# Patient Record
Sex: MALE | Race: WHITE | Employment: OTHER | URBAN - METROPOLITAN AREA
[De-identification: names, ages, dates, MRNs, and addresses within clinical notes are randomized per-mention and may not be internally consistent; named-entity substitution may affect disease eponyms.]

---

## 2017-07-28 ENCOUNTER — HOSPITAL ENCOUNTER (OUTPATIENT)
Dept: OTHER | Age: 77
Discharge: HOME | End: 2017-07-28
Attending: UROLOGY | Admitting: UROLOGY

## 2017-07-28 DIAGNOSIS — N20.0 RENAL CALCULUS, LEFT: ICD-10-CM

## 2018-03-05 PROBLEM — E11.9 DIABETES (HCC): Status: ACTIVE | Noted: 2018-03-05

## 2018-03-05 PROBLEM — R07.9 CHEST PAIN: Status: ACTIVE | Noted: 2018-03-05

## 2018-03-05 PROBLEM — E66.9 OBESITY: Status: ACTIVE | Noted: 2018-03-05

## 2018-03-05 PROBLEM — E78.5 HYPERLIPIDEMIA: Status: ACTIVE | Noted: 2018-03-05

## 2018-03-05 PROBLEM — I10 HYPERTENSION: Status: ACTIVE | Noted: 2018-03-05

## 2018-07-27 ENCOUNTER — HOSPITAL ENCOUNTER (EMERGENCY)
Age: 78
Discharge: HOME | End: 2018-07-27
Payer: MEDICARE

## 2018-07-27 ENCOUNTER — APPOINTMENT (OUTPATIENT)
Dept: GENERAL RADIOLOGY | Age: 78
End: 2018-07-27
Payer: MEDICARE

## 2018-07-27 VITALS
WEIGHT: 217 LBS | RESPIRATION RATE: 16 BRPM | OXYGEN SATURATION: 98 % | HEART RATE: 79 BPM | DIASTOLIC BLOOD PRESSURE: 79 MMHG | HEIGHT: 70 IN | BODY MASS INDEX: 31.07 KG/M2 | SYSTOLIC BLOOD PRESSURE: 136 MMHG | TEMPERATURE: 97.8 F

## 2018-07-27 DIAGNOSIS — M25.552 LEFT HIP PAIN: Primary | ICD-10-CM

## 2018-07-27 DIAGNOSIS — M54.50 ACUTE BILATERAL LOW BACK PAIN WITHOUT SCIATICA: ICD-10-CM

## 2018-07-27 PROCEDURE — 73502 X-RAY EXAM HIP UNI 2-3 VIEWS: CPT

## 2018-07-27 PROCEDURE — 72100 X-RAY EXAM L-S SPINE 2/3 VWS: CPT

## 2018-07-27 PROCEDURE — 6360000002 HC RX W HCPCS: Performed by: PHYSICIAN ASSISTANT

## 2018-07-27 PROCEDURE — 96372 THER/PROPH/DIAG INJ SC/IM: CPT

## 2018-07-27 PROCEDURE — 99283 EMERGENCY DEPT VISIT LOW MDM: CPT

## 2018-07-27 RX ORDER — ORPHENADRINE CITRATE 30 MG/ML
60 INJECTION INTRAMUSCULAR; INTRAVENOUS ONCE
Status: COMPLETED | OUTPATIENT
Start: 2018-07-27 | End: 2018-07-27

## 2018-07-27 RX ORDER — KETOROLAC TROMETHAMINE 30 MG/ML
30 INJECTION, SOLUTION INTRAMUSCULAR; INTRAVENOUS ONCE
Status: COMPLETED | OUTPATIENT
Start: 2018-07-27 | End: 2018-07-27

## 2018-07-27 RX ORDER — CYCLOBENZAPRINE HCL 10 MG
10 TABLET ORAL 3 TIMES DAILY PRN
Qty: 20 TABLET | Refills: 0 | Status: SHIPPED | OUTPATIENT
Start: 2018-07-27 | End: 2018-08-03

## 2018-07-27 RX ORDER — METHYLPREDNISOLONE 4 MG/1
TABLET ORAL
Qty: 1 KIT | Refills: 0 | Status: SHIPPED | OUTPATIENT
Start: 2018-07-27 | End: 2018-08-02

## 2018-07-27 RX ADMIN — ORPHENADRINE CITRATE 60 MG: 30 INJECTION INTRAMUSCULAR; INTRAVENOUS at 17:29

## 2018-07-27 RX ADMIN — KETOROLAC TROMETHAMINE 30 MG: 30 INJECTION, SOLUTION INTRAMUSCULAR at 17:29

## 2018-07-27 ASSESSMENT — PAIN SCALES - GENERAL
PAINLEVEL_OUTOF10: 7
PAINLEVEL_OUTOF10: 7

## 2018-07-27 NOTE — ED PROVIDER NOTES
Seen independently        [facility]  eMERGENCY dEPARTMENT eNCOUnter        Pt Name: Christelle Bateman  MRN: [id number]  [place] 1940  Date of evaluation: 7/27/2018  Provider: Shireen Atkinson PA-C  PCP: Angélica Kenny MD  ED Attending: No att. providers found    [facility]       Chief Complaint   Patient presents with    Hip Pain     pt c/o left hip pain and lower back pain that started tuesday. Pt picked up bottled water and thinks he may have done something to it or it is because he wears his wallet on his left side    Back Pain       HISTORY OF PRESENT ILLNESS   (Location/Symptom, Timing/Onset, Context/Setting, Quality, Duration, Modifying Factors, Severity)  Note limiting factors. Christelle Bateman is a [de-identified] y.o. male presents to the emergency department with left-sided lower back pain as well as left hip pain. The patient denies any known injury, but stated it started after he placed for 35-40 pound water jugs in the car on Tuesday. He describes his pain as a 2 out of 10 dull ache that is worse with changing position and walking. He denies any known injury, fever, chills, nausea, vomiting, chest pain, shortness of breath, abdominal pain, urinary symptoms, saddle paresthesias, bowel or bladder incontinence or retention, recreational drug abuse, history of recent neck or back surgery. The patient states that he does have neuropathy and has noticed no change in his numbness/tingling. Nursing Notes were all reviewed and agreed with or any disagreements were addressed  in the HPI. REVIEW OF SYSTEMS    (2-9 systems for level 4, 10 or more for level 5)     Review of Systems    Positives and Pertinent negatives as per HPI. Except as noted above in the ROS, all other systems were reviewed and negative.        PAST MEDICAL HISTORY     Past Medical History:   Diagnosis Date    Diabetes mellitus ([address]     type 2    Glaucoma     Hyperlipidemia     Hypertension     Kidney stone seen.  No fracture or destructive bony lesion is present. Mild endplate bony spurring is present. Severe narrowing of the L5-S1 interspace is present. Moderate degenerative changes of the mid and lower lumbar facet joints are present. No acute abnormality. Mild lumbar spondylosis. Moderate facet arthropathy. Xr Hip Left (2-3 Views)    Result Date: 7/27/2018  EXAMINATION: 2 XRAY VIEWS OF THE LEFT HIP 7/27/2018 5:14 pm COMPARISON: CT pelvis, 07/17/2017 HISTORY: ORDERING SYSTEM PROVIDED HISTORY: pain, no known injury TECHNOLOGIST PROVIDED HISTORY: Reason for exam:->pain, no known injury Ordering Physician Provided Reason for Exam: hip pain Acuity: Acute Type of Exam: Initial FINDINGS: The alignment of the pelvis is normal.  There is no acute fracture or subluxation. No significant arthropathy is appreciated. Arterial vascular calcifications are noted. No acute injury or significant arthropathy of the left hip is identified. PROCEDURES   Unless otherwise noted below, none     Procedures    CRITICAL CARE TIME   N/A    CONSULTS:  None      EMERGENCY DEPARTMENT COURSE and DIFFERENTIAL DIAGNOSIS/MDM:   Vitals:    Vitals:    07/27/18 1650 07/27/18 1652   BP:  136/79   Pulse: 79 79   Resp:  16   Temp:  97.8 °F (36.6 °C)   SpO2:  98%   Weight:  217 lb (98.4 kg)   Height:  5' 9.5\" (1.765 m)       Patient was given the following medications:  Medications   ketorolac (TORADOL) injection 30 mg (30 mg Intramuscular Given 7/27/18 1729)   orphenadrine (NORFLEX) injection 60 mg (60 mg Intramuscular Given 7/27/18 1729)     This patient has back pain and left hip pain which appears to be musculoskeletal.  The patient was given IM Norflex and Toradol. X-rays of the left hip show no acute injury or significant arthropathy of the left hip. X-ray of the lumbar spine shows no acute abnormality, but does show evidence of mild lumbar spondylosis and moderate facet arthropathy.   The symptoms do not appear to be cauda

## 2018-10-05 ENCOUNTER — HOSPITAL ENCOUNTER (OUTPATIENT)
Dept: GENERAL RADIOLOGY | Age: 78
Discharge: HOME | End: 2018-10-05
Payer: MEDICARE

## 2018-10-05 ENCOUNTER — HOSPITAL ENCOUNTER (OUTPATIENT)
Age: 78
Discharge: HOME | End: 2018-10-05
Payer: MEDICARE

## 2018-10-05 DIAGNOSIS — M25.552 LEFT HIP PAIN: ICD-10-CM

## 2018-10-05 PROCEDURE — 73501 X-RAY EXAM HIP UNI 1 VIEW: CPT

## 2018-12-02 ENCOUNTER — APPOINTMENT (OUTPATIENT)
Dept: GENERAL RADIOLOGY | Age: 78
End: 2018-12-02
Payer: MEDICARE

## 2018-12-02 ENCOUNTER — HOSPITAL ENCOUNTER (EMERGENCY)
Age: 78
Discharge: HOME | End: 2018-12-02
Attending: EMERGENCY MEDICINE
Payer: MEDICARE

## 2018-12-02 VITALS
SYSTOLIC BLOOD PRESSURE: 160 MMHG | BODY MASS INDEX: 32.29 KG/M2 | DIASTOLIC BLOOD PRESSURE: 87 MMHG | TEMPERATURE: 98.6 F | RESPIRATION RATE: 16 BRPM | HEART RATE: 88 BPM | HEIGHT: 69 IN | WEIGHT: 218 LBS | OXYGEN SATURATION: 93 %

## 2018-12-02 DIAGNOSIS — R05.9 COUGH: Primary | ICD-10-CM

## 2018-12-02 LAB
RAPID INFLUENZA  B AGN: NEGATIVE
RAPID INFLUENZA A AGN: NEGATIVE

## 2018-12-02 PROCEDURE — 99284 EMERGENCY DEPT VISIT MOD MDM: CPT

## 2018-12-02 PROCEDURE — 6370000000 HC RX 637 (ALT 250 FOR IP): Performed by: EMERGENCY MEDICINE

## 2018-12-02 PROCEDURE — 87804 INFLUENZA ASSAY W/OPTIC: CPT

## 2018-12-02 PROCEDURE — 71046 X-RAY EXAM CHEST 2 VIEWS: CPT

## 2018-12-02 RX ORDER — ASCORBIC ACID 500 MG
500 TABLET ORAL DAILY
COMMUNITY
End: 2021-07-27

## 2018-12-02 RX ORDER — OMEPRAZOLE 10 MG/1
10 CAPSULE, DELAYED RELEASE ORAL DAILY
COMMUNITY
End: 2021-07-27

## 2018-12-02 RX ORDER — BENZONATATE 200 MG/1
200 CAPSULE ORAL 3 TIMES DAILY PRN
Qty: 30 CAPSULE | Refills: 0 | Status: SHIPPED | OUTPATIENT
Start: 2018-12-02 | End: 2018-12-09

## 2018-12-02 RX ORDER — BROMPHENIRAMINE MALEATE, PSEUDOEPHEDRINE HYDROCHLORIDE, AND DEXTROMETHORPHAN HYDROBROMIDE 2; 30; 10 MG/5ML; MG/5ML; MG/5ML
5 SYRUP ORAL 4 TIMES DAILY PRN
Qty: 150 ML | Refills: 0 | Status: SHIPPED | OUTPATIENT
Start: 2018-12-02 | End: 2021-07-27

## 2018-12-02 RX ORDER — BENZONATATE 100 MG/1
100 CAPSULE ORAL ONCE
Status: COMPLETED | OUTPATIENT
Start: 2018-12-02 | End: 2018-12-02

## 2018-12-02 RX ADMIN — BENZONATATE 100 MG: 100 CAPSULE ORAL at 07:01

## 2018-12-02 NOTE — ED NOTES
Called RT about chest xray results. She is calling them, because she sts it looks like no one has \" picked it up to read\".       Hua Guzman RN  12/02/18 2359

## 2018-12-16 ENCOUNTER — APPOINTMENT (OUTPATIENT)
Dept: CT IMAGING | Age: 78
End: 2018-12-16
Payer: MEDICARE

## 2018-12-16 ENCOUNTER — HOSPITAL ENCOUNTER (EMERGENCY)
Age: 78
Discharge: HOME | End: 2018-12-16
Payer: MEDICARE

## 2018-12-16 VITALS
BODY MASS INDEX: 31.75 KG/M2 | TEMPERATURE: 96.6 F | OXYGEN SATURATION: 95 % | HEART RATE: 91 BPM | SYSTOLIC BLOOD PRESSURE: 138 MMHG | RESPIRATION RATE: 16 BRPM | DIASTOLIC BLOOD PRESSURE: 75 MMHG | WEIGHT: 215 LBS

## 2018-12-16 DIAGNOSIS — N20.0 KIDNEY STONE: Primary | ICD-10-CM

## 2018-12-16 DIAGNOSIS — K86.2 PANCREATIC CYST: ICD-10-CM

## 2018-12-16 DIAGNOSIS — R10.9 FLANK PAIN: ICD-10-CM

## 2018-12-16 LAB
A/G RATIO: 1.2 (ref 1.1–2.2)
ALBUMIN SERPL-MCNC: 3.9 G/DL (ref 3.4–5)
ALP BLD-CCNC: 93 U/L (ref 40–129)
ALT SERPL-CCNC: 14 U/L (ref 10–40)
AMORPHOUS: ABNORMAL /HPF
ANION GAP SERPL CALCULATED.3IONS-SCNC: 10 MMOL/L (ref 3–16)
AST SERPL-CCNC: 18 U/L (ref 15–37)
BASOPHILS ABSOLUTE: 0.1 K/UL (ref 0–0.2)
BASOPHILS RELATIVE PERCENT: 0.5 %
BILIRUB SERPL-MCNC: 0.5 MG/DL (ref 0–1)
BILIRUBIN URINE: NEGATIVE
BLOOD, URINE: ABNORMAL
BUN BLDV-MCNC: 18 MG/DL (ref 7–20)
CALCIUM SERPL-MCNC: 9.5 MG/DL (ref 8.3–10.6)
CHLORIDE BLD-SCNC: 101 MMOL/L (ref 99–110)
CLARITY: CLEAR
CO2: 26 MMOL/L (ref 21–32)
COLOR: YELLOW
CREAT SERPL-MCNC: 0.9 MG/DL (ref 0.8–1.3)
EOSINOPHILS ABSOLUTE: 0.1 K/UL (ref 0–0.6)
EOSINOPHILS RELATIVE PERCENT: 1 %
EPITHELIAL CELLS, UA: ABNORMAL /HPF
GFR AFRICAN AMERICAN: >60
GFR NON-AFRICAN AMERICAN: >60
GLOBULIN: 3.3 G/DL
GLUCOSE BLD-MCNC: 207 MG/DL (ref 70–99)
GLUCOSE URINE: 500 MG/DL
HCT VFR BLD CALC: 50.3 % (ref 40.5–52.5)
HEMOGLOBIN: 17 G/DL (ref 13.5–17.5)
KETONES, URINE: ABNORMAL MG/DL
LEUKOCYTE ESTERASE, URINE: NEGATIVE
LIPASE: 12 U/L (ref 13–60)
LYMPHOCYTES ABSOLUTE: 1.2 K/UL (ref 1–5.1)
LYMPHOCYTES RELATIVE PERCENT: 9.2 %
MCH RBC QN AUTO: 30.4 PG (ref 26–34)
MCHC RBC AUTO-ENTMCNC: 33.8 G/DL (ref 31–36)
MCV RBC AUTO: 89.9 FL (ref 80–100)
MICROSCOPIC EXAMINATION: YES
MONOCYTES ABSOLUTE: 0.6 K/UL (ref 0–1.3)
MONOCYTES RELATIVE PERCENT: 5.1 %
NEUTROPHILS ABSOLUTE: 10.6 K/UL (ref 1.7–7.7)
NEUTROPHILS RELATIVE PERCENT: 84.2 %
NITRITE, URINE: NEGATIVE
PDW BLD-RTO: 13.8 % (ref 12.4–15.4)
PH UA: 6
PLATELET # BLD: 160 K/UL (ref 135–450)
PMV BLD AUTO: 9.1 FL (ref 5–10.5)
POTASSIUM SERPL-SCNC: 3.8 MMOL/L (ref 3.5–5.1)
PROTEIN UA: 30 MG/DL
RBC # BLD: 5.59 M/UL (ref 4.2–5.9)
RBC UA: ABNORMAL /HPF (ref 0–2)
RENAL EPITHELIAL, UA: ABNORMAL /HPF
SODIUM BLD-SCNC: 137 MMOL/L (ref 136–145)
SPECIFIC GRAVITY UA: 1.02
TOTAL PROTEIN: 7.2 G/DL (ref 6.4–8.2)
URINE REFLEX TO CULTURE: ABNORMAL
URINE TYPE: ABNORMAL
UROBILINOGEN, URINE: 0.2 E.U./DL
WBC # BLD: 12.5 K/UL (ref 4–11)
WBC UA: ABNORMAL /HPF (ref 0–5)

## 2018-12-16 PROCEDURE — 6370000000 HC RX 637 (ALT 250 FOR IP): Performed by: PHYSICIAN ASSISTANT

## 2018-12-16 PROCEDURE — 2580000003 HC RX 258: Performed by: PHYSICIAN ASSISTANT

## 2018-12-16 PROCEDURE — 81001 URINALYSIS AUTO W/SCOPE: CPT

## 2018-12-16 PROCEDURE — 74176 CT ABD & PELVIS W/O CONTRAST: CPT

## 2018-12-16 PROCEDURE — 6360000002 HC RX W HCPCS: Performed by: PHYSICIAN ASSISTANT

## 2018-12-16 PROCEDURE — 96374 THER/PROPH/DIAG INJ IV PUSH: CPT

## 2018-12-16 PROCEDURE — 96361 HYDRATE IV INFUSION ADD-ON: CPT

## 2018-12-16 PROCEDURE — 99284 EMERGENCY DEPT VISIT MOD MDM: CPT

## 2018-12-16 PROCEDURE — 85025 COMPLETE CBC W/AUTO DIFF WBC: CPT

## 2018-12-16 PROCEDURE — 83690 ASSAY OF LIPASE: CPT

## 2018-12-16 PROCEDURE — 96375 TX/PRO/DX INJ NEW DRUG ADDON: CPT

## 2018-12-16 PROCEDURE — 80053 COMPREHEN METABOLIC PANEL: CPT

## 2018-12-16 RX ORDER — KETOROLAC TROMETHAMINE 30 MG/ML
15 INJECTION, SOLUTION INTRAMUSCULAR; INTRAVENOUS ONCE
Status: COMPLETED | OUTPATIENT
Start: 2018-12-16 | End: 2018-12-16

## 2018-12-16 RX ORDER — TAMSULOSIN HYDROCHLORIDE 0.4 MG/1
0.4 CAPSULE ORAL ONCE
Status: COMPLETED | OUTPATIENT
Start: 2018-12-16 | End: 2018-12-16

## 2018-12-16 RX ORDER — ONDANSETRON 2 MG/ML
4 INJECTION INTRAMUSCULAR; INTRAVENOUS ONCE
Status: COMPLETED | OUTPATIENT
Start: 2018-12-16 | End: 2018-12-16

## 2018-12-16 RX ORDER — OXYCODONE HYDROCHLORIDE AND ACETAMINOPHEN 5; 325 MG/1; MG/1
1 TABLET ORAL ONCE
Status: COMPLETED | OUTPATIENT
Start: 2018-12-16 | End: 2018-12-16

## 2018-12-16 RX ORDER — ONDANSETRON 4 MG/1
4-8 TABLET, ORALLY DISINTEGRATING ORAL EVERY 12 HOURS PRN
Qty: 12 TABLET | Refills: 0 | Status: SHIPPED | OUTPATIENT
Start: 2018-12-16

## 2018-12-16 RX ORDER — MORPHINE SULFATE 2 MG/ML
4 INJECTION, SOLUTION INTRAMUSCULAR; INTRAVENOUS ONCE
Status: COMPLETED | OUTPATIENT
Start: 2018-12-16 | End: 2018-12-16

## 2018-12-16 RX ORDER — 0.9 % SODIUM CHLORIDE 0.9 %
1000 INTRAVENOUS SOLUTION INTRAVENOUS ONCE
Status: COMPLETED | OUTPATIENT
Start: 2018-12-16 | End: 2018-12-16

## 2018-12-16 RX ORDER — OXYCODONE HYDROCHLORIDE AND ACETAMINOPHEN 5; 325 MG/1; MG/1
1 TABLET ORAL EVERY 6 HOURS PRN
Qty: 12 TABLET | Refills: 0 | Status: SHIPPED | OUTPATIENT
Start: 2018-12-16 | End: 2018-12-19

## 2018-12-16 RX ORDER — KETOROLAC TROMETHAMINE 10 MG/1
10 TABLET, FILM COATED ORAL EVERY 6 HOURS PRN
Qty: 20 TABLET | Refills: 0 | Status: ON HOLD | OUTPATIENT
Start: 2018-12-16 | End: 2020-01-20 | Stop reason: HOSPADM

## 2018-12-16 RX ORDER — ONDANSETRON 4 MG/1
8 TABLET, ORALLY DISINTEGRATING ORAL ONCE
Status: COMPLETED | OUTPATIENT
Start: 2018-12-16 | End: 2018-12-16

## 2018-12-16 RX ORDER — TAMSULOSIN HYDROCHLORIDE 0.4 MG/1
0.4 CAPSULE ORAL DAILY
Qty: 10 CAPSULE | Refills: 0 | Status: ON HOLD | OUTPATIENT
Start: 2018-12-16 | End: 2020-01-20 | Stop reason: HOSPADM

## 2018-12-16 RX ADMIN — MORPHINE SULFATE 4 MG: 2 INJECTION, SOLUTION INTRAMUSCULAR; INTRAVENOUS at 17:04

## 2018-12-16 RX ADMIN — TAMSULOSIN HYDROCHLORIDE 0.4 MG: 0.4 CAPSULE ORAL at 19:08

## 2018-12-16 RX ADMIN — ONDANSETRON 4 MG: 2 INJECTION INTRAMUSCULAR; INTRAVENOUS at 17:01

## 2018-12-16 RX ADMIN — KETOROLAC TROMETHAMINE 15 MG: 30 INJECTION, SOLUTION INTRAMUSCULAR at 17:02

## 2018-12-16 RX ADMIN — SODIUM CHLORIDE 1000 ML: 9 INJECTION, SOLUTION INTRAVENOUS at 17:01

## 2018-12-16 RX ADMIN — OXYCODONE HYDROCHLORIDE AND ACETAMINOPHEN 1 TABLET: 5; 325 TABLET ORAL at 19:12

## 2018-12-16 RX ADMIN — ONDANSETRON 8 MG: 4 TABLET, ORALLY DISINTEGRATING ORAL at 19:12

## 2018-12-16 ASSESSMENT — PAIN SCALES - GENERAL
PAINLEVEL_OUTOF10: 6
PAINLEVEL_OUTOF10: 4
PAINLEVEL_OUTOF10: 6
PAINLEVEL_OUTOF10: 4
PAINLEVEL_OUTOF10: 6

## 2018-12-16 ASSESSMENT — PAIN DESCRIPTION - LOCATION: LOCATION: FLANK

## 2018-12-16 ASSESSMENT — PAIN DESCRIPTION - PAIN TYPE: TYPE: ACUTE PAIN

## 2018-12-17 NOTE — ED PROVIDER NOTES
EYE SURGERY      left eyemed valve, bilateral retinal repair    FOOT SURGERY      right, tendon repair    KIDNEY STONE SURGERY      OTHER SURGICAL HISTORY Left 07/17/2017    CYSTOSCOPY, LEFT URETEROSCOPY, STENT PLACEMENT, URETHERAL dilation, stone manipulation          CURRENTMEDICATIONS       Discharge Medication List as of 12/16/2018  7:08 PM      CONTINUE these medications which have NOT CHANGED    Details   Cyanocobalamin ([employer] VITAMIN B-12 SL) Place under the tongue dailyHistorical Med      vitamin C (ASCORBIC ACID) 500 MG tablet Take 500 mg by mouth dailyHistorical Med      omeprazole (PRILOSEC) 10 MG delayed release capsule Take 10 mg by mouth daily Unsure of dose. Historical Med      brompheniramine-pseudoephedrine-DM (BROMFED DM) 30-2-10 MG/5ML syrup Take 5 mLs by mouth 4 times daily as needed for Congestion or Cough, Disp-150 mL, R-0Print      benazepril (LOTENSIN) 20 MG tablet Take 40 mg by mouth dailyHistorical Med      aspirin 81 MG tablet Take 81 mg by mouth dailyHistorical Med      docusate sodium (COLACE) 100 MG capsule Take 100 mg by mouth 2 times daily as needed for ConstipationHistorical Med      Cholecalciferol (VITAMIN D3) 5000 units TABS Take by mouth dailyHistorical Med      NONFORMULARY Pt takes a probiotic dose [address] [facility] that has about 8 pills in it, Unknown what each pill is. Historical Med      brimonidine (ALPHAGAN) 0.2 % ophthalmic solution Place 1 drop into both eyes 2 times daily Historical Med      b complex vitamins capsule Take 1 capsule by mouth daily      PEARL PARRA REPENS, PO Take by mouth      AMLODIPINE BESYLATE PO Take 10 mg by mouth daily Historical Med      Dorzolamide HCl (TRUSOPT OP) Apply 1 drop to eye 2 times daily Historical Med      Multiple Vitamin (MULTI VITAMIN MENS PO) Take  by mouth. insulin aspart (NOVOLOG) 100 UNIT/ML injection Inject  into the skin 3 times daily (before meals).  Sliding scale based on meal calculation       insulin glargine (LANTUS) 100 UNIT/ML injection Inject 40 Units into the skin 2 times daily. ALLERGIES     Patient has no known allergies. FAMILYHISTORY     History reviewed. No pertinent family history. SOCIAL HISTORY       Social History     Social History    Marital status:      Spouse name: N/A    Number of children: N/A    Years of education: N/A     Social History Main Topics    Smoking status: Never Smoker    Smokeless tobacco: Former User    Alcohol use No    Drug use: No    Sexual activity: Yes     Partners: Female     Other Topics Concern    None     Social History Narrative    None       SCREENINGS             PHYSICAL EXAM    (up to 7 for level 4, 8 or more for level 5)     ED Triage Vitals [12/16/18 1643]   BP Temp Temp Source Pulse Resp SpO2 Height Weight   (!) 179/92 96.6 °F (35.9 °C) Oral 97 20 96 % -- 215 lb (97.5 kg)       Physical Exam   Constitutional: He is oriented to person, place, and time. He appears well-developed and well-nourished. HENT:   Head: Normocephalic and atraumatic. Right Ear: External ear normal.   Left Ear: External ear normal.   Nose: Nose normal.   Eyes: Conjunctivae are normal. Right eye exhibits no discharge. Left eye exhibits no discharge. No scleral icterus. Neck: Normal range of motion. Neck supple. Cardiovascular: Normal rate, regular rhythm, normal heart sounds and intact distal pulses. Exam reveals no gallop and no friction rub. No murmur heard. Pulmonary/Chest: Effort normal and breath sounds normal. No respiratory distress. He has no wheezes. He has no rales. Abdominal: Soft. Normal appearance and bowel sounds are normal. He exhibits no distension and no mass. There is tenderness in the left upper quadrant. There is no rigidity, no rebound, no guarding, no CVA tenderness, no tenderness at McBurney's point and negative Roberts's sign. No hernia. Musculoskeletal: He exhibits no edema.    Neurological: He is alert and oriented to pancreas. Punctate calcifications within the spleen are likely related to prior granulomatous disease. No adrenal lesion. No right-sided hydronephrosis. Large left renal cyst.  Mild left-sided hydronephrosis. Calcifications within the left renal pelvis measure up to 1.2 cm and are suspected to be causing partial obstruction. Stone volume appears increased from the previous exam. GI/Bowel: No bowel obstruction. No acute inflammatory process. Pelvis: No free fluid. No bladder calculus. Peritoneum/Retroperitoneum: Atherosclerotic calcification of the abdominal aorta. No aortic aneurysm. No adenopathy. Bones/Soft Tissues: Bilateral inguinal hernias contain fat. Umbilical hernia contains fat. Lumbar spine degenerative changes. Mild left-sided hydronephrosis. Stone volume within the left renal pelvis appears increased from the prior study and is suspected to be causing at least partial obstruction. Complex cystic pancreatic lesion similar to prior studies.          PROCEDURES   Unless otherwise noted below, none     Procedures    CRITICAL CARE TIME   N/A    CONSULTS:  IP CONSULT TO UROLOGY      EMERGENCY DEPARTMENT COURSE and DIFFERENTIALDIAGNOSIS/MDM:   Vitals:    Vitals:    12/16/18 1643 12/16/18 1726 12/16/18 1845 12/16/18 1902   BP: (!) 179/92 (!) 185/90 138/74 138/75   Pulse: 97 93  91   Resp: 20 18  16   Temp: 96.6 °F (35.9 °C)      TempSrc: Oral      SpO2: 96% 100% 99% 95%   Weight: 215 lb (97.5 kg)          Patient was given thefollowing medications:  Medications   0.9 % sodium chloride bolus (0 mLs Intravenous Stopped 12/16/18 1908)   ondansetron (ZOFRAN) injection 4 mg (4 mg Intravenous Given 12/16/18 1701)   ketorolac (TORADOL) injection 15 mg (15 mg Intravenous Given 12/16/18 1702)   morphine (PF) injection 4 mg (4 mg Intravenous Given 12/16/18 1704)   tamsulosin (FLOMAX) capsule 0.4 mg (0.4 mg Oral Given 12/16/18 1908)   oxyCODONE-acetaminophen (PERCOCET) 5-325 MG per tablet 1 tablet (1 tablet DISPOSITION/PLAN   DISPOSITION Decision To Discharge 12/16/2018 07:04:23 PM      PATIENT REFERREDTO:  [place] [facility]  [facility]  [phone]  Go to   If symptoms worsen    Riya Lowe MD  [place] [id number]  [phone]    Schedule an appointment as soon as possible for a visit         DISCHARGE MEDICATIONS:  Discharge Medication List as of 12/16/2018  7:08 PM      START taking these medications    Details   tamsulosin (FLOMAX) 0.4 MG capsule Take 1 capsule by mouth daily for 10 days, Disp-10 capsule, R-0Print      oxyCODONE-acetaminophen (PERCOCET) 5-325 MG per tablet Take 1 tablet by mouth every 6 hours as needed for Pain for up to 3 days. Intended supply: 3 days. Take lowest dose possible to manage pain., Disp-12 tablet, R-0Print      ketorolac (TORADOL) 10 MG tablet Take 1 tablet by mouth every 6 hours as needed for Pain, Disp-20 tablet, R-0Print      ondansetron (ZOFRAN ODT) 4 MG disintegrating tablet Take 1-2 tablets by mouth every 12 hours as needed for Nausea May Sub regular tablet (non-ODT) if insurance does not cover ODT., Disp-12 tablet, R-0Print             DISCONTINUED MEDICATIONS:  Discharge Medication List as of 12/16/2018  7:08 PM            Attestation: The Prescription Monitoring Report for this patient was reviewed today. (Alfonso Lynn PA-C)  Documentation: Possible medication side effects, risk of tolerance/dependence & alternative treatments discussed.  (Alfonso Lynn PA-C)    (Please note that portions ofthis note were completed with a voice recognition program.  Efforts were made to edit the dictations but occasionally words are mis-transcribed.)    Alexsander Stephenson PA-C (electronically signed)           Alfonso Lynn PA-C  12/16/18 [id number]

## 2018-12-19 ENCOUNTER — APPOINTMENT (OUTPATIENT)
Dept: GENERAL RADIOLOGY | Age: 78
End: 2018-12-19
Payer: MEDICARE

## 2018-12-19 ENCOUNTER — APPOINTMENT (OUTPATIENT)
Dept: CT IMAGING | Age: 78
End: 2018-12-19
Payer: MEDICARE

## 2018-12-19 ENCOUNTER — HOSPITAL ENCOUNTER (EMERGENCY)
Age: 78
Discharge: HOME | End: 2018-12-19
Attending: EMERGENCY MEDICINE
Payer: MEDICARE

## 2018-12-19 VITALS
RESPIRATION RATE: 16 BRPM | HEART RATE: 101 BPM | DIASTOLIC BLOOD PRESSURE: 89 MMHG | OXYGEN SATURATION: 100 % | TEMPERATURE: 97.9 F | WEIGHT: 215 LBS | HEIGHT: 69 IN | BODY MASS INDEX: 31.84 KG/M2 | SYSTOLIC BLOOD PRESSURE: 155 MMHG

## 2018-12-19 DIAGNOSIS — K59.00 CONSTIPATION, UNSPECIFIED CONSTIPATION TYPE: Primary | ICD-10-CM

## 2018-12-19 LAB
A/G RATIO: 1.1 (ref 1.1–2.2)
ALBUMIN SERPL-MCNC: 3.6 G/DL (ref 3.4–5)
ALP BLD-CCNC: 82 U/L (ref 40–129)
ALT SERPL-CCNC: 15 U/L (ref 10–40)
AMORPHOUS: ABNORMAL /HPF
ANION GAP SERPL CALCULATED.3IONS-SCNC: 11 MMOL/L (ref 3–16)
AST SERPL-CCNC: 22 U/L (ref 15–37)
BACTERIA: ABNORMAL /HPF
BASOPHILS ABSOLUTE: 0 K/UL (ref 0–0.2)
BASOPHILS RELATIVE PERCENT: 0.3 %
BILIRUB SERPL-MCNC: 0.4 MG/DL (ref 0–1)
BILIRUBIN URINE: NEGATIVE
BLOOD, URINE: ABNORMAL
BUN BLDV-MCNC: 19 MG/DL (ref 7–20)
CALCIUM SERPL-MCNC: 8.8 MG/DL (ref 8.3–10.6)
CHLORIDE BLD-SCNC: 105 MMOL/L (ref 99–110)
CLARITY: ABNORMAL
CO2: 24 MMOL/L (ref 21–32)
COLOR: YELLOW
CREAT SERPL-MCNC: 0.9 MG/DL (ref 0.8–1.3)
EOSINOPHILS ABSOLUTE: 0 K/UL (ref 0–0.6)
EOSINOPHILS RELATIVE PERCENT: 0.4 %
EPITHELIAL CELLS, UA: ABNORMAL /HPF
GFR AFRICAN AMERICAN: >60
GFR NON-AFRICAN AMERICAN: >60
GLOBULIN: 3.2 G/DL
GLUCOSE BLD-MCNC: 211 MG/DL (ref 70–99)
GLUCOSE URINE: >=1000 MG/DL
HCT VFR BLD CALC: 46.5 % (ref 40.5–52.5)
HEMOGLOBIN: 15.7 G/DL (ref 13.5–17.5)
KETONES, URINE: NEGATIVE MG/DL
LEUKOCYTE ESTERASE, URINE: NEGATIVE
LIPASE: 11 U/L (ref 13–60)
LYMPHOCYTES ABSOLUTE: 0.8 K/UL (ref 1–5.1)
LYMPHOCYTES RELATIVE PERCENT: 8.1 %
MCH RBC QN AUTO: 30.1 PG (ref 26–34)
MCHC RBC AUTO-ENTMCNC: 33.8 G/DL (ref 31–36)
MCV RBC AUTO: 88.9 FL (ref 80–100)
MICROSCOPIC EXAMINATION: YES
MONOCYTES ABSOLUTE: 0.4 K/UL (ref 0–1.3)
MONOCYTES RELATIVE PERCENT: 4.2 %
MUCUS: ABNORMAL /LPF
NEUTROPHILS ABSOLUTE: 8.7 K/UL (ref 1.7–7.7)
NEUTROPHILS RELATIVE PERCENT: 87 %
NITRITE, URINE: NEGATIVE
PDW BLD-RTO: 14.1 % (ref 12.4–15.4)
PH UA: 5.5
PLATELET # BLD: 143 K/UL (ref 135–450)
PMV BLD AUTO: 9.5 FL (ref 5–10.5)
POTASSIUM SERPL-SCNC: 4.5 MMOL/L (ref 3.5–5.1)
PROTEIN UA: NEGATIVE MG/DL
RBC # BLD: 5.23 M/UL (ref 4.2–5.9)
RBC UA: ABNORMAL /HPF (ref 0–2)
SODIUM BLD-SCNC: 140 MMOL/L (ref 136–145)
SPECIFIC GRAVITY UA: 1.01
TOTAL PROTEIN: 6.8 G/DL (ref 6.4–8.2)
URINE TYPE: ABNORMAL
UROBILINOGEN, URINE: 0.2 E.U./DL
WBC # BLD: 10 K/UL (ref 4–11)
WBC UA: ABNORMAL /HPF (ref 0–5)

## 2018-12-19 PROCEDURE — 83690 ASSAY OF LIPASE: CPT

## 2018-12-19 PROCEDURE — 6370000000 HC RX 637 (ALT 250 FOR IP): Performed by: NURSE PRACTITIONER

## 2018-12-19 PROCEDURE — 96360 HYDRATION IV INFUSION INIT: CPT

## 2018-12-19 PROCEDURE — 85025 COMPLETE CBC W/AUTO DIFF WBC: CPT

## 2018-12-19 PROCEDURE — 99284 EMERGENCY DEPT VISIT MOD MDM: CPT

## 2018-12-19 PROCEDURE — 80053 COMPREHEN METABOLIC PANEL: CPT

## 2018-12-19 PROCEDURE — 81001 URINALYSIS AUTO W/SCOPE: CPT

## 2018-12-19 PROCEDURE — 2580000003 HC RX 258: Performed by: NURSE PRACTITIONER

## 2018-12-19 PROCEDURE — 74177 CT ABD & PELVIS W/CONTRAST: CPT

## 2018-12-19 PROCEDURE — 6360000004 HC RX CONTRAST MEDICATION: Performed by: NURSE PRACTITIONER

## 2018-12-19 RX ORDER — POLYETHYLENE GLYCOL 3350 17 G/17G
17 POWDER, FOR SOLUTION ORAL DAILY
Qty: 1 BOTTLE | Refills: 0 | Status: SHIPPED | OUTPATIENT
Start: 2018-12-19 | End: 2018-12-26

## 2018-12-19 RX ORDER — DICYCLOMINE HYDROCHLORIDE 10 MG/1
10 CAPSULE ORAL ONCE
Status: COMPLETED | OUTPATIENT
Start: 2018-12-19 | End: 2018-12-19

## 2018-12-19 RX ORDER — 0.9 % SODIUM CHLORIDE 0.9 %
1000 INTRAVENOUS SOLUTION INTRAVENOUS ONCE
Status: COMPLETED | OUTPATIENT
Start: 2018-12-19 | End: 2018-12-19

## 2018-12-19 RX ADMIN — DICYCLOMINE HYDROCHLORIDE 10 MG: 10 CAPSULE ORAL at 13:48

## 2018-12-19 RX ADMIN — IOPAMIDOL 75 ML: 755 INJECTION, SOLUTION INTRAVENOUS at 13:26

## 2018-12-19 RX ADMIN — SORBITOL: 258.2 SOLUTION ORAL at 14:47

## 2018-12-19 RX ADMIN — SODIUM CHLORIDE 1000 ML: 9 INJECTION, SOLUTION INTRAVENOUS at 12:41

## 2018-12-19 ASSESSMENT — PAIN SCALES - GENERAL: PAINLEVEL_OUTOF10: 6

## 2018-12-19 ASSESSMENT — ENCOUNTER SYMPTOMS
COUGH: 0
ABDOMINAL PAIN: 1
ANAL BLEEDING: 0
BLOOD IN STOOL: 0
VOMITING: 0
DIARRHEA: 0
CONSTIPATION: 1
SHORTNESS OF BREATH: 0
BACK PAIN: 0
NAUSEA: 0

## 2018-12-19 ASSESSMENT — PAIN DESCRIPTION - LOCATION: LOCATION: RECTUM

## 2018-12-19 ASSESSMENT — PAIN SCALES - WONG BAKER: WONGBAKER_NUMERICALRESPONSE: 6

## 2018-12-19 ASSESSMENT — PAIN DESCRIPTION - PAIN TYPE: TYPE: ACUTE PAIN

## 2018-12-19 NOTE — ED PROVIDER NOTES
[address] ED  eMERGENCY dEPARTMENT eNCOUnter        Pt Name: Claudia Tamayo  MRN: [id number]  [place] 1940  Date of evaluation: 12/19/2018  Provider: TRESSA Bautista - JIA  PCP: Dorrie Dandy, MD  ED Attending: No att. providers found    [address] [place] [address]       Chief Complaint   Patient presents with    Rectal Problems     complains of GI problems, 4-5 eposides of loose stools today, last good BM was 1 week ago, (took enema today)       HISTORY OF PRESENT ILLNESS   (Location/Symptom, Timing/Onset, Context/Setting, Quality, Duration, Modifying Factors, Severity)  Note limiting factors. Claudia Tamayo is a [de-identified] y.o. male  tense to the emergency department with complaints of constipation. He reports he has not had a bowel movement since last Thursday. He was seen in this emergency department on Saturday for a kidney stone and was given morphine and oxycodone. He has not needed additional doses of narcotics since he left the emergency department on Saturday. He reports that starting about 4 AM he had some abdominal cramping and rectal pressure like he needed to use the bathroom but was unable to. He did try tapwater enema without much success. He is having no pain in his abdomen and was his abdomen is touched. He has had no abdominal surgeries. Nursing Notes were all reviewed and agreed with or any disagreements were addressed  in the HPI. REVIEW OF SYSTEMS    (2-9 systems for level 4, 10 or more for level 5)     Review of Systems   Constitutional: Negative for chills and fever. HENT: Negative. Respiratory: Negative for cough and shortness of breath. Cardiovascular: Negative for chest pain. Gastrointestinal: Positive for abdominal pain and constipation. Negative for anal bleeding, blood in stool, diarrhea, nausea and vomiting. Genitourinary: Negative for dysuria, flank pain and hematuria. Musculoskeletal: Negative for back pain. Skin: Negative. Abnormal; Notable for the following:     Mucus, UA 1+ (*)     RBC, UA  (*)     Bacteria, UA Rare (*)     Amorphous, UA 1+ (*)     All other components within normal limits    Narrative:     Performed at:  [facility]) [facility]  [address],  [place], [place]   Phone [phone]       All other labs were within normal range or not returned as of this dictation. EKG: All EKG's are interpreted by the Emergency Department Physician who either signs orCo-signs this chart in the absence of a cardiologist.  Please see their note for interpretation of EKG. RADIOLOGY:   Non-plain film images such as CT, Ultrasound and MRI are read by the radiologist. Selestino Coca radiographic images are visualized andpreliminarily interpreted by the  ED Provider with the below findings:    Interpretation per theRadiologist below, if available at the time of this note:    CT ABDOMEN PELVIS W IV CONTRAST Additional Contrast? None   Final Result   1. Fecal impaction with mild presacral edema. 2. Unchanged 9 mm obstructing nephrolithiasis within the left renal pelvis   with resolution of the previously noted mild hydronephrosis. 3. Unchanged 5.3 cm complex cystic pancreatic mass; correlate with endoscopic   ultrasound. No results found.       PROCEDURES   Unless otherwise noted below, none     Procedures    CRITICAL CARE TIME   N/A    CONSULTS:  None      EMERGENCY DEPARTMENT COURSE and DIFFERENTIALDIAGNOSIS/MDM:   Vitals:    Vitals:    12/19/18 1151 12/19/18 1404   BP: (!) 150/79 (!) 155/89   Pulse: 100 101   Resp: 12 16   Temp: 97.9 °F (36.6 °C)    SpO2: 100% 100%   Weight: 215 lb (97.5 kg)    Height: 5' 9\" (1.753 m)        Patient was given thefollowing medications:  Medications   0.9 % sodium chloride bolus (0 mLs Intravenous Stopped 12/19/18 1341)   iopamidol (ISOVUE-370) 76 % injection 75 mL (75 mLs Intravenous Given 12/19/18 1326)   dicyclomine (BENTYL) capsule 10 mg (10 mg Oral Given

## 2018-12-19 NOTE — ED NOTES
Bed: 15  Expected date:   Expected time:   Means of arrival:   Comments:  jesi Shearer  12/19/18 1142

## 2019-01-20 ENCOUNTER — HOSPITAL ENCOUNTER (OUTPATIENT)
Age: 79
Setting detail: SPECIMEN
Discharge: HOME | End: 2019-01-20
Payer: MEDICARE

## 2019-01-20 PROCEDURE — 88300 SURGICAL PATH GROSS: CPT

## 2019-01-20 PROCEDURE — 82365 CALCULUS SPECTROSCOPY: CPT

## 2019-01-27 LAB
CALCULI COMPOSITION: NORMAL
MASS: 78 MG
STONE DESCRIPTION: NORMAL
STONE NUMBER: NORMAL
STONE SIZE: NORMAL MM

## 2019-12-27 ENCOUNTER — APPOINTMENT (OUTPATIENT)
Dept: GENERAL RADIOLOGY | Age: 79
End: 2019-12-27
Payer: MEDICARE

## 2019-12-27 ENCOUNTER — HOSPITAL ENCOUNTER (EMERGENCY)
Age: 79
Discharge: HOME | End: 2019-12-27
Payer: MEDICARE

## 2019-12-27 VITALS
TEMPERATURE: 99 F | SYSTOLIC BLOOD PRESSURE: 150 MMHG | WEIGHT: 214 LBS | BODY MASS INDEX: 31.7 KG/M2 | HEIGHT: 69 IN | DIASTOLIC BLOOD PRESSURE: 81 MMHG | RESPIRATION RATE: 18 BRPM | HEART RATE: 89 BPM | OXYGEN SATURATION: 96 %

## 2019-12-27 DIAGNOSIS — I10 ESSENTIAL HYPERTENSION: Primary | ICD-10-CM

## 2019-12-27 DIAGNOSIS — M25.552 LEFT HIP PAIN: ICD-10-CM

## 2019-12-27 LAB
A/G RATIO: 1.2 (ref 1.1–2.2)
ALBUMIN SERPL-MCNC: 4 G/DL (ref 3.4–5)
ALP BLD-CCNC: 105 U/L (ref 40–129)
ALT SERPL-CCNC: 21 U/L (ref 10–40)
ANION GAP SERPL CALCULATED.3IONS-SCNC: 12 MMOL/L (ref 3–16)
AST SERPL-CCNC: 24 U/L (ref 15–37)
BASOPHILS ABSOLUTE: 0.1 K/UL (ref 0–0.2)
BASOPHILS RELATIVE PERCENT: 1.3 %
BILIRUB SERPL-MCNC: 0.3 MG/DL (ref 0–1)
BUN BLDV-MCNC: 19 MG/DL (ref 7–20)
CALCIUM SERPL-MCNC: 9.3 MG/DL (ref 8.3–10.6)
CHLORIDE BLD-SCNC: 102 MMOL/L (ref 99–110)
CO2: 24 MMOL/L (ref 21–32)
CREAT SERPL-MCNC: 0.9 MG/DL (ref 0.8–1.3)
EOSINOPHILS ABSOLUTE: 0.1 K/UL (ref 0–0.6)
EOSINOPHILS RELATIVE PERCENT: 1.6 %
GFR AFRICAN AMERICAN: >60
GFR NON-AFRICAN AMERICAN: >60
GLOBULIN: 3.4 G/DL
GLUCOSE BLD-MCNC: 277 MG/DL (ref 70–99)
HCT VFR BLD CALC: 46.5 % (ref 40.5–52.5)
HEMOGLOBIN: 15.9 G/DL (ref 13.5–17.5)
LYMPHOCYTES ABSOLUTE: 1.3 K/UL (ref 1–5.1)
LYMPHOCYTES RELATIVE PERCENT: 15.6 %
MCH RBC QN AUTO: 31 PG (ref 26–34)
MCHC RBC AUTO-ENTMCNC: 34.1 G/DL (ref 31–36)
MCV RBC AUTO: 90.7 FL (ref 80–100)
MONOCYTES ABSOLUTE: 0.5 K/UL (ref 0–1.3)
MONOCYTES RELATIVE PERCENT: 6.3 %
NEUTROPHILS ABSOLUTE: 6.4 K/UL (ref 1.7–7.7)
NEUTROPHILS RELATIVE PERCENT: 75.2 %
PDW BLD-RTO: 13.6 % (ref 12.4–15.4)
PLATELET # BLD: 154 K/UL (ref 135–450)
PMV BLD AUTO: 8.9 FL (ref 5–10.5)
POTASSIUM REFLEX MAGNESIUM: 4.3 MMOL/L (ref 3.5–5.1)
RBC # BLD: 5.12 M/UL (ref 4.2–5.9)
SODIUM BLD-SCNC: 138 MMOL/L (ref 136–145)
TOTAL PROTEIN: 7.4 G/DL (ref 6.4–8.2)
TROPONIN: <0.01 NG/ML
WBC # BLD: 8.5 K/UL (ref 4–11)

## 2019-12-27 PROCEDURE — 73501 X-RAY EXAM HIP UNI 1 VIEW: CPT

## 2019-12-27 PROCEDURE — 99283 EMERGENCY DEPT VISIT LOW MDM: CPT

## 2019-12-27 PROCEDURE — 80053 COMPREHEN METABOLIC PANEL: CPT

## 2019-12-27 PROCEDURE — 85025 COMPLETE CBC W/AUTO DIFF WBC: CPT

## 2019-12-27 PROCEDURE — 6370000000 HC RX 637 (ALT 250 FOR IP): Performed by: NURSE PRACTITIONER

## 2019-12-27 PROCEDURE — 93005 ELECTROCARDIOGRAM TRACING: CPT | Performed by: NURSE PRACTITIONER

## 2019-12-27 PROCEDURE — 6360000002 HC RX W HCPCS: Performed by: NURSE PRACTITIONER

## 2019-12-27 PROCEDURE — 71046 X-RAY EXAM CHEST 2 VIEWS: CPT

## 2019-12-27 PROCEDURE — 96361 HYDRATE IV INFUSION ADD-ON: CPT

## 2019-12-27 PROCEDURE — 96374 THER/PROPH/DIAG INJ IV PUSH: CPT

## 2019-12-27 PROCEDURE — 2580000003 HC RX 258: Performed by: NURSE PRACTITIONER

## 2019-12-27 PROCEDURE — 84484 ASSAY OF TROPONIN QUANT: CPT

## 2019-12-27 RX ORDER — 0.9 % SODIUM CHLORIDE 0.9 %
1000 INTRAVENOUS SOLUTION INTRAVENOUS ONCE
Status: COMPLETED | OUTPATIENT
Start: 2019-12-27 | End: 2019-12-27

## 2019-12-27 RX ORDER — BENAZEPRIL HYDROCHLORIDE 20 MG/1
20 TABLET ORAL DAILY
Status: DISCONTINUED | OUTPATIENT
Start: 2019-12-27 | End: 2019-12-28 | Stop reason: HOSPADM

## 2019-12-27 RX ORDER — LIDOCAINE 50 MG/G
1 PATCH TOPICAL DAILY
Qty: 30 PATCH | Refills: 0 | Status: SHIPPED | OUTPATIENT
Start: 2019-12-27 | End: 2021-09-27

## 2019-12-27 RX ORDER — LIDOCAINE 4 G/G
1 PATCH TOPICAL ONCE
Status: DISCONTINUED | OUTPATIENT
Start: 2019-12-27 | End: 2019-12-28 | Stop reason: HOSPADM

## 2019-12-27 RX ORDER — HYDROCHLOROTHIAZIDE 25 MG/1
12.5 TABLET ORAL ONCE
Status: COMPLETED | OUTPATIENT
Start: 2019-12-27 | End: 2019-12-27

## 2019-12-27 RX ORDER — AMLODIPINE BESYLATE 10 MG/1
10 TABLET ORAL DAILY
Qty: 14 TABLET | Refills: 0 | Status: SHIPPED | OUTPATIENT
Start: 2019-12-27 | End: 2021-07-27

## 2019-12-27 RX ORDER — AMLODIPINE BESYLATE 5 MG/1
10 TABLET ORAL ONCE
Status: COMPLETED | OUTPATIENT
Start: 2019-12-27 | End: 2019-12-27

## 2019-12-27 RX ORDER — KETOROLAC TROMETHAMINE 30 MG/ML
15 INJECTION, SOLUTION INTRAMUSCULAR; INTRAVENOUS ONCE
Status: COMPLETED | OUTPATIENT
Start: 2019-12-27 | End: 2019-12-27

## 2019-12-27 RX ADMIN — KETOROLAC TROMETHAMINE 15 MG: 30 INJECTION, SOLUTION INTRAMUSCULAR at 22:25

## 2019-12-27 RX ADMIN — HYDROCHLOROTHIAZIDE 12.5 MG: 25 TABLET ORAL at 20:54

## 2019-12-27 RX ADMIN — AMLODIPINE BESYLATE 10 MG: 5 TABLET ORAL at 20:54

## 2019-12-27 RX ADMIN — BENAZEPRIL HYDROCHLORIDE 20 MG: 20 TABLET, COATED ORAL at 20:54

## 2019-12-27 RX ADMIN — SODIUM CHLORIDE 1000 ML: 9 INJECTION, SOLUTION INTRAVENOUS at 20:54

## 2019-12-27 ASSESSMENT — ENCOUNTER SYMPTOMS
SHORTNESS OF BREATH: 0
BACK PAIN: 0
COUGH: 0
GASTROINTESTINAL NEGATIVE: 1

## 2019-12-27 ASSESSMENT — PAIN SCALES - GENERAL: PAINLEVEL_OUTOF10: 0

## 2019-12-28 LAB
EKG ATRIAL RATE: 89 BPM
EKG DIAGNOSIS: NORMAL
EKG P AXIS: 39 DEGREES
EKG P-R INTERVAL: 164 MS
EKG Q-T INTERVAL: 380 MS
EKG QRS DURATION: 84 MS
EKG QTC CALCULATION (BAZETT): 462 MS
EKG R AXIS: 14 DEGREES
EKG T AXIS: 14 DEGREES
EKG VENTRICULAR RATE: 89 BPM

## 2019-12-28 PROCEDURE — 93010 ELECTROCARDIOGRAM REPORT: CPT | Performed by: INTERNAL MEDICINE

## 2020-01-16 ENCOUNTER — APPOINTMENT (OUTPATIENT)
Dept: CT IMAGING | Age: 80
End: 2020-01-16
Payer: MEDICARE

## 2020-01-16 ENCOUNTER — HOSPITAL ENCOUNTER (EMERGENCY)
Age: 80
Discharge: ANOTHER HOSPITAL | End: 2020-01-16
Attending: EMERGENCY MEDICINE
Payer: MEDICARE

## 2020-01-16 ENCOUNTER — HOSPITAL ENCOUNTER (INPATIENT)
Age: 80
LOS: 3 days | Discharge: HOME | DRG: 066 | End: 2020-01-20
Attending: INTERNAL MEDICINE | Admitting: INTERNAL MEDICINE
Payer: MEDICARE

## 2020-01-16 ENCOUNTER — APPOINTMENT (OUTPATIENT)
Dept: GENERAL RADIOLOGY | Age: 80
End: 2020-01-16
Payer: MEDICARE

## 2020-01-16 VITALS
WEIGHT: 215 LBS | TEMPERATURE: 98.5 F | HEART RATE: 88 BPM | DIASTOLIC BLOOD PRESSURE: 77 MMHG | BODY MASS INDEX: 31.84 KG/M2 | HEIGHT: 69 IN | OXYGEN SATURATION: 98 % | SYSTOLIC BLOOD PRESSURE: 137 MMHG | RESPIRATION RATE: 17 BRPM

## 2020-01-16 PROBLEM — G45.9 TIA (TRANSIENT ISCHEMIC ATTACK): Status: ACTIVE | Noted: 2020-01-16

## 2020-01-16 LAB
A/G RATIO: 1.5 (ref 1.1–2.2)
ALBUMIN SERPL-MCNC: 4.1 G/DL (ref 3.4–5)
ALP BLD-CCNC: 90 U/L (ref 40–129)
ALT SERPL-CCNC: 27 U/L (ref 10–40)
ANION GAP SERPL CALCULATED.3IONS-SCNC: 14 MMOL/L (ref 3–16)
AST SERPL-CCNC: 24 U/L (ref 15–37)
BASE EXCESS VENOUS: -1.1 MMOL/L (ref -3–3)
BASOPHILS ABSOLUTE: 0.1 K/UL (ref 0–0.2)
BASOPHILS RELATIVE PERCENT: 0.8 %
BETA-HYDROXYBUTYRATE: 0.2 MMOL/L (ref 0–0.27)
BILIRUB SERPL-MCNC: 0.4 MG/DL (ref 0–1)
BILIRUBIN URINE: NEGATIVE
BLOOD, URINE: NEGATIVE
BUN BLDV-MCNC: 26 MG/DL (ref 7–20)
CALCIUM SERPL-MCNC: 9.4 MG/DL (ref 8.3–10.6)
CARBOXYHEMOGLOBIN: 3.5 % (ref 0–1.5)
CHLORIDE BLD-SCNC: 99 MMOL/L (ref 99–110)
CHP ED QC CHECK: NORMAL
CLARITY: CLEAR
CO2: 22 MMOL/L (ref 21–32)
COLOR: YELLOW
CREAT SERPL-MCNC: 1 MG/DL (ref 0.8–1.3)
EKG ATRIAL RATE: 98 BPM
EKG DIAGNOSIS: NORMAL
EKG P AXIS: 54 DEGREES
EKG P-R INTERVAL: 178 MS
EKG Q-T INTERVAL: 362 MS
EKG QRS DURATION: 82 MS
EKG QTC CALCULATION (BAZETT): 462 MS
EKG R AXIS: 39 DEGREES
EKG T AXIS: 35 DEGREES
EKG VENTRICULAR RATE: 98 BPM
EOSINOPHILS ABSOLUTE: 0.1 K/UL (ref 0–0.6)
EOSINOPHILS RELATIVE PERCENT: 1.4 %
GFR AFRICAN AMERICAN: >60
GFR NON-AFRICAN AMERICAN: >60
GLOBULIN: 2.7 G/DL
GLUCOSE BLD-MCNC: 351 MG/DL (ref 70–99)
GLUCOSE BLD-MCNC: 359 MG/DL
GLUCOSE BLD-MCNC: 359 MG/DL (ref 70–99)
GLUCOSE URINE: >=1000 MG/DL
HCO3 VENOUS: 22.6 MMOL/L (ref 23–29)
HCT VFR BLD CALC: 46.2 % (ref 40.5–52.5)
HEMOGLOBIN: 15.7 G/DL (ref 13.5–17.5)
INR BLD: 0.95 (ref 0.86–1.14)
KETONES, URINE: NEGATIVE MG/DL
LEUKOCYTE ESTERASE, URINE: NEGATIVE
LYMPHOCYTES ABSOLUTE: 1.6 K/UL (ref 1–5.1)
LYMPHOCYTES RELATIVE PERCENT: 19.8 %
MCH RBC QN AUTO: 30.9 PG (ref 26–34)
MCHC RBC AUTO-ENTMCNC: 34 G/DL (ref 31–36)
MCV RBC AUTO: 91 FL (ref 80–100)
METHEMOGLOBIN VENOUS: 0.3 %
MICROSCOPIC EXAMINATION: ABNORMAL
MONOCYTES ABSOLUTE: 0.4 K/UL (ref 0–1.3)
MONOCYTES RELATIVE PERCENT: 5.1 %
NEUTROPHILS ABSOLUTE: 5.8 K/UL (ref 1.7–7.7)
NEUTROPHILS RELATIVE PERCENT: 72.9 %
NITRITE, URINE: NEGATIVE
O2 CONTENT, VEN: 21 VOL %
O2 SAT, VEN: 97 %
O2 THERAPY: ABNORMAL
PCO2, VEN: 35.2 MMHG (ref 40–50)
PDW BLD-RTO: 13.4 % (ref 12.4–15.4)
PERFORMED ON: ABNORMAL
PH UA: 5 (ref 5–8)
PH VENOUS: 7.43 (ref 7.35–7.45)
PLATELET # BLD: 153 K/UL (ref 135–450)
PMV BLD AUTO: 9.6 FL (ref 5–10.5)
PO2, VEN: 86.4 MMHG (ref 25–40)
POTASSIUM REFLEX MAGNESIUM: 4.8 MMOL/L (ref 3.5–5.1)
PROTEIN UA: NEGATIVE MG/DL
PROTHROMBIN TIME: 11 SEC (ref 10–13.2)
RBC # BLD: 5.07 M/UL (ref 4.2–5.9)
SODIUM BLD-SCNC: 135 MMOL/L (ref 136–145)
SPECIFIC GRAVITY UA: 1.01 (ref 1–1.03)
TCO2 CALC VENOUS: 24 MMOL/L
TOTAL PROTEIN: 6.8 G/DL (ref 6.4–8.2)
TROPONIN: 0.02 NG/ML
URINE REFLEX TO CULTURE: ABNORMAL
URINE TYPE: ABNORMAL
UROBILINOGEN, URINE: 0.2 E.U./DL
WBC # BLD: 8 K/UL (ref 4–11)

## 2020-01-16 PROCEDURE — 71045 X-RAY EXAM CHEST 1 VIEW: CPT

## 2020-01-16 PROCEDURE — 93010 ELECTROCARDIOGRAM REPORT: CPT | Performed by: INTERNAL MEDICINE

## 2020-01-16 PROCEDURE — 82803 BLOOD GASES ANY COMBINATION: CPT

## 2020-01-16 PROCEDURE — 2580000003 HC RX 258: Performed by: EMERGENCY MEDICINE

## 2020-01-16 PROCEDURE — 93005 ELECTROCARDIOGRAM TRACING: CPT | Performed by: EMERGENCY MEDICINE

## 2020-01-16 PROCEDURE — 84484 ASSAY OF TROPONIN QUANT: CPT

## 2020-01-16 PROCEDURE — 6370000000 HC RX 637 (ALT 250 FOR IP): Performed by: INTERNAL MEDICINE

## 2020-01-16 PROCEDURE — 70450 CT HEAD/BRAIN W/O DYE: CPT

## 2020-01-16 PROCEDURE — 85025 COMPLETE CBC W/AUTO DIFF WBC: CPT

## 2020-01-16 PROCEDURE — 85610 PROTHROMBIN TIME: CPT

## 2020-01-16 PROCEDURE — 96361 HYDRATE IV INFUSION ADD-ON: CPT

## 2020-01-16 PROCEDURE — 81003 URINALYSIS AUTO W/O SCOPE: CPT

## 2020-01-16 PROCEDURE — 6370000000 HC RX 637 (ALT 250 FOR IP): Performed by: EMERGENCY MEDICINE

## 2020-01-16 PROCEDURE — G0378 HOSPITAL OBSERVATION PER HR: HCPCS

## 2020-01-16 PROCEDURE — 2580000003 HC RX 258: Performed by: INTERNAL MEDICINE

## 2020-01-16 PROCEDURE — 70498 CT ANGIOGRAPHY NECK: CPT

## 2020-01-16 PROCEDURE — G0379 DIRECT REFER HOSPITAL OBSERV: HCPCS

## 2020-01-16 PROCEDURE — 82010 KETONE BODYS QUAN: CPT

## 2020-01-16 PROCEDURE — 96360 HYDRATION IV INFUSION INIT: CPT

## 2020-01-16 PROCEDURE — 80053 COMPREHEN METABOLIC PANEL: CPT

## 2020-01-16 PROCEDURE — 6370000000 HC RX 637 (ALT 250 FOR IP): Performed by: NURSE PRACTITIONER

## 2020-01-16 PROCEDURE — 6360000004 HC RX CONTRAST MEDICATION: Performed by: EMERGENCY MEDICINE

## 2020-01-16 PROCEDURE — 99285 EMERGENCY DEPT VISIT HI MDM: CPT

## 2020-01-16 RX ORDER — SENNA AND DOCUSATE SODIUM 50; 8.6 MG/1; MG/1
2 TABLET, FILM COATED ORAL DAILY PRN
Status: DISCONTINUED | OUTPATIENT
Start: 2020-01-16 | End: 2020-01-18

## 2020-01-16 RX ORDER — 0.9 % SODIUM CHLORIDE 0.9 %
1000 INTRAVENOUS SOLUTION INTRAVENOUS ONCE
Status: COMPLETED | OUTPATIENT
Start: 2020-01-16 | End: 2020-01-16

## 2020-01-16 RX ORDER — LABETALOL HYDROCHLORIDE 5 MG/ML
10 INJECTION, SOLUTION INTRAVENOUS EVERY 10 MIN PRN
Status: DISCONTINUED | OUTPATIENT
Start: 2020-01-16 | End: 2020-01-20 | Stop reason: HOSPADM

## 2020-01-16 RX ORDER — SODIUM CHLORIDE 0.9 % (FLUSH) 0.9 %
10 SYRINGE (ML) INJECTION EVERY 12 HOURS SCHEDULED
Status: DISCONTINUED | OUTPATIENT
Start: 2020-01-16 | End: 2020-01-20 | Stop reason: HOSPADM

## 2020-01-16 RX ORDER — INSULIN GLARGINE 100 [IU]/ML
40 INJECTION, SOLUTION SUBCUTANEOUS 2 TIMES DAILY
Status: DISCONTINUED | OUTPATIENT
Start: 2020-01-16 | End: 2020-01-20 | Stop reason: HOSPADM

## 2020-01-16 RX ORDER — SODIUM CHLORIDE 0.9 % (FLUSH) 0.9 %
10 SYRINGE (ML) INJECTION PRN
Status: DISCONTINUED | OUTPATIENT
Start: 2020-01-16 | End: 2020-01-20 | Stop reason: HOSPADM

## 2020-01-16 RX ORDER — DEXTROSE MONOHYDRATE 50 MG/ML
100 INJECTION, SOLUTION INTRAVENOUS PRN
Status: DISCONTINUED | OUTPATIENT
Start: 2020-01-16 | End: 2020-01-20 | Stop reason: HOSPADM

## 2020-01-16 RX ORDER — PANTOPRAZOLE SODIUM 40 MG/1
40 TABLET, DELAYED RELEASE ORAL
Status: DISCONTINUED | OUTPATIENT
Start: 2020-01-17 | End: 2020-01-20 | Stop reason: HOSPADM

## 2020-01-16 RX ORDER — ASPIRIN 81 MG/1
81 TABLET, CHEWABLE ORAL DAILY
Status: DISCONTINUED | OUTPATIENT
Start: 2020-01-17 | End: 2020-01-20 | Stop reason: HOSPADM

## 2020-01-16 RX ORDER — ONDANSETRON 2 MG/ML
4 INJECTION INTRAMUSCULAR; INTRAVENOUS EVERY 6 HOURS PRN
Status: DISCONTINUED | OUTPATIENT
Start: 2020-01-16 | End: 2020-01-20 | Stop reason: HOSPADM

## 2020-01-16 RX ORDER — ATORVASTATIN CALCIUM 80 MG/1
80 TABLET, FILM COATED ORAL NIGHTLY
Status: DISCONTINUED | OUTPATIENT
Start: 2020-01-16 | End: 2020-01-20 | Stop reason: HOSPADM

## 2020-01-16 RX ORDER — LORAZEPAM 2 MG/ML
1 INJECTION INTRAMUSCULAR EVERY 6 HOURS PRN
Status: DISCONTINUED | OUTPATIENT
Start: 2020-01-16 | End: 2020-01-20 | Stop reason: HOSPADM

## 2020-01-16 RX ORDER — ASPIRIN 81 MG/1
324 TABLET, CHEWABLE ORAL ONCE
Status: COMPLETED | OUTPATIENT
Start: 2020-01-16 | End: 2020-01-16

## 2020-01-16 RX ORDER — DEXTROSE MONOHYDRATE 25 G/50ML
12.5 INJECTION, SOLUTION INTRAVENOUS PRN
Status: DISCONTINUED | OUTPATIENT
Start: 2020-01-16 | End: 2020-01-20 | Stop reason: HOSPADM

## 2020-01-16 RX ORDER — ACETAMINOPHEN 325 MG/1
650 TABLET ORAL ONCE
Status: COMPLETED | OUTPATIENT
Start: 2020-01-16 | End: 2020-01-16

## 2020-01-16 RX ORDER — NICOTINE POLACRILEX 4 MG
15 LOZENGE BUCCAL PRN
Status: DISCONTINUED | OUTPATIENT
Start: 2020-01-16 | End: 2020-01-20 | Stop reason: HOSPADM

## 2020-01-16 RX ADMIN — IOPAMIDOL 75 ML: 755 INJECTION, SOLUTION INTRAVENOUS at 16:04

## 2020-01-16 RX ADMIN — INSULIN GLARGINE 40 UNITS: 100 INJECTION, SOLUTION SUBCUTANEOUS at 22:34

## 2020-01-16 RX ADMIN — SENNOSIDES AND DOCUSATE SODIUM 2 TABLET: 8.6; 5 TABLET ORAL at 23:30

## 2020-01-16 RX ADMIN — Medication 10 ML: at 23:13

## 2020-01-16 RX ADMIN — ACETAMINOPHEN 650 MG: 325 TABLET ORAL at 16:57

## 2020-01-16 RX ADMIN — ASPIRIN 81 MG 324 MG: 81 TABLET ORAL at 18:13

## 2020-01-16 RX ADMIN — SODIUM CHLORIDE 1000 ML: 9 INJECTION, SOLUTION INTRAVENOUS at 15:42

## 2020-01-16 RX ADMIN — INSULIN LISPRO 1 UNITS: 100 INJECTION, SOLUTION INTRAVENOUS; SUBCUTANEOUS at 22:34

## 2020-01-16 ASSESSMENT — PAIN SCALES - GENERAL
PAINLEVEL_OUTOF10: 0
PAINLEVEL_OUTOF10: 2

## 2020-01-16 NOTE — ED NOTES
[facility] [place]      Benedicto Gong, CAROL  01/16/20 KATYA Pedroza, RN  01/16/20 KATYA Pedroza, RN  01/16/20 [id number]

## 2020-01-16 NOTE — ED PROVIDER NOTES
insecurity:     Worry: Not on file     Inability: Not on file    Transportation needs:     Medical: Not on file     Non-medical: Not on file   Tobacco Use    Smoking status: Never Smoker    Smokeless tobacco: Former User   Substance and Sexual Activity    Alcohol use: No    Drug use: No    Sexual activity: Yes     Partners: Female   Lifestyle    Physical activity:     Days per week: Not on file     Minutes per session: Not on file    Stress: Not on file   Relationships    Social connections:     Talks on phone: Not on file     Gets together: Not on file     Attends Rastafari service: Not on file     Active member of club or organization: Not on file     Attends meetings of clubs or organizations: Not on file     Relationship status: Not on file    Intimate partner violence:     Fear of current or ex partner: Not on file     Emotionally abused: Not on file     Physically abused: Not on file     Forced sexual activity: Not on file   Other Topics Concern    Not on file   Social History Narrative    Not on file     No current facility-administered medications for this encounter. No current outpatient medications on file.      Facility-Administered Medications Ordered in Other Encounters   Medication Dose Route Frequency Provider Last Rate Last Dose    [START ON 1/17/2020] aspirin chewable tablet 81 mg  81 mg Oral Daily Leeanne Rosales MD        insulin glargine (LANTUS) injection vial 40 Units  40 Units Subcutaneous BID Leeanne Rosales MD       Alfaro [START ON 1/17/2020] pantoprazole (PROTONIX) tablet 40 mg  40 mg Oral QAM AC Leeanne Rosales MD        sodium chloride flush 0.9 % injection 10 mL  10 mL Intravenous 2 times per day Leeanne Rosales MD        sodium chloride flush 0.9 % injection 10 mL  10 mL Intravenous PRN Leeanne Rosales MD        magnesium hydroxide (MILK OF MAGNESIA) 400 MG/5ML suspension 30 mL  30 mL Oral Daily PRN Leeanne Roslaes MD        ondansetron (ZOFRAN) injection 4 mg  4 mg Intravenous Q6H PRN MD Freddie Angeles [START ON 1/17/2020] enoxaparin (LOVENOX) injection 40 mg  40 mg Subcutaneous Daily Jose Valdovinos MD        atorvastatin (LIPITOR) tablet 80 mg  80 mg Oral Nightly Jose Valdovinos MD        labetalol (NORMODYNE;TRANDATE) injection 10 mg  10 mg Intravenous Q10 Min PRN Jose Valdovinos MD        glucose (GLUTOSE) 40 % oral gel 15 g  15 g Oral PRN Jose Valdovinos MD        dextrose 50 % IV solution  12.5 g Intravenous PRN Jose Valdovinos MD        glucagon (rDNA) injection 1 mg  1 mg Intramuscular PRN Jose Valdovinos MD        dextrose 5 % solution  100 mL/hr Intravenous PRN MD Freddie Angeles [START ON 1/17/2020] insulin lispro (HUMALOG) injection vial 0-12 Units  0-12 Units Subcutaneous TID WC Jose Valdovinos MD        insulin lispro (HUMALOG) injection vial 0-6 Units  0-6 Units Subcutaneous Nightly Jose Valdovinos MD         No Known Allergies    REVIEW OF SYSTEMS  10 systems reviewed, pertinent positives and negatives per HPI, otherwise noted to be negative. PHYSICAL EXAM  ED Triage Vitals   Enc Vitals Group      BP 01/16/20 1502 (!) 170/79      Pulse 01/16/20 1528 95      Resp 01/16/20 1459 20      Temp 01/16/20 1459 98.5 °F (36.9 °C)      Temp Source 01/16/20 1459 Oral      SpO2 01/16/20 1528 96 %      Weight 01/16/20 1459 215 lb (97.5 kg)      Height 01/16/20 1459 5' 9\" (1.753 m)      Head Circumference --       Peak Flow --       Pain Score --       Pain Loc --       Pain Edu? --       Excl. in [address]? --        General appearance: Awake and alert. Cooperative. No acute distress. HENT: Normocephalic. Atraumatic. Mucous membranes are moist.  Neck: Supple. Eyes: PERRL. EOMI. Heart/Chest: RRR. No murmurs. Lungs: Respirations unlabored. CTAB. Good air exchange. Speaking comfortably in full sentences. Abdomen: No tenderness. Soft. Non-distended. No masses. No organomegaly. No guarding or rebound. Musculoskeletal: No extremity edema. No deformity.   No tenderness in the extremities. All extremities neurovascularly intact. Skin: Warm and dry. No acute rashes. Neurological:   NIH Stroke Scale     Interval: Baseline  Time: 2:55 PM  Person Administering Scale: Roberto Mehta   Administer stroke scale items in the order listed. Record performance in each category after each subscale exam. Do not go back and change scores. Follow directions provided for each exam technique. Scores should reflect what the patient does, not what the clinician thinks the patient can do. The clinician should record answers while administering the exam and work quickly. Except where indicated, the patient should not be coached (i.e., repeated requests to patient to make a special effort). 1a  Level of consciousness: 0=alert; keenly responsive   1b. LOC questions:  0=Performs both tasks correctly   1c. LOC commands: 0=Performs both tasks correctly   2. Best Gaze: 0=normal   3. Visual: 0=No visual loss   4. Facial Palsy: 0=Normal symmetric movement   5a. Motor left arm: 0=No drift, limb holds 90 (or 45) degrees for full 10 seconds   5b. Motor right arm: 0=No drift, limb holds 90 (or 45) degrees for full 10 seconds   6a. motor left le=No drift, limb holds 90 (or 45) degrees for full 10 seconds   6b  Motor right le=No drift, limb holds 90 (or 45) degrees for full 10 seconds   7. Limb Ataxia: 0=Absent   8. Sensory: 0=Normal; no sensory loss   9. Best Language:  0=No aphasia, normal   10. Dysarthria: 0=Normal   11. Extinction and Inattention: 0=No abnormality         Total:  0       Psychiatric: Normal mood and affect. LABS  I have reviewed all labs for this visit.    Results for orders placed or performed during the hospital encounter of 20   CBC Auto Differential   Result Value Ref Range    WBC 8.0 4.0 - 11.0 K/uL    RBC 5.07 4.20 - 5.90 M/uL    Hemoglobin 15.7 13.5 - 17.5 g/dL    Hematocrit 46.2 40.5 - 52.5 %    MCV 91.0 80.0 - 100.0 fL    MCH 30.9 26.0 - 34.0 pg

## 2020-01-17 ENCOUNTER — APPOINTMENT (OUTPATIENT)
Dept: MRI IMAGING | Age: 80
DRG: 066 | End: 2020-01-17
Attending: INTERNAL MEDICINE
Payer: MEDICARE

## 2020-01-17 PROBLEM — I63.9 ACUTE CEREBROVASCULAR ACCIDENT (CVA) (HCC): Status: ACTIVE | Noted: 2020-01-17

## 2020-01-17 LAB
CHOLESTEROL, TOTAL: 172 MG/DL (ref 0–199)
ESTIMATED AVERAGE GLUCOSE: 226 MG/DL
GLUCOSE BLD-MCNC: 143 MG/DL (ref 70–99)
GLUCOSE BLD-MCNC: 157 MG/DL (ref 70–99)
GLUCOSE BLD-MCNC: 160 MG/DL (ref 70–99)
GLUCOSE BLD-MCNC: 183 MG/DL (ref 70–99)
GLUCOSE BLD-MCNC: 191 MG/DL (ref 70–99)
HBA1C MFR BLD: 9.5 %
HCT VFR BLD CALC: 44.1 % (ref 40.5–52.5)
HDLC SERPL-MCNC: 42 MG/DL (ref 40–60)
HEMOGLOBIN: 15 G/DL (ref 13.5–17.5)
LDL CHOLESTEROL CALCULATED: 86 MG/DL
MCH RBC QN AUTO: 30.8 PG (ref 26–34)
MCHC RBC AUTO-ENTMCNC: 34 G/DL (ref 31–36)
MCV RBC AUTO: 90.7 FL (ref 80–100)
PDW BLD-RTO: 13.5 % (ref 12.4–15.4)
PERFORMED ON: ABNORMAL
PLATELET # BLD: 150 K/UL (ref 135–450)
PMV BLD AUTO: 8.8 FL (ref 5–10.5)
RBC # BLD: 4.87 M/UL (ref 4.2–5.9)
TRIGL SERPL-MCNC: 219 MG/DL (ref 0–150)
VLDLC SERPL CALC-MCNC: 44 MG/DL
WBC # BLD: 6.6 K/UL (ref 4–11)

## 2020-01-17 PROCEDURE — 80061 LIPID PANEL: CPT

## 2020-01-17 PROCEDURE — 96372 THER/PROPH/DIAG INJ SC/IM: CPT

## 2020-01-17 PROCEDURE — 97165 OT EVAL LOW COMPLEX 30 MIN: CPT

## 2020-01-17 PROCEDURE — 97162 PT EVAL MOD COMPLEX 30 MIN: CPT

## 2020-01-17 PROCEDURE — 92610 EVALUATE SWALLOWING FUNCTION: CPT

## 2020-01-17 PROCEDURE — 97530 THERAPEUTIC ACTIVITIES: CPT

## 2020-01-17 PROCEDURE — 6370000000 HC RX 637 (ALT 250 FOR IP): Performed by: NURSE PRACTITIONER

## 2020-01-17 PROCEDURE — 6370000000 HC RX 637 (ALT 250 FOR IP): Performed by: INTERNAL MEDICINE

## 2020-01-17 PROCEDURE — 36415 COLL VENOUS BLD VENIPUNCTURE: CPT

## 2020-01-17 PROCEDURE — 99223 1ST HOSP IP/OBS HIGH 75: CPT | Performed by: PSYCHIATRY & NEUROLOGY

## 2020-01-17 PROCEDURE — 70551 MRI BRAIN STEM W/O DYE: CPT

## 2020-01-17 PROCEDURE — 6360000002 HC RX W HCPCS: Performed by: INTERNAL MEDICINE

## 2020-01-17 PROCEDURE — 83036 HEMOGLOBIN GLYCOSYLATED A1C: CPT

## 2020-01-17 PROCEDURE — 92526 ORAL FUNCTION THERAPY: CPT

## 2020-01-17 PROCEDURE — 85027 COMPLETE CBC AUTOMATED: CPT

## 2020-01-17 PROCEDURE — 97116 GAIT TRAINING THERAPY: CPT

## 2020-01-17 PROCEDURE — 1200000000 HC SEMI PRIVATE

## 2020-01-17 PROCEDURE — 2580000003 HC RX 258: Performed by: INTERNAL MEDICINE

## 2020-01-17 RX ORDER — ACETAMINOPHEN 325 MG/1
650 TABLET ORAL EVERY 6 HOURS PRN
Status: DISCONTINUED | OUTPATIENT
Start: 2020-01-17 | End: 2020-01-18

## 2020-01-17 RX ORDER — ROPINIROLE 0.5 MG/1
0.5 TABLET, FILM COATED ORAL ONCE
Status: COMPLETED | OUTPATIENT
Start: 2020-01-17 | End: 2020-01-17

## 2020-01-17 RX ORDER — DIAZEPAM 5 MG/1
5 TABLET ORAL ONCE
Status: COMPLETED | OUTPATIENT
Start: 2020-01-17 | End: 2020-01-17

## 2020-01-17 RX ADMIN — ACETAMINOPHEN 650 MG: 325 TABLET ORAL at 09:52

## 2020-01-17 RX ADMIN — INSULIN LISPRO 2 UNITS: 100 INJECTION, SOLUTION INTRAVENOUS; SUBCUTANEOUS at 16:50

## 2020-01-17 RX ADMIN — INSULIN GLARGINE 40 UNITS: 100 INJECTION, SOLUTION SUBCUTANEOUS at 09:53

## 2020-01-17 RX ADMIN — Medication 10 ML: at 09:54

## 2020-01-17 RX ADMIN — ASPIRIN 81 MG 81 MG: 81 TABLET ORAL at 09:52

## 2020-01-17 RX ADMIN — INSULIN GLARGINE 40 UNITS: 100 INJECTION, SOLUTION SUBCUTANEOUS at 20:24

## 2020-01-17 RX ADMIN — SENNOSIDES AND DOCUSATE SODIUM 2 TABLET: 8.6; 5 TABLET ORAL at 20:18

## 2020-01-17 RX ADMIN — INSULIN LISPRO 2 UNITS: 100 INJECTION, SOLUTION INTRAVENOUS; SUBCUTANEOUS at 09:54

## 2020-01-17 RX ADMIN — Medication 1 LOZENGE: at 13:20

## 2020-01-17 RX ADMIN — ROPINIROLE HYDROCHLORIDE 0.5 MG: 0.5 TABLET, FILM COATED ORAL at 20:18

## 2020-01-17 RX ADMIN — ACETAMINOPHEN 650 MG: 325 TABLET ORAL at 03:08

## 2020-01-17 RX ADMIN — ACETAMINOPHEN 650 MG: 325 TABLET ORAL at 20:18

## 2020-01-17 RX ADMIN — DIAZEPAM 5 MG: 5 TABLET ORAL at 13:19

## 2020-01-17 RX ADMIN — PANTOPRAZOLE SODIUM 40 MG: 40 TABLET, DELAYED RELEASE ORAL at 05:41

## 2020-01-17 RX ADMIN — ENOXAPARIN SODIUM 40 MG: 40 INJECTION SUBCUTANEOUS at 09:53

## 2020-01-17 RX ADMIN — INSULIN LISPRO 2 UNITS: 100 INJECTION, SOLUTION INTRAVENOUS; SUBCUTANEOUS at 12:38

## 2020-01-17 RX ADMIN — ATORVASTATIN CALCIUM 80 MG: 80 TABLET, FILM COATED ORAL at 20:18

## 2020-01-17 ASSESSMENT — PAIN SCALES - WONG BAKER: WONGBAKER_NUMERICALRESPONSE: 4

## 2020-01-17 ASSESSMENT — PAIN DESCRIPTION - PAIN TYPE
TYPE: CHRONIC PAIN
TYPE: CHRONIC PAIN

## 2020-01-17 ASSESSMENT — PAIN SCALES - GENERAL
PAINLEVEL_OUTOF10: 0
PAINLEVEL_OUTOF10: 5
PAINLEVEL_OUTOF10: 0
PAINLEVEL_OUTOF10: 7
PAINLEVEL_OUTOF10: 0
PAINLEVEL_OUTOF10: 4
PAINLEVEL_OUTOF10: 0

## 2020-01-17 ASSESSMENT — PAIN DESCRIPTION - LOCATION
LOCATION: HIP
LOCATION: HIP

## 2020-01-17 ASSESSMENT — PAIN DESCRIPTION - ORIENTATION
ORIENTATION: LEFT
ORIENTATION: LEFT

## 2020-01-17 ASSESSMENT — PAIN - FUNCTIONAL ASSESSMENT: PAIN_FUNCTIONAL_ASSESSMENT: PREVENTS OR INTERFERES SOME ACTIVE ACTIVITIES AND ADLS

## 2020-01-17 NOTE — CARE COORDINATION
CASE MANAGEMENT INITIAL ASSESSMENT      Reviewed chart and met with patient today, re: potential needs at discharge. Triggered by age and dx  Explained Case Management role/services. Family present: no  Primary contact information: Farhat Price [address] date/status: observation 1/16/20   Diagnosis: TIA  Is this a Readmission? no    Insurance: Medicare. [employer] secondary    Precert required for SNF - N        3 night stay required - Y    Living arrangements, Adls, care needs, prior to admission: lives with spouse in 2 story home. Ramped entrance (pt and spouse care for patient's uncle), indep in adls    Transportation: likely private    Durable Medical Equipment at home: walker, w/c, raised toilet, grab bars, lift chair to basement, ramped entrance to home    Services in the home and/or outpatient, prior to admission: none    PT/OT recs: not completed    [address] Notification (HEN): not started, as pt anticipates being discharged home. Barriers to discharge: none identified    Plan/comments: pt intends to discharge home with spouse without needs from case mgmt team. Please consult case mgmt if needs arise.     ECOC on chart for MD willie Garner, CAROL CM

## 2020-01-17 NOTE — FLOWSHEET NOTE
Attempted to visit with Pt for Over 78 Advance Care Planning initiative. Pt asleep. Will attempt to follow up at a later time.     [address] [facility]       01/17/20 1129   Encounter Summary   Services provided to: Patient not available   Continue Visiting   (1/17 80 ACP, Pt asleep)

## 2020-01-17 NOTE — PROGRESS NOTES
Patient admitted to room 363-02 from [facility] ED. Patient oriented to room, call light, bed rails, phone, lights and bathroom. Patient instructed about the schedule of the day including: vital sign frequency, lab draws, possible tests, frequency of MD and staff rounds, including RN/MD rounding together at bedside, daily weights, and I &O's. Patient instructed about prescribed diet, how to use 8MENU, and television. elemetry box  in place, patient aware of placement and reason. Bed locked, in lowest position, side rails up 2/4, call light within reach. Will continue to monitor.

## 2020-01-17 NOTE — ED NOTES
Called to give report to [facility], they needed to find out who was getting this pt and will call me back to get report.      Bryanna Jacob RN  01/16/20 3063

## 2020-01-17 NOTE — H&P
Hospital Medicine History & Physical      PCP: Eston Libman, MD    Date of Admission: 1/16/2020    Date of Service: Pt seen/examined on 01/16/20 and placed in Observation. Chief Complaint:  Slurred speech       History Of Present Illness:  Jennifer Blair is 78 y.o. male who presented with complaint of slurred speech. Symptom onset was acute for a time period of 1 day. The severity is described as moderate. The course of his symptoms over time is constant. The symptoms improved with none and worsened with none. The patient's symptom is associated with generalized fatigue. Jennifer Blair is 78 y.o. male with history of DM II and HLP  He presents to the ER with complaint of slurred speech   It started the day before and it continued  He says \"I don't have the freedom to talk\" or it feels the words are not coming out properly  His wife says his speech was somewhat garbled. At the time of interview, his speech appears normal to me although he hesitates with some words. Again, he thinks his speech is not back to his baseline. He denies any other focal neurological deficit. Past Medical History:          Diagnosis Date    Diabetes mellitus ([facility] [address]     type 2    Glaucoma     Hyperlipidemia     Hypertension     Kidney stone     Neuropathy     Osteoarthrosis, hip 3/21/2016    Prepatellar bursitis 3/21/2016       Past Surgical History:          Procedure Laterality Date    COLONOSCOPY  2008    CYSTOSCOPY  2/2/12    with stent placement    EYE SURGERY      left eyemed valve, bilateral retinal repair    FOOT SURGERY      right, tendon repair    KIDNEY STONE SURGERY      OTHER SURGICAL HISTORY Left 07/17/2017    CYSTOSCOPY, LEFT URETEROSCOPY, STENT PLACEMENT, URETHERAL dilation, stone manipulation        Medications Prior to Admission:      Prior to Admission medications    Medication Sig Start Date End Date Taking?  Authorizing Provider   amLODIPine (NORVASC) 10 MG tablet Take 1 tablet by Historical Provider, MD   Dorzolamide HCl (TRUSOPT OP) Apply 1 drop to eye 2 times daily     Historical Provider, MD   Multiple Vitamin (MULTI VITAMIN MENS PO) Take  by mouth. Historical Provider, MD   insulin aspart (NOVOLOG) 100 UNIT/ML injection Inject  into the skin 3 times daily (before meals). Sliding scale based on meal calculation     Historical Provider, MD   insulin glargine (LANTUS) 100 UNIT/ML injection Inject 40 Units into the skin 2 times daily. Historical Provider, MD       Allergies:  Patient has no known allergies. Social History:      The patient currently lives at home with wife and son. Retired in 2003, previously worked at [employer] and . TOBACCO:   reports that he has never smoked. He has quit using smokeless tobacco.  ETOH:   reports no history of alcohol use. E-Cigarettes Vaping or Juuling     Questions Responses    E-Cigarette Use Never User    Start Date     Does device contain nicotine? Quit Date     E-Cigarette Type             Family History:      Reviewed in detail and negative for DM, CAD, Cancer, CVA. REVIEW OF SYSTEMS:   Pertinent positives as noted in the HPI. All other systems reviewed and negative. PHYSICAL EXAM PERFORMED:    BP (!) 156/84   Pulse 91   Temp 98 °F (36.7 °C) (Oral)   Resp 16   SpO2 94%     General appearance:  No apparent distress, appears stated age and cooperative. HEENT:  Normal cephalic, atraumatic without obvious deformity. Pupils equal, round, and reactive to light. Extra ocular muscles intact. Conjunctivae/corneas clear. Neck: Supple, with full range of motion. No jugular venous distention. Trachea midline. Respiratory:  Normal respiratory effort. Clear to auscultation, bilaterally without Rales/Wheezes/Rhonchi. Cardiovascular:  Regular rate and rhythm with normal S1/S2 without murmurs, rubs or gallops.   Abdomen: Soft, non-tender, non-distended with normal bowel sounds. Musculoskeletal:  No clubbing, cyanosis or edema bilaterally. Full range of motion without deformity. Skin: Skin color, texture, turgor normal.  No rashes or lesions. Neurologic:  Neurovascularly intact without any focal sensory/motor deficits. Cranial nerves: II-XII intact, grossly non-focal.  Psychiatric:  Alert and oriented, thought content appropriate, normal insight  Capillary Refill: Brisk,< 3 seconds   Peripheral Pulses: +2 palpable, equal bilaterally       Labs:     Recent Labs     01/16/20  1510   WBC 8.0   HGB 15.7   HCT 46.2        Recent Labs     01/16/20  1510   *   K 4.8   CL 99   CO2 22   BUN 26*   CREATININE 1.0   CALCIUM 9.4     Recent Labs     01/16/20  1510   AST 24   ALT 27   BILITOT 0.4   ALKPHOS 90     Recent Labs     01/16/20  1510   INR 0.95     Recent Labs     01/16/20  1510   TROPONINI 0.02*       Urinalysis:      Lab Results   Component Value Date    NITRU Negative 01/16/2020    WBCUA 3-5 12/19/2018    BACTERIA Rare 12/19/2018    RBCUA  12/19/2018    BLOODU Negative 01/16/2020    SPECGRAV 1.015 01/16/2020    GLUCOSEU >=1000 01/16/2020    GLUCOSEU >=1000 02/02/2012       Radiology:       MRI brain without contrast    (Results Pending)       ASSESSMENT:    Principal Problem:    TIA (transient ischemic attack)  Active Problems:    Hypertension    Hyperlipidemia    Diabetes ([facility] [address]  Resolved Problems:    * No resolved hospital problems. *        PLAN:    1. TIA    Admit to telemetry unit   Started on ASA and statin   Lipid panel in am   NPO until dysphagia evaluation    SLP / OT / PT consulted    VTE prophylaxis ordered - Lovenox    MRI brain without contrast in am     2. DM II - check HgBA1C. . Continue lantus 40 un bid and add SSI     3. HTN - he is not on home BP med. BP is elevated but is appropriate in context of suspected TIA/CVA. Monitor for now.      4. GERD - on Protonix       DVT Prophylaxis: Lovenox   Diet: Diet NPO Effective Now  Code Status: Full Code    PT/OT Eval Status: ordered     Dispo - Admit to telemetry unit        Shay Trinidad MD    Thank you Edwige Em MD for the opportunity to be involved in this patient's care. If you have any questions or concerns please feel free to contact me at [phone].

## 2020-01-17 NOTE — PROGRESS NOTES
Physical Therapy    Facility/Department: [facility] - MED SURG  Initial Assessment    NAME: Teresa Singh  : 1940  MRN: [id number]    Date of Service: 2020    Discharge Recommendations:  24 hour supervision or assist, Home with Home health PT   PT Equipment Recommendations  Equipment Needed: No    Assessment   Body structures, Functions, Activity limitations: Decreased functional mobility ; Decreased safe awareness;Decreased balance;Decreased ADL status; Decreased endurance; Increased pain;Decreased strength;Decreased sensation;Decreased posture  Assessment: Pt evaluated this PM for gait training. Pt cleared per RN, pt agreeable to therapy. Pt admitted for slurred speech with PMH neuropathy kim feet and DM2. Pt demonstrated 1 LOB upon initiation of walking on 1st bout of ambulation, requiring up to mod A to assist to correct. Pt demonstrates unsteadiness of gait, reaching for items such as railing and tables to provide support during a rest break and ambulation. Pt's L hip pain limiting ambulation distance. Faciliated endurance training with pt ambulating 1x 20 ft and 1x 40 ft without AD and CGA. PT will continue to follow throughout LOS. Recommending d/c to home with 24 hr supervision and home PT due to balance and endurance deficits. Treatment Diagnosis: decreased balance and endurance  Specific instructions for Next Treatment: progress ambulation, trial AD  Prognosis: Good  Decision Making: Medium Complexity  PT Education: Goals;Precautions;Orientation; Functional Mobility Training;PT Role;Transfer Training;Family Education;Plan of Care;Gait Training;General Safety  Patient Education: pt verbalized understanding  Barriers to Learning: none  REQUIRES PT FOLLOW UP: Yes  Activity Tolerance  Activity Tolerance: Patient Tolerated treatment well;Patient limited by fatigue;Patient limited by pain       Patient Diagnosis(es): There were no encounter diagnoses.      has a past medical history of Diabetes mellitus Toilet: Handicap height  Bathroom Equipment: Grab bars in shower  Home Equipment: Cane, [employer], Rolling walker  ADL Assistance: Independent  Homemaking Responsibilities: Yes  Meal Prep Responsibility: Secondary  Cleaning Responsibility: Secondary  Bill Paying/Finance Responsibility: Primary  Shopping Responsibility: Secondary  Ambulation Assistance: Independent(without AD)  Transfer Assistance: Independent  Active : No  Patient's  Info: reports has not driven in a couple of years due to decreased vision  Mode of Transportation: Lujuliette Garcia  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[address], teacher  Leisure & Hobbies: camping  Additional Comments: pt's wife able to provide 24 hr  Cognition   Cognition  Overall Cognitive Status: Exceptions  Memory: Decreased short term memory    Objective     Observation/Palpation  Posture: Fair    AROM RLE (degrees)  RLE AROM: WFL(did not formally assess)  AROM LLE (degrees)  LLE AROM : WFL(did not formally assess)  Strength RLE  Strength RLE: WFL(did not formally assess)  Strength LLE  Strength LLE: WFL(did not formally assess)     Sensation  Overall Sensation Status: Impaired(chronic numbness in BLE d/t neuropathy)  Bed mobility  Supine to Sit: Supervision(HOB elevated)  Sit to Supine: Unable to assess(seated at EOB with RN at end of session)  Transfers  Sit to Stand: Contact guard assistance  Stand to sit: Contact guard assistance  Ambulation  Ambulation?: Yes  Ambulation 1  Surface: level tile  Device: No Device  Assistance: Contact guard assistance; Moderate assistance  Quality of Gait: Pt ambulates with increased trunk flexion, shuffling gait with minimal kim foot clearance, pt reaching for walls/railing/tables for steadying. Pt required up to mod A initially upon ambulation due to LOB toward the R. Pt was able to steady himself with assistance and ambulate. Gait Deviations: Slow Kaila; Increased MARSHAL; Decreased step length;Shuffles;Decreased step height  Distance: 1x 20 ft, 1x 40 ft   Comments: Pt demonstrates one LOB with steadying with mod A. Stairs/Curb  Stairs?: No              Plan   Plan  Times per week: 3-5x/week  Times per day: Daily  Plan weeks: 1/24/20  Specific instructions for Next Treatment: progress ambulation, trial AD  Current Treatment Recommendations: Transfer Training, Strengthening, Endurance Training, Patient/Caregiver Education & Training, Balance Training, Gait Training, Home Exercise Program, Functional Mobility Training, Safety Education & Training  Safety Devices  Type of devices: Gait belt, Patient at risk for falls, Nurse notified, Call light within reach, Left in bed(pt with RN at end of session)  Restraints  Initially in place: No      AM-PAC Score     AM-PAC Inpatient Mobility without Stair Climbing Raw Score : 15 (01/17/20 1544)  AM-PAC Inpatient without Stair Climbing T-Scale Score : 43.03 (01/17/20 1544)  Mobility Inpatient CMS 0-100% Score: 47.43 (01/17/20 1544)  Mobility Inpatient without Stair CMS G-Code Modifier : CK (01/17/20 1544)       Goals  Short term goals  Time Frame for Short term goals: 1/22/20  Short term goal 1: Pt will complete bed mobility independently. Short term goal 2: Pt will complete sit<>stand independently. Short term goal 3: Pt will ambulate 50 ft with LRAD and supervision.   Short term goal 4: Pt will tolerate 12-15 reps of LE exercises for strengthening by 1/20/20  Patient Goals   Patient goals : \"get back to being active\"       Therapy Time   Individual Concurrent Group Co-treatment   Time In 1455         Time Out 1520         Minutes 25         Timed Code Treatment Minutes: 250 Soraya Rock PT

## 2020-01-17 NOTE — PROGRESS NOTES
Occupational Therapy   Occupational Therapy Initial/discharge Assessment  1 x only;    Date: 2020   Patient Name: Tere Sawyer  MRN: [id number]     : 1940    Date of Service: 2020    Discharge Recommendations:  Home with assist PRN       Assessment      OT Education: OT Role  REQUIRES OT FOLLOW UP: No  Activity Tolerance  Activity Tolerance: Patient Tolerated treatment well  Safety Devices  Safety Devices in place: Yes  Type of devices: Call light within reach           Patient Diagnosis(es): There were no encounter diagnoses. has a past medical history of Diabetes mellitus ([facility] [address] Glaucoma, Hyperlipidemia, Hypertension, Kidney stone, Neuropathy, Osteoarthrosis, hip, and Prepatellar bursitis. has a past surgical history that includes eye surgery; Kidney stone surgery; Foot surgery; Colonoscopy (); Cystoscopy (12); and other surgical history (Left, 2017). Restrictions  Restrictions/Precautions  Restrictions/Precautions: General Precautions  Position Activity Restriction  Other position/activity restrictions: Medium fall risk per nursing assessment; Up as tolerated, telemetry    Subjective   General  Chart Reviewed: Yes  Patient assessed for rehabilitation services?: Yes  Family / Caregiver Present: No  Referring Practitioner: Dr. Alyson Gonzalez  Diagnosis: TIA, slurred speech  Subjective  Subjective: \"I get the shakes when I am cold & haven't eaten\"  General Comment  Comments: RN cleared pt for OT eval; pt sitting EOB, agreeable to therapy  Patient Currently in Pain: Yes  Pain Assessment  Pain Assessment: Faces  Pain Level: 4  Pain Type: Chronic pain  Pain Location: Hip  Pain Orientation: Left  Functional Pain Assessment: Prevents or interferes some active activities and ADLs  Non-Pharmaceutical Pain Intervention(s): Ambulation/Increased Activity;Repositioned; Therapeutic presence  Pre Treatment Pain Screening  Intervention List: Patient able to continue with

## 2020-01-17 NOTE — CONSULTS
In patient Neurology consult        [facility]      MD Torres [place]  1940    Date of Service: 1/17/2020    Referring Physician: Loy Trent MD      Reason for the consult and CC: Acute speech impairment and possible new stroke. HPI:   The patient is a 78y.o.  years old male with history of diabetes, hypertension hyperlipidemia who was admitted to the hospital yesterday with acute speech impairment and dysarthria. Symptoms started the day before admission. He describes sudden onset of speech impairment and lack of fluency. He was having some difficulties with word findings. Degree was severe and duration was persistent. Other associated symptom including feeling unsteady and dizzy but no headache or chest pain or swallowing issues. No falling or passing out. No other relieving or aggravating factors. He was concerned with possible stroke and he came actually to the ER. Initial CT of the head showed no acute stroke. CTA showed no large vessel occlusion. He was admitted. Today he denies any new symptoms but he continues to have dysarthria and speech impairment. He was taking aspirin at home. Other review of system was unremarkable. No family history on file.   Past Surgical History:   Procedure Laterality Date    COLONOSCOPY  2008    CYSTOSCOPY  2/2/12    with stent placement    EYE SURGERY      left eyemed valve, bilateral retinal repair    FOOT SURGERY      right, tendon repair    KIDNEY STONE SURGERY      OTHER SURGICAL HISTORY Left 07/17/2017    CYSTOSCOPY, LEFT URETEROSCOPY, STENT PLACEMENT, URETHERAL dilation, stone manipulation         Past Medical History:   Diagnosis Date    Diabetes mellitus ([facility] [address]     type 2    Glaucoma     Hyperlipidemia     Hypertension     Kidney stone     Neuropathy     Osteoarthrosis, hip 3/21/2016    Prepatellar bursitis 3/21/2016     Social History     Tobacco Use    Smoking status: Never Smoker   

## 2020-01-17 NOTE — PROGRESS NOTES
The patient will demonstrate an understanding of a low carb diet, by selecting low carb items,  with the goal of completion at discharge.

## 2020-01-17 NOTE — PROGRESS NOTES
Speech Language Pathology  Facility/Department: [facility] - MED SURG   CLINICAL BEDSIDE SWALLOW EVALUATION    NAME: Lorraine Faria  : 1940  MRN: [id number]       Recommend regular solids and thin liquids, meds whole with thin liquids. Recommend small bites/sips, slow rate of feeding, upright position for po intake and 30 min after, and oral care. ADMISSION DATE: 2020  ADMITTING DIAGNOSIS: has Renal colic on left side; Prepatellar bursitis; Osteoarthrosis, hip; Obesity; Hypertension; Hyperlipidemia; Diabetes ([address] Chest pain; and TIA (transient ischemic attack) on their problem list.  ONSET DATE: admit 2020    Past Medical History:       Past Medical History             Diagnosis Date    Diabetes mellitus ([address]       type 2    Glaucoma      Hyperlipidemia      Hypertension      Kidney stone      Neuropathy      Osteoarthrosis, hip 3/21/2016    Prepatellar bursitis                 Recent Chest Xray/CT of Chest: ( 2020)  No acute cardiopulmonary disease. Date of Eval: 2020  Evaluating Therapist: Flor Mendez    Current Diet level: Regular/Thin Liquids     Primary Complaint: \"I just feel like everything I do takes more effort. \" (including speech and swallowing). Pt reports articulation is not precise, more sluggish. Pain:  Pain Assessment  Pain Assessment: Faces  Pain Level: 4  Pain Type: Chronic pain  Pain Location: Hip  Pain Orientation: Left  Functional Pain Assessment: Prevents or interferes some active activities and ADLs  Non-Pharmaceutical Pain Intervention(s): Ambulation/Increased Activity, Repositioned, Therapeutic presence    Reason for Referral  Lorraine Faria was referred for a bedside swallow evaluation to assess the efficiency of his swallow function, identify signs and symptoms of aspiration and make recommendations regarding safe dietary consistencies, effective compensatory strategies, and safe eating environment.     Impression    Per MD note, \"Maya is 78 y.o. male who presented with complaint of slurred speech. Symptom onset was acute for a time period of 1 day. The severity is described as moderate. The course of his symptoms over time is constant. The symptoms improved with none and worsened with none. The patient's symptom is associated with generalized fatigue. \"    Pt admit  for TIA and is currently awaiting MRI brain. CT Head (01/16/2020) indicates:  Mild scattered atherosclerotic changes.  No evidence of hemodynamic stenosis   or occlusion involving intracranial or cervical arterial circulation. Pt seen sitting on side of bed. Spouse present. No c/o pain. Pt reports his tongue feels thick and speech and swallowing are more effortful than usual.  Pt states he feels as if his articulation is imprecise as he fatigues and if he does not use intentional effort. Spouse states she feels overall speech and intelligibility are at baseline. Oral motor exam reveals adequate lip ROM and symmetry at rest and with retraction. On tongue protrusion, the tongue deviates slightly to the right. No other remarkable observations noted. Volitional cough is strong. Cocal quality is strong and clear with appropriate intensity and prosodic features in conversational speech. Trials of thin liquids by tsp, cup, and straw yield timely oral prep and transit, no significant oral residue, and no clinical signs of aspiration are observed. Pt completes 3 ounces water challenge without stopping and without coughing one minute after completion. Pt reports needing increased effort and focus to slow and intentionally uses slow rate of eating. He also states he feels like his swallow is less coordinated as he fatigues. Assessment: Oral swallow appears WFL. No overt clinical signs of aspiration are observed. Pt completes 3 ounce water challenge without stopping and without coughing suggesting that silent aspiration is highly unlikely.  Speech is informally assessed during conversation with appropriate comprehension, no word-finding deficits noted, occasional hesitations and repetitions, and normal intelligibility. However, pt also c/o speech requiring effort for articulatory precision which declines as he fatigues. Recommend regular solids and thin liquids, meds whole with thin liquids. Recommend small bites/sips, slow rate of feeding, upright position for po intake and 30 min after, and oral care. Given pt's main complaint of increased effort and fatigue over time, recommend ST assess diet tolerance over time to ensure swallow safety. Recommend speech/language eval to determine needs. Await MRI brain to identify further needs. Dysphagia Impression : Oral swallow appears WFL. No clinical signs of aspiration observed. However, pt reports he feels as if he requires more effort, focus, and intentionality when he swallows as his tongue feels thick. Pt also reports decline and increased effort required with fatigue. Dysphagia Outcome Severity Scale: Level 6: Within functional limits/Modified independence     Treatment Plan  Requires SLP Intervention: Yes  Duration/Frequency of Treatment: 1-2x over length of stay to assess endurance with swallow and reported decline with fatigue  D/C Recommendations: To be determined       Recommended Diet and Intervention  Diet Solids Recommendation: Regular  Liquid Consistency Recommendation:  Thin  Recommended Form of Meds: Whole with water  Recommendations: Dysphagia treatment  Therapeutic Interventions: Diet tolerance monitoring;Patient/Family education    Compensatory Swallowing Strategies  Compensatory Swallowing Strategies: Upright as possible for all oral intake;Remain upright for 30-45 minutes after meals;Small bites/sips;Eat/Feed slowly    Treatment/Goals  Short-term Goals  Timeframe for Short-term Goals: 5 days by 01/22/2020  Goal 1: Pt will tolerate recommended diet with no clinical signs of aspiration observed in 5/5

## 2020-01-17 NOTE — PROGRESS NOTES
Hospitalist Progress Note      PCP: Neil Toro MD    Date of Admission: 1/16/2020    Chief Complaint: Slurred speech    Hospital Course: Bill Bergman is 78 y.o. male who presented with complaint of slurred speech. Subjective: slurred speech persists. Wife at bedside. Very anxious about having MRI - asking to be \"knocked out. \"       Medications:  Reviewed    Infusion Medications    dextrose       Scheduled Medications    aspirin  81 mg Oral Daily    insulin glargine  40 Units Subcutaneous BID    pantoprazole  40 mg Oral QAM AC    sodium chloride flush  10 mL Intravenous 2 times per day    enoxaparin  40 mg Subcutaneous Daily    atorvastatin  80 mg Oral Nightly    insulin lispro  0-12 Units Subcutaneous TID WC    insulin lispro  0-6 Units Subcutaneous Nightly     PRN Meds: acetaminophen, benzocaine-menthol, sodium chloride flush, magnesium hydroxide, ondansetron, labetalol, glucose, dextrose, glucagon (rDNA), dextrose, LORazepam, sennosides-docusate sodium      Intake/Output Summary (Last 24 hours) at 1/17/2020 1808  Last data filed at 1/17/2020 0330  Gross per 24 hour   Intake 470 ml   Output 700 ml   Net -230 ml       Physical Exam Performed:    BP (!) 145/84   Pulse 78   Temp 97.9 °F (36.6 °C) (Oral)   Resp 18   Ht 5' 9\" (1.753 m)   Wt 215 lb 11.2 oz (97.8 kg)   SpO2 97%   BMI 31.85 kg/m²     General appearance: No apparent distress, appears stated age and cooperative. HEENT: Pupils equal, round, and reactive to light. Conjunctivae/corneas clear. Neck: Supple, with full range of motion. No jugular venous distention. Trachea midline. Respiratory:  Normal respiratory effort. Clear to auscultation, bilaterally without Rales/Wheezes/Rhonchi. Cardiovascular: Regular rate and rhythm with normal S1/S2 without murmurs, rubs or gallops. Abdomen: Soft, non-tender, non-distended with normal bowel sounds. Musculoskeletal: No clubbing, cyanosis or edema bilaterally.   Full range of motion without deformity. Skin: Skin color, texture, turgor normal.  No rashes or lesions. Neurologic:  Neurovascularly intact without any focal sensory/motor deficits. Cranial nerves: II-XII intact, grossly non-focal.  Mild dysarthria noted. Psychiatric: Alert and oriented, thought content appropriate, normal insight  Capillary Refill: Brisk,< 3 seconds   Peripheral Pulses: +2 palpable, equal bilaterally       Labs:   Recent Labs     01/16/20  1510 01/17/20  0717   WBC 8.0 6.6   HGB 15.7 15.0   HCT 46.2 44.1    150     Recent Labs     01/16/20  1510   *   K 4.8   CL 99   CO2 22   BUN 26*   CREATININE 1.0   CALCIUM 9.4     Recent Labs     01/16/20  1510   AST 24   ALT 27   BILITOT 0.4   ALKPHOS 90     Recent Labs     01/16/20  1510   INR 0.95     Recent Labs     01/16/20  1510   TROPONINI 0.02*       Urinalysis:      Lab Results   Component Value Date    NITRU Negative 01/16/2020    WBCUA 3-5 12/19/2018    BACTERIA Rare 12/19/2018    RBCUA  12/19/2018    BLOODU Negative 01/16/2020    SPECGRAV 1.015 01/16/2020    GLUCOSEU >=1000 01/16/2020    GLUCOSEU >=1000 02/02/2012       Radiology:  MRI brain without contrast   Preliminary Result   1. Acute/early subacute lacunar infarction in the left frontal lobe posterior   corona radiata. No associated hemorrhage. 2. Parenchymal volume loss and sequela of mild chronic microvascular ischemic   changes and old lacunar infarctions, as described above. The findings were sent to the Radiology Results [address] at 3:17   pm on 1/17/2020to be communicated to a licensed caregiver. Assessment/Plan:    Active Hospital Problems    Diagnosis    Arterial ischemic stroke, ICA, left, acute ([facility] [address] [I63.232]    Dyslipidemia [E78.5]    TIA (transient ischemic attack) [G45.9]    DM (diabetes mellitus), type 2, uncontrolled with complications (HCC) [G11.9, E11.65]    Hyperlipidemia [E78.5]    HTN (hypertension), benign [I10]     1.  TIA vs CVA   CTA head and neck with mild atherosclerotic changes. CTH negative. Started on ASA and statin              LDL 86              SLP / OT / PT consulted               MRI brain pending. PRN Valium for MRI.     2. DM II - check HgBA1C. . Continue lantus 40 un bid and add SSI      3. HTN - he is not on home BP med. BP is elevated but is appropriate in context of suspected TIA/CVA. Monitor for now.      4.  GERD - on Protonix     DVT Prophylaxis: Lovenox  Diet: DIET CARB CONTROL;  Code Status: Full Code    PT/OT Eval Status: ordered    Dispo - likely 1-2 days    TRESSA Rueda - CNP

## 2020-01-17 NOTE — PLAN OF CARE
Problem: Nutrition  Intervention: Swallowing evaluation  Note:   Education completed re: results and recommendations, role of ST, and POC. Problem: Nutrition  Intervention: Aspiration precautions  Note:   Education completed re: signs of aspiration, safe swallow strategies, and recommendations.

## 2020-01-18 LAB
GLUCOSE BLD-MCNC: 195 MG/DL (ref 70–99)
GLUCOSE BLD-MCNC: 202 MG/DL (ref 70–99)
GLUCOSE BLD-MCNC: 220 MG/DL (ref 70–99)
GLUCOSE BLD-MCNC: 84 MG/DL (ref 70–99)
PERFORMED ON: ABNORMAL
PERFORMED ON: NORMAL

## 2020-01-18 PROCEDURE — 1200000000 HC SEMI PRIVATE

## 2020-01-18 PROCEDURE — 6360000002 HC RX W HCPCS: Performed by: INTERNAL MEDICINE

## 2020-01-18 PROCEDURE — 99232 SBSQ HOSP IP/OBS MODERATE 35: CPT | Performed by: PSYCHIATRY & NEUROLOGY

## 2020-01-18 PROCEDURE — 2580000003 HC RX 258: Performed by: INTERNAL MEDICINE

## 2020-01-18 PROCEDURE — 92526 ORAL FUNCTION THERAPY: CPT

## 2020-01-18 PROCEDURE — 6370000000 HC RX 637 (ALT 250 FOR IP): Performed by: NURSE PRACTITIONER

## 2020-01-18 PROCEDURE — 6370000000 HC RX 637 (ALT 250 FOR IP): Performed by: INTERNAL MEDICINE

## 2020-01-18 RX ORDER — ROPINIROLE 0.5 MG/1
0.5 TABLET, FILM COATED ORAL NIGHTLY
Status: DISCONTINUED | OUTPATIENT
Start: 2020-01-18 | End: 2020-01-20 | Stop reason: HOSPADM

## 2020-01-18 RX ORDER — ACETAMINOPHEN 325 MG/1
TABLET ORAL
Status: DISPENSED
Start: 2020-01-18 | End: 2020-01-18

## 2020-01-18 RX ORDER — ACETAMINOPHEN 325 MG/1
650 TABLET ORAL EVERY 4 HOURS PRN
Status: DISCONTINUED | OUTPATIENT
Start: 2020-01-18 | End: 2020-01-20 | Stop reason: HOSPADM

## 2020-01-18 RX ORDER — SODIUM PHOSPHATE,MONO-DIBASIC 19G-7G/118
1 ENEMA (ML) RECTAL ONCE
Status: DISCONTINUED | OUTPATIENT
Start: 2020-01-18 | End: 2020-01-20 | Stop reason: HOSPADM

## 2020-01-18 RX ORDER — SENNA AND DOCUSATE SODIUM 50; 8.6 MG/1; MG/1
2 TABLET, FILM COATED ORAL 2 TIMES DAILY
Status: DISCONTINUED | OUTPATIENT
Start: 2020-01-18 | End: 2020-01-20 | Stop reason: HOSPADM

## 2020-01-18 RX ORDER — BISACODYL 10 MG
10 SUPPOSITORY, RECTAL RECTAL ONCE
Status: COMPLETED | OUTPATIENT
Start: 2020-01-18 | End: 2020-01-18

## 2020-01-18 RX ADMIN — ATORVASTATIN CALCIUM 80 MG: 80 TABLET, FILM COATED ORAL at 21:06

## 2020-01-18 RX ADMIN — ACETAMINOPHEN 650 MG: 325 TABLET ORAL at 21:05

## 2020-01-18 RX ADMIN — INSULIN LISPRO 4 UNITS: 100 INJECTION, SOLUTION INTRAVENOUS; SUBCUTANEOUS at 12:56

## 2020-01-18 RX ADMIN — SENNOSIDES AND DOCUSATE SODIUM 2 TABLET: 8.6; 5 TABLET ORAL at 10:04

## 2020-01-18 RX ADMIN — ACETAMINOPHEN 650 MG: 325 TABLET ORAL at 05:14

## 2020-01-18 RX ADMIN — ROPINIROLE HYDROCHLORIDE 0.5 MG: 0.5 TABLET, FILM COATED ORAL at 21:06

## 2020-01-18 RX ADMIN — INSULIN LISPRO 4 UNITS: 100 INJECTION, SOLUTION INTRAVENOUS; SUBCUTANEOUS at 16:36

## 2020-01-18 RX ADMIN — ACETAMINOPHEN 650 MG: 325 TABLET ORAL at 10:05

## 2020-01-18 RX ADMIN — INSULIN GLARGINE 40 UNITS: 100 INJECTION, SOLUTION SUBCUTANEOUS at 10:06

## 2020-01-18 RX ADMIN — ASPIRIN 81 MG 81 MG: 81 TABLET ORAL at 10:04

## 2020-01-18 RX ADMIN — INSULIN GLARGINE 40 UNITS: 100 INJECTION, SOLUTION SUBCUTANEOUS at 21:06

## 2020-01-18 RX ADMIN — Medication 10 ML: at 10:06

## 2020-01-18 RX ADMIN — ENOXAPARIN SODIUM 40 MG: 40 INJECTION SUBCUTANEOUS at 10:05

## 2020-01-18 RX ADMIN — BISACODYL 10 MG: 10 SUPPOSITORY RECTAL at 12:55

## 2020-01-18 RX ADMIN — SENNOSIDES AND DOCUSATE SODIUM 2 TABLET: 8.6; 5 TABLET ORAL at 21:10

## 2020-01-18 RX ADMIN — PANTOPRAZOLE SODIUM 40 MG: 40 TABLET, DELAYED RELEASE ORAL at 05:08

## 2020-01-18 ASSESSMENT — PAIN SCALES - GENERAL
PAINLEVEL_OUTOF10: 7
PAINLEVEL_OUTOF10: 0
PAINLEVEL_OUTOF10: 7
PAINLEVEL_OUTOF10: 7
PAINLEVEL_OUTOF10: 0
PAINLEVEL_OUTOF10: 0
PAINLEVEL_OUTOF10: 6
PAINLEVEL_OUTOF10: 0

## 2020-01-18 NOTE — PROGRESS NOTES
clubbing, cyanosis or edema bilaterally. Full range of motion without deformity. Skin: Skin color, texture, turgor normal.  No rashes or lesions. Neurologic:  Neurovascularly intact without any focal sensory/motor deficits. Cranial nerves: II-XII intact, grossly non-focal.  Mild dysarthria noted. Psychiatric: Alert and oriented, thought content appropriate, normal insight  Capillary Refill: Brisk,< 3 seconds   Peripheral Pulses: +2 palpable, equal bilaterally       Labs:   Recent Labs     01/16/20  1510 01/17/20  0717   WBC 8.0 6.6   HGB 15.7 15.0   HCT 46.2 44.1    150     Recent Labs     01/16/20  1510   *   K 4.8   CL 99   CO2 22   BUN 26*   CREATININE 1.0   CALCIUM 9.4     Recent Labs     01/16/20  1510   AST 24   ALT 27   BILITOT 0.4   ALKPHOS 90     Recent Labs     01/16/20  1510   INR 0.95     Recent Labs     01/16/20  1510   TROPONINI 0.02*       Urinalysis:      Lab Results   Component Value Date    NITRU Negative 01/16/2020    WBCUA 3-5 12/19/2018    BACTERIA Rare 12/19/2018    RBCUA  12/19/2018    BLOODU Negative 01/16/2020    SPECGRAV 1.015 01/16/2020    GLUCOSEU >=1000 01/16/2020    GLUCOSEU >=1000 02/02/2012       Radiology:  MRI brain without contrast   Final Result   1. Acute/early subacute lacunar infarction in the left frontal lobe posterior   corona radiata. No associated hemorrhage. 2. Parenchymal volume loss and sequela of mild chronic microvascular ischemic   changes and old lacunar infarctions, as described above. The findings were sent to the Radiology Results [address] at 3:17   pm on 1/17/2020to be communicated to a licensed caregiver.                  Assessment/Plan:    Active Hospital Problems    Diagnosis    Acute cerebrovascular accident (CVA) ([facility] [address] [I63.9]    Arterial ischemic stroke, ICA, left, acute ([facility] [address] [I63.232]    Dyslipidemia [E78.5]    TIA (transient ischemic attack) [G45.9]    DM (diabetes mellitus), type 2, uncontrolled with

## 2020-01-18 NOTE — PROGRESS NOTES
Speech Language Pathology  Facility/Department: [facility] B3 - MED SURG  Dysphagia Daily Treatment Note    NAME: Ventura Jean  : 1940  MRN: [id number]    Patient Diagnosis(es):   Patient Active Problem List    Diagnosis Date Noted    Acute cerebrovascular accident (CVA) ([facility] [address] 2020    Arterial ischemic stroke, ICA, left, acute ([facility] [address]     Dyslipidemia     TIA (transient ischemic attack) 2020    Obesity 2018    HTN (hypertension), benign 2018    Hyperlipidemia 2018    DM (diabetes mellitus), type 2, uncontrolled with complications ([facility] [address]     Chest pain 2018    Prepatellar bursitis 2016    Osteoarthrosis, hip     Renal colic on left side      Allergies: No Known Allergies     Subjective: 78year old male admitted on 20 with left CVA. Pt reports that he has mild dysphagia at baseline, which he attributes to esophageal issues. Pt reports that his speech has gotten \"much better\" today. Pain: denies    Current Diet: DIET CARB CONTROL; Diet Tolerance:  Patient tolerating current diet level without signs/symptoms of penetration / aspiration. P.O. Trials: Thin   x Cup sip x 15   Solid   x X 4     Dysphagia Treatment and Impressions:  RN okays SLP entry into pt's room. Pt sitting up on edge of bed. Cough x 1 following pt taking several pills at once with sip of water. Pt states: \"I took too many at one time. \"   Oral phase of swallow grossly appears WNL. No oral phase deficits noted during evaluation. Pharyngeal phase of swallow appears grossly WNL. No pharyngeal deficits noted during evaluation. Laryngeal elevation appears WFL based upon palpation of anterior neck during swallow. Vocal quality dry before and after po trials. RR 16/min prior to and following po trials.       Dysphagia Goals:  Timeframe for Long-term Goals: 7 days by 2020  Goal 1: Pt will demonstrate functional swallow of preferred diet items using safe swallow strategies independently in 5/5 opportunities. Today, 01/18: Progressing. Ongoing. Short-term Goals  Timeframe for Short-term Goals: 5 days by 01/22/2020  Goal 1: Pt will tolerate recommended diet with no clinical signs of aspiration observed in 5/5 trials. Today, 01/18: Progressing. Ongoing. Goal 2: Pt will demonstrate safe swallow strategies independently in 5/5 opportunities. Today, 01/18: Progressing. Ongoing. Recommendations:  Solid Consistency: REGULAR  Liquid Consistency: THIN  Medication: one at a time, with sips of water    Patient/Family/Caregiver Education: reviewed aspiration precautions    Compensatory Strategies:   · HOB 90* and 30\" after meals;   · small bites/sips;   · alternate solids/liquids every 3-5 bites;   · oral care after every meal    Plan:    Continued Dysphagia treatment with goals per plan of care. Discharge Recommendations: to be determined    If pt discharges from hospital prior to Speech/Swallowing discharge, this note serves as tx and discharge summary. Total Treatment Time / Charges     Time in Time out Total Time / units   Cognitive Tx         Speech Tx      Dysphagia Tx  1005 1020  15 min / 1 unit     Signature:    Zulay Fisher, [address] [place] [id number]  Speech-Language Pathologist  (desk #): [phone]

## 2020-01-18 NOTE — PROGRESS NOTES
(ZOFRAN) injection 4 mg  4 mg Intravenous Q6H PRN Vita Pérez MD        enoxaparin (LOVENOX) injection 40 mg  40 mg Subcutaneous Daily Vita Pérez MD   40 mg at 01/18/20 1005    atorvastatin (LIPITOR) tablet 80 mg  80 mg Oral Nightly Vita Pérez MD   80 mg at 01/17/20 2018    labetalol (NORMODYNE;TRANDATE) injection 10 mg  10 mg Intravenous Q10 Min PRN Vita Pérez MD        glucose (GLUTOSE) 40 % oral gel 15 g  15 g Oral PRN Vita Pérez MD        dextrose 50 % IV solution  12.5 g Intravenous PRN Vita Pérez MD        glucagon (rDNA) injection 1 mg  1 mg Intramuscular PRN Vita Pérez MD        dextrose 5 % solution  100 mL/hr Intravenous PRN Vita Pérez MD        insulin lispro (HUMALOG) injection vial 0-12 Units  0-12 Units Subcutaneous TID WC Vita Pérez MD   2 Units at 01/17/20 1650    insulin lispro (HUMALOG) injection vial 0-6 Units  0-6 Units Subcutaneous Nightly Vita Pérez MD   1 Units at 01/16/20 2234    LORazepam (ATIVAN) injection 1 mg  1 mg Intravenous Q6H PRN Vita Pérez MD        sennosides-docusate sodium (SENOKOT-S) 8.6-50 MG tablet 2 tablet  2 tablet Oral Daily PRN Mackenzie Torres, APRN - NP   2 tablet at 01/18/20 1004     No Known Allergies   reports that he has never smoked. He has quit using smokeless tobacco. He reports that he does not drink alcohol or use drugs. Objective:  Exam:   Constitutional:   Vitals:    01/17/20 2018 01/18/20 0000 01/18/20 0500 01/18/20 0745   BP: (!) 144/82 (!) 102/41 (!) 161/90 120/69   Pulse: 79 69 77 69   Resp: 16 16 16 16   Temp: 97.5 °F (36.4 °C) 98.2 °F (36.8 °C) 97.6 °F (36.4 °C) 97.6 °F (36.4 °C)   TempSrc: Oral Oral Oral Oral   SpO2: 97% 99% 97% 100%   Weight:       Height:         General appearance:  Normal development and appear in no acute distress. Eye: No icterus. Neck: supple  Cardiovascular:  No lower leg edema with good pulsation. Mental Status:   Oriented to person, place, problem, and time.

## 2020-01-19 ENCOUNTER — APPOINTMENT (OUTPATIENT)
Dept: GENERAL RADIOLOGY | Age: 80
DRG: 066 | End: 2020-01-19
Attending: INTERNAL MEDICINE
Payer: MEDICARE

## 2020-01-19 LAB
GLUCOSE BLD-MCNC: 125 MG/DL (ref 70–99)
GLUCOSE BLD-MCNC: 164 MG/DL (ref 70–99)
GLUCOSE BLD-MCNC: 174 MG/DL (ref 70–99)
GLUCOSE BLD-MCNC: 201 MG/DL (ref 70–99)
PERFORMED ON: ABNORMAL

## 2020-01-19 PROCEDURE — 6370000000 HC RX 637 (ALT 250 FOR IP): Performed by: INTERNAL MEDICINE

## 2020-01-19 PROCEDURE — 6360000002 HC RX W HCPCS: Performed by: INTERNAL MEDICINE

## 2020-01-19 PROCEDURE — 74022 RADEX COMPL AQT ABD SERIES: CPT

## 2020-01-19 PROCEDURE — 2580000003 HC RX 258: Performed by: INTERNAL MEDICINE

## 2020-01-19 PROCEDURE — 6370000000 HC RX 637 (ALT 250 FOR IP): Performed by: NURSE PRACTITIONER

## 2020-01-19 PROCEDURE — 1200000000 HC SEMI PRIVATE

## 2020-01-19 RX ORDER — BISACODYL 10 MG
10 SUPPOSITORY, RECTAL RECTAL DAILY PRN
Status: DISCONTINUED | OUTPATIENT
Start: 2020-01-19 | End: 2020-01-20 | Stop reason: HOSPADM

## 2020-01-19 RX ORDER — GABAPENTIN 100 MG/1
100 CAPSULE ORAL ONCE
Status: COMPLETED | OUTPATIENT
Start: 2020-01-19 | End: 2020-01-19

## 2020-01-19 RX ORDER — LACTULOSE 10 G/15ML
20 SOLUTION ORAL 3 TIMES DAILY
Status: DISCONTINUED | OUTPATIENT
Start: 2020-01-19 | End: 2020-01-20 | Stop reason: HOSPADM

## 2020-01-19 RX ADMIN — Medication 10 ML: at 09:35

## 2020-01-19 RX ADMIN — SENNOSIDES AND DOCUSATE SODIUM 2 TABLET: 8.6; 5 TABLET ORAL at 21:10

## 2020-01-19 RX ADMIN — ACETAMINOPHEN 650 MG: 325 TABLET ORAL at 12:12

## 2020-01-19 RX ADMIN — ASPIRIN 81 MG 81 MG: 81 TABLET ORAL at 09:34

## 2020-01-19 RX ADMIN — ROPINIROLE HYDROCHLORIDE 0.5 MG: 0.5 TABLET, FILM COATED ORAL at 21:10

## 2020-01-19 RX ADMIN — INSULIN LISPRO 2 UNITS: 100 INJECTION, SOLUTION INTRAVENOUS; SUBCUTANEOUS at 12:09

## 2020-01-19 RX ADMIN — LACTULOSE 20 G: 20 SOLUTION ORAL at 21:07

## 2020-01-19 RX ADMIN — INSULIN GLARGINE 40 UNITS: 100 INJECTION, SOLUTION SUBCUTANEOUS at 23:31

## 2020-01-19 RX ADMIN — GABAPENTIN 100 MG: 100 CAPSULE ORAL at 00:20

## 2020-01-19 RX ADMIN — Medication 10 ML: at 21:10

## 2020-01-19 RX ADMIN — LACTULOSE 20 G: 20 SOLUTION ORAL at 15:02

## 2020-01-19 RX ADMIN — INSULIN GLARGINE 40 UNITS: 100 INJECTION, SOLUTION SUBCUTANEOUS at 09:35

## 2020-01-19 RX ADMIN — ENOXAPARIN SODIUM 40 MG: 40 INJECTION SUBCUTANEOUS at 09:34

## 2020-01-19 RX ADMIN — INSULIN LISPRO 2 UNITS: 100 INJECTION, SOLUTION INTRAVENOUS; SUBCUTANEOUS at 21:10

## 2020-01-19 RX ADMIN — PANTOPRAZOLE SODIUM 40 MG: 40 TABLET, DELAYED RELEASE ORAL at 05:42

## 2020-01-19 RX ADMIN — ACETAMINOPHEN 650 MG: 325 TABLET ORAL at 21:27

## 2020-01-19 RX ADMIN — SENNOSIDES AND DOCUSATE SODIUM 2 TABLET: 8.6; 5 TABLET ORAL at 09:35

## 2020-01-19 RX ADMIN — ATORVASTATIN CALCIUM 80 MG: 80 TABLET, FILM COATED ORAL at 21:10

## 2020-01-19 RX ADMIN — INSULIN LISPRO 2 UNITS: 100 INJECTION, SOLUTION INTRAVENOUS; SUBCUTANEOUS at 18:38

## 2020-01-19 ASSESSMENT — PAIN DESCRIPTION - PAIN TYPE
TYPE: CHRONIC PAIN
TYPE: CHRONIC PAIN

## 2020-01-19 ASSESSMENT — PAIN SCALES - GENERAL
PAINLEVEL_OUTOF10: 0
PAINLEVEL_OUTOF10: 0
PAINLEVEL_OUTOF10: 4
PAINLEVEL_OUTOF10: 0
PAINLEVEL_OUTOF10: 4
PAINLEVEL_OUTOF10: 0
PAINLEVEL_OUTOF10: 3

## 2020-01-19 ASSESSMENT — PAIN DESCRIPTION - LOCATION
LOCATION: BACK
LOCATION: BACK

## 2020-01-19 NOTE — PROGRESS NOTES
with normal bowel sounds. Musculoskeletal: No clubbing, cyanosis or edema bilaterally. Full range of motion without deformity. Skin: Skin color, texture, turgor normal.  No rashes or lesions. Neurologic:  Neurovascularly intact without any focal sensory/motor deficits. Cranial nerves: II-XII intact, grossly non-focal.  Mild dysarthria noted. Psychiatric: Alert and oriented, thought content appropriate, normal insight  Capillary Refill: Brisk,< 3 seconds   Peripheral Pulses: +2 palpable, equal bilaterally       Labs:   Recent Labs     01/17/20  0717   WBC 6.6   HGB 15.0   HCT 44.1        No results for input(s): NA, K, CL, CO2, BUN, CREATININE, CALCIUM, PHOS in the last 72 hours. Invalid input(s): MAGNES  No results for input(s): AST, ALT, BILIDIR, BILITOT, ALKPHOS in the last 72 hours. No results for input(s): INR in the last 72 hours. No results for input(s): [facility] in the last 72 hours. Urinalysis:      Lab Results   Component Value Date    NITRU Negative 01/16/2020    WBCUA 3-5 12/19/2018    BACTERIA Rare 12/19/2018    RBCUA  12/19/2018    BLOODU Negative 01/16/2020    SPECGRAV 1.015 01/16/2020    GLUCOSEU >=1000 01/16/2020    GLUCOSEU >=1000 02/02/2012       Radiology:  XR Acute Abd Series Chest 1 VW   Final Result   No radiographic evidence of acute cardiopulmonary disease. Moderate volume fecal debris may reflect constipation. Otherwise unremarkable upright and supine AP abdomen. MRI brain without contrast   Final Result   1. Acute/early subacute lacunar infarction in the left frontal lobe posterior   corona radiata. No associated hemorrhage. 2. Parenchymal volume loss and sequela of mild chronic microvascular ischemic   changes and old lacunar infarctions, as described above. The findings were sent to the Radiology Results [address] at 3:17   pm on 1/17/2020to be communicated to a licensed caregiver.

## 2020-01-20 VITALS
SYSTOLIC BLOOD PRESSURE: 147 MMHG | HEIGHT: 69 IN | HEART RATE: 83 BPM | TEMPERATURE: 97.7 F | DIASTOLIC BLOOD PRESSURE: 85 MMHG | WEIGHT: 172 LBS | OXYGEN SATURATION: 97 % | RESPIRATION RATE: 16 BRPM | BODY MASS INDEX: 25.48 KG/M2

## 2020-01-20 LAB
GLUCOSE BLD-MCNC: 124 MG/DL (ref 70–99)
GLUCOSE BLD-MCNC: 150 MG/DL (ref 70–99)
LV EF: 58 %
LVEF MODALITY: NORMAL
PERFORMED ON: ABNORMAL
PERFORMED ON: ABNORMAL

## 2020-01-20 PROCEDURE — 2580000003 HC RX 258: Performed by: INTERNAL MEDICINE

## 2020-01-20 PROCEDURE — 93306 TTE W/DOPPLER COMPLETE: CPT

## 2020-01-20 PROCEDURE — 6370000000 HC RX 637 (ALT 250 FOR IP): Performed by: NURSE PRACTITIONER

## 2020-01-20 PROCEDURE — 6370000000 HC RX 637 (ALT 250 FOR IP): Performed by: INTERNAL MEDICINE

## 2020-01-20 PROCEDURE — 6360000002 HC RX W HCPCS: Performed by: INTERNAL MEDICINE

## 2020-01-20 RX ORDER — ATORVASTATIN CALCIUM 80 MG/1
80 TABLET, FILM COATED ORAL NIGHTLY
Qty: 30 TABLET | Refills: 3 | Status: ON HOLD | OUTPATIENT
Start: 2020-01-20 | End: 2022-02-08 | Stop reason: SDUPTHER

## 2020-01-20 RX ADMIN — ASPIRIN 81 MG 81 MG: 81 TABLET ORAL at 08:12

## 2020-01-20 RX ADMIN — ACETAMINOPHEN 650 MG: 325 TABLET ORAL at 08:18

## 2020-01-20 RX ADMIN — INSULIN GLARGINE 40 UNITS: 100 INJECTION, SOLUTION SUBCUTANEOUS at 08:12

## 2020-01-20 RX ADMIN — Medication 10 ML: at 08:11

## 2020-01-20 RX ADMIN — PANTOPRAZOLE SODIUM 40 MG: 40 TABLET, DELAYED RELEASE ORAL at 05:10

## 2020-01-20 RX ADMIN — ENOXAPARIN SODIUM 40 MG: 40 INJECTION SUBCUTANEOUS at 08:10

## 2020-01-20 ASSESSMENT — PAIN SCALES - GENERAL
PAINLEVEL_OUTOF10: 6
PAINLEVEL_OUTOF10: 0
PAINLEVEL_OUTOF10: 0

## 2020-01-20 NOTE — CARE COORDINATION
CM reviewed chart for length of stay. Per provider note yesterday, pt likely to discharge home today after echocardiogram. Pt lives with spouse and was independent prior to admission. No needs identified by CM. Please consult CM team if needs arise.      Fernando Davis RN CM

## 2020-01-20 NOTE — DISCHARGE INSTR - COC
mellitus), type 2, uncontrolled with complications (HCC) W34.0, E11.65    Chest pain R07.9    TIA (transient ischemic attack) G45.9    Arterial ischemic stroke, ICA, left, acute (HCC) R77.209    Dyslipidemia E78.5    Acute cerebrovascular accident (CVA) ([facility] [address] I63.9       Isolation/Infection:   Isolation          No Isolation        Patient Infection Status     None to display          Nurse Assessment:  Last Vital Signs: /86   Pulse 78   Temp 97.5 °F (36.4 °C) (Oral)   Resp 16   Ht 5' 9\" (1.753 m)   Wt 172 lb (78 kg)   SpO2 96%   BMI 25.40 kg/m²     Last documented pain score (0-10 scale): Pain Level: 6  Last Weight:   Wt Readings from Last 1 Encounters:   01/19/20 172 lb (78 kg)     Mental Status:  {IP PT MENTAL STATUS:20030}    IV Access:  { BEA IV ACCESS:811487707}    Nursing Mobility/ADLs:  Walking   {[facility] DME VRHJ:562573472}  Transfer  {P DME CIFY:004538385}  Bathing  {[facility] DME RLUN:570199293}  Dressing  {P DME ZTBL:358184682}  Toileting  {[facility] DME OTOI:866159328}  Feeding  {[facility] DME JYFW:859245450}  Med Admin  {[facility] DME QOLX:917381855}  Med Delivery   { BEA MED Delivery:758162995}    Wound Care Documentation and Therapy:        Elimination:  Continence:   · Bowel: {YES / DS:40016}  · Bladder: {YES / JN:51610}  Urinary Catheter: {Urinary Catheter:534788924}   Colostomy/Ileostomy/Ileal Conduit: {YES / QF:20073}       Date of Last BM: ***    Intake/Output Summary (Last 24 hours) at 1/20/2020 1146  Last data filed at 1/20/2020 0500  Gross per 24 hour   Intake 730 ml   Output --   Net 730 ml     I/O last 3 completed shifts:   In: 5589 [P.O.:1080; I.V.:10]  Out: 1 [Urine:1]    Safety Concerns:     [address] BEA Safety Concerns:147408398}    Impairments/Disabilities:      [address] FIGU[address]EDO Impairments/Disabilities:547077954}    Nutrition Therapy:  Current Nutrition Therapy:   [address] Diet List:172913901}    Routes of Feeding: {CHP DME Other Feedings:048946822}  Liquids: {Slp liquid thickness:43674}  Daily Fluid Restriction: {CHP DME Yes amt example:756725862}  Last Modified Barium Swallow with Video (Video Swallowing Test): {Done Not Done YNNS:483162154}    Treatments at the Time of Hospital Discharge:   Respiratory Treatments: ***  Oxygen Therapy:  {Therapy; copd oxygen:26710}  Ventilator:    {MH CC Vent HZLW:084230422}    Rehab Therapies: {THERAPEUTIC INTERVENTION:5184882984}  Weight Bearing Status/Restrictions: { CC Weight Bearin}  Other Medical Equipment (for information only, NOT a DME order):  {EQUIPMENT:542345718}  Other Treatments: ***    Patient's personal belongings (please select all that are sent with patient):  {P DME Belongings:689623749}    RN SIGNATURE:  {Esignature:861436957}    CASE MANAGEMENT/SOCIAL WORK SECTION    Inpatient Status Date: ***    Readmission Risk Assessment Score:  Readmission Risk              Risk of Unplanned Readmission:        10           Discharging to Facility/ Agency   · Name:   · Address:  · Phone:  · Fax:    Dialysis Facility (if applicable)   · Name:  · Address:  · Dialysis Schedule:  · Phone:  · Fax:    / signature: {Esignature:528804310}    PHYSICIAN SECTION    Prognosis: Good    Condition at Discharge: Stable    Recommended Labs or Other Treatments After Discharge: Home PT/OT    Physician Certification: I certify the above information and transfer of Joann Gann  is necessary for the continuing treatment of the diagnosis listed and that he requires Home Care for less than 30 days.      Update Admission H&P: No change in H&P    PHYSICIAN SIGNATURE:  Electronically signed by TRESSA Olivier CNP on 20 at 11:47 AM

## 2020-01-20 NOTE — PROGRESS NOTES
Discharge instructions given, pt and spouse verbalized understanding. Pt aware 1 RX in OP pharmacy. IV removed no complications. Tele box removed returned. Lock box emptied. Pt belongings gathered. Awaiting ride from family.

## 2020-01-25 NOTE — DISCHARGE SUMMARY
Hospital Medicine Discharge Summary    Patient ID: She Caldwell      Patient's PCP: Shaka Castorena MD    Admit Date: 1/16/2020     Discharge Date: 1/20/2020      Admitting Physician: Matteo Bertrand MD     Discharge Physician: TRESSA Zhang - CNP     Discharge Diagnoses: Active Hospital Problems    Diagnosis    Acute cerebrovascular accident (CVA) ([facility] [address] [I63.9]    Arterial ischemic stroke, ICA, left, acute ([facility] [address] [I63.232]    Dyslipidemia [E78.5]    TIA (transient ischemic attack) [G45.9]    DM (diabetes mellitus), type 2, uncontrolled with complications ([facility] [address] [X69.7, E11.65]    Hyperlipidemia [E78.5]    HTN (hypertension), benign [I10]       The patient was seen and examined on day of discharge and this discharge summary is in conjunction with any daily progress note from day of discharge. Hospital Course:     Estella Lopez 78 y. o. male who presented with complaint of slurred speech. 1. Acute CVA              CTA head and neck with mild atherosclerotic changes. CTH negative.              Started on ASA and statin              LDL 80              SLP / OT / PT consulted               NKR brain completed and revealed acute/early subacute      lacunar infarction in the left frontal lobe posterior corona  Radiata. Echo completed - EF 55-60% with grade I DD.       2. DM II - check HgBA1C. . Continue lantus 40 un bid and add SSI      3. HTN - he is not on home BP med. BP is elevated but is appropriate in context of suspected TIA/CVA. Monitor for now.      4. GERD - on Protonix      5. Constipation, acute on chronic - worsened by immobility. - attempted suppository, senna without success. Resolved with  Lactulose.         Physical Exam Performed:     BP (!) 147/85   Pulse 83   Temp 97.7 °F (36.5 °C) (Oral)   Resp 16   Ht 5' 9\" (1.753 m)   Wt 172 lb (78 kg)   SpO2 97%   BMI 25.40 kg/m²       General appearance:  No apparent distress, appears stated Take by mouth dailyHistorical Med      NONFORMULARY Pt takes a probiotic dose [address] [facility] that has about 8 pills in it, Unknown what each pill is. Historical Med      brimonidine (ALPHAGAN) 0.2 % ophthalmic solution Place 1 drop into both eyes 2 times daily Historical Med      b complex vitamins capsule Take 1 capsule by mouth daily      PEARL PARRA, PO Take by mouth      Dorzolamide HCl (TRUSOPT OP) Apply 1 drop to eye 2 times daily Historical Med      Multiple Vitamin (MULTI VITAMIN MENS PO) Take  by mouth. insulin aspart (NOVOLOG) 100 UNIT/ML injection Inject  into the skin 3 times daily (before meals). Sliding scale based on meal calculation       insulin glargine (LANTUS) 100 UNIT/ML injection Inject 40 Units into the skin 2 times daily. Time Spent on discharge is more than 30 minutes in the examination, evaluation, counseling and review of medications and discharge plan. Signed:    TRESSA Zhang - CNP   1/25/2020      Thank you Shaka Castorena MD for the opportunity to be involved in this patient's care. If you have any questions or concerns please feel free to contact me at [phone].

## 2020-11-03 PROBLEM — E78.5 HYPERLIPIDEMIA: Status: RESOLVED | Noted: 2018-03-05 | Resolved: 2020-11-03

## 2021-03-20 ENCOUNTER — HOSPITAL ENCOUNTER (EMERGENCY)
Age: 81
Discharge: HOME | End: 2021-03-21
Attending: EMERGENCY MEDICINE
Payer: MEDICARE

## 2021-03-20 ENCOUNTER — APPOINTMENT (OUTPATIENT)
Dept: GENERAL RADIOLOGY | Age: 81
End: 2021-03-20
Payer: MEDICARE

## 2021-03-20 DIAGNOSIS — R73.9 HYPERGLYCEMIA: ICD-10-CM

## 2021-03-20 DIAGNOSIS — R29.810 FACIAL DROOP: ICD-10-CM

## 2021-03-20 DIAGNOSIS — G45.9 TRANSIENT ISCHEMIC ATTACK: Primary | ICD-10-CM

## 2021-03-20 DIAGNOSIS — I70.90 ATHEROSCLEROSIS: ICD-10-CM

## 2021-03-20 LAB
A/G RATIO: 1.5 (ref 1.1–2.2)
ALBUMIN SERPL-MCNC: 4 G/DL (ref 3.4–5)
ALP BLD-CCNC: 111 U/L (ref 40–129)
ALT SERPL-CCNC: 19 U/L (ref 10–40)
ANION GAP SERPL CALCULATED.3IONS-SCNC: 10 MMOL/L (ref 3–16)
AST SERPL-CCNC: 21 U/L (ref 15–37)
BASOPHILS ABSOLUTE: 0 K/UL (ref 0–0.2)
BASOPHILS RELATIVE PERCENT: 0.5 %
BILIRUB SERPL-MCNC: 0.6 MG/DL (ref 0–1)
BUN BLDV-MCNC: 27 MG/DL (ref 7–20)
CALCIUM SERPL-MCNC: 9.2 MG/DL (ref 8.3–10.6)
CHLORIDE BLD-SCNC: 105 MMOL/L (ref 99–110)
CO2: 26 MMOL/L (ref 21–32)
CREAT SERPL-MCNC: 1 MG/DL (ref 0.8–1.3)
EOSINOPHILS ABSOLUTE: 0.3 K/UL (ref 0–0.6)
EOSINOPHILS RELATIVE PERCENT: 3.5 %
GFR AFRICAN AMERICAN: >60
GFR NON-AFRICAN AMERICAN: >60
GLOBULIN: 2.7 G/DL
GLUCOSE BLD-MCNC: 207 MG/DL (ref 70–99)
HCT VFR BLD CALC: 43 % (ref 40.5–52.5)
HEMOGLOBIN: 14.5 G/DL (ref 13.5–17.5)
INR BLD: 0.95 (ref 0.86–1.14)
LYMPHOCYTES ABSOLUTE: 2 K/UL (ref 1–5.1)
LYMPHOCYTES RELATIVE PERCENT: 27.6 %
MCH RBC QN AUTO: 30.1 PG (ref 26–34)
MCHC RBC AUTO-ENTMCNC: 33.8 G/DL (ref 31–36)
MCV RBC AUTO: 89.2 FL (ref 80–100)
MONOCYTES ABSOLUTE: 0.6 K/UL (ref 0–1.3)
MONOCYTES RELATIVE PERCENT: 8.7 %
NEUTROPHILS ABSOLUTE: 4.3 K/UL (ref 1.7–7.7)
NEUTROPHILS RELATIVE PERCENT: 59.7 %
PDW BLD-RTO: 14.4 % (ref 12.4–15.4)
PLATELET # BLD: 142 K/UL (ref 135–450)
PMV BLD AUTO: 9.3 FL (ref 5–10.5)
POTASSIUM REFLEX MAGNESIUM: 4.2 MMOL/L (ref 3.5–5.1)
PROTHROMBIN TIME: 11 SEC (ref 10–13.2)
RBC # BLD: 4.82 M/UL (ref 4.2–5.9)
SODIUM BLD-SCNC: 141 MMOL/L (ref 136–145)
TOTAL PROTEIN: 6.7 G/DL (ref 6.4–8.2)
TROPONIN: 0.01 NG/ML
WBC # BLD: 7.2 K/UL (ref 4–11)

## 2021-03-20 PROCEDURE — 71045 X-RAY EXAM CHEST 1 VIEW: CPT

## 2021-03-20 PROCEDURE — 85610 PROTHROMBIN TIME: CPT

## 2021-03-20 PROCEDURE — 93005 ELECTROCARDIOGRAM TRACING: CPT | Performed by: EMERGENCY MEDICINE

## 2021-03-20 PROCEDURE — 99284 EMERGENCY DEPT VISIT MOD MDM: CPT

## 2021-03-20 PROCEDURE — 84484 ASSAY OF TROPONIN QUANT: CPT

## 2021-03-20 PROCEDURE — 80053 COMPREHEN METABOLIC PANEL: CPT

## 2021-03-20 PROCEDURE — 85025 COMPLETE CBC W/AUTO DIFF WBC: CPT

## 2021-03-21 ENCOUNTER — APPOINTMENT (OUTPATIENT)
Dept: CT IMAGING | Age: 81
End: 2021-03-21
Payer: MEDICARE

## 2021-03-21 VITALS
RESPIRATION RATE: 16 BRPM | BODY MASS INDEX: 30.66 KG/M2 | WEIGHT: 207 LBS | HEIGHT: 69 IN | TEMPERATURE: 98.1 F | DIASTOLIC BLOOD PRESSURE: 85 MMHG | HEART RATE: 67 BPM | SYSTOLIC BLOOD PRESSURE: 148 MMHG | OXYGEN SATURATION: 94 %

## 2021-03-21 LAB
EKG ATRIAL RATE: 77 BPM
EKG DIAGNOSIS: NORMAL
EKG P AXIS: 37 DEGREES
EKG P-R INTERVAL: 190 MS
EKG Q-T INTERVAL: 410 MS
EKG QRS DURATION: 82 MS
EKG QTC CALCULATION (BAZETT): 463 MS
EKG R AXIS: 18 DEGREES
EKG T AXIS: 18 DEGREES
EKG VENTRICULAR RATE: 77 BPM

## 2021-03-21 PROCEDURE — 70498 CT ANGIOGRAPHY NECK: CPT

## 2021-03-21 PROCEDURE — 6360000004 HC RX CONTRAST MEDICATION: Performed by: EMERGENCY MEDICINE

## 2021-03-21 PROCEDURE — 70450 CT HEAD/BRAIN W/O DYE: CPT

## 2021-03-21 PROCEDURE — 93010 ELECTROCARDIOGRAM REPORT: CPT | Performed by: INTERNAL MEDICINE

## 2021-03-21 RX ADMIN — IOPAMIDOL 75 ML: 755 INJECTION, SOLUTION INTRAVENOUS at 01:20

## 2021-03-21 ASSESSMENT — ENCOUNTER SYMPTOMS
TROUBLE SWALLOWING: 0
DIARRHEA: 0
PHOTOPHOBIA: 0
BACK PAIN: 0
VOMITING: 0
CHEST TIGHTNESS: 0
SORE THROAT: 0
EYE PAIN: 0
FACIAL SWELLING: 0
ABDOMINAL PAIN: 0
COLOR CHANGE: 0
EYE REDNESS: 0
SHORTNESS OF BREATH: 0
COUGH: 0

## 2021-03-21 NOTE — ED NOTES

## 2021-03-21 NOTE — ED NOTES
All discharge paperwork and follow-up instructions reviewed with pt. Pt verbalized understanding. Pt ambulatory upon discharge in stable condition to private vehicle with Wife and Family.        Reymundo Camacho RN  03/21/21 8602

## 2021-03-21 NOTE — ED PROVIDER NOTES
[facility]  ED  EMERGENCY DEPARTMENT ENCOUNTER        Pt Name: Anthony Ramírez  MRN: [id number]  [place] 1940  Date of evaluation: 3/20/2021  Provider: Thania Madrid MD  PCP: Serjio Loya MD      [address] [place] [address]       Chief Complaint   Patient presents with    Transient Ischemic Attack     Patient comes into ED via [facility] from home with c/o right sided facial drooping that had resolved before EMS got there. Patient has hx of TIA. HISTORY OFPRESENT ILLNESS   (Location/Symptom, Timing/Onset, Context/Setting, Quality, Duration, Modifying Factors,Severity)  Note limiting factors. Anthony Ramírez is a 78 y.o. male presenting today due to concern for developing left-sided facial droop (not right side as chief complaint stated) today around 4 PM associated with some trouble with speech. He states last year he had a stroke in January 2020 and had similar symptoms and was worried he may be having another stroke. By the time he got to the emergency department by EMS, his droop had resolved and he was feeling back to his normal self. He states that he always has a little bit of numbness to the left side of the face since the prior stroke but no current change from his baseline. He denies any chest pain or shortness of breath. No falls or trauma. He is on aspirin but denies any other blood thinners. No visual changes. No fevers or chills. No trouble swallowing. No double vision. No other concerns at this time but he does wanted to be safe based on his prior history of strokes. REVIEW OF SYSTEMS    (2-9 systems for level 4, 10 or more for level 5)     Review of Systems   Constitutional: Negative for chills, diaphoresis, fatigue and fever. HENT: Negative for congestion, ear pain, facial swelling, sore throat and trouble swallowing. Eyes: Negative for photophobia, pain, redness and visual disturbance.    Respiratory: Negative for cough, chest tightness and shortness of breath. Cardiovascular: Negative for chest pain. Gastrointestinal: Negative for abdominal pain, diarrhea and vomiting. Genitourinary: Negative for dysuria and flank pain. Musculoskeletal: Negative for back pain, gait problem, neck pain and neck stiffness. Skin: Negative for color change. Neurological: Positive for facial asymmetry (left side, resolved), speech difficulty (resolved) and numbness (left side of face is chronic, no new changes). Negative for dizziness, weakness, light-headedness and headaches. Psychiatric/Behavioral: Negative for confusion. Positives and Pertinent negatives as per HPI.       PASTMEDICAL HISTORY     Past Medical History:   Diagnosis Date    Diabetes mellitus ([facility] [address]     type 2    Glaucoma     Hyperlipidemia     Hypertension     Kidney stone     Neuropathy     Osteoarthrosis, hip 3/21/2016    Prepatellar bursitis 3/21/2016         SURGICAL HISTORY       Past Surgical History:   Procedure Laterality Date    COLONOSCOPY  2008    CYSTOSCOPY  2/2/12    with stent placement    EYE SURGERY      left eyemed valve, bilateral retinal repair    FOOT SURGERY      right, tendon repair    KIDNEY STONE SURGERY      OTHER SURGICAL HISTORY Left 07/17/2017    CYSTOSCOPY, LEFT URETEROSCOPY, STENT PLACEMENT, URETHERAL dilation, stone manipulation          CURRENT MEDICATIONS       Discharge Medication List as of 3/21/2021  4:33 AM      CONTINUE these medications which have NOT CHANGED    Details   atorvastatin (LIPITOR) 80 MG tablet Take 1 tablet by mouth nightly, Disp-30 tablet, R-3Normal      amLODIPine (NORVASC) 10 MG tablet Take 1 tablet by mouth daily, Disp-14 tablet, R-0Print      lidocaine (LIDODERM) 5 % Place 1 patch onto the skin daily 12 hours on, 12 hours off., Disp-30 patch, R-0Print      ondansetron (ZOFRAN ODT) 4 MG disintegrating tablet Take 1-2 tablets by mouth every 12 hours as needed for Nausea May Sub regular tablet (non-ODT) if insurance does not cover ODT., Disp-12 tablet, R-0Print      Cyanocobalamin ([employer] VITAMIN B-12 SL) Place under the tongue dailyHistorical Med      vitamin C (ASCORBIC ACID) 500 MG tablet Take 500 mg by mouth dailyHistorical Med      omeprazole (PRILOSEC) 10 MG delayed release capsule Take 10 mg by mouth daily Unsure of dose. Historical Med      brompheniramine-pseudoephedrine-DM (BROMFED DM) 30-2-10 MG/5ML syrup Take 5 mLs by mouth 4 times daily as needed for Congestion or Cough, Disp-150 mL, R-0Print      benazepril (LOTENSIN) 20 MG tablet Take 20 mg by mouth daily Historical Med      aspirin 81 MG tablet Take 81 mg by mouth dailyHistorical Med      docusate sodium (COLACE) 100 MG capsule Take 100 mg by mouth 2 times daily as needed for ConstipationHistorical Med      Cholecalciferol (VITAMIN D3) 5000 units TABS Take by mouth dailyHistorical Med      NONFORMULARY Pt takes a probiotic dose [address] [facility] that has about 8 pills in it, Unknown what each pill is. Historical Med      brimonidine (ALPHAGAN) 0.2 % ophthalmic solution Place 1 drop into both eyes 2 times daily Historical Med      b complex vitamins capsule Take 1 capsule by mouth daily      SAW PALMETTO, SERENOA REPENS, PO Take by mouth      Dorzolamide HCl (TRUSOPT OP) Apply 1 drop to eye 2 times daily Historical Med      Multiple Vitamin (MULTI VITAMIN MENS PO) Take  by mouth. insulin aspart (NOVOLOG) 100 UNIT/ML injection Inject  into the skin 3 times daily (before meals). Sliding scale based on meal calculation       insulin glargine (LANTUS) 100 UNIT/ML injection Inject 40 Units into the skin 2 times daily. ALLERGIES     Patient has no known allergies. FAMILY HISTORY     History reviewed. No pertinent family history.        SOCIAL HISTORY       Social History     Socioeconomic History    Marital status:      Spouse name: None    Number of children: None    Years of education: None    Highest education level: None   Occupational History    None   Social Needs    Financial resource strain: None    Food insecurity     Worry: None     Inability: None    Transportation needs     Medical: None     Non-medical: None   Tobacco Use    Smoking status: Never Smoker    Smokeless tobacco: Former User   Substance and Sexual Activity    Alcohol use: No    Drug use: No    Sexual activity: Yes     Partners: Female   Lifestyle    Physical activity     Days per week: None     Minutes per session: None    Stress: None   Relationships    Social connections     Talks on phone: None     Gets together: None     Attends Buddhism service: None     Active member of club or organization: None     Attends meetings of clubs or organizations: None     Relationship status: None    Intimate partner violence     Fear of current or ex partner: None     Emotionally abused: None     Physically abused: None     Forced sexual activity: None   Other Topics Concern    None   Social History Narrative    None       SCREENINGS   NIH Stroke Scale  Interval: Baseline  Level of Consciousness (1a. ): Alert  LOC Questions (1b. ): Answers both correctly  LOC Commands (1c. ): Performs both tasks correctly  Best Gaze (2. ): Normal  Visual (3. ): No visual loss  Facial Palsy (4. ): Normal symmetrical movement  Motor Arm, Left (5a. ): No drift  Motor Arm, Right (5b. ): No drift  Motor Leg, Left (6a. ): No drift  Motor Leg, Right (6b. ): No drift  Limb Ataxia (7. ): Absent  Sensory (8. ): Normal  Best Language (9. ): No aphasia  Dysarthria (10. ): Normal  Extinction and Inattention (11): No abnormality  Total: 0            PHYSICAL EXAM    (up to 7 for level 4, 8 or more for level 5)     ED Triage Vitals   BP Temp Temp src Pulse Resp SpO2 Height Weight   -- -- -- -- -- -- -- --       Physical Exam  Vitals signs and nursing note reviewed. Constitutional:       General: He is awake. He is not in acute distress. Appearance: Normal appearance. He is well-developed and well-groomed.  He is obese. He is not ill-appearing, toxic-appearing or diaphoretic. HENT:      Head: Normocephalic and atraumatic. Right Ear: External ear normal.      Left Ear: External ear normal.      Nose: Nose normal.      Mouth/Throat:      Mouth: Mucous membranes are moist.      Pharynx: No oropharyngeal exudate or posterior oropharyngeal erythema. Eyes:      General: No visual field deficit or scleral icterus. Right eye: No discharge. Left eye: No discharge. Extraocular Movements: Extraocular movements intact. Conjunctiva/sclera: Conjunctivae normal.      Pupils: Pupils are equal, round, and reactive to light. Neck:      Musculoskeletal: Full passive range of motion without pain, normal range of motion and neck supple. Normal range of motion. No edema, erythema, neck rigidity, crepitus, injury, pain with movement, torticollis, spinous process tenderness or muscular tenderness. Vascular: No JVD. Trachea: Trachea and phonation normal. No tracheal deviation. Cardiovascular:      Rate and Rhythm: Normal rate and regular rhythm. Pulses: Normal pulses. Heart sounds: No friction rub. No gallop. Pulmonary:      Effort: Pulmonary effort is normal. No accessory muscle usage or respiratory distress. Breath sounds: Normal breath sounds. No stridor. No decreased breath sounds, wheezing, rhonchi or rales. Chest:      Chest wall: No tenderness. Abdominal:      General: Abdomen is flat. Bowel sounds are normal. There is no distension. Palpations: Abdomen is soft. Tenderness: There is no abdominal tenderness. There is no guarding or rebound. Musculoskeletal: Normal range of motion. General: No swelling, tenderness, deformity or signs of injury. Right lower leg: No edema. Left lower leg: No edema. Skin:     General: Skin is warm and dry. Coloration: Skin is not jaundiced or pale. Findings: No bruising, erythema, lesion or rash. All other labs were within normal range or not returned asof this dictation. EKG: All EKG's are interpreted by the Emergency Department Physician who either signs or Co-signs this chart in the absence of a cardiologist.    The Ekg interpreted by me shows  normal sinus rhythm with a rate of 77  Axis is   Normal  QTc is  within an acceptable range  Intervals and Durations are unremarkable. ST Segments: no acute change and nonspecific changes  No significant change from prior EKG dated - 1/16/20  No STEMI           RADIOLOGY:   Non-plain film images such as CT, Ultrasound and MRI are read by the radiologist. Delwin Morelle images are visualized and preliminarily interpreted by the  ED Provider with the belowfindings:        Interpretation per the Radiologist below, if available at the time of this note:    CT HEAD WO CONTRAST   Final Result   1. No acute intracranial abnormality. 2. No significant arterial stenosis in the neck. 3. 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 CTA HEAD NECK W CONTRAST   Final Result   1. No acute intracranial abnormality. 2. No significant arterial stenosis in the neck. 3. 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 XR CHEST PORTABLE   Final Result   No radiographic evidence of acute cardiopulmonary disease.                PROCEDURES   Unless otherwise noted below, none     Procedures    CRITICAL CARE TIME   N/A    CONSULTS:  None    EMERGENCY DEPARTMENT COURSE and DIFFERENTIAL DIAGNOSIS/MDM:   Vitals:    Vitals:    03/21/21 0015 03/21/21 0144 03/21/21 0253 03/21/21 0404   BP: 132/71 (!) 154/83 122/72 (!) 148/85   Pulse: 73 71 66 67   Resp: 16 18 18 16   Temp:       TempSrc:       SpO2: 94% 96% 96% 94%   Weight:       Height:           Patient was given the following medications:  Medications   iopamidol (ISOVUE-370) 76 % injection 75 mL (75 mLs Intravenous Given 3/21/21 0120)     Patient was evaluated due to having left 4:33 AM          DISCONTINUED MEDICATIONS:  Discharge Medication List as of 3/21/2021  4:33 AM                 (Please note that portions of this note were completed with a voicerecognition program.  Efforts were made to edit the dictations but occasionally words are mis-transcribed.)    Donato Garber MD (electronically signed)            Donato Garber MD  03/21/21 2057

## 2021-07-27 ENCOUNTER — HOSPITAL ENCOUNTER (EMERGENCY)
Age: 81
Discharge: HOME | End: 2021-07-27
Attending: STUDENT IN AN ORGANIZED HEALTH CARE EDUCATION/TRAINING PROGRAM
Payer: MEDICARE

## 2021-07-27 VITALS
RESPIRATION RATE: 16 BRPM | WEIGHT: 215 LBS | SYSTOLIC BLOOD PRESSURE: 151 MMHG | HEIGHT: 69 IN | HEART RATE: 75 BPM | OXYGEN SATURATION: 96 % | TEMPERATURE: 97.9 F | DIASTOLIC BLOOD PRESSURE: 83 MMHG | BODY MASS INDEX: 31.84 KG/M2

## 2021-07-27 DIAGNOSIS — F41.0 PANIC ATTACK AS REACTION TO STRESS: Primary | ICD-10-CM

## 2021-07-27 DIAGNOSIS — F43.0 PANIC ATTACK AS REACTION TO STRESS: Primary | ICD-10-CM

## 2021-07-27 LAB
A/G RATIO: 1.3 (ref 1.1–2.2)
ALBUMIN SERPL-MCNC: 3.8 G/DL (ref 3.4–5)
ALP BLD-CCNC: 130 U/L (ref 40–129)
ALT SERPL-CCNC: 31 U/L (ref 10–40)
ANION GAP SERPL CALCULATED.3IONS-SCNC: 9 MMOL/L (ref 3–16)
AST SERPL-CCNC: 31 U/L (ref 15–37)
BASOPHILS ABSOLUTE: 0.1 K/UL (ref 0–0.2)
BASOPHILS RELATIVE PERCENT: 1.1 %
BILIRUB SERPL-MCNC: 0.4 MG/DL (ref 0–1)
BILIRUBIN URINE: NEGATIVE
BLOOD, URINE: NEGATIVE
BUN BLDV-MCNC: 21 MG/DL (ref 7–20)
CALCIUM SERPL-MCNC: 9 MG/DL (ref 8.3–10.6)
CHLORIDE BLD-SCNC: 101 MMOL/L (ref 99–110)
CLARITY: CLEAR
CO2: 23 MMOL/L (ref 21–32)
COLOR: YELLOW
CREAT SERPL-MCNC: 0.8 MG/DL (ref 0.8–1.3)
EOSINOPHILS ABSOLUTE: 0.3 K/UL (ref 0–0.6)
EOSINOPHILS RELATIVE PERCENT: 3.5 %
GFR AFRICAN AMERICAN: >60
GFR NON-AFRICAN AMERICAN: >60
GLOBULIN: 3 G/DL
GLUCOSE BLD-MCNC: 276 MG/DL (ref 70–99)
GLUCOSE URINE: >=1000 MG/DL
HCT VFR BLD CALC: 41.8 % (ref 40.5–52.5)
HEMOGLOBIN: 14.5 G/DL (ref 13.5–17.5)
INFLUENZA A: NOT DETECTED
INFLUENZA B: NOT DETECTED
KETONES, URINE: NEGATIVE MG/DL
LEUKOCYTE ESTERASE, URINE: NEGATIVE
LYMPHOCYTES ABSOLUTE: 1.3 K/UL (ref 1–5.1)
LYMPHOCYTES RELATIVE PERCENT: 16.9 %
MCH RBC QN AUTO: 31.6 PG (ref 26–34)
MCHC RBC AUTO-ENTMCNC: 34.6 G/DL (ref 31–36)
MCV RBC AUTO: 91.2 FL (ref 80–100)
MICROSCOPIC EXAMINATION: ABNORMAL
MONOCYTES ABSOLUTE: 0.6 K/UL (ref 0–1.3)
MONOCYTES RELATIVE PERCENT: 7.7 %
NEUTROPHILS ABSOLUTE: 5.3 K/UL (ref 1.7–7.7)
NEUTROPHILS RELATIVE PERCENT: 70.8 %
NITRITE, URINE: NEGATIVE
PDW BLD-RTO: 14 % (ref 12.4–15.4)
PH UA: 5.5 (ref 5–8)
PLATELET # BLD: 127 K/UL (ref 135–450)
PMV BLD AUTO: 9.5 FL (ref 5–10.5)
POTASSIUM REFLEX MAGNESIUM: 4.1 MMOL/L (ref 3.5–5.1)
PROTEIN UA: NEGATIVE MG/DL
RBC # BLD: 4.59 M/UL (ref 4.2–5.9)
SARS-COV-2 RNA, RT PCR: NOT DETECTED
SODIUM BLD-SCNC: 133 MMOL/L (ref 136–145)
SPECIFIC GRAVITY UA: 1.02 (ref 1–1.03)
TOTAL PROTEIN: 6.8 G/DL (ref 6.4–8.2)
URINE TYPE: ABNORMAL
UROBILINOGEN, URINE: 0.2 E.U./DL
WBC # BLD: 7.4 K/UL (ref 4–11)

## 2021-07-27 PROCEDURE — 81003 URINALYSIS AUTO W/O SCOPE: CPT

## 2021-07-27 PROCEDURE — 80053 COMPREHEN METABOLIC PANEL: CPT

## 2021-07-27 PROCEDURE — 87636 SARSCOV2 & INF A&B AMP PRB: CPT

## 2021-07-27 PROCEDURE — 99285 EMERGENCY DEPT VISIT HI MDM: CPT

## 2021-07-27 PROCEDURE — 6370000000 HC RX 637 (ALT 250 FOR IP): Performed by: STUDENT IN AN ORGANIZED HEALTH CARE EDUCATION/TRAINING PROGRAM

## 2021-07-27 PROCEDURE — 85025 COMPLETE CBC W/AUTO DIFF WBC: CPT

## 2021-07-27 RX ORDER — HYDROCHLOROTHIAZIDE 25 MG/1
12.5 TABLET ORAL DAILY
Status: ON HOLD | COMMUNITY
End: 2021-08-20 | Stop reason: HOSPADM

## 2021-07-27 RX ORDER — IRBESARTAN 300 MG/1
300 TABLET ORAL DAILY
COMMUNITY
End: 2021-09-27

## 2021-07-27 RX ORDER — POLYETHYLENE GLYCOL 3350 17 G/17G
17 POWDER, FOR SOLUTION ORAL DAILY PRN
COMMUNITY
End: 2021-09-27

## 2021-07-27 RX ORDER — METHOCARBAMOL 500 MG/1
1500 TABLET, FILM COATED ORAL ONCE
Status: COMPLETED | OUTPATIENT
Start: 2021-07-27 | End: 2021-07-27

## 2021-07-27 RX ORDER — PREGABALIN 50 MG/1
50 CAPSULE ORAL 2 TIMES DAILY
COMMUNITY

## 2021-07-27 RX ORDER — LANSOPRAZOLE 30 MG/1
30 CAPSULE, DELAYED RELEASE ORAL DAILY
COMMUNITY

## 2021-07-27 RX ADMIN — METHOCARBAMOL TABLETS 1500 MG: 500 TABLET, COATED ORAL at 06:22

## 2021-07-27 NOTE — ED PROVIDER NOTES
[address] ED      CHIEF COMPLAINT  Other (pt states just feels ill. denies pain or any specific area that is causing him to feel ill. deneis nausea and vomiting, chest pain or sob)     HISTORY OF PRESENT ILLNESS  Delicia Suarez is a 78 y.o. male  who presents to the ED complaining of episode of feeling ill prior to arrival which is since resolved. 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 He states that he this evening had an episode of shakiness that occurred as his uncle was being loaded into the ambulance that lasted a few minutes and since resolved. He denies any pain, recent illnesses, fevers or chills, nausea vomiting, chest pain or shortness of breath, or other complaints or concerns. Denies dysuria or hematuria. He states that he is currently back to baseline and has no current complaints and believes that he may have just felt \"severe empathy\" for his uncle. No other complaints, modifying factors or associated symptoms. I have reviewed the following from the nursing documentation. Past Medical History:   Diagnosis Date    Diabetes mellitus ([facility] [address]     type 2    Glaucoma     Hyperlipidemia     Hypertension     Kidney stone     Neuropathy     Osteoarthrosis, hip 3/21/2016    Prepatellar bursitis 3/21/2016     Past Surgical History:   Procedure Laterality Date    COLON SURGERY      COLONOSCOPY  2008    CYSTOSCOPY  2/2/12    with stent placement    EYE SURGERY      left eyemed valve, bilateral retinal repair    FOOT SURGERY      right, tendon repair    KIDNEY STONE SURGERY      OTHER SURGICAL HISTORY Left 07/17/2017    CYSTOSCOPY, LEFT URETEROSCOPY, STENT PLACEMENT, URETHERAL dilation, stone manipulation      History reviewed. No pertinent family history.   Social History     Socioeconomic History    Marital status:      Spouse name: Not on file    Number of children: Not on file    Years of education: Not on file    Highest education level: Not on file   Occupational History    Not on file   Tobacco Use    Smoking status: Never Smoker    Smokeless tobacco: Former User   Vaping Use    Vaping Use: Never used   Substance and Sexual Activity    Alcohol use: No    Drug use: No    Sexual activity: Yes     Partners: Female   Other Topics Concern    Not on file   Social History Narrative    Not on file     Social Determinants of Health     Financial Resource Strain:     Difficulty of Paying Living Expenses:    Food Insecurity:     Worried About [address] [employer] in the Last Year:     [address] [employer] in the Last Year:    Transportation Needs:     Lack of Transportation (Medical):  Lack of Transportation (Non-Medical):    Physical Activity:     Days of Exercise per Week:     Minutes of Exercise per Session:    Stress:     Feeling of Stress :    Social Connections:     Frequency of Communication with Friends and Family:     Frequency of Social Gatherings with Friends and Family:     Attends Hinduism Services:     Active Member of Clubs or Organizations:     Attends Club or Organization Meetings:     Marital Status:    Intimate Partner Violence:     Fear of Current or Ex-Partner:     Emotionally Abused:     Physically Abused:     Sexually Abused:      No current facility-administered medications for this encounter. Current Outpatient Medications   Medication Sig Dispense Refill    hydroCHLOROthiazide (HYDRODIURIL) 25 MG tablet Take 12.5 mg by mouth daily      irbesartan (AVAPRO) 300 MG tablet Take 300 mg by mouth daily      lansoprazole (PREVACID) 30 MG delayed release capsule Take 30 mg by mouth daily      polyethylene glycol (GLYCOLAX) 17 g packet Take 17 g by mouth daily as needed for Constipation      pregabalin (LYRICA) 50 MG capsule Take 50 mg by mouth 2 times daily.       atorvastatin (LIPITOR) 80 MG organomegaly. No guarding or rebound. MUSCULOSKELETAL: No extremity edema. Compartments soft. No deformity. No tenderness in the extremities. All extremities neurovascularly intact. SKIN: Warm and dry. No acute rashes. NEUROLOGICAL: Alert and oriented. CN's 2-12 intact. No gross facial drooping. Strength 5/5, sensation intact. PSYCHIATRIC: Normal mood and affect. LABS  I have reviewed all labs for this visit.    Results for orders placed or performed during the hospital encounter of 07/27/21   COVID-19 & Influenza Combo    Specimen: Nasopharyngeal Swab   Result Value Ref Range    SARS-CoV-2 RNA, RT PCR NOT DETECTED NOT DETECTED    INFLUENZA A NOT DETECTED NOT DETECTED    INFLUENZA B NOT DETECTED NOT DETECTED   CBC Auto Differential   Result Value Ref Range    WBC 7.4 4.0 - 11.0 K/uL    RBC 4.59 4.20 - 5.90 M/uL    Hemoglobin 14.5 13.5 - 17.5 g/dL    Hematocrit 41.8 40.5 - 52.5 %    MCV 91.2 80.0 - 100.0 fL    MCH 31.6 26.0 - 34.0 pg    MCHC 34.6 31.0 - 36.0 g/dL    RDW 14.0 12.4 - 15.4 %    Platelets 363 (L) 193 - 450 K/uL    MPV 9.5 5.0 - 10.5 fL    Neutrophils % 70.8 %    Lymphocytes % 16.9 %    Monocytes % 7.7 %    Eosinophils % 3.5 %    Basophils % 1.1 %    Neutrophils Absolute 5.3 1.7 - 7.7 K/uL    Lymphocytes Absolute 1.3 1.0 - 5.1 K/uL    Monocytes Absolute 0.6 0.0 - 1.3 K/uL    Eosinophils Absolute 0.3 0.0 - 0.6 K/uL    Basophils Absolute 0.1 0.0 - 0.2 K/uL   Comprehensive Metabolic Panel w/ Reflex to MG   Result Value Ref Range    Sodium 133 (L) 136 - 145 mmol/L    Potassium reflex Magnesium 4.1 3.5 - 5.1 mmol/L    Chloride 101 99 - 110 mmol/L    CO2 23 21 - 32 mmol/L    Anion Gap 9 3 - 16    Glucose 276 (H) 70 - 99 mg/dL    BUN 21 (H) 7 - 20 mg/dL    CREATININE 0.8 0.8 - 1.3 mg/dL    GFR Non-African American >60 >60    GFR African American >60 >60    Calcium 9.0 8.3 - 10.6 mg/dL    Total Protein 6.8 6.4 - 8.2 g/dL    Albumin 3.8 3.4 - 5.0 g/dL    Albumin/Globulin Ratio 1.3 1.1 - 2.2    Total Bilirubin 0.4 0.0 - 1.0 mg/dL    Alkaline Phosphatase 130 (H) 40 - 129 U/L    ALT 31 10 - 40 U/L    AST 31 15 - 37 U/L    Globulin 3.0 g/dL   Urinalysis, reflex to microscopic   Result Value Ref Range    Color, UA Yellow Straw/Yellow    Clarity, UA Clear Clear    Glucose, Ur >=1000 (A) Negative mg/dL    Bilirubin Urine Negative Negative    Ketones, Urine Negative Negative mg/dL    Specific Gravity, UA 1.020 1.005 - 1.030    Blood, Urine Negative Negative    pH, UA 5.5 5.0 - 8.0    Protein, UA Negative Negative mg/dL    Urobilinogen, Urine 0.2 <2.0 E.U./dL    Nitrite, Urine Negative Negative    Leukocyte Esterase, Urine Negative Negative    Microscopic Examination Not Indicated     Urine Type NotGiven        RADIOLOGY  No orders to display     ED COURSE/MDM  Patient seen and evaluated. Old records reviewed. Labs and imaging reviewed and results discussed with patient. Is a 68-year-old male, presenting with concerns for \"feeling shaky\" while his uncle is being brought to the hospital.  Please for detailed above for upon arrival in the ED, vitals reassuring. Patient is resting comfortably is in no acute distress. He denies any current complaints. Denies any pain at any point. Denies any dysuria hematuria. Denies abdominal pain, nausea vomiting, or other complaints or concerns. COVID flu swabs are negative. Labs without acute concerning electrolyte abnormalities. Patient made asymptomatic urine department, however he did develop worsening of his chronic restless leg syndrome and requested medication and was given robaxin. Patient was reassessed and continued to feel well. He is comfortable agreement with plan of care for discharge home. Given return precautions for the ED contacts follow-up with his PCP if symptoms recur. During the patient's ED course, the patient was given:  Medications   methocarbamol (ROBAXIN) tablet 1,500 mg (1,500 mg Oral Given 7/27/21 0622)        CLINICAL IMPRESSION  1.  Panic attack as reaction to stress        Blood pressure (!) 151/83, pulse 75, temperature 97.9 °F (36.6 °C), temperature source Oral, resp. rate 16, height 5' 9\" (1.753 m), weight 215 lb (97.5 kg), SpO2 96 %. Mateo Rodriguez was discharged to home in good condition. Patient was given scripts for the following medications. I counseled patient how to take these medications. Discharge Medication List as of 7/27/2021  6:31 AM          Follow-up with:  Poornima Medina, [address] [facility]  [phone]    Schedule an appointment as soon as possible for a visit       [facility] PHYSICAL [facility] ED  [address] [facility]  [phone]  Go to   If symptoms worsen      DISCLAIMER: This chart was created using Dragon dictation software. Efforts were made by me to ensure accuracy, however some errors may be present due to limitations of this technology and occasionally words are not transcribed correctly.        Letty Reyes MD  07/27/21 7676

## 2021-07-27 NOTE — ED NOTES
Pt to lobby ambulatory with steady gait, to await family.      Johanny Blair, [address] [facility]  07/27/21 5212

## 2021-08-04 ENCOUNTER — APPOINTMENT (OUTPATIENT)
Dept: GENERAL RADIOLOGY | Age: 81
End: 2021-08-04
Payer: MEDICARE

## 2021-08-04 ENCOUNTER — HOSPITAL ENCOUNTER (EMERGENCY)
Age: 81
Discharge: HOME | End: 2021-08-04
Attending: EMERGENCY MEDICINE
Payer: MEDICARE

## 2021-08-04 VITALS
WEIGHT: 215 LBS | TEMPERATURE: 98.4 F | BODY MASS INDEX: 31.84 KG/M2 | HEIGHT: 69 IN | RESPIRATION RATE: 20 BRPM | OXYGEN SATURATION: 98 % | HEART RATE: 91 BPM | DIASTOLIC BLOOD PRESSURE: 77 MMHG | SYSTOLIC BLOOD PRESSURE: 140 MMHG

## 2021-08-04 DIAGNOSIS — U07.1 COVID-19: Primary | ICD-10-CM

## 2021-08-04 LAB
A/G RATIO: 1.4 (ref 1.1–2.2)
ALBUMIN SERPL-MCNC: 4.2 G/DL (ref 3.4–5)
ALP BLD-CCNC: 133 U/L (ref 40–129)
ALT SERPL-CCNC: 38 U/L (ref 10–40)
ANION GAP SERPL CALCULATED.3IONS-SCNC: 12 MMOL/L (ref 3–16)
APTT: 29 SEC (ref 26.2–38.6)
AST SERPL-CCNC: 36 U/L (ref 15–37)
BASOPHILS ABSOLUTE: 0 K/UL (ref 0–0.2)
BASOPHILS RELATIVE PERCENT: 0.6 %
BILIRUB SERPL-MCNC: 0.7 MG/DL (ref 0–1)
BUN BLDV-MCNC: 22 MG/DL (ref 7–20)
CALCIUM SERPL-MCNC: 9.5 MG/DL (ref 8.3–10.6)
CHLORIDE BLD-SCNC: 106 MMOL/L (ref 99–110)
CO2: 25 MMOL/L (ref 21–32)
CREAT SERPL-MCNC: 1.1 MG/DL (ref 0.8–1.3)
D DIMER: <200 NG/ML DDU (ref 0–229)
EKG ATRIAL RATE: 95 BPM
EKG DIAGNOSIS: NORMAL
EKG P AXIS: 47 DEGREES
EKG P-R INTERVAL: 170 MS
EKG Q-T INTERVAL: 366 MS
EKG QRS DURATION: 82 MS
EKG QTC CALCULATION (BAZETT): 459 MS
EKG R AXIS: 36 DEGREES
EKG T AXIS: 43 DEGREES
EKG VENTRICULAR RATE: 95 BPM
EOSINOPHILS ABSOLUTE: 0.1 K/UL (ref 0–0.6)
EOSINOPHILS RELATIVE PERCENT: 1.4 %
FIBRINOGEN: 405 MG/DL (ref 200–397)
GFR AFRICAN AMERICAN: >60
GFR NON-AFRICAN AMERICAN: >60
GLOBULIN: 3.1 G/DL
GLUCOSE BLD-MCNC: 254 MG/DL (ref 70–99)
HCT VFR BLD CALC: 42.7 % (ref 40.5–52.5)
HEMOGLOBIN: 14.5 G/DL (ref 13.5–17.5)
INFLUENZA A: NOT DETECTED
INFLUENZA B: NOT DETECTED
INR BLD: 0.9 (ref 0.88–1.12)
LACTATE DEHYDROGENASE: 228 U/L (ref 100–190)
LACTIC ACID, SEPSIS: 2.3 MMOL/L (ref 0.4–1.9)
LACTIC ACID, SEPSIS: 2.3 MMOL/L (ref 0.4–1.9)
LACTIC ACID: 2.7 MMOL/L (ref 0.4–2)
LYMPHOCYTES ABSOLUTE: 0.4 K/UL (ref 1–5.1)
LYMPHOCYTES RELATIVE PERCENT: 5.2 %
MCH RBC QN AUTO: 30.9 PG (ref 26–34)
MCHC RBC AUTO-ENTMCNC: 34 G/DL (ref 31–36)
MCV RBC AUTO: 90.8 FL (ref 80–100)
MONOCYTES ABSOLUTE: 0.6 K/UL (ref 0–1.3)
MONOCYTES RELATIVE PERCENT: 7.9 %
NEUTROPHILS ABSOLUTE: 6.3 K/UL (ref 1.7–7.7)
NEUTROPHILS RELATIVE PERCENT: 84.9 %
PDW BLD-RTO: 13.9 % (ref 12.4–15.4)
PLATELET # BLD: 135 K/UL (ref 135–450)
PMV BLD AUTO: 8.9 FL (ref 5–10.5)
POTASSIUM REFLEX MAGNESIUM: 4.1 MMOL/L (ref 3.5–5.1)
PRO-BNP: 147 PG/ML (ref 0–449)
PROCALCITONIN: 0.13 NG/ML (ref 0–0.15)
PROTHROMBIN TIME: 10.1 SEC (ref 9.9–12.7)
RBC # BLD: 4.7 M/UL (ref 4.2–5.9)
SARS-COV-2 RNA, RT PCR: DETECTED
SODIUM BLD-SCNC: 143 MMOL/L (ref 136–145)
TOTAL PROTEIN: 7.3 G/DL (ref 6.4–8.2)
TROPONIN: 0.01 NG/ML
TROPONIN: 0.02 NG/ML
WBC # BLD: 7.4 K/UL (ref 4–11)

## 2021-08-04 PROCEDURE — 71045 X-RAY EXAM CHEST 1 VIEW: CPT

## 2021-08-04 PROCEDURE — 99284 EMERGENCY DEPT VISIT MOD MDM: CPT

## 2021-08-04 PROCEDURE — 87636 SARSCOV2 & INF A&B AMP PRB: CPT

## 2021-08-04 PROCEDURE — 80053 COMPREHEN METABOLIC PANEL: CPT

## 2021-08-04 PROCEDURE — 87040 BLOOD CULTURE FOR BACTERIA: CPT

## 2021-08-04 PROCEDURE — 6370000000 HC RX 637 (ALT 250 FOR IP): Performed by: EMERGENCY MEDICINE

## 2021-08-04 PROCEDURE — 85379 FIBRIN DEGRADATION QUANT: CPT

## 2021-08-04 PROCEDURE — 85610 PROTHROMBIN TIME: CPT

## 2021-08-04 PROCEDURE — 85025 COMPLETE CBC W/AUTO DIFF WBC: CPT

## 2021-08-04 PROCEDURE — 2580000003 HC RX 258: Performed by: STUDENT IN AN ORGANIZED HEALTH CARE EDUCATION/TRAINING PROGRAM

## 2021-08-04 PROCEDURE — 83615 LACTATE (LD) (LDH) ENZYME: CPT

## 2021-08-04 PROCEDURE — 85384 FIBRINOGEN ACTIVITY: CPT

## 2021-08-04 PROCEDURE — 83880 ASSAY OF NATRIURETIC PEPTIDE: CPT

## 2021-08-04 PROCEDURE — 84484 ASSAY OF TROPONIN QUANT: CPT

## 2021-08-04 PROCEDURE — 84145 PROCALCITONIN (PCT): CPT

## 2021-08-04 PROCEDURE — 85730 THROMBOPLASTIN TIME PARTIAL: CPT

## 2021-08-04 PROCEDURE — 93010 ELECTROCARDIOGRAM REPORT: CPT | Performed by: INTERNAL MEDICINE

## 2021-08-04 PROCEDURE — 83605 ASSAY OF LACTIC ACID: CPT

## 2021-08-04 PROCEDURE — 93005 ELECTROCARDIOGRAM TRACING: CPT | Performed by: EMERGENCY MEDICINE

## 2021-08-04 RX ORDER — BENZONATATE 100 MG/1
100 CAPSULE ORAL 3 TIMES DAILY PRN
Qty: 21 CAPSULE | Refills: 0 | Status: SHIPPED | OUTPATIENT
Start: 2021-08-04 | End: 2021-08-11

## 2021-08-04 RX ORDER — 0.9 % SODIUM CHLORIDE 0.9 %
500 INTRAVENOUS SOLUTION INTRAVENOUS ONCE
Status: COMPLETED | OUTPATIENT
Start: 2021-08-04 | End: 2021-08-04

## 2021-08-04 RX ORDER — ACETAMINOPHEN 650 MG/1
650 SUPPOSITORY RECTAL EVERY 6 HOURS PRN
Status: DISCONTINUED | OUTPATIENT
Start: 2021-08-04 | End: 2021-08-04 | Stop reason: HOSPADM

## 2021-08-04 RX ORDER — BENZONATATE 100 MG/1
100 CAPSULE ORAL ONCE
Status: COMPLETED | OUTPATIENT
Start: 2021-08-04 | End: 2021-08-04

## 2021-08-04 RX ORDER — ACETAMINOPHEN 325 MG/1
650 TABLET ORAL EVERY 6 HOURS PRN
Status: DISCONTINUED | OUTPATIENT
Start: 2021-08-04 | End: 2021-08-04 | Stop reason: HOSPADM

## 2021-08-04 RX ADMIN — BENZONATATE 100 MG: 100 CAPSULE ORAL at 06:48

## 2021-08-04 RX ADMIN — SODIUM CHLORIDE 500 ML: 9 INJECTION, SOLUTION INTRAVENOUS at 12:15

## 2021-08-04 NOTE — ED PROVIDER NOTES
HPI.  Except as noted above in the ROS, all other systems were reviewed and negative. PAST MEDICAL HISTORY     Past Medical History:   Diagnosis Date    COVID-19 08/04/2021    Diabetes mellitus ([address]     type 2    Glaucoma     Hyperlipidemia     Hypertension     Kidney stone     Neuropathy     Osteoarthrosis, hip 03/21/2016    Prepatellar bursitis 03/21/2016         SURGICAL HISTORY     Past Surgical History:   Procedure Laterality Date    COLON SURGERY      COLONOSCOPY  2008    CYSTOSCOPY  2/2/12    with stent placement    EYE SURGERY      left eyemed valve, bilateral retinal repair    FOOT SURGERY      right, tendon repair    KIDNEY STONE SURGERY      OTHER SURGICAL HISTORY Left 07/17/2017    CYSTOSCOPY, LEFT URETEROSCOPY, STENT PLACEMENT, URETHERAL dilation, stone manipulation          CURRENTMEDICATIONS       Discharge Medication List as of 8/4/2021  4:24 PM      CONTINUE these medications which have NOT CHANGED    Details   hydroCHLOROthiazide (HYDRODIURIL) 25 MG tablet Take 12.5 mg by mouth dailyHistorical Med      irbesartan (AVAPRO) 300 MG tablet Take 300 mg by mouth dailyHistorical Med      lansoprazole (PREVACID) 30 MG delayed release capsule Take 30 mg by mouth dailyHistorical Med      polyethylene glycol (GLYCOLAX) 17 g packet Take 17 g by mouth daily as needed for ConstipationHistorical Med      pregabalin (LYRICA) 50 MG capsule Take 50 mg by mouth 2 times daily. Historical Med      atorvastatin (LIPITOR) 80 MG tablet Take 1 tablet by mouth nightly, Disp-30 tablet, R-3Normal      lidocaine (LIDODERM) 5 % Place 1 patch onto the skin daily 12 hours on, 12 hours off., Disp-30 patch, R-0Print      ondansetron (ZOFRAN ODT) 4 MG disintegrating tablet Take 1-2 tablets by mouth every 12 hours as needed for Nausea May Sub regular tablet (non-ODT) if insurance does not cover ODT., Disp-12 tablet, R-0Print      Cyanocobalamin ([employer] VITAMIN B-12 SL) Place under the tongue dailyHistorical Med aspirin 81 MG tablet Take 162 mg by mouth daily Historical Med      docusate sodium (COLACE) 100 MG capsule Take 100 mg by mouth 2 times daily as needed for ConstipationHistorical Med      Cholecalciferol (VITAMIN D3) 5000 units TABS Take by mouth dailyHistorical Med      NONFORMULARY Pt takes a probiotic dose [id number] [facility] that has about 8 pills in it, Unknown what each pill is. Historical Med      brimonidine (ALPHAGAN) 0.2 % ophthalmic solution Place 1 drop into both eyes 2 times daily Historical Med      b complex vitamins capsule Take 1 capsule by mouth daily      Dorzolamide HCl (TRUSOPT OP) Apply 1 drop to eye 2 times daily Historical Med      Multiple Vitamin (MULTI VITAMIN MENS PO) Take  by mouth. insulin aspart (NOVOLOG) 100 UNIT/ML injection Inject  into the skin 3 times daily (before meals). Sliding scale based on meal calculation       insulin glargine (LANTUS) 100 UNIT/ML injection Inject 40 Units into the skin 2 times daily. ALLERGIES     Patient has no known allergies. FAMILYHISTORY     History reviewed. No pertinent family history. SOCIAL HISTORY       Social History     Socioeconomic History    Marital status:      Spouse name: None    Number of children: None    Years of education: None    Highest education level: None   Occupational History    None   Tobacco Use    Smoking status: Never Smoker    Smokeless tobacco: Former User   Vaping Use    Vaping Use: Never used   Substance and Sexual Activity    Alcohol use: No    Drug use: No    Sexual activity: Yes     Partners: Female   Other Topics Concern    None   Social History Narrative    None     Social Determinants of Health     Financial Resource Strain:     Difficulty of Paying Living Expenses:    Food Insecurity:     Worried About Running Out of Food in the Last Year:     Ran Out of Food in the Last Year:    Transportation Needs:     Lack of Transportation (Medical):      Lack of Transportation (Non-Medical):    Physical Activity:     Days of Exercise per Week:     Minutes of Exercise per Session:    Stress:     Feeling of Stress :    Social Connections:     Frequency of Communication with Friends and Family:     Frequency of Social Gatherings with Friends and Family:     Attends [employer] Services:     Active Member of Clubs or Organizations:     Attends Club or Organization Meetings:     Marital Status:    Intimate Partner Violence:     Fear of Current or Ex-Partner:     Emotionally Abused:     Physically Abused:     Sexually Abused:        SCREENINGS             PHYSICAL EXAM    (up to 7 for level 4, 8 or more for level 5)     ED Triage Vitals [08/04/21 0538]   BP Temp Temp Source Pulse Resp SpO2 Height Weight   (!) 149/68 99.1 °F (37.3 °C) Oral 99 16 98 % 5' 9\" (1.753 m) 215 lb (97.5 kg)       Physical Exam  Constitutional:       General: He is not in acute distress. Appearance: Normal appearance. He is well-developed. HENT:      Head: Normocephalic and atraumatic. Right Ear: External ear normal.      Left Ear: External ear normal.   Eyes:      Conjunctiva/sclera: Conjunctivae normal.   Cardiovascular:      Rate and Rhythm: Normal rate and regular rhythm. Heart sounds: Normal heart sounds. No murmur heard. No friction rub. No gallop. Pulmonary:      Effort: Pulmonary effort is normal. No respiratory distress. Breath sounds: Wheezing present. Abdominal:      General: Bowel sounds are normal. There is no distension. Palpations: Abdomen is soft. Tenderness: There is no abdominal tenderness. There is no guarding or rebound. Musculoskeletal:         General: No tenderness. Normal range of motion. Cervical back: Normal range of motion and neck supple. Lymphadenopathy:      Cervical: No cervical adenopathy. Skin:     General: Skin is warm and dry. Capillary Refill: Capillary refill takes less than 2 seconds.       Findings: No rash.   Neurological:      Mental Status: He is alert and oriented to person, place, and time. Motor: No weakness. Psychiatric:         Mood and Affect: Mood normal.         DIAGNOSTIC RESULTS   LABS:    Results for orders placed or performed during the hospital encounter of 08/04/21   COVID-19 & Influenza Combo    Specimen: Nasopharyngeal Swab; Nasal   Result Value Ref Range    SARS-CoV-2 RNA, RT PCR DETECTED (A) NOT DETECTED    INFLUENZA A NOT DETECTED NOT DETECTED    INFLUENZA B NOT DETECTED NOT DETECTED   Culture, Blood 1    Specimen: Blood; Arm   Result Value Ref Range    Blood Culture, Routine No Growth after 4 days of incubation. Culture, Blood 2    Specimen: Blood   Result Value Ref Range    Culture, Blood 2 No Growth after 4 days of incubation.     CBC Auto Differential   Result Value Ref Range    WBC 7.4 4.0 - 11.0 K/uL    RBC 4.70 4.20 - 5.90 M/uL    Hemoglobin 14.5 13.5 - 17.5 g/dL    Hematocrit 42.7 40.5 - 52.5 %    MCV 90.8 80.0 - 100.0 fL    MCH 30.9 26.0 - 34.0 pg    MCHC 34.0 31.0 - 36.0 g/dL    RDW 13.9 12.4 - 15.4 %    Platelets 565 618 - 852 K/uL    MPV 8.9 5.0 - 10.5 fL    Neutrophils % 84.9 %    Lymphocytes % 5.2 %    Monocytes % 7.9 %    Eosinophils % 1.4 %    Basophils % 0.6 %    Neutrophils Absolute 6.3 1.7 - 7.7 K/uL    Lymphocytes Absolute 0.4 (L) 1.0 - 5.1 K/uL    Monocytes Absolute 0.6 0.0 - 1.3 K/uL    Eosinophils Absolute 0.1 0.0 - 0.6 K/uL    Basophils Absolute 0.0 0.0 - 0.2 K/uL   Comprehensive Metabolic Panel w/ Reflex to MG   Result Value Ref Range    Sodium 143 136 - 145 mmol/L    Potassium reflex Magnesium 4.1 3.5 - 5.1 mmol/L    Chloride 106 99 - 110 mmol/L    CO2 25 21 - 32 mmol/L    Anion Gap 12 3 - 16    Glucose 254 (H) 70 - 99 mg/dL    BUN 22 (H) 7 - 20 mg/dL    CREATININE 1.1 0.8 - 1.3 mg/dL    GFR Non-African American >60 >60    GFR African American >60 >60    Calcium 9.5 8.3 - 10.6 mg/dL    Total Protein 7.3 6.4 - 8.2 g/dL    Albumin 4.2 3.4 - 5.0 g/dL Albumin/Globulin Ratio 1.4 1.1 - 2.2    Total Bilirubin 0.7 0.0 - 1.0 mg/dL    Alkaline Phosphatase 133 (H) 40 - 129 U/L    ALT 38 10 - 40 U/L    AST 36 15 - 37 U/L    Globulin 3.1 g/dL   Protime-INR   Result Value Ref Range    Protime 10.1 9.9 - 12.7 sec    INR 0.90 0.88 - 1.12   APTT   Result Value Ref Range    aPTT 29.0 26.2 - 38.6 sec   Lactate Dehydrogenase   Result Value Ref Range     (H) 100 - 190 U/L   D-Dimer, Quantitative   Result Value Ref Range    D-Dimer, Quant <200 0 - 229 ng/mL DDU   Troponin   Result Value Ref Range    Troponin 0.02 (H) <0.01 ng/mL   Fibrinogen   Result Value Ref Range    Fibrinogen 405 (H) 200 - 397 mg/dL   Procalcitonin   Result Value Ref Range    Procalcitonin 0.13 0.00 - 0.15 ng/mL   Brain Natriuretic Peptide   Result Value Ref Range    Pro- 0 - 449 pg/mL   Lactate, Sepsis   Result Value Ref Range    Lactic Acid, Sepsis 2.3 (H) 0.4 - 1.9 mmol/L   Lactate, Sepsis   Result Value Ref Range    Lactic Acid, Sepsis 2.3 (H) 0.4 - 1.9 mmol/L   Troponin   Result Value Ref Range    Troponin 0.01 <0.01 ng/mL   Lactic acid, plasma   Result Value Ref Range    Lactic Acid 2.7 (H) 0.4 - 2.0 mmol/L   EKG 12 lead   Result Value Ref Range    Ventricular Rate 95 BPM    Atrial Rate 95 BPM    P-R Interval 170 ms    QRS Duration 82 ms    Q-T Interval 366 ms    QTc Calculation (Bazett) 459 ms    P Axis 47 degrees    R Axis 36 degrees    T Axis 43 degrees    Diagnosis       Normal sinus rhythmNormal ECGNo previous ECGs availableConfirmed by ROBERT LOPEZ, GEORGIE ([id number]) on 8/4/2021 3:03:01 PM       All other labs were within normal range ornot returned as of this dictation. EKG: All EKG's are interpreted by the Emergency Department Physician who either signs or Co-signs this chart in the absence of a cardiologist.  Please see their note for interpretation of EKG.     EKG Interpretation    Interpreted by emergency department physician    Rhythm: normal sinus   Rate: normal  Axis: normal  Ectopy: none  Conduction: normal  ST Segments: normal  T Waves: normal  Q Waves: none    Clinical Impression: normal sinus rhythm      RADIOLOGY:   Non-plain film images such as CT, Ultrasound and MRI are read by the radiologist.  Plain radiographic images are visualized and preliminarily interpreted by the ED Provider with the belowfindings:    Interpretation per the Radiologist below, if available at the time of this note:    XR CHEST PORTABLE   Final Result   Negative portable chest.               PROCEDURES   Unless otherwise noted below, none     Procedures    CRITICAL CARE TIME   N/A    CONSULTS:  None      EMERGENCY DEPARTMENT COURSE and DIFFERENTIAL DIAGNOSIS/MDM:   Vitals:    Vitals:    08/04/21 1131 08/04/21 1302 08/04/21 1431 08/04/21 1623   BP: (!) 163/81 (!) 166/78 (!) 176/81 (!) 140/77   Pulse:    91   Resp:    20   Temp:    98.4 °F (36.9 °C)   TempSrc:    Oral   SpO2: 97%   98%   Weight:       Height:           Patient was given the following medications:  Medications   benzonatate (TESSALON) capsule 100 mg (100 mg Oral Given 8/4/21 0648)   0.9 % sodium chloride bolus (0 mLs Intravenous Stopped 8/4/21 1443)     Patient is an 78year old male with known COVID exposure presenting with COVID symptoms. COVID workup obtained. At the time of signout, the patient's workup is still pending. I expect that he should be able to be discharged as he is not in any respiratory distress and is not requiring supplemental oxygen. 15:40p.m. I have signed out [facility] Emergency Department care to Dr. China Zimmerman. We discussed the history, physical exam, completed/pending test results (if obtained) and current treatment plan. Please refer to his/her chart for the patients further details, remaining Emergency Department course, final disposition and diagnosis.       (Please note that portions of this note were completed with a voice recognition program.  Efforts were made to edit the dictations but occasionally words are mis-transcribed.)    Bina Stewart MD(electronically signed)              Bina Stewart MD  08/08/21 [address], [place]  08/21/21 9442

## 2021-08-04 NOTE — ED NOTES
Ambulated pt in room with pulse ox, pt's o2 sat 97% while ambulating.      Katie Rodgers RN  08/04/21 6968

## 2021-08-04 NOTE — ED NOTES
Bed: 04  Expected date:   Expected time:   Means of arrival:   Comments:  Maurizio Osuna [facility]  08/04/21 1742

## 2021-08-04 NOTE — ED PROVIDER NOTES
3:42 PM: I discussed the history, physical examination, laboratory and imaging studies, and treatment plan with Dr. Yevgeniy Miller. Leanne Carter was signed out to me in stable condition. Please see Dr. Gari Goltz documentation for details of their history, physical, and laboratory studies. Upon re-examination, a summary of Abiola Day history, physical examination, and studies are as follows: Patient is an 78-year-old male, presenting with concerns for cough that worsened overnight, with Covid exposure at home. At time of signout, pending studies included laboratory results, reevaluation, repeat troponin.     Labs Reviewed   COVID-19 & INFLUENZA COMBO - Abnormal; Notable for the following components:       Result Value    SARS-CoV-2 RNA, RT PCR DETECTED (*)     All other components within normal limits    Narrative:     Yvette WALLIS tel. [phone],  Microbiology results called to and read back by Yosvany Felton RN, 08/04/2021  07:39, by Liyah Leigh  Performed at:  [facility],  [employer], [facility]   Phone [phone]   CBC WITH AUTO DIFFERENTIAL - Abnormal; Notable for the following components:    Lymphocytes Absolute 0.4 (*)     All other components within normal limits    Narrative:     Performed at:  [facility],  [place], [facility]   Phone [phone]   COMPREHENSIVE METABOLIC PANEL W/ REFLEX TO MG FOR LOW K - Abnormal; Notable for the following components:    Glucose 254 (*)     BUN 22 (*)     Alkaline Phosphatase 133 (*)     All other components within normal limits    Narrative:     Performed at:  [facility] - [facility],  [place], [facility]   Phone [phone]   LACTATE DEHYDROGENASE - Abnormal; Notable for the following components:     (*)     All other components within normal limits    Narrative:     Performed at:  [facility]  [facility],  [place], [facility]   Phone [phone]   TROPONIN - Abnormal; Notable for the following components:    Troponin 0.02 (*)     All other components within normal limits    Narrative:     Performed at:  [facility] [address],  [place], [facility]   Phone [phone]   FIBRINOGEN - Abnormal; Notable for the following components:    Fibrinogen 405 (*)     All other components within normal limits    Narrative:     Performed at:  [place] [address],  [place], [facility]   Phone [phone]   LACTATE, SEPSIS - Abnormal; Notable for the following components:    Lactic Acid, Sepsis 2.3 (*)     All other components within normal limits    Narrative:     Performed at:  [place] [address],  [place], [facility]   Phone [phone]   LACTATE, SEPSIS - Abnormal; Notable for the following components:    Lactic Acid, Sepsis 2.3 (*)     All other components within normal limits    Narrative:     Performed at:  [place] [address],  [place], [facility]   Phone [phone]   LACTIC ACID, PLASMA - Abnormal; Notable for the following components:    Lactic Acid 2.7 (*)     All other components within normal limits    Narrative:     Performed at:  [place] [address],  [place], [facility]   Phone [phone]   CULTURE, BLOOD 1   CULTURE, BLOOD 2   PROTIME-INR    Narrative:     Performed at:  [place] [address],  [place], [facility]   Phone [phone]   APTT    Narrative:     Performed at:  [place] [address],  [place], [facility]   Phone [phone]   D-DIMER, QUANTITATIVE    Narrative:     Performed at:  [facility]) [place] [address],  [place], [place] [address]   Phone [phone]   PROCALCITONIN    Narrative:     Performed at:  [facility],  [place], [facility]   Phone [phone]   BRAIN NATRIURETIC PEPTIDE    Narrative:     Performed at:  [facility],  [place], [facility]   Phone [phone]   TROPONIN    Narrative:     Performed at:  [facility],  [place], [facility]   Phone [phone]     XR CHEST PORTABLE   Final Result   Negative portable chest.           The Ekg interpreted by me shows  normal sinus rhythm with a rate of 95  Axis is   Normal  QTc is  within an acceptable range  Intervals and Durations are unremarkable. ST Segments: nonspecific changes  No significant change from prior EKG dated 3/20/2021    Patient is an 49-year-old male, presenting with concerns for dry cough and shortness of breath. Full HPI as detailed above. Upon arrival in the ED, vitals reassuring. Patient is resting comfortably is in no acute distress. Not hypoxic at any point throughout the course of his stay and was ambulated in the department with no decrease in his oxygen saturation. Chest x-ray negative. Patient did test positive for Covid. He does have a lactic acidosis however no other concerns for sepsis given his vital signs, has no leukocytosis at this time. He was treated with some IV fluids here in the department. Initial troponin was found to be 0.02, does have history of chronic troponin leak, 3-hour repeat was decreased to 0.01 and he has no chest pain. proBNP is normal at 147. KG without evidence of acute ischemic changes. Patient was reassessed and continued to feel well. He will be discharged home, sent with a prescription for Tessalon Perles for cough.   He will also be sent with a pulse oximeter for home use, advised to return to the ED for worsening symptoms or oxygen saturation less than 90%. Patient is comfortable in agreement with plan. He will be discharged. Given return cautions for the ED and advised follow-up with PCP as needed.     1. 109 Sunita Recinos MD  08/04/21 0083

## 2021-08-05 ENCOUNTER — CARE COORDINATION (OUTPATIENT)
Dept: CARE COORDINATION | Age: 81
End: 2021-08-05

## 2021-08-05 NOTE — CARE COORDINATION
Patient contacted regarding COVID-19 risk, exposure, pulse oximeter ordered at discharge and monoclonal antibody infusion follow up. Discussed COVID-19 related testing which was available at this time. Test results were positive. Patient informed of results, if available? Yes. Ambulatory Care Manager contacted the patient by telephone to perform post discharge assessment. Call within 2 business days of discharge: Yes. Verified name and  with patient as identifiers. Provided introduction to self, and explanation of the CTN/ACM role, and reason for call due to risk factors for infection and/or exposure to COVID-19. Symptoms reviewed with patient who verbalized the following symptoms: fatigue, pain or aching joints, cough, shortness of breath, no new symptoms and no worsening symptoms. Due to no new or worsening symptoms encounter was not routed to provider for escalation. Discussed follow-up appointments. If no appointment was previously scheduled, appointment scheduling offered: patient declined assistance and plans to contact Dr. Juan R Estrada on his own . [facility] follow up appointment(s): No future appointments. Non-[facility] follow up appointment(s): na  Non-face-to-face services provided:  Reviewed and followed up on pending diagnostic tests and treatments-covid results  Education of patient/family/caregiver/guardian to support self-management-educated on s/s of covid and aftercare, when to return to ER, and quarantine. Assessment and support for treatment adherence and medication management-tessalon perles. reviewed ER discharge instructions     Advance Care Planning:   Does patient have an Advance Directive:  patient declined education. Educated patient about risk for severe COVID-19 due to risk factors according to CDC guidelines. ACM reviewed discharge instructions, medical action plan and red flag symptoms with the patient who verbalized understanding. Discussed COVID vaccination status: Yes. Education provided on COVID-19 vaccination as appropriate. Discussed exposure protocols and quarantine with CDC Guidelines. Patient was given an opportunity to verbalize any questions and concerns and agrees to contact ACM or health care provider for questions related to their healthcare. Reviewed and educated patient on any new and changed medications related to discharge diagnosis     Was patient discharged with a pulse oximeter? No, but has access to one. Discussed and confirmed pulse oximeter discharge instructions and when to notify provider or seek emergency care. Normal blood oxygen level is between 93% to 100%. For patients like you with COVID-19, your oxygen level sometimes drops below 93%.  Please use the Pulse-Oximeter at home to check your blood oxygen level twice per day or anytime you have worsening shortness of breath.                -Place the Pulse-Oximeter on your index finger (remove any nail polish if present). -Direct the red light downwards towards your finger, on top of your fingernail.  -Hold your finger still while the machine reads your blood oxygen level.     If you notice that your blood oxygen level stays below 88% while being active OR stays below 90% while sitting or standing, PLEASE RETURN IMMEDIATELY TO THE EMERGENCY DEPARTMENT.  If you normally require home Oxygen (even before you got COVID-19) and you notice that you need more than 4 liters of Oxygen to keep your oxygen level above 88%, PLEASE RETURN IMMEDIATELY TO THE EMERGENCY DEPARTMENT.  If your shortness of breath is severe or getting worse, no matter what your oxygen level states, PLEASE RETURN IMMEDIATELY TO THE EMERGENCY DEPARTMENT.  If you have chest pain, fainting episodes, heart palpitations, or any other serious medical issue, PLEASE RETURN IMMEDIATELY TO THE EMERGENCY DEPARTMENT. [facility] provided contact information.  Plan for follow-up call in 1-2 days based on severity of symptoms and risk factors.

## 2021-08-06 ENCOUNTER — CARE COORDINATION (OUTPATIENT)
Dept: CARE COORDINATION | Age: 81
End: 2021-08-06

## 2021-08-08 LAB
BLOOD CULTURE, ROUTINE: NORMAL
CULTURE, BLOOD 2: NORMAL

## 2021-08-08 ASSESSMENT — ENCOUNTER SYMPTOMS
EYE DISCHARGE: 0
COUGH: 0
VOMITING: 0
DIARRHEA: 0
ABDOMINAL PAIN: 0
SORE THROAT: 0
NAUSEA: 0
BACK PAIN: 0

## 2021-08-09 ENCOUNTER — CARE COORDINATION (OUTPATIENT)
Dept: CARE COORDINATION | Age: 81
End: 2021-08-09

## 2021-08-09 NOTE — CARE COORDINATION
Patient contacted regarding COVID-19 risk, exposure, pulse oximeter ordered at discharge and monoclonal antibody infusion follow up. Discussed COVID-19 related testing which was available at this time. Test results were positive. Patient informed of results, if available? Yes    Ambulatory Care Manager contacted the patient by telephone to perform follow-up assessment. Verified name and  with patient as identifiers. Patient has following risk factors of: htn. Symptoms reviewed with patient who verbalized the following symptoms: fever, fatigue, cough and shortness of breath. Due to no new or worsening symptoms encounter advised he could contact his physician for addiitonal supportive meds if he feels he needs. them  routed to provider for escalation. Educated patient about risk for severe COVID-19 due to risk factors according to CDC guidelines. ACM reviewed discharge instructions, medical action plan and red flag symptoms with the patient who verbalized understanding. Discussed COVID vaccination status: Yes. Education provided on COVID-19 vaccination as appropriate. Discussed exposure protocols and quarantine with CDC Guidelines. Patient was given an opportunity to verbalize any questions and concerns and agrees to contact ACM or health care provider for questions related to their healthcare. Was patient discharged with a pulse oximeter? No but does have access to his wife's. Discussed and confirmed pulse oximeter discharge instructions and when to notify provider or seek emergency care. ACM provided contact information. Plan for follow-up call in 1-2 days based on severity of symptoms and risk factors. Still having cough and feels he could use an expectorant. He plans to contact his provider. Does not feel the tessalon perles are effective. Using his wife's inhaler at times.

## 2021-08-12 ENCOUNTER — HOSPITAL ENCOUNTER (INPATIENT)
Age: 81
LOS: 7 days | Discharge: HOME | DRG: 177 | End: 2021-08-20
Attending: EMERGENCY MEDICINE | Admitting: INTERNAL MEDICINE
Payer: MEDICARE

## 2021-08-12 ENCOUNTER — CARE COORDINATION (OUTPATIENT)
Dept: CARE COORDINATION | Age: 81
End: 2021-08-12

## 2021-08-12 DIAGNOSIS — U07.1 COVID-19 VIRUS INFECTION: Primary | ICD-10-CM

## 2021-08-12 DIAGNOSIS — J96.01 ACUTE RESPIRATORY FAILURE WITH HYPOXIA (HCC): ICD-10-CM

## 2021-08-12 PROCEDURE — 99285 EMERGENCY DEPT VISIT HI MDM: CPT

## 2021-08-12 NOTE — CARE COORDINATION
Additional ER follow up call completed. The patient is still having issues with a dry, harsh cough that is causing him not to be able to talk easily or rest. I have placed a call to his PCP-Dr. Martha Arevalo and left a message regarding this to see if additional supportive meds are indicated and for follow up needs. . Patient was informed of his upcoming regular appt on  at 130. Patient contacted regarding COVID-19 risk, exposure, pulse oximeter ordered at discharge and monoclonal antibody infusion follow up. Discussed COVID-19 related testing which was available at this time. Test results were positive. Patient informed of results, if available? Yes    Ambulatory Care Manager contacted the patient by telephone to perform follow-up assessment. Verified name and  with patient as identifiers. Patient has following risk factors of: diabetes and htn. Symptoms reviewed with patient who verbalized the following symptoms: cough and sore skin all over, achy like the flu. Due to contacted PCP due to cough encounter was routed to provider for escalation. Educated patient about risk for severe COVID-19 due to risk factors according to CDC guidelines. ACM reviewed discharge instructions, medical action plan and red flag symptoms with the patient who verbalized understanding. Discussed COVID vaccination status: Yes. Education provided on COVID-19 vaccination as appropriate. Discussed exposure protocols and quarantine with CDC Guidelines. Patient was given an opportunity to verbalize any questions and concerns and agrees to contact ACM or health care provider for questions related to their healthcare. Was patient discharged with a pulse oximeter? Yes Discussed and confirmed pulse oximeter discharge instructions and when to notify provider or seek emergency care. ACM provided contact information. Plan for follow-up call in 1-2 days based on severity of symptoms and risk factors.

## 2021-08-13 ENCOUNTER — APPOINTMENT (OUTPATIENT)
Dept: GENERAL RADIOLOGY | Age: 81
DRG: 177 | End: 2021-08-13
Payer: MEDICARE

## 2021-08-13 PROBLEM — J12.82 PNEUMONIA DUE TO COVID-19 VIRUS: Status: ACTIVE | Noted: 2021-08-13

## 2021-08-13 PROBLEM — U07.1 PNEUMONIA DUE TO COVID-19 VIRUS: Status: ACTIVE | Noted: 2021-08-13

## 2021-08-13 LAB
A/G RATIO: 0.9 (ref 1.1–2.2)
ALBUMIN SERPL-MCNC: 3.5 G/DL (ref 3.4–5)
ALP BLD-CCNC: 90 U/L (ref 40–129)
ALT SERPL-CCNC: 24 U/L (ref 10–40)
ANION GAP SERPL CALCULATED.3IONS-SCNC: 14 MMOL/L (ref 3–16)
AST SERPL-CCNC: 34 U/L (ref 15–37)
BASE EXCESS VENOUS: 1.8 MMOL/L (ref -3–3)
BASOPHILS ABSOLUTE: 0 K/UL (ref 0–0.2)
BASOPHILS RELATIVE PERCENT: 0.3 %
BILIRUB SERPL-MCNC: 0.6 MG/DL (ref 0–1)
BUN BLDV-MCNC: 20 MG/DL (ref 7–20)
C-REACTIVE PROTEIN: 145.7 MG/L (ref 0–5.1)
CALCIUM SERPL-MCNC: 8.3 MG/DL (ref 8.3–10.6)
CARBOXYHEMOGLOBIN: 1.7 % (ref 0–1.5)
CHLORIDE BLD-SCNC: 97 MMOL/L (ref 99–110)
CO2: 24 MMOL/L (ref 21–32)
CREAT SERPL-MCNC: 1.3 MG/DL (ref 0.8–1.3)
D DIMER: 390 NG/ML DDU (ref 0–229)
EKG ATRIAL RATE: 85 BPM
EKG DIAGNOSIS: NORMAL
EKG P AXIS: 18 DEGREES
EKG P-R INTERVAL: 140 MS
EKG Q-T INTERVAL: 388 MS
EKG QRS DURATION: 82 MS
EKG QTC CALCULATION (BAZETT): 461 MS
EKG R AXIS: 8 DEGREES
EKG T AXIS: 26 DEGREES
EKG VENTRICULAR RATE: 85 BPM
EOSINOPHILS ABSOLUTE: 0 K/UL (ref 0–0.6)
EOSINOPHILS RELATIVE PERCENT: 0 %
GFR AFRICAN AMERICAN: >60
GFR NON-AFRICAN AMERICAN: 53
GLOBULIN: 3.7 G/DL
GLUCOSE BLD-MCNC: 242 MG/DL (ref 70–99)
GLUCOSE BLD-MCNC: 279 MG/DL (ref 70–99)
GLUCOSE BLD-MCNC: 320 MG/DL (ref 70–99)
GLUCOSE BLD-MCNC: 331 MG/DL (ref 70–99)
GLUCOSE BLD-MCNC: 388 MG/DL (ref 70–99)
GLUCOSE BLD-MCNC: 447 MG/DL (ref 70–99)
HCO3 VENOUS: 26.9 MMOL/L (ref 23–29)
HCT VFR BLD CALC: 38.7 % (ref 40.5–52.5)
HEMOGLOBIN: 13.1 G/DL (ref 13.5–17.5)
LYMPHOCYTES ABSOLUTE: 0.4 K/UL (ref 1–5.1)
LYMPHOCYTES RELATIVE PERCENT: 9.7 %
MCH RBC QN AUTO: 30.8 PG (ref 26–34)
MCHC RBC AUTO-ENTMCNC: 34 G/DL (ref 31–36)
MCV RBC AUTO: 90.6 FL (ref 80–100)
METHEMOGLOBIN VENOUS: 0.3 %
MONOCYTES ABSOLUTE: 0.5 K/UL (ref 0–1.3)
MONOCYTES RELATIVE PERCENT: 10.1 %
NEUTROPHILS ABSOLUTE: 3.6 K/UL (ref 1.7–7.7)
NEUTROPHILS RELATIVE PERCENT: 79.9 %
O2 SAT, VEN: 56 %
O2 THERAPY: ABNORMAL
PCO2, VEN: 43.8 MMHG (ref 40–50)
PDW BLD-RTO: 14.1 % (ref 12.4–15.4)
PERFORMED ON: ABNORMAL
PH VENOUS: 7.41 (ref 7.35–7.45)
PLATELET # BLD: 120 K/UL (ref 135–450)
PMV BLD AUTO: 8.9 FL (ref 5–10.5)
PO2, VEN: 29.2 MMHG (ref 25–40)
POTASSIUM REFLEX MAGNESIUM: 4.2 MMOL/L (ref 3.5–5.1)
PRO-BNP: 557 PG/ML (ref 0–449)
PROCALCITONIN: 0.24 NG/ML (ref 0–0.15)
RBC # BLD: 4.27 M/UL (ref 4.2–5.9)
SODIUM BLD-SCNC: 135 MMOL/L (ref 136–145)
TCO2 CALC VENOUS: 28 MMOL/L
TOTAL PROTEIN: 7.2 G/DL (ref 6.4–8.2)
TROPONIN: 0.03 NG/ML
TROPONIN: <0.01 NG/ML
WBC # BLD: 4.5 K/UL (ref 4–11)

## 2021-08-13 PROCEDURE — 6370000000 HC RX 637 (ALT 250 FOR IP): Performed by: INTERNAL MEDICINE

## 2021-08-13 PROCEDURE — 6360000002 HC RX W HCPCS: Performed by: PHYSICIAN ASSISTANT

## 2021-08-13 PROCEDURE — 80053 COMPREHEN METABOLIC PANEL: CPT

## 2021-08-13 PROCEDURE — 2580000003 HC RX 258: Performed by: INTERNAL MEDICINE

## 2021-08-13 PROCEDURE — 2700000000 HC OXYGEN THERAPY PER DAY

## 2021-08-13 PROCEDURE — 82803 BLOOD GASES ANY COMBINATION: CPT

## 2021-08-13 PROCEDURE — 84145 PROCALCITONIN (PCT): CPT

## 2021-08-13 PROCEDURE — 2580000003 HC RX 258: Performed by: PHYSICIAN ASSISTANT

## 2021-08-13 PROCEDURE — 99223 1ST HOSP IP/OBS HIGH 75: CPT | Performed by: INTERNAL MEDICINE

## 2021-08-13 PROCEDURE — 84484 ASSAY OF TROPONIN QUANT: CPT

## 2021-08-13 PROCEDURE — 1200000000 HC SEMI PRIVATE

## 2021-08-13 PROCEDURE — 2500000003 HC RX 250 WO HCPCS: Performed by: INTERNAL MEDICINE

## 2021-08-13 PROCEDURE — 6360000002 HC RX W HCPCS: Performed by: INTERNAL MEDICINE

## 2021-08-13 PROCEDURE — 85379 FIBRIN DEGRADATION QUANT: CPT

## 2021-08-13 PROCEDURE — 93010 ELECTROCARDIOGRAM REPORT: CPT | Performed by: INTERNAL MEDICINE

## 2021-08-13 PROCEDURE — 85025 COMPLETE CBC W/AUTO DIFF WBC: CPT

## 2021-08-13 PROCEDURE — XW033E5 INTRODUCTION OF REMDESIVIR ANTI-INFECTIVE INTO PERIPHERAL VEIN, PERCUTANEOUS APPROACH, NEW TECHNOLOGY GROUP 5: ICD-10-PCS | Performed by: INTERNAL MEDICINE

## 2021-08-13 PROCEDURE — 83880 ASSAY OF NATRIURETIC PEPTIDE: CPT

## 2021-08-13 PROCEDURE — 93005 ELECTROCARDIOGRAM TRACING: CPT | Performed by: EMERGENCY MEDICINE

## 2021-08-13 PROCEDURE — 71045 X-RAY EXAM CHEST 1 VIEW: CPT

## 2021-08-13 PROCEDURE — 94761 N-INVAS EAR/PLS OXIMETRY MLT: CPT

## 2021-08-13 PROCEDURE — 86140 C-REACTIVE PROTEIN: CPT

## 2021-08-13 RX ORDER — DEXTROSE MONOHYDRATE 50 MG/ML
100 INJECTION, SOLUTION INTRAVENOUS PRN
Status: DISCONTINUED | OUTPATIENT
Start: 2021-08-13 | End: 2021-08-20 | Stop reason: HOSPADM

## 2021-08-13 RX ORDER — SODIUM CHLORIDE 0.9 % (FLUSH) 0.9 %
5-40 SYRINGE (ML) INJECTION PRN
Status: DISCONTINUED | OUTPATIENT
Start: 2021-08-13 | End: 2021-08-20 | Stop reason: HOSPADM

## 2021-08-13 RX ORDER — GUAIFENESIN/DEXTROMETHORPHAN 100-10MG/5
5 SYRUP ORAL EVERY 4 HOURS PRN
Status: DISCONTINUED | OUTPATIENT
Start: 2021-08-13 | End: 2021-08-20 | Stop reason: HOSPADM

## 2021-08-13 RX ORDER — FAMOTIDINE 20 MG/1
20 TABLET, FILM COATED ORAL 2 TIMES DAILY
Status: DISCONTINUED | OUTPATIENT
Start: 2021-08-13 | End: 2021-08-20 | Stop reason: HOSPADM

## 2021-08-13 RX ORDER — DEXTROSE MONOHYDRATE 25 G/50ML
12.5 INJECTION, SOLUTION INTRAVENOUS PRN
Status: DISCONTINUED | OUTPATIENT
Start: 2021-08-13 | End: 2021-08-20 | Stop reason: HOSPADM

## 2021-08-13 RX ORDER — ASPIRIN 81 MG/1
162 TABLET, CHEWABLE ORAL DAILY
Status: DISCONTINUED | OUTPATIENT
Start: 2021-08-13 | End: 2021-08-20 | Stop reason: HOSPADM

## 2021-08-13 RX ORDER — NICOTINE POLACRILEX 4 MG
15 LOZENGE BUCCAL PRN
Status: DISCONTINUED | OUTPATIENT
Start: 2021-08-13 | End: 2021-08-20 | Stop reason: HOSPADM

## 2021-08-13 RX ORDER — DEXAMETHASONE SODIUM PHOSPHATE 10 MG/ML
10 INJECTION, SOLUTION INTRAMUSCULAR; INTRAVENOUS ONCE
Status: DISCONTINUED | OUTPATIENT
Start: 2021-08-13 | End: 2021-08-13 | Stop reason: ALTCHOICE

## 2021-08-13 RX ORDER — SODIUM CHLORIDE 9 MG/ML
INJECTION, SOLUTION INTRAVENOUS PRN
Status: DISCONTINUED | OUTPATIENT
Start: 2021-08-13 | End: 2021-08-20 | Stop reason: HOSPADM

## 2021-08-13 RX ORDER — SODIUM CHLORIDE 9 MG/ML
25 INJECTION, SOLUTION INTRAVENOUS PRN
Status: DISCONTINUED | OUTPATIENT
Start: 2021-08-13 | End: 2021-08-20 | Stop reason: HOSPADM

## 2021-08-13 RX ORDER — SODIUM CHLORIDE 0.9 % (FLUSH) 0.9 %
5-40 SYRINGE (ML) INJECTION EVERY 12 HOURS SCHEDULED
Status: DISCONTINUED | OUTPATIENT
Start: 2021-08-13 | End: 2021-08-20 | Stop reason: HOSPADM

## 2021-08-13 RX ORDER — ACETAMINOPHEN 325 MG/1
650 TABLET ORAL EVERY 6 HOURS PRN
Status: DISCONTINUED | OUTPATIENT
Start: 2021-08-13 | End: 2021-08-20 | Stop reason: HOSPADM

## 2021-08-13 RX ORDER — INSULIN GLARGINE 100 [IU]/ML
40 INJECTION, SOLUTION SUBCUTANEOUS 2 TIMES DAILY
Status: DISCONTINUED | OUTPATIENT
Start: 2021-08-13 | End: 2021-08-14

## 2021-08-13 RX ORDER — ATORVASTATIN CALCIUM 40 MG/1
80 TABLET, FILM COATED ORAL NIGHTLY
Status: DISCONTINUED | OUTPATIENT
Start: 2021-08-13 | End: 2021-08-20 | Stop reason: HOSPADM

## 2021-08-13 RX ORDER — DEXAMETHASONE SODIUM PHOSPHATE 10 MG/ML
6 INJECTION, SOLUTION INTRAMUSCULAR; INTRAVENOUS EVERY 24 HOURS
Status: DISCONTINUED | OUTPATIENT
Start: 2021-08-13 | End: 2021-08-20 | Stop reason: HOSPADM

## 2021-08-13 RX ORDER — MECOBALAMIN 5000 MCG
10 TABLET,DISINTEGRATING ORAL NIGHTLY
Status: DISCONTINUED | OUTPATIENT
Start: 2021-08-13 | End: 2021-08-20 | Stop reason: HOSPADM

## 2021-08-13 RX ORDER — ONDANSETRON 2 MG/ML
4 INJECTION INTRAMUSCULAR; INTRAVENOUS EVERY 6 HOURS PRN
Status: DISCONTINUED | OUTPATIENT
Start: 2021-08-13 | End: 2021-08-20 | Stop reason: HOSPADM

## 2021-08-13 RX ORDER — PREGABALIN 25 MG/1
50 CAPSULE ORAL 2 TIMES DAILY
Status: DISCONTINUED | OUTPATIENT
Start: 2021-08-13 | End: 2021-08-20 | Stop reason: HOSPADM

## 2021-08-13 RX ORDER — ACETAMINOPHEN 650 MG/1
650 SUPPOSITORY RECTAL EVERY 6 HOURS PRN
Status: DISCONTINUED | OUTPATIENT
Start: 2021-08-13 | End: 2021-08-20 | Stop reason: HOSPADM

## 2021-08-13 RX ORDER — POLYETHYLENE GLYCOL 3350 17 G/17G
17 POWDER, FOR SOLUTION ORAL DAILY PRN
Status: DISCONTINUED | OUTPATIENT
Start: 2021-08-13 | End: 2021-08-20 | Stop reason: HOSPADM

## 2021-08-13 RX ORDER — ONDANSETRON 4 MG/1
4 TABLET, ORALLY DISINTEGRATING ORAL EVERY 8 HOURS PRN
Status: DISCONTINUED | OUTPATIENT
Start: 2021-08-13 | End: 2021-08-20 | Stop reason: HOSPADM

## 2021-08-13 RX ADMIN — PREGABALIN 50 MG: 25 CAPSULE ORAL at 08:54

## 2021-08-13 RX ADMIN — DEXAMETHASONE SODIUM PHOSPHATE 6 MG: 10 INJECTION, SOLUTION INTRAMUSCULAR; INTRAVENOUS at 04:01

## 2021-08-13 RX ADMIN — INSULIN GLARGINE 40 UNITS: 100 INJECTION, SOLUTION SUBCUTANEOUS at 10:21

## 2021-08-13 RX ADMIN — CEFTRIAXONE SODIUM 1000 MG: 1 INJECTION, POWDER, FOR SOLUTION INTRAMUSCULAR; INTRAVENOUS at 13:14

## 2021-08-13 RX ADMIN — REMDESIVIR 200 MG: 100 INJECTION, POWDER, LYOPHILIZED, FOR SOLUTION INTRAVENOUS at 05:40

## 2021-08-13 RX ADMIN — INSULIN LISPRO 8 UNITS: 100 INJECTION, SOLUTION INTRAVENOUS; SUBCUTANEOUS at 18:02

## 2021-08-13 RX ADMIN — INSULIN LISPRO 8 UNITS: 100 INJECTION, SOLUTION INTRAVENOUS; SUBCUTANEOUS at 08:56

## 2021-08-13 RX ADMIN — ACETAMINOPHEN 650 MG: 325 TABLET ORAL at 06:24

## 2021-08-13 RX ADMIN — SODIUM CHLORIDE, PRESERVATIVE FREE 10 ML: 5 INJECTION INTRAVENOUS at 08:55

## 2021-08-13 RX ADMIN — DEXTROSE MONOHYDRATE 500 MG: 50 INJECTION, SOLUTION INTRAVENOUS at 11:45

## 2021-08-13 RX ADMIN — ENOXAPARIN SODIUM 30 MG: 30 INJECTION SUBCUTANEOUS at 08:56

## 2021-08-13 RX ADMIN — ASPIRIN 162 MG: 81 TABLET, CHEWABLE ORAL at 08:54

## 2021-08-13 RX ADMIN — FAMOTIDINE 20 MG: 20 TABLET ORAL at 08:54

## 2021-08-13 ASSESSMENT — PAIN SCALES - GENERAL
PAINLEVEL_OUTOF10: 0
PAINLEVEL_OUTOF10: 5
PAINLEVEL_OUTOF10: 0

## 2021-08-13 NOTE — CONSULTS
Patient is being seen at the request of Dr. Ash Moore for a consultation for Acute respiratory failure with hypoxemia/Covid 19 pneumonia    HISTORY OF PRESENT ILLNESS: The patient is an 57-year-old man with a past medical history of type 2 diabetes, diagnosed with COVID-19 infection on 8/3/2021 who was brought in by EMS for increasing shortness of breath and respiratory distress. Upon arrival to the ER he was noted to be hypoxemic with an ox saturation of 88%. He has multiple family members at home who was diagnosed with COVID-19 including his wife was recently admitted to [facility].  He has not been vaccinated. He feels short of breath with any movement. He has a cough productive of yellow sputum. He denies any hemoptysis. PAST MEDICAL HISTORY:  Past Medical History:   Diagnosis Date    COVID-19 08/04/2021    Diabetes mellitus ([address]     type 2    Glaucoma     Hyperlipidemia     Hypertension     Kidney stone     Neuropathy     Osteoarthrosis, hip 03/21/2016    Prepatellar bursitis 03/21/2016     PAST SURGICAL HISTORY:  Past Surgical History:   Procedure Laterality Date    COLON SURGERY      COLONOSCOPY  2008    CYSTOSCOPY  2/2/12    with stent placement    EYE SURGERY      left eyemed valve, bilateral retinal repair    FOOT SURGERY      right, tendon repair    KIDNEY STONE SURGERY      OTHER SURGICAL HISTORY Left 07/17/2017    CYSTOSCOPY, LEFT URETEROSCOPY, STENT PLACEMENT, URETHERAL dilation, stone manipulation        FAMILY HISTORY:  family history is not on file. SOCIAL HISTORY:   reports that he has never smoked.  He has quit using smokeless tobacco.    Scheduled Meds:   insulin glargine  40 Units Subcutaneous BID    pregabalin  50 mg Oral BID    atorvastatin  80 mg Oral Nightly    aspirin  162 mg Oral Daily    sodium chloride flush  5-40 mL Intravenous 2 times per day    enoxaparin  30 mg Subcutaneous BID    famotidine  20 mg Oral BID    insulin lispro  0-12 Units Subcutaneous TID WC    insulin lispro  0-6 Units Subcutaneous Nightly    dexamethasone  6 mg Intravenous Q24H    melatonin  10 mg Oral Nightly    [START ON 8/14/2021] remdesivir IVPB  100 mg Intravenous Q24H     Continuous Infusions:   dextrose      sodium chloride       PRN Meds:  glucose, dextrose, glucagon (rDNA), dextrose, sodium chloride flush, sodium chloride, ondansetron **OR** ondansetron, polyethylene glycol, acetaminophen **OR** acetaminophen, guaiFENesin-dextromethorphan    ALLERGIES:  Patient has No Known Allergies. REVIEW OF SYSTEMS:  Constitutional: Negative for fever  HENT: Negative for sore throat  Eyes: Negative for redness   Respiratory: + for dyspnea, cough  Cardiovascular: Negative for chest pain  Gastrointestinal: Negative for vomiting, diarrhea   Genitourinary: Negative for hematuria   Musculoskeletal: Negative for arthralgias   Skin: Negative for rash  Neurological: Negative for syncope  Hematological: Negative for adenopathy  Psychiatric/Behavorial: Negative for anxiety    PHYSICAL EXAM:  Blood pressure (!) 163/83, pulse 82, temperature 98.8 °F (37.1 °C), temperature source Oral, resp. rate 22, height 5' 9\" (1.753 m), weight 217 lb (98.4 kg), SpO2 90 %.' on 3l NC  Gen: No distress. Normocephalic, atraumatic. Eyes: PERRL. No sclera icterus. No conjunctival injection. ENT: No discharge. Pharynx clear. Neck: Trachea midline. No obvious mass. Resp: No accessory muscle use. bilateral crackles. No wheezes. No rhonchi. No dullness on percussion. CV: Regular rate. Regular rhythm. No murmur or rub. No edema. GI: Non-tender. Non-distended. No hernia. Skin: Warm and dry. No nodule on exposed extremities. M/S: No cyanosis. No joint deformity. No clubbing. Neuro: Awake. Alert. Moves all four extremities. Psych: Oriented x 3. No anxiety.      LABS:  CBC:   Recent Labs     08/13/21  0025   WBC 4.5   HGB 13.1*   HCT 38.7*   MCV 90.6   *     BMP:   Recent Labs 08/13/21  0025   *   K 4.2   CL 97*   CO2 24   BUN 20   CREATININE 1.3     LIVER PROFILE:   Recent Labs     08/13/21  0025   AST 34   ALT 24   BILITOT 0.6   ALKPHOS 90     PT/INR: No results for input(s): PROTIME, INR in the last 72 hours. APTT: No results for input(s): APTT in the last 72 hours. UA:No results for input(s): NITRITE, COLORU, PHUR, LABCAST, WBCUA, RBCUA, MUCUS, TRICHOMONAS, YEAST, BACTERIA, CLARITYU, SPECGRAV, LEUKOCYTESUR, UROBILINOGEN, BILIRUBINUR, BLOODU, GLUCOSEU, AMORPHOUS in the last 72 hours. Invalid input(s): KETONESU  No results for input(s): PHART, XCS1PWA, PO2ART in the last 72 hours. Chest imaging was reviewed by me and showed :  CXR 8/13: The heart and mediastinum are normal.  Severe perihilar opacities have  developed centrally, worse on the right.  More diffuse reticular and  ground-glass opacities throughout both lungs.  Suspected small pleural  effusions.  No skeletal finding. ASSESSMENT:  · Acute respiratory failure with hypoxemia  · COVID-19 pneumonia  · Hyponatremia  · Thrombocytopenia    PLAN:  · Supplemental oxygen to maintain SaO2 >92%; wean as tolerated  · Droplet plus respiratory isolation  · Decadron 6 mg daily  · Remdesivir, will need daily laboratory monitoring to avoid toxicity  · Monitor platelets  · Gentle IV fluid resuscitation  · If hypoxemia worsens would strongly consider tocilizumab if available    Thank you for the consult.

## 2021-08-13 NOTE — ED PROVIDER NOTES
[address] ED  EMERGENCY DEPARTMENT ENCOUNTER      Pt Name: Kerry Guerrero  MRN: [id number]  [place] 1940  Date of evaluation: 8/12/2021  Provider: Tian Broussard MD    CHIEF COMPLAINT       Chief Complaint   Patient presents with    Shortness of Breath     Pt arrived from home via EMS w/ c/o increased SOB. Dx w/ Covid on 8/3. Per EMS report, pt O2 was 88% on RA upon there arrival. Pt does not wear O2 at baseline. HISTORY OF PRESENT ILLNESS   (Location/Symptom, Timing/Onset, Context/Setting, Quality, Duration, Modifying Factors, Severity)  Note limiting factors. Kerry Guerrero is a 78 y.o. male with past medical history of hypertension, diabetes, hyperlipidemia, prior stroke here today with shortness of breath after recent COVID-19 infection    Patient states that he was seen to the emergency department on 8/4/2021 for cough shortness of breath and was ultimately diagnosed with COVID-19 infection. At that time he had no hypoxia was ultimately discharged home. He states since he has been home his cough and shortness of breath has worsened. He states he now has significant dyspnea on exertion. He has been monitoring his oxygen levels at home and they have been in the high 80s. Denies chest pain. No abdominal pain, vomiting. He states his prior diarrhea has resolved. Multiple positive COVID-19 sick contacts at home. States he was not vaccinated    [facility]    Nursing Notes were reviewed. REVIEW OF SYSTEMS    (2-9 systems for level 4, 10 or more for level 5)     Review of Systems    Please see HPI for pertinent positive and negative review of system findings. A full 10 system ROS was performed and otherwise negative.         PAST MEDICAL HISTORY     Past Medical History:   Diagnosis Date    COVID-19 08/04/2021    Diabetes mellitus ([facility] [address]     type 2    Glaucoma     Hyperlipidemia     Hypertension     Kidney stone     Neuropathy     Osteoarthrosis, hip 03/21/2016    Prepatellar bursitis 03/21/2016         SURGICAL HISTORY       Past Surgical History:   Procedure Laterality Date    COLON SURGERY      COLONOSCOPY  2008    CYSTOSCOPY  2/2/12    with stent placement    EYE SURGERY      left eyemed valve, bilateral retinal repair    FOOT SURGERY      right, tendon repair    KIDNEY STONE SURGERY      OTHER SURGICAL HISTORY Left 07/17/2017    CYSTOSCOPY, LEFT URETEROSCOPY, STENT PLACEMENT, URETHERAL dilation, stone manipulation          CURRENT MEDICATIONS       Previous Medications    ASPIRIN 81 MG TABLET    Take 162 mg by mouth daily     ATORVASTATIN (LIPITOR) 80 MG TABLET    Take 1 tablet by mouth nightly    B COMPLEX VITAMINS CAPSULE    Take 1 capsule by mouth daily    BRIMONIDINE (ALPHAGAN) 0.2 % OPHTHALMIC SOLUTION    Place 1 drop into both eyes 2 times daily     CHOLECALCIFEROL (VITAMIN D3) 5000 UNITS TABS    Take by mouth daily    CYANOCOBALAMIN ([employer] VITAMIN B-12 SL)    Place under the tongue daily    DOCUSATE SODIUM (COLACE) 100 MG CAPSULE    Take 100 mg by mouth 2 times daily as needed for Constipation    DORZOLAMIDE HCL (TRUSOPT OP)    Apply 1 drop to eye 2 times daily     HYDROCHLOROTHIAZIDE (HYDRODIURIL) 25 MG TABLET    Take 12.5 mg by mouth daily    INSULIN ASPART (NOVOLOG) 100 UNIT/ML INJECTION    Inject  into the skin 3 times daily (before meals). Sliding scale based on meal calculation     INSULIN GLARGINE (LANTUS) 100 UNIT/ML INJECTION    Inject 40 Units into the skin 2 times daily. IRBESARTAN (AVAPRO) 300 MG TABLET    Take 300 mg by mouth daily    LANSOPRAZOLE (PREVACID) 30 MG DELAYED RELEASE CAPSULE    Take 30 mg by mouth daily    LIDOCAINE (LIDODERM) 5 %    Place 1 patch onto the skin daily 12 hours on, 12 hours off. MULTIPLE VITAMIN (MULTI VITAMIN MENS PO)    Take  by mouth. NONFORMULARY    Pt takes a probiotic dose [address] [facility] that has about 8 pills in it, Unknown what each pill is.     ONDANSETRON (ZOFRAN ODT) 4 MG DISINTEGRATING TABLET Take 1-2 tablets by mouth every 12 hours as needed for Nausea May Sub regular tablet (non-ODT) if insurance does not cover ODT. POLYETHYLENE GLYCOL (GLYCOLAX) 17 G PACKET    Take 17 g by mouth daily as needed for Constipation    PREGABALIN (LYRICA) 50 MG CAPSULE    Take 50 mg by mouth 2 times daily. ALLERGIES     Patient has no known allergies. FAMILY HISTORY     History reviewed. No pertinent family history. SOCIAL HISTORY       Social History     Socioeconomic History    Marital status:      Spouse name: None    Number of children: None    Years of education: None    Highest education level: None   Occupational History    None   Tobacco Use    Smoking status: Never Smoker    Smokeless tobacco: Former User   Vaping Use    Vaping Use: Never used   Substance and Sexual Activity    Alcohol use: No    Drug use: No    Sexual activity: Not Currently     Partners: Female   Other Topics Concern    None   Social History Narrative    None     Social Determinants of Health     Financial Resource Strain:     Difficulty of Paying Living Expenses:    Food Insecurity:     Worried About Running Out of Food in the Last Year:     Ran Out of Food in the Last Year:    Transportation Needs:     Lack of Transportation (Medical):      Lack of Transportation (Non-Medical):    Physical Activity:     Days of Exercise per Week:     Minutes of Exercise per Session:    Stress:     Feeling of Stress :    Social Connections:     Frequency of Communication with Friends and Family:     Frequency of Social Gatherings with Friends and Family:     Attends [employer] Services:     Active Member of Clubs or Organizations:     Attends Club or Organization Meetings:     Marital Status:    Intimate Partner Violence:     Fear of Current or Ex-Partner:     Emotionally Abused:     Physically Abused:     Sexually Abused:        SCREENINGS    [place] Coma Scale  Eye Opening: Spontaneous  Best Verbal Response: Oriented  Best Motor Response: Obeys commands  [place] Coma Scale Score: 15          PHYSICAL EXAM    (up to 7 for level 4, 8 or more for level 5)     ED Triage Vitals [08/12/21 2357]   BP Temp Temp Source Pulse Resp SpO2 Height Weight   (!) 142/65 100 °F (37.8 °C) Oral 92 30 95 % 5' 9\" (1.753 m) 217 lb (98.4 kg)       Physical Exam    General appearance:  Cooperative. No acute distress. Skin:  Warm. Dry. Eye:  Extraocular movements intact. Ears, nose, mouth and throat:  Oral mucosa moist,  Neck:  Trachea midline. Heart:  Regular rate and rhythm  Perfusion:  intact  Respiratory: Crackles in the bilateral lungs. Mildly increased work of breathing. Speaking in full sentences. Abdominal:   Non distended. Nontender  Neurological:  Alert and oriented x 3.   Moves all extremities spontaneously  Musculoskeletal:   Normal ROM, no deformities          Psychiatric:  Normal mood      DIAGNOSTIC RESULTS       Labs Reviewed   CBC WITH AUTO DIFFERENTIAL - Abnormal; Notable for the following components:       Result Value    Hemoglobin 13.1 (*)     Hematocrit 38.7 (*)     Platelets 813 (*)     Lymphocytes Absolute 0.4 (*)     All other components within normal limits    Narrative:     Performed at:  [facility],  [employer], [facility] [place]   Phone [phone]   COMPREHENSIVE METABOLIC PANEL W/ REFLEX TO MG FOR LOW K - Abnormal; Notable for the following components:    Sodium 135 (*)     Chloride 97 (*)     Glucose 279 (*)     GFR Non-African American 53 (*)     Albumin/Globulin Ratio 0.9 (*)     All other components within normal limits    Narrative:     Performed at:  [facility],  [place], [facility]   Phone [phone]   TROPONIN - Abnormal; Notable for the following components:    Troponin 0.03 (*)     All other components within normal limits    Narrative:     Performed at:  [facility] [address]  [place], [facility]   Phone [phone]   BRAIN NATRIURETIC PEPTIDE - Abnormal; Notable for the following components:    Pro- (*)     All other components within normal limits    Narrative:     Performed at:  [facility] [address],  [place], [facility]   Phone [phone]   BLOOD GAS, VENOUS - Abnormal; Notable for the following components:    Carboxyhemoglobin 1.7 (*)     All other components within normal limits    Narrative:     Performed at:  [facility] [address],  [place], [facility]   Phone [phone]       Interpretation per the Radiologist below, if obtained/available at the time of this note:    XR CHEST PORTABLE   Final Result   Severe perihilar and more diffuse reticular and ground-glass opacities   throughout the lungs likely represents pulmonary edema or viral pneumonitis. All other labs/imaging were within normal range or not returned as of this dictation. EMERGENCY DEPARTMENT COURSE and DIFFERENTIAL DIAGNOSIS/MDM:   Vitals:    Vitals:    08/12/21 2357 08/13/21 0017 08/13/21 0046 08/13/21 0053   BP: (!) 142/65 133/60 135/68    Pulse: 92 90 90    Resp: 30 30 29    Temp: 100 °F (37.8 °C)      TempSrc: Oral      SpO2: 95% 93% 90% 92%   Weight: 217 lb (98.4 kg)      Height: 5' 9\" (1.753 m)          EKG: Sinus rhythm with marked sinus arrhythmia rate of 85 bpm.  No ST elevation. Otherwise normal.    Patient presents emergency department today complaining of worsening shortness of breath. Hypoxic with oxygen saturations 88% on room air requiring 2 to 3 L nasal cannula oxygen. Patient was found to have a mildly elevated troponin but has no chest pain. Has had chronic troponin elevations. No new EKG changes. Chest x-ray showing diffuse bilateral worsening patchy infiltrates consistent with viral pneumonitis.   Was given Decadron here and will be admitted to the hospital.    MDM    CONSULTS     IP CONSULT TO HOSPITALIST    Critical Care:     CRITICAL CARE TIME   Total Critical Care time was 30 minutes, excluding separately reportable procedures. There was a high probability of clinically significant/life threatening deterioration in the patient's condition which required my urgent intervention. This time was spent reviewing the patient chart, interpreting diagnostic/laboratory data, administration of supplemental oxygen for hypoxic respiratory failure      REASSESSMENT          PROCEDURE     Unless otherwise noted below, none     Procedures      FINAL IMPRESSION      1. COVID-19 virus infection    2. Acute respiratory failure with hypoxia [facility])            DISPOSITION/PLAN   DISPOSITION Decision To Admit 08/13/2021 12:56:32 AM        PATIENT REFERRED TO:  No follow-up provider specified. DISCHARGE MEDICATIONS:  New Prescriptions    No medications on file     Controlled Substances Monitoring:     RX Monitoring 12/16/2018   Attestation The Prescription Monitoring Report for this patient was reviewed today. Periodic Controlled Substance Monitoring Possible medication side effects, risk of tolerance/dependence & alternative treatments discussed.        (Please note that portions of this note were completed with a voice recognition program.  Efforts were made to edit the dictations but occasionally words are mis-transcribed.)    Zoie Roa MD (electronically signed)  Attending Emergency Physician            Jolynn Holt MD  08/13/21 [id number]

## 2021-08-13 NOTE — ED NOTES
Pt moved from room 4 to 17 for pt care from assigned nurse.        [facility]  08/13/21 [id number]

## 2021-08-13 NOTE — H&P
Hospital Medicine History & Physical      PCP: Ariel Ellis MD    Date of Admission: 8/12/2021    Date of Service: Pt seen/examined on 8/13/2021   Chief Complaint:    Chief Complaint   Patient presents with    Shortness of Breath     Pt arrived from home via EMS w/ c/o increased SOB. Dx w/ Covid on 8/3. Per EMS report, pt O2 was 88% on RA upon there arrival. Pt does not wear O2 at baseline. History Of Present Illness: The patient is a 78 y.o. male with COVID 19 diagnosed on 8/4/21, DM2, HTN, HLD who presented to [facility] ED with complaint of shortness of breath and cough. He was initially seen in ER on 8/4 when he was diagnosed with COVID 19. He was not requiring oxygen and was discharged to home at time of diagnosis. He reports that over the past 2 weeks his symptoms have not improved and his shortness of breath has slowly worsened. His cough was severe making it hard for him to breath. He denies sputum or hemoptysis production. He has had fevers on and off. He came to ER for evaluation where he was hypoxic on RA and had temp 100F. CXR consistent with viral PNA. He is admitted for further care. Multiple family members including wife have been ill with COVID 19. The patient has not had the COVID 19 vaccine.       Past Medical History:        Diagnosis Date    COVID-19 08/04/2021    Diabetes mellitus ([facility] [place] [address]     type 2    Glaucoma     Hyperlipidemia     Hypertension     Kidney stone     Neuropathy     Osteoarthrosis, hip 03/21/2016    Prepatellar bursitis 03/21/2016       Past Surgical History:        Procedure Laterality Date    COLON SURGERY      COLONOSCOPY  2008    CYSTOSCOPY  2/2/12    with stent placement    EYE SURGERY      left eyemed valve, bilateral retinal repair    FOOT SURGERY      right, tendon repair    KIDNEY STONE SURGERY      OTHER SURGICAL HISTORY Left 07/17/2017    CYSTOSCOPY, LEFT URETEROSCOPY, STENT PLACEMENT, URETHERAL dilation, stone manipulation Medications Prior to Admission:    Prior to Admission medications    Medication Sig Start Date End Date Taking? Authorizing Provider   hydroCHLOROthiazide (HYDRODIURIL) 25 MG tablet Take 12.5 mg by mouth daily    Historical Provider, MD   irbesartan (AVAPRO) 300 MG tablet Take 300 mg by mouth daily    Historical Provider, MD   lansoprazole (PREVACID) 30 MG delayed release capsule Take 30 mg by mouth daily    Historical Provider, MD   polyethylene glycol (GLYCOLAX) 17 g packet Take 17 g by mouth daily as needed for Constipation    Historical Provider, MD   pregabalin (LYRICA) 50 MG capsule Take 50 mg by mouth 2 times daily. Historical Provider, MD   atorvastatin (LIPITOR) 80 MG tablet Take 1 tablet by mouth nightly 1/20/20   TRESSA Martin CNP   lidocaine (LIDODERM) 5 % Place 1 patch onto the skin daily 12 hours on, 12 hours off. 12/27/19   TRESSA Braxton CNP   ondansetron (ZOFRAN ODT) 4 MG disintegrating tablet Take 1-2 tablets by mouth every 12 hours as needed for Nausea May Sub regular tablet (non-ODT) if insurance does not cover ODT. 12/16/18   Heber Rodriguez PA-C   Cyanocobalamin ([employer] VITAMIN B-12 SL) Place under the tongue daily    Historical Provider, MD   aspirin 81 MG tablet Take 162 mg by mouth daily  9/7/17   Historical Provider, MD   docusate sodium (COLACE) 100 MG capsule Take 100 mg by mouth 2 times daily as needed for Constipation    Historical Provider, MD   Cholecalciferol (VITAMIN D3) 5000 units TABS Take by mouth daily    Historical Provider, MD   NONFORMULARY Pt takes a probiotic dose [address] [facility] that has about 8 pills in it, Unknown what each pill is.     Historical Provider, MD   brimonidine (ALPHAGAN) 0.2 % ophthalmic solution Place 1 drop into both eyes 2 times daily  2/18/16   Historical Provider, MD   b complex vitamins capsule Take 1 capsule by mouth daily    Historical Provider, MD   Dorzolamide HCl (TRUSOPT OP) Apply 1 drop to eye 2 times daily Historical Provider, MD   Multiple Vitamin (MULTI VITAMIN MENS PO) Take  by mouth. Historical Provider, MD   insulin aspart (NOVOLOG) 100 UNIT/ML injection Inject  into the skin 3 times daily (before meals). Sliding scale based on meal calculation     Historical Provider, MD   insulin glargine (LANTUS) 100 UNIT/ML injection Inject 40 Units into the skin 2 times daily. Historical Provider, MD       Allergies:  Patient has no known allergies. Social History:  The patient currently lives at home with his wife     TOBACCO:   reports that he has never smoked. He has quit using smokeless tobacco.  ETOH:   reports no history of alcohol use. Family History:   Positive as follows:    History reviewed. No pertinent family history. REVIEW OF SYSTEMS:       Constitutional: Negative for fever   HENT: Negative for sore throat   Eyes: Negative for redness   Respiratory: +for dyspnea, cough   Cardiovascular: Negative for chest pain   Gastrointestinal: Negative for vomiting, diarrhea   Genitourinary: Negative for hematuria   Musculoskeletal: Negative for arthralgias   Skin: Negative for rash   Neurological: Negative for syncope   Hematological: Negative for adenopathy   Psychiatric/Behavorial: Negative for anxiety    PHYSICAL EXAM:    BP (!) 151/108   Pulse 82   Temp 97.8 °F (36.6 °C)   Resp 13   Ht 5' 9\" (1.753 m)   Wt 217 lb (98.4 kg)   SpO2 95%   BMI 32.05 kg/m²     Gen: Elderly male seen sitting on the edge of the bed. No distress. Alert. Eyes: PERRL. No sclera icterus. No conjunctival injection. ENT: No discharge. Pharynx clear. Neck: No JVD. No Carotid Bruit. Trachea midline. Resp: No accessory muscle use. + bilateral crackles. No wheezes. No rhonchi. CV: Regular rate. Regular rhythm. No murmur. No rub. No edema. GI: Non-tender. Non-distended. No masses. No organomegaly. Normal bowel sounds. No hernia. Skin: Warm and dry. No nodule on exposed extremities.  No rash on exposed extremities. M/S: No cyanosis. No joint deformity. No clubbing. Neuro: Awake. Grossly nonfocal    Psych: Oriented x 3. No anxiety or agitation. Salty Pedro MD have reviewed the chart on Gracie Schneider and personally interviewed and examined patient, reviewed the data (labs and imaging) and after discussion with my PA formulated the plan. Agree with note with the following edits. HPI:     I reviewed the patient's Past Medical History, Past Surgical History, Medications, and Allergies. 78 y.o. male with COVID 19 diagnosed on 8/4/21, DM2, HTN, HLD who presented to [facility] ED with complaint of shortness of breath and cough. He was initially seen in ER on 8/4 when he was diagnosed with COVID 19. He was not requiring oxygen and was discharged to home at time of diagnosis. He reports that over the past 2 weeks his symptoms have not improved and his shortness of breath has slowly worsened. His cough was severe making it hard for him to breath. He denies sputum or hemoptysis production. He has had fevers on and off.  he reports all family members now have covid  In ER noted to be hypoxic and was admitted        General: elderly male poor vision and poor hearing    Awake, alert and oriented. Appears to be not in any distress  Mucous Membranes:  Pink , anicteric  Neck: No JVD, no carotid bruit, no thyromegaly  Chest:  Clear to auscultation bilaterally, diffuse kim crackles   Cardiovascular:  RRR S1S2 heard, no murmurs or gallops  Abdomen:  Soft, undistended, non tender, no organomegaly, BS present  Extremities:trace edema .  Distal pulses well felt  Neurological : grossly normal                  CBC:   Recent Labs     08/13/21  0025   WBC 4.5   HGB 13.1*   HCT 38.7*   MCV 90.6   *     BMP:   Recent Labs     08/13/21  0025   *   K 4.2   CL 97*   CO2 24   BUN 20   CREATININE 1.3     LIVER PROFILE:   Recent Labs     08/13/21  0025   AST 34   ALT 24   BILITOT 0.6   ALKPHOS 90     CARDIAC ENZYMES  Recent Labs     08/13/21  0025   TROPONINI 0.03*       U/A:    Lab Results   Component Value Date    NITRITE neg 02/02/2012    COLORU Yellow 07/27/2021    WBCUA 3-5 12/19/2018    RBCUA  12/19/2018    MUCUS 1+ 12/19/2018    BACTERIA Rare 12/19/2018    CLARITYU Clear 07/27/2021    SPECGRAV 1.020 07/27/2021    LEUKOCYTESUR Negative 07/27/2021    BLOODU Negative 07/27/2021    GLUCOSEU >=1000 07/27/2021    GLUCOSEU >=1000 02/02/2012    AMORPHOUS 1+ 12/19/2018     CULTURES  None     EKG:  I have reviewed the EKG with the following interpretation:   SR with PAC     RADIOLOGY  XR CHEST PORTABLE   Final Result   Severe perihilar and more diffuse reticular and ground-glass opacities   throughout the lungs likely represents pulmonary edema or viral pneumonitis. ASSESSMENT/PLAN:    #COVID 19 Pneumonia  - initially diagnosed 8/4/21. He was not vaccinated. His symptoms have progressively worsened and he is now requiring 3L NC O2   - Decadron D#1  - Remdesivir D#1  - elevated PCT, cannot rule out superimposed bacterial infection, start Rocephin and Azithromycin D#1  - Pulm c/s  - supplemental O2, cont to wean as able  - droplet + isolation     #Hypoxia   - 2/2 COVID 19 Pneumonia  - supplemental O2 and wean as able    #Elevated troponin   - 0.03 on admit. No CP, no ischemia on EKG. Check repeat trop    #Elevated d dimer   - mild elevated at 390, suspect related to COVID 19     #Thrombocytopenia   - mild at 120k, could be 2/2 acute viral infection. Monitor CBC daily     #DM2  - Cont Lantus + SSI     #Neuropathy   - cont Lyrica    #Colonic lesion sp right colectomy 11/2020    #Pancreatic mass  - f/w hem/onc who is monitoring size of lesion      DVT Prophylaxis: Lovenox   Diet: ADULT DIET;  Regular; 4 carb choices (60 gm/meal)  Code Status: Full Code      Xavier More PA-C  8/13/2021 11:30 AM      Agree with above  Changes made to note    Franko Rushing MD,8/13/2021 5:06 PM

## 2021-08-13 NOTE — PROGRESS NOTES
Patient admitted to room ___225_ from ER. Patient oriented to room, call light, bed rails, phone, lights and bathroom. Patient instructed about the schedule of the day including: vital sign frequency, lab draws, possible tests, frequency of MD and staff rounds, daily weights, I &O's and prescribed diet. Bed alarm on patient a fall risk. Telemetry box in place, patient aware of placement and reason. Bed locked, in lowest position, side rails up 2/4, call light within reach. Recliner Assessment:     Patient is not able to demonstrate the ability to move from a reclining position to an upright position within the recliner due to weakness. 4 Eyes Skin Assessment     The patient is being assess for   Admission    I agree that 1 RN have performed a thorough Head to Toe Skin Assessment on the patient. ALL assessment sites listed below have been assessed. Areas assessed for pressure by both nurses:   [x]   Head, Face, and Ears   [x]   Shoulders, Back, and Chest, Abdomen  [x]   Arms, Elbows, and Hands   [x]   Coccyx, Sacrum, and Ischium  [x]   Legs, Feet, and Heels        Skin Assessed Under all Medical Devices               Patient without any skin breakdown. Scattered abrasions    All Mepilex Borders were peeled back and area peeked at by both nurses:  No: N/A  Please list where Mepilex Borders are located:  N/A             **SHARE this note so that the co-signing nurse is able to place an eSignature**    Co-signer eSignature: Electronically signed by Adrian Zambrano RN on 8/13/21 at 2:59 PM EDT    Does the Patient have Skin Breakdown related to pressure?   No              Marcelo Prevention initiated:  No   Wound Care Orders initiated:  No      [facility] nurse consulted for Pressure Injury (Stage 3,4, Unstageable, DTI, NWPT, Complex wounds)and New or Established Ostomies:  No      Primary Nurse eSignature: Electronically signed by Adrian Zambrano RN on 8/13/21 at 2:59 PM EDT

## 2021-08-13 NOTE — CONSULTS
Remdesivir protocol initiated per order from Dr. Tracey July. Patient is Covid + and on supplemental O2  BUN/SCr = 20/1.3 , CrCl = 50 mL/min  Baseline ALT/AST = 24/34  WBC= 4.5    Start Remdesivir 200 mg IV x 1 dose today, then give Remdesivir 100 mg daily x 4 days.     Shirley Stephens, [facility]

## 2021-08-13 NOTE — ED NOTES
Pt resting in bed, on 3 liters oxygen , none at baseline. Saturations above 93% on 3 liters. Pt states he feels very tired and wants to sleep. Admission questions complete. Am assessment complete. Call light in reach.

## 2021-08-13 NOTE — ED NOTES
Updated Son Katharine Moore pt wife on patient status, he is concerned for constipation will address with patient.      Ino Abraham RN  08/13/21 [id number]

## 2021-08-14 LAB
A/G RATIO: 0.9 (ref 1.1–2.2)
ALBUMIN SERPL-MCNC: 2.9 G/DL (ref 3.4–5)
ALP BLD-CCNC: 83 U/L (ref 40–129)
ALT SERPL-CCNC: 23 U/L (ref 10–40)
ANION GAP SERPL CALCULATED.3IONS-SCNC: 14 MMOL/L (ref 3–16)
AST SERPL-CCNC: 30 U/L (ref 15–37)
BASOPHILS ABSOLUTE: 0 K/UL (ref 0–0.2)
BASOPHILS RELATIVE PERCENT: 0.3 %
BILIRUB SERPL-MCNC: 0.4 MG/DL (ref 0–1)
BUN BLDV-MCNC: 25 MG/DL (ref 7–20)
C-REACTIVE PROTEIN: 100.3 MG/L (ref 0–5.1)
CALCIUM SERPL-MCNC: 7.8 MG/DL (ref 8.3–10.6)
CHLORIDE BLD-SCNC: 102 MMOL/L (ref 99–110)
CO2: 22 MMOL/L (ref 21–32)
CREAT SERPL-MCNC: 0.9 MG/DL (ref 0.8–1.3)
D DIMER: 403 NG/ML DDU (ref 0–229)
EOSINOPHILS ABSOLUTE: 0 K/UL (ref 0–0.6)
EOSINOPHILS RELATIVE PERCENT: 0 %
FIBRINOGEN: 649 MG/DL (ref 200–397)
GFR AFRICAN AMERICAN: >60
GFR NON-AFRICAN AMERICAN: >60
GLOBULIN: 3.3 G/DL
GLUCOSE BLD-MCNC: 248 MG/DL (ref 70–99)
GLUCOSE BLD-MCNC: 258 MG/DL (ref 70–99)
GLUCOSE BLD-MCNC: 264 MG/DL (ref 70–99)
GLUCOSE BLD-MCNC: 305 MG/DL (ref 70–99)
GLUCOSE BLD-MCNC: 380 MG/DL (ref 70–99)
GLUCOSE BLD-MCNC: 391 MG/DL (ref 70–99)
HCT VFR BLD CALC: 37.9 % (ref 40.5–52.5)
HEMOGLOBIN: 12.8 G/DL (ref 13.5–17.5)
LYMPHOCYTES ABSOLUTE: 0.4 K/UL (ref 1–5.1)
LYMPHOCYTES RELATIVE PERCENT: 6.3 %
MCH RBC QN AUTO: 30.3 PG (ref 26–34)
MCHC RBC AUTO-ENTMCNC: 33.6 G/DL (ref 31–36)
MCV RBC AUTO: 90.2 FL (ref 80–100)
MONOCYTES ABSOLUTE: 0.4 K/UL (ref 0–1.3)
MONOCYTES RELATIVE PERCENT: 5.9 %
NEUTROPHILS ABSOLUTE: 5.6 K/UL (ref 1.7–7.7)
NEUTROPHILS RELATIVE PERCENT: 87.5 %
PDW BLD-RTO: 14.3 % (ref 12.4–15.4)
PERFORMED ON: ABNORMAL
PLATELET # BLD: 144 K/UL (ref 135–450)
PMV BLD AUTO: 9.4 FL (ref 5–10.5)
POTASSIUM REFLEX MAGNESIUM: 4.1 MMOL/L (ref 3.5–5.1)
RBC # BLD: 4.21 M/UL (ref 4.2–5.9)
SODIUM BLD-SCNC: 138 MMOL/L (ref 136–145)
TOTAL PROTEIN: 6.2 G/DL (ref 6.4–8.2)
WBC # BLD: 6.4 K/UL (ref 4–11)

## 2021-08-14 PROCEDURE — 2500000003 HC RX 250 WO HCPCS: Performed by: INTERNAL MEDICINE

## 2021-08-14 PROCEDURE — 86140 C-REACTIVE PROTEIN: CPT

## 2021-08-14 PROCEDURE — 85025 COMPLETE CBC W/AUTO DIFF WBC: CPT

## 2021-08-14 PROCEDURE — 6370000000 HC RX 637 (ALT 250 FOR IP): Performed by: INTERNAL MEDICINE

## 2021-08-14 PROCEDURE — 85379 FIBRIN DEGRADATION QUANT: CPT

## 2021-08-14 PROCEDURE — 80053 COMPREHEN METABOLIC PANEL: CPT

## 2021-08-14 PROCEDURE — 99233 SBSQ HOSP IP/OBS HIGH 50: CPT | Performed by: INTERNAL MEDICINE

## 2021-08-14 PROCEDURE — 99232 SBSQ HOSP IP/OBS MODERATE 35: CPT | Performed by: INTERNAL MEDICINE

## 2021-08-14 PROCEDURE — 6360000002 HC RX W HCPCS: Performed by: INTERNAL MEDICINE

## 2021-08-14 PROCEDURE — 36415 COLL VENOUS BLD VENIPUNCTURE: CPT

## 2021-08-14 PROCEDURE — 6360000002 HC RX W HCPCS: Performed by: PHYSICIAN ASSISTANT

## 2021-08-14 PROCEDURE — 2580000003 HC RX 258: Performed by: PHYSICIAN ASSISTANT

## 2021-08-14 PROCEDURE — 85384 FIBRINOGEN ACTIVITY: CPT

## 2021-08-14 PROCEDURE — 1200000000 HC SEMI PRIVATE

## 2021-08-14 PROCEDURE — 2580000003 HC RX 258: Performed by: INTERNAL MEDICINE

## 2021-08-14 RX ORDER — INSULIN GLARGINE 100 [IU]/ML
50 INJECTION, SOLUTION SUBCUTANEOUS 2 TIMES DAILY
Status: DISCONTINUED | OUTPATIENT
Start: 2021-08-14 | End: 2021-08-20 | Stop reason: HOSPADM

## 2021-08-14 RX ORDER — CARBOXYMETHYLCELLULOSE SODIUM 10 MG/ML
1 GEL OPHTHALMIC 3 TIMES DAILY
Status: DISCONTINUED | OUTPATIENT
Start: 2021-08-14 | End: 2021-08-14 | Stop reason: SDUPTHER

## 2021-08-14 RX ORDER — CARBOXYMETHYLCELLULOSE SODIUM 10 MG/ML
1 GEL OPHTHALMIC 3 TIMES DAILY
Status: DISCONTINUED | OUTPATIENT
Start: 2021-08-14 | End: 2021-08-18

## 2021-08-14 RX ADMIN — PREGABALIN 50 MG: 25 CAPSULE ORAL at 09:30

## 2021-08-14 RX ADMIN — ATORVASTATIN CALCIUM 80 MG: 40 TABLET, FILM COATED ORAL at 00:07

## 2021-08-14 RX ADMIN — PREGABALIN 50 MG: 25 CAPSULE ORAL at 21:49

## 2021-08-14 RX ADMIN — FAMOTIDINE 20 MG: 20 TABLET ORAL at 09:31

## 2021-08-14 RX ADMIN — INSULIN GLARGINE 50 UNITS: 100 INJECTION, SOLUTION SUBCUTANEOUS at 21:56

## 2021-08-14 RX ADMIN — ASPIRIN 162 MG: 81 TABLET, CHEWABLE ORAL at 09:30

## 2021-08-14 RX ADMIN — INSULIN LISPRO 10 UNITS: 100 INJECTION, SOLUTION INTRAVENOUS; SUBCUTANEOUS at 12:08

## 2021-08-14 RX ADMIN — DEXAMETHASONE SODIUM PHOSPHATE 6 MG: 10 INJECTION, SOLUTION INTRAMUSCULAR; INTRAVENOUS at 04:07

## 2021-08-14 RX ADMIN — FAMOTIDINE 20 MG: 20 TABLET ORAL at 21:49

## 2021-08-14 RX ADMIN — CARBOXYMETHYLCELLULOSE SODIUM 1 DROP: 10 GEL OPHTHALMIC at 09:31

## 2021-08-14 RX ADMIN — ENOXAPARIN SODIUM 30 MG: 30 INJECTION SUBCUTANEOUS at 09:30

## 2021-08-14 RX ADMIN — SODIUM CHLORIDE, PRESERVATIVE FREE 10 ML: 5 INJECTION INTRAVENOUS at 09:31

## 2021-08-14 RX ADMIN — REMDESIVIR 100 MG: 100 INJECTION, POWDER, LYOPHILIZED, FOR SOLUTION INTRAVENOUS at 06:35

## 2021-08-14 RX ADMIN — POLYETHYLENE GLYCOL (3350) 17 G: 17 POWDER, FOR SOLUTION ORAL at 00:06

## 2021-08-14 RX ADMIN — SODIUM CHLORIDE, PRESERVATIVE FREE 5 ML: 5 INJECTION INTRAVENOUS at 00:09

## 2021-08-14 RX ADMIN — FAMOTIDINE 20 MG: 20 TABLET ORAL at 00:07

## 2021-08-14 RX ADMIN — INSULIN GLARGINE 40 UNITS: 100 INJECTION, SOLUTION SUBCUTANEOUS at 09:36

## 2021-08-14 RX ADMIN — DEXTROSE MONOHYDRATE 500 MG: 50 INJECTION, SOLUTION INTRAVENOUS at 11:47

## 2021-08-14 RX ADMIN — PREGABALIN 50 MG: 25 CAPSULE ORAL at 00:06

## 2021-08-14 RX ADMIN — INSULIN LISPRO 6 UNITS: 100 INJECTION, SOLUTION INTRAVENOUS; SUBCUTANEOUS at 17:06

## 2021-08-14 RX ADMIN — INSULIN LISPRO 6 UNITS: 100 INJECTION, SOLUTION INTRAVENOUS; SUBCUTANEOUS at 09:32

## 2021-08-14 RX ADMIN — Medication 5 ML: at 04:06

## 2021-08-14 RX ADMIN — ENOXAPARIN SODIUM 30 MG: 30 INJECTION SUBCUTANEOUS at 21:49

## 2021-08-14 RX ADMIN — SODIUM CHLORIDE 25 ML: 9 INJECTION, SOLUTION INTRAVENOUS at 06:34

## 2021-08-14 RX ADMIN — ATORVASTATIN CALCIUM 80 MG: 40 TABLET, FILM COATED ORAL at 21:48

## 2021-08-14 RX ADMIN — CARBOXYMETHYLCELLULOSE SODIUM 1 DROP: 10 GEL OPHTHALMIC at 21:47

## 2021-08-14 RX ADMIN — Medication 10 MG: at 21:48

## 2021-08-14 RX ADMIN — CEFTRIAXONE SODIUM 1000 MG: 1 INJECTION, POWDER, FOR SOLUTION INTRAMUSCULAR; INTRAVENOUS at 11:48

## 2021-08-14 RX ADMIN — CARBOXYMETHYLCELLULOSE SODIUM 1 DROP: 10 GEL OPHTHALMIC at 04:07

## 2021-08-14 RX ADMIN — INSULIN GLARGINE 40 UNITS: 100 INJECTION, SOLUTION SUBCUTANEOUS at 00:11

## 2021-08-14 RX ADMIN — ENOXAPARIN SODIUM 30 MG: 30 INJECTION SUBCUTANEOUS at 00:08

## 2021-08-14 RX ADMIN — SODIUM CHLORIDE, PRESERVATIVE FREE 5 ML: 5 INJECTION INTRAVENOUS at 21:50

## 2021-08-14 RX ADMIN — CARBOXYMETHYLCELLULOSE SODIUM 1 DROP: 10 GEL OPHTHALMIC at 13:03

## 2021-08-14 RX ADMIN — Medication 10 MG: at 00:08

## 2021-08-14 ASSESSMENT — PAIN SCALES - GENERAL
PAINLEVEL_OUTOF10: 0

## 2021-08-14 NOTE — PROGRESS NOTES
Pulmonary Progress Note    CC: shortness of breath     Subjective:   Patient feels tired. O2 dropped when cannula displaced. Intake/Output Summary (Last 24 hours) at 8/14/2021 1435  Last data filed at 8/14/2021 1321  Gross per 24 hour   Intake 670 ml   Output    Net 670 ml       Exam:   BP (!) 155/81   Pulse 84   Temp 97.2 °F (36.2 °C) (Oral)   Resp 20   Ht 5' 9\" (1.753 m)   Wt 212 lb 9 oz (96.4 kg)   SpO2 97%   BMI 31.39 kg/m²  on 3l NC  Gen: No distress. Normocephalic, atraumatic. Eyes: PERRL. No sclera icterus. No conjunctival injection. ENT: No discharge. Pharynx clear. Neck: Trachea midline. No obvious mass. Resp: No accessory muscle use. bilateral crackles. No wheezes. No rhonchi. No dullness on percussion. CV: Regular rate. Regular rhythm. No murmur or rub. No edema. GI: Non-tender. Non-distended. No hernia. Skin: Warm and dry. No nodule on exposed extremities. M/S: No cyanosis. No joint deformity. No clubbing. Neuro: Awake. Alert. Moves all four extremities. Psych: Oriented x 3. No anxiety.      Scheduled Meds:   carboxymethylcellulose PF  1 drop Both Eyes TID    insulin glargine  50 Units Subcutaneous BID    insulin lispro  5 Units Subcutaneous TID     pregabalin  50 mg Oral BID    atorvastatin  80 mg Oral Nightly    aspirin  162 mg Oral Daily    sodium chloride flush  5-40 mL Intravenous 2 times per day    enoxaparin  30 mg Subcutaneous BID    famotidine  20 mg Oral BID    insulin lispro  0-12 Units Subcutaneous TID     insulin lispro  0-6 Units Subcutaneous Nightly    dexamethasone  6 mg Intravenous Q24H    melatonin  10 mg Oral Nightly    remdesivir IVPB  100 mg Intravenous Q24H    cefTRIAXone (ROCEPHIN) IV  1,000 mg Intravenous Q24H    azithromycin  500 mg Intravenous Q24H     Continuous Infusions:   dextrose      sodium chloride 25 mL (08/14/21 0634)    sodium chloride       PRN Meds:  glucose, dextrose, glucagon (rDNA), dextrose, sodium chloride flush, sodium chloride, ondansetron **OR** ondansetron, polyethylene glycol, acetaminophen **OR** acetaminophen, guaiFENesin-dextromethorphan, sodium chloride    Labs:  CBC:   Recent Labs     08/13/21 0025 08/14/21  0536   WBC 4.5 6.4   HGB 13.1* 12.8*   HCT 38.7* 37.9*   MCV 90.6 90.2   * 144     BMP:   Recent Labs     08/13/21 0025 08/14/21  0536   * 138   K 4.2 4.1   CL 97* 102   CO2 24 22   BUN 20 25*   CREATININE 1.3 0.9     LIVER PROFILE:   Recent Labs     08/13/21 0025 08/14/21  0536   AST 34 30   ALT 24 23   BILITOT 0.6 0.4   ALKPHOS 90 83     PT/INR: No results for input(s): PROTIME, INR in the last 72 hours. APTT: No results for input(s): APTT in the last 72 hours. UA:No results for input(s): NITRITE, COLORU, PHUR, LABCAST, WBCUA, RBCUA, MUCUS, TRICHOMONAS, YEAST, BACTERIA, CLARITYU, SPECGRAV, LEUKOCYTESUR, UROBILINOGEN, BILIRUBINUR, BLOODU, GLUCOSEU, AMORPHOUS in the last 72 hours. Invalid input(s): KETONESU  No results for input(s): PHART, MTW5LYD, PO2ART in the last 72 hours.     Cultures:   8/4 sars Cov2- detected    Films:  CXR 8/13: The heart and mediastinum are normal.  Severe perihilar opacities have  developed centrally, worse on the right.  More diffuse reticular and  ground-glass opacities throughout both lungs.  Suspected small pleural  effusions.  No skeletal finding.           ASSESSMENT:  · Acute respiratory failure with hypoxemia  · COVID-19 pneumonia  · Hyponatremia  · Thrombocytopenia     PLAN:  · Supplemental oxygen to maintain SaO2 >92%; wean as tolerated  · Droplet plus respiratory isolation  · D2 Decadron 6 mg daily  · D2 Remdesivir, will need daily laboratory monitoring to avoid toxicity  · CTX and azithro D2  · Monitor platelets  · If hypoxemia worsens would strongly consider tocilizumab if available

## 2021-08-14 NOTE — FLOWSHEET NOTE
Pt A/Ox4. Pt unlabored at this time, respirations even & easy. Pt reported MURILLO at times. No distress noted. Shift assessment complete. See flowsheet. +1 pitting edema to BLE. Blood glucose 388, 5 units of Humalog given. PM meds given. See MAR. Denies needs at this time. Bed alarm on. Bed in low position. Call light within reach.           08/13/21 1917 08/14/21 0000   Vital Signs   Temp 97.7 °F (36.5 °C)  --    Temp Source Oral  --    Pulse 81  --    Heart Rate Source Monitor  --    Resp 22  --    BP (!) 146/82  --    BP Location Left upper arm  --    Patient Position Semi fowlers  --    Level of Consciousness Alert (0)  --    MEWS Score 2  --    Patient Currently in Pain  --  Denies   Pain Assessment   Pain Assessment  --  0-10   Pain Level  --  0   Oxygen Therapy   SpO2 95 %  --    O2 Device Nasal cannula  --    O2 Flow Rate (L/min) 3 L/min  --

## 2021-08-14 NOTE — PROGRESS NOTES
Son Juan C Gutierrez called, stated he wanted to give me heads up in case his dad seems more anxious or upset. Son warned this RN, that patient's uncle is not doing well at [facility]; the patient is the POA, primary decision maker, he needs to decide on what to do in regarding if his uncle \"codes\" or needs to be on hospice. Son Juan C Gutierrez called to give patient the news.

## 2021-08-14 NOTE — PLAN OF CARE
Problem: Falls - Risk of:  Goal: Will remain free from falls  Description: Will remain free from falls  8/14/2021 0220 by James Mae RN  Outcome: Ongoing  8/13/2021 1506 by Rai Summers RN  Outcome: Ongoing  Goal: Absence of physical injury  Description: Absence of physical injury  8/14/2021 0220 by James Mae RN  Outcome: Ongoing  8/13/2021 1506 by Rai Summers RN  Outcome: Ongoing     Problem: Infection:  Goal: Will remain free from infection  Description: Will remain free from infection  8/14/2021 0220 by James Mae RN  Outcome: Ongoing  8/13/2021 1506 by Rai Summers RN  Outcome: Ongoing     Problem: Safety:  Goal: Free from accidental physical injury  Description: Free from accidental physical injury  8/14/2021 0220 by James Mae RN  Outcome: Ongoing  8/13/2021 1506 by Rai Summers RN  Outcome: Ongoing  Goal: Free from intentional harm  Description: Free from intentional harm  8/14/2021 0220 by James Mae RN  Outcome: Ongoing  8/13/2021 1506 by Rai Summers RN  Outcome: Ongoing     Problem: Daily Care:  Goal: Daily care needs are met  Description: Daily care needs are met  8/14/2021 0220 by James Mae RN  Outcome: Ongoing  8/13/2021 1506 by Rai Summers RN  Outcome: Ongoing     Problem: Pain:  Goal: Patient's pain/discomfort is manageable  Description: Patient's pain/discomfort is manageable  8/14/2021 0220 by James Mae RN  Outcome: Ongoing  8/13/2021 1506 by Rai Summers RN  Outcome: Ongoing     Problem: Skin Integrity:  Goal: Skin integrity will stabilize  Description: Skin integrity will stabilize  8/14/2021 0220 by James Mae RN  Outcome: Ongoing  8/13/2021 1506 by Rai Summers RN  Outcome: Ongoing     Problem: Discharge Planning:  Goal: Patients continuum of care needs are met  Description: Patients continuum of care needs are met  8/14/2021 0220 by James Mae RN  Outcome: Ongoing  8/13/2021 1506 by Austin Napoles RN  Outcome: Ongoing     Problem: Airway Clearance - Ineffective  Goal: Achieve or maintain patent airway  8/14/2021 0220 by Candis Sharma RN  Outcome: Ongoing  8/13/2021 1506 by Austin Napoles RN  Outcome: Ongoing     Problem: Gas Exchange - Impaired  Goal: Absence of hypoxia  8/14/2021 0220 by Candis Sharma RN  Outcome: Ongoing  8/13/2021 1506 by Austin Napoles RN  Outcome: Ongoing  Goal: Promote optimal lung function  8/14/2021 0220 by Candis Sharma RN  Outcome: Ongoing  8/13/2021 1506 by Austin Napoles RN  Outcome: Ongoing     Problem: Breathing Pattern - Ineffective  Goal: Ability to achieve and maintain a regular respiratory rate  8/14/2021 0220 by Candis Sharma RN  Outcome: Ongoing  8/13/2021 1506 by Austin Napoles RN  Outcome: Ongoing     Problem:  Body Temperature -  Risk of, Imbalanced  Goal: Ability to maintain a body temperature within defined limits  8/14/2021 0220 by Candis Sharma RN  Outcome: Ongoing  8/13/2021 1506 by Austin Napoles RN  Outcome: Ongoing  Goal: Will regain or maintain usual level of consciousness  8/14/2021 0220 by Candis Sharma RN  Outcome: Ongoing  8/13/2021 1506 by Austin Napoles RN  Outcome: Ongoing  Goal: Complications related to the disease process, condition or treatment will be avoided or minimized  8/14/2021 0220 by Candis Sharma RN  Outcome: Ongoing  8/13/2021 1506 by Austin Napoles RN  Outcome: Ongoing     Problem: Isolation Precautions - Risk of Spread of Infection  Goal: Prevent transmission of infection  8/14/2021 0220 by Candis Sharma RN  Outcome: Ongoing  8/13/2021 1506 by Austin Napoles RN  Outcome: Ongoing     Problem: Nutrition Deficits  Goal: Optimize nutritional status  8/14/2021 0220 by Candis Sharma RN  Outcome: Ongoing  8/13/2021 1506 by Austin Napoles RN  Outcome: Ongoing     Problem: Risk for Fluid Volume Deficit  Goal: Maintain normal heart rhythm  8/14/2021 3575 by Chanda Yuen RN  Outcome: Ongoing  8/13/2021 1506 by Lindy Belcher RN  Outcome: Ongoing  Goal: Maintain absence of muscle cramping  8/14/2021 0220 by Chanda Yuen RN  Outcome: Ongoing  8/13/2021 1506 by Lindy Belcher RN  Outcome: Ongoing  Goal: Maintain normal serum potassium, sodium, calcium, phosphorus, and pH  8/14/2021 0220 by Chanda Yuen RN  Outcome: Ongoing  8/13/2021 1506 by Lindy Belcher RN  Outcome: Ongoing     Problem: Loneliness or Risk for Loneliness  Goal: Demonstrate positive use of time alone when socialization is not possible  8/14/2021 0220 by Chanda Yuen RN  Outcome: Ongoing  8/13/2021 1506 by Lindy Belcher RN  Outcome: Ongoing     Problem: Fatigue  Goal: Verbalize increase energy and improved vitality  8/14/2021 0220 by Chanda Yuen RN  Outcome: Ongoing  8/13/2021 1506 by Lindy Belcher RN  Outcome: Ongoing     Problem: Patient Education: Go to Patient Education Activity  Goal: Patient/Family Education  8/14/2021 0220 by Chanda Yuen RN  Outcome: Ongoing  8/13/2021 1506 by Lindy Belcher RN  Outcome: Ongoing

## 2021-08-14 NOTE — PROGRESS NOTES
Morning assessment completed patient lying in bed offers no c/o at this time alert and oriented pleasant and cooperative IV saline locked site to [facility] unremarkable denies pain or discomfort no SOB or cough noted

## 2021-08-14 NOTE — PLAN OF CARE
Problem: Falls - Risk of:  Goal: Will remain free from falls  Description: Will remain free from falls  8/14/2021 1049 by Tana Oconnor RN  Outcome: Ongoing  8/14/2021 0220 by James Mae RN  Outcome: Ongoing  Goal: Absence of physical injury  Description: Absence of physical injury  8/14/2021 1049 by Tana Oconnor RN  Outcome: Ongoing  8/14/2021 0220 by James Mae RN  Outcome: Ongoing     Problem: Infection:  Goal: Will remain free from infection  Description: Will remain free from infection  8/14/2021 1049 by Tana Oconnor RN  Outcome: Ongoing  8/14/2021 0220 by James Mae RN  Outcome: Ongoing     Problem: Safety:  Goal: Free from accidental physical injury  Description: Free from accidental physical injury  8/14/2021 1049 by Tana Oconnor RN  Outcome: Ongoing  8/14/2021 0220 by James Mae RN  Outcome: Ongoing  Goal: Free from intentional harm  Description: Free from intentional harm  8/14/2021 1049 by Tana Oconnor RN  Outcome: Ongoing  8/14/2021 0220 by James Mae RN  Outcome: Ongoing     Problem: Daily Care:  Goal: Daily care needs are met  Description: Daily care needs are met  8/14/2021 1049 by Tana Oconnor RN  Outcome: Ongoing  8/14/2021 0220 by James Mae RN  Outcome: Ongoing     Problem: Pain:  Goal: Patient's pain/discomfort is manageable  Description: Patient's pain/discomfort is manageable  8/14/2021 1049 by Tana Oconnor RN  Outcome: Ongoing  8/14/2021 0220 by James Mae RN  Outcome: Ongoing     Problem: Skin Integrity:  Goal: Skin integrity will stabilize  Description: Skin integrity will stabilize  8/14/2021 1049 by Tana Oconnor RN  Outcome: Ongoing  8/14/2021 0220 by James Mae RN  Outcome: Ongoing     Problem: Discharge Planning:  Goal: Patients continuum of care needs are met  Description: Patients continuum of care needs are met  8/14/2021 1049 by Tana Oconnor RN  Outcome: Ongoing  8/14/2021 0220 by James Mae RN  Outcome: Ongoing     Problem: Airway Clearance - Ineffective  Goal: Achieve or maintain patent airway  8/14/2021 1049 by Anusha Hargrove RN  Outcome: Ongoing  8/14/2021 0220 by Shai Shankar RN  Outcome: Ongoing     Problem: Gas Exchange - Impaired  Goal: Absence of hypoxia  8/14/2021 1049 by Anusha Hargrove RN  Outcome: Ongoing  8/14/2021 0220 by Shai Shankar RN  Outcome: Ongoing  Goal: Promote optimal lung function  8/14/2021 1049 by Anusha Hargrove RN  Outcome: Ongoing  8/14/2021 0220 by Shai Shankar RN  Outcome: Ongoing     Problem: Breathing Pattern - Ineffective  Goal: Ability to achieve and maintain a regular respiratory rate  8/14/2021 1049 by Anusha Hargrove RN  Outcome: Ongoing  8/14/2021 0220 by Shai Shankar RN  Outcome: Ongoing     Problem:  Body Temperature -  Risk of, Imbalanced  Goal: Ability to maintain a body temperature within defined limits  8/14/2021 1049 by Anusha Hargrove RN  Outcome: Ongoing  8/14/2021 0220 by Shai Shankar RN  Outcome: Ongoing  Goal: Will regain or maintain usual level of consciousness  8/14/2021 1049 by Anusha Hargrove RN  Outcome: Ongoing  8/14/2021 0220 by Shai Shankar RN  Outcome: Ongoing  Goal: Complications related to the disease process, condition or treatment will be avoided or minimized  8/14/2021 1049 by Anusha Hargrove RN  Outcome: Ongoing  8/14/2021 0220 by Shai Shankar RN  Outcome: Ongoing     Problem: Isolation Precautions - Risk of Spread of Infection  Goal: Prevent transmission of infection  8/14/2021 1049 by Anusha Hargrove RN  Outcome: Ongoing  8/14/2021 0220 by Shai Shankar RN  Outcome: Ongoing     Problem: Nutrition Deficits  Goal: Optimize nutritional status  8/14/2021 1049 by Anusha Hargrove RN  Outcome: Ongoing  8/14/2021 0220 by Shai Shankar RN  Outcome: Ongoing     Problem: Risk for Fluid Volume Deficit  Goal: Maintain normal heart rhythm  8/14/2021 1049 by Anusha Hargrove RN  Outcome: Ongoing  8/14/2021 0220 by Shai Shankar RN  Outcome: Ongoing  Goal: Maintain absence of muscle cramping  8/14/2021 1049 by Holden Lombardo RN  Outcome: Ongoing  8/14/2021 0220 by Merrill Dumont RN  Outcome: Ongoing  Goal: Maintain normal serum potassium, sodium, calcium, phosphorus, and pH  8/14/2021 1049 by Holden Lombardo RN  Outcome: Ongoing  8/14/2021 0220 by Merrill Dumont RN  Outcome: Ongoing     Problem: Loneliness or Risk for Loneliness  Goal: Demonstrate positive use of time alone when socialization is not possible  8/14/2021 1049 by Holden Lombardo RN  Outcome: Ongoing  8/14/2021 0220 by Merrill Dumont RN  Outcome: Ongoing     Problem: Fatigue  Goal: Verbalize increase energy and improved vitality  8/14/2021 1049 by Holden Lombardo RN  Outcome: Ongoing  8/14/2021 0220 by Merrill Dumont RN  Outcome: Ongoing     Problem: Patient Education: Go to Patient Education Activity  Goal: Patient/Family Education  8/14/2021 1049 by Holden Lombardo RN  Outcome: Ongoing  8/14/2021 0220 by Merrill Dumont RN  Outcome: Ongoing

## 2021-08-14 NOTE — PROGRESS NOTES
0600: Called pharmacy for Remdesivir d/t no medication in either medication refrigerator. Waiting for pharmacy to send medication.

## 2021-08-14 NOTE — PROGRESS NOTES
Patient lying in bed offers no c/o no SOB or cough noted no c/o pain or discomfort noted remains in isolation d/t covid 02 sat 94% on 3L per nasal cannula

## 2021-08-14 NOTE — PROGRESS NOTES
Call received from monitor room patient noted with 02 sat of 50% patient had 02 tubing lying beside of him on his bed 02 replaced and 02 sat recovered to 97% patient toileted and assisted back into bed 02 sat remained at 97% during ambulation with 02 on

## 2021-08-14 NOTE — PROGRESS NOTES
Progress Note    Admit Date:  8/12/2021    Admitted with COVID-19 pneumonia, hypoxemia. Patient was not vaccinated. He reports that all his family members now have Covid      Subjective:  Mr. Fide Barry complains of persistent dyspnea and cough. Looks ill. Gets dyspneic and desaturates with minimal exertion. On O2 3 L. He is a diabetic and his blood sugars are running high. Objective:   BP (!) 155/81   Pulse 84   Temp 97.2 °F (36.2 °C) (Oral)   Resp 20   Ht 5' 9\" (1.753 m)   Wt 212 lb 9 oz (96.4 kg)   SpO2 97%   BMI 31.39 kg/m²     Intake/Output Summary (Last 24 hours) at 8/14/2021 1552  Last data filed at 8/14/2021 1321  Gross per 24 hour   Intake 670 ml   Output    Net 670 ml         Physical Exam:  General: Elderly male , ill-appearing . Doe Rathke Mild distress . awake, alert, oriented. Vision and hearing is poor  Skin:  Warm and dry  Neck:  JVD absent. Neck supple  Chest: Diminished breath sounds, with diffuse rhonchi. Cardiovascular:  RRR ,S1S2 normal  Abdomen:  Soft, non tender, non distended, BS +  Extremities:  No edema. Intact peripheral pulses.  Brisk cap refill, < 2 secs  Neuro: non focal      Medications:   Scheduled Meds:   carboxymethylcellulose PF  1 drop Both Eyes TID    insulin glargine  50 Units Subcutaneous BID    insulin lispro  5 Units Subcutaneous TID WC    pregabalin  50 mg Oral BID    atorvastatin  80 mg Oral Nightly    aspirin  162 mg Oral Daily    sodium chloride flush  5-40 mL Intravenous 2 times per day    enoxaparin  30 mg Subcutaneous BID    famotidine  20 mg Oral BID    insulin lispro  0-12 Units Subcutaneous TID WC    insulin lispro  0-6 Units Subcutaneous Nightly    dexamethasone  6 mg Intravenous Q24H    melatonin  10 mg Oral Nightly    remdesivir IVPB  100 mg Intravenous Q24H    cefTRIAXone (ROCEPHIN) IV  1,000 mg Intravenous Q24H    azithromycin  500 mg Intravenous Q24H       Continuous Infusions:   dextrose      sodium chloride 25 mL (08/14/21 0634)  sodium chloride         Data:  CBC:   Recent Labs     08/13/21  0025 08/14/21  0536   WBC 4.5 6.4   RBC 4.27 4.21   HGB 13.1* 12.8*   HCT 38.7* 37.9*   MCV 90.6 90.2   RDW 14.1 14.3   * 144     BMP:   Recent Labs     08/13/21  0025 08/14/21  0536   * 138   K 4.2 4.1   CL 97* 102   CO2 24 22   BUN 20 25*   CREATININE 1.3 0.9     BNP: No results for input(s): BNP in the last 72 hours. PT/INR: No results for input(s): PROTIME, INR in the last 72 hours. APTT: No results for input(s): APTT in the last 72 hours. CARDIAC ENZYMES:   Recent Labs     08/13/21  0025 08/13/21  1335   TROPONINI 0.03* <0.01     FASTING LIPID PANEL:  Lab Results   Component Value Date    CHOL 172 01/17/2020    HDL 42 01/17/2020    TRIG 219 (H) 01/17/2020     LIVER PROFILE:   Recent Labs     08/13/21  0025 08/14/21  0536   AST 34 30   ALT 24 23   BILITOT 0.6 0.4   ALKPHOS 90 83        Radiology  XR CHEST PORTABLE   Final Result   Severe perihilar and more diffuse reticular and ground-glass opacities   throughout the lungs likely represents pulmonary edema or viral pneumonitis. Assessment:  Active Problems:    COVID-19 virus infection    Acute respiratory failure with hypoxia (HCC)  Resolved Problems:    * No resolved hospital problems. *      Plan:     #COVID 19 Pneumonia  #Acute hypoxic respiratory failure  #Possible secondary bacterial pneumonia  - initially diagnosed 8/4/21. He was not vaccinated. His symptoms have progressively worsened and he is now hypoxic .   -Continue Decadron D#2  -Continue remdesivir D#2  - elevated PCT, cannot rule out superimposed bacterial infection, started on Rocephin and Azithromycin D#2  -Seen by pulmonology  - supplemental O2, cont to wean as able. Continues to require supplemental O2 3 L. Desaturates with exertion  - droplet + isolation   - cont pulse oximetry, telemetry  -Lovenox twice daily     #Elevated troponin   - 0.03 on admit. No CP, no ischemia on EKG.     Checked repeat trop < 0.01     #Elevated d dimer   - mild elevated at 390, suspect related to COVID 19      #Thrombocytopenia   - mild at 120k, could be 2/2 acute viral infection. Monitor CBC daily      #DM2  With steroid-induced hypoglycemia  - Cont Lantus-increased dose.  + SSI      #Neuropathy   - cont Lyrica     #Colonic lesion sp right colectomy 11/2020     #Pancreatic mass  - f/w hem/onc who is monitoring size of lesion        DVT Prophylaxis: Lovenox   Diet: ADULT DIET;  Regular; 4 carb choices (60 gm/meal)  Code Status: Full Code          Jamil Warren MD, MD 8/14/2021 3:52 PM

## 2021-08-14 NOTE — PROGRESS NOTES
Spoke with  Lilibeth at [facility] requesting for patient to call her back in regards to his POA relationship to patient there spoke with patient and he wants her to call him on his cell phone and he provided me with his number for her to call on spoke again with [facility] and gave her the number to call him on patient lying in bed with cell phone in hand

## 2021-08-14 NOTE — FLOWSHEET NOTE
08/14/21 0330   Provider Notification   Reason for Communication Patient request  (Pt requesting artificial tears/eye lubricant for dry eyes.)   Provider Name [facility]   Provider Notification Physician   Method of Communication Secure Message   Response Waiting for response   Notification Time [id number] [phone]: new orders for carboxymethylcellulose TID.

## 2021-08-15 LAB
BASOPHILS ABSOLUTE: 0 K/UL (ref 0–0.2)
BASOPHILS RELATIVE PERCENT: 0.3 %
C-REACTIVE PROTEIN: 54.6 MG/L (ref 0–5.1)
D DIMER: 260 NG/ML DDU (ref 0–229)
EOSINOPHILS ABSOLUTE: 0 K/UL (ref 0–0.6)
EOSINOPHILS RELATIVE PERCENT: 0 %
GLUCOSE BLD-MCNC: 114 MG/DL (ref 70–99)
GLUCOSE BLD-MCNC: 132 MG/DL (ref 70–99)
GLUCOSE BLD-MCNC: 153 MG/DL (ref 70–99)
GLUCOSE BLD-MCNC: 202 MG/DL (ref 70–99)
GLUCOSE BLD-MCNC: 207 MG/DL (ref 70–99)
HCT VFR BLD CALC: 40.2 % (ref 40.5–52.5)
HEMOGLOBIN: 13.3 G/DL (ref 13.5–17.5)
LYMPHOCYTES ABSOLUTE: 0.4 K/UL (ref 1–5.1)
LYMPHOCYTES RELATIVE PERCENT: 4.3 %
MCH RBC QN AUTO: 30.2 PG (ref 26–34)
MCHC RBC AUTO-ENTMCNC: 33.2 G/DL (ref 31–36)
MCV RBC AUTO: 91 FL (ref 80–100)
MONOCYTES ABSOLUTE: 0.5 K/UL (ref 0–1.3)
MONOCYTES RELATIVE PERCENT: 5.2 %
NEUTROPHILS ABSOLUTE: 8.7 K/UL (ref 1.7–7.7)
NEUTROPHILS RELATIVE PERCENT: 90.2 %
PDW BLD-RTO: 14.4 % (ref 12.4–15.4)
PERFORMED ON: ABNORMAL
PLATELET # BLD: 193 K/UL (ref 135–450)
PMV BLD AUTO: 9.6 FL (ref 5–10.5)
PROCALCITONIN: 0.12 NG/ML (ref 0–0.15)
RBC # BLD: 4.42 M/UL (ref 4.2–5.9)
WBC # BLD: 9.7 K/UL (ref 4–11)

## 2021-08-15 PROCEDURE — 6360000002 HC RX W HCPCS: Performed by: INTERNAL MEDICINE

## 2021-08-15 PROCEDURE — 36415 COLL VENOUS BLD VENIPUNCTURE: CPT

## 2021-08-15 PROCEDURE — 1200000000 HC SEMI PRIVATE

## 2021-08-15 PROCEDURE — 2700000000 HC OXYGEN THERAPY PER DAY

## 2021-08-15 PROCEDURE — 2500000003 HC RX 250 WO HCPCS: Performed by: INTERNAL MEDICINE

## 2021-08-15 PROCEDURE — 99232 SBSQ HOSP IP/OBS MODERATE 35: CPT | Performed by: INTERNAL MEDICINE

## 2021-08-15 PROCEDURE — 2580000003 HC RX 258: Performed by: PHYSICIAN ASSISTANT

## 2021-08-15 PROCEDURE — 85025 COMPLETE CBC W/AUTO DIFF WBC: CPT

## 2021-08-15 PROCEDURE — 94761 N-INVAS EAR/PLS OXIMETRY MLT: CPT

## 2021-08-15 PROCEDURE — 2580000003 HC RX 258: Performed by: INTERNAL MEDICINE

## 2021-08-15 PROCEDURE — 86140 C-REACTIVE PROTEIN: CPT

## 2021-08-15 PROCEDURE — 6370000000 HC RX 637 (ALT 250 FOR IP): Performed by: INTERNAL MEDICINE

## 2021-08-15 PROCEDURE — 84145 PROCALCITONIN (PCT): CPT

## 2021-08-15 PROCEDURE — 6360000002 HC RX W HCPCS: Performed by: PHYSICIAN ASSISTANT

## 2021-08-15 PROCEDURE — 85379 FIBRIN DEGRADATION QUANT: CPT

## 2021-08-15 PROCEDURE — 99233 SBSQ HOSP IP/OBS HIGH 50: CPT | Performed by: INTERNAL MEDICINE

## 2021-08-15 RX ADMIN — INSULIN LISPRO 4 UNITS: 100 INJECTION, SOLUTION INTRAVENOUS; SUBCUTANEOUS at 12:26

## 2021-08-15 RX ADMIN — GUAIFENESIN AND DEXTROMETHORPHAN 5 ML: 100; 10 SYRUP ORAL at 21:21

## 2021-08-15 RX ADMIN — SODIUM CHLORIDE, PRESERVATIVE FREE 10 ML: 5 INJECTION INTRAVENOUS at 09:07

## 2021-08-15 RX ADMIN — INSULIN LISPRO 4 UNITS: 100 INJECTION, SOLUTION INTRAVENOUS; SUBCUTANEOUS at 17:32

## 2021-08-15 RX ADMIN — DEXTROSE MONOHYDRATE 500 MG: 50 INJECTION, SOLUTION INTRAVENOUS at 12:22

## 2021-08-15 RX ADMIN — SODIUM CHLORIDE, PRESERVATIVE FREE 10 ML: 5 INJECTION INTRAVENOUS at 21:26

## 2021-08-15 RX ADMIN — INSULIN GLARGINE 50 UNITS: 100 INJECTION, SOLUTION SUBCUTANEOUS at 21:26

## 2021-08-15 RX ADMIN — CARBOXYMETHYLCELLULOSE SODIUM 1 DROP: 10 GEL OPHTHALMIC at 12:25

## 2021-08-15 RX ADMIN — Medication 10 MG: at 21:22

## 2021-08-15 RX ADMIN — GUAIFENESIN AND DEXTROMETHORPHAN 5 ML: 100; 10 SYRUP ORAL at 05:59

## 2021-08-15 RX ADMIN — FAMOTIDINE 20 MG: 20 TABLET ORAL at 21:23

## 2021-08-15 RX ADMIN — ATORVASTATIN CALCIUM 80 MG: 40 TABLET, FILM COATED ORAL at 21:23

## 2021-08-15 RX ADMIN — Medication 10 ML: at 04:27

## 2021-08-15 RX ADMIN — FAMOTIDINE 20 MG: 20 TABLET ORAL at 09:07

## 2021-08-15 RX ADMIN — PREGABALIN 50 MG: 25 CAPSULE ORAL at 21:22

## 2021-08-15 RX ADMIN — CARBOXYMETHYLCELLULOSE SODIUM 1 DROP: 10 GEL OPHTHALMIC at 09:07

## 2021-08-15 RX ADMIN — INSULIN GLARGINE 50 UNITS: 100 INJECTION, SOLUTION SUBCUTANEOUS at 09:17

## 2021-08-15 RX ADMIN — CEFTRIAXONE SODIUM 1000 MG: 1 INJECTION, POWDER, FOR SOLUTION INTRAMUSCULAR; INTRAVENOUS at 12:20

## 2021-08-15 RX ADMIN — PREGABALIN 50 MG: 25 CAPSULE ORAL at 09:07

## 2021-08-15 RX ADMIN — ASPIRIN 162 MG: 81 TABLET, CHEWABLE ORAL at 09:07

## 2021-08-15 RX ADMIN — SODIUM CHLORIDE 25 ML: 9 INJECTION, SOLUTION INTRAVENOUS at 04:34

## 2021-08-15 RX ADMIN — ENOXAPARIN SODIUM 30 MG: 30 INJECTION SUBCUTANEOUS at 09:07

## 2021-08-15 RX ADMIN — REMDESIVIR 100 MG: 100 INJECTION, POWDER, LYOPHILIZED, FOR SOLUTION INTRAVENOUS at 04:35

## 2021-08-15 RX ADMIN — DEXAMETHASONE SODIUM PHOSPHATE 6 MG: 10 INJECTION, SOLUTION INTRAMUSCULAR; INTRAVENOUS at 04:27

## 2021-08-15 RX ADMIN — ENOXAPARIN SODIUM 30 MG: 30 INJECTION SUBCUTANEOUS at 21:23

## 2021-08-15 ASSESSMENT — PAIN SCALES - GENERAL
PAINLEVEL_OUTOF10: 0
PAINLEVEL_OUTOF10: 4

## 2021-08-15 ASSESSMENT — PAIN DESCRIPTION - PROGRESSION: CLINICAL_PROGRESSION: NOT CHANGED

## 2021-08-15 ASSESSMENT — PAIN DESCRIPTION - ORIENTATION: ORIENTATION: RIGHT;LEFT

## 2021-08-15 ASSESSMENT — PAIN DESCRIPTION - FREQUENCY: FREQUENCY: CONTINUOUS

## 2021-08-15 ASSESSMENT — PAIN - FUNCTIONAL ASSESSMENT: PAIN_FUNCTIONAL_ASSESSMENT: ACTIVITIES ARE NOT PREVENTED

## 2021-08-15 ASSESSMENT — PAIN DESCRIPTION - LOCATION: LOCATION: LEG

## 2021-08-15 ASSESSMENT — PAIN DESCRIPTION - PAIN TYPE: TYPE: NEUROPATHIC PAIN

## 2021-08-15 ASSESSMENT — PAIN DESCRIPTION - DESCRIPTORS: DESCRIPTORS: NUMBNESS;TINGLING

## 2021-08-15 ASSESSMENT — PAIN DESCRIPTION - ONSET: ONSET: ON-GOING

## 2021-08-15 NOTE — PROGRESS NOTES
Progress Note    Admit Date:  8/12/2021    Admitted with COVID-19 pneumonia, hypoxemia. Patient was not vaccinated. He reports that all his family members now have Covid      Subjective:  Mr. Mellisa Santos complains of persistent dyspnea and cough. Looks ill. Gets dyspneic and desaturates with minimal exertion. On O2 3 L. He is a diabetic and his blood sugars are running high. 8/15  Patient looks a little better today . cough and shortness of breath have improved. O2 requirement up to 5 L, overnight currently back down to 4 Liters . Monitor closely. Objective:   /71   Pulse 64   Temp 96.7 °F (35.9 °C) (Oral)   Resp 16   Ht 5' 9\" (1.753 m)   Wt 212 lb 9 oz (96.4 kg)   SpO2 93%   BMI 31.39 kg/m²       Intake/Output Summary (Last 24 hours) at 8/15/2021 1557  Last data filed at 8/15/2021 1354  Gross per 24 hour   Intake 1260.74 ml   Output    Net 1260.74 ml         Physical Exam:  General: Elderly male , ill-appearing . [place] Hilt No  distress . awake, alert, oriented. Vision and hearing is poor  Skin:  Warm and dry  Neck:  JVD absent. Neck supple  Chest: Diminished breath sounds, with mild rhonchi. Cardiovascular:  RRR ,S1S2 normal  Abdomen:  Soft, non tender, non distended, BS +  Extremities:  No edema. Intact peripheral pulses.  Brisk cap refill, < 2 secs  Neuro: non focal      Medications:   Scheduled Meds:   carboxymethylcellulose PF  1 drop Both Eyes TID    insulin glargine  50 Units Subcutaneous BID    insulin lispro  5 Units Subcutaneous TID WC    pregabalin  50 mg Oral BID    atorvastatin  80 mg Oral Nightly    aspirin  162 mg Oral Daily    sodium chloride flush  5-40 mL Intravenous 2 times per day    enoxaparin  30 mg Subcutaneous BID    famotidine  20 mg Oral BID    insulin lispro  0-12 Units Subcutaneous TID WC    insulin lispro  0-6 Units Subcutaneous Nightly    dexamethasone  6 mg Intravenous Q24H    melatonin  10 mg Oral Nightly    remdesivir IVPB  100 mg Intravenous Q24H    cefTRIAXone (ROCEPHIN) IV  1,000 mg Intravenous Q24H    azithromycin  500 mg Intravenous Q24H       Continuous Infusions:   dextrose      sodium chloride Stopped (08/15/21 0557)    sodium chloride         Data:  CBC:   Recent Labs     08/13/21  0025 08/14/21  0536 08/15/21  0555   WBC 4.5 6.4 9.7   RBC 4.27 4.21 4.42   HGB 13.1* 12.8* 13.3*   HCT 38.7* 37.9* 40.2*   MCV 90.6 90.2 91.0   RDW 14.1 14.3 14.4   * 144 193     BMP:   Recent Labs     08/13/21  0025 08/14/21  0536   * 138   K 4.2 4.1   CL 97* 102   CO2 24 22   BUN 20 25*   CREATININE 1.3 0.9     BNP: No results for input(s): BNP in the last 72 hours. PT/INR: No results for input(s): PROTIME, INR in the last 72 hours. APTT: No results for input(s): APTT in the last 72 hours. CARDIAC ENZYMES:   Recent Labs     08/13/21  0025 08/13/21  1335   TROPONINI 0.03* <0.01     FASTING LIPID PANEL:  Lab Results   Component Value Date    CHOL 172 01/17/2020    HDL 42 01/17/2020    TRIG 219 (H) 01/17/2020     LIVER PROFILE:   Recent Labs     08/13/21  0025 08/14/21  0536   AST 34 30   ALT 24 23   BILITOT 0.6 0.4   ALKPHOS 90 83        Radiology  XR CHEST PORTABLE   Final Result   Severe perihilar and more diffuse reticular and ground-glass opacities   throughout the lungs likely represents pulmonary edema or viral pneumonitis. Assessment:  Active Problems:    COVID-19 virus infection    Acute respiratory failure with hypoxia (HCC)    Type 2 diabetes mellitus with hyperglycemia, with long-term current use of insulin (HCC)  Resolved Problems:    * No resolved hospital problems. *      Plan:     #COVID 19 Pneumonia  #Acute hypoxic respiratory failure  #Possible secondary bacterial pneumonia  - initially diagnosed 8/4/21. He was not vaccinated.   His symptoms have progressively worsened and he is now hypoxic .   -Continue Decadron D#3  -Continue remdesivir D#3  - elevated PCT, cannot rule out superimposed bacterial

## 2021-08-15 NOTE — PROGRESS NOTES
Patient anxious, called out said something is wrong, needed a nurse to come in. This RN entered room, pt not in physical distress, lying in bed. Patient reported restless legs, feeling \"trapped\", & anxiety. Provided therapeutic communication. Applied lavender aromatherapy patch to gown. Dimmed room lights. Currently patient resting in bed with eyes closed but patient remains awake.

## 2021-08-15 NOTE — PLAN OF CARE
Problem: Falls - Risk of:  Goal: Will remain free from falls  Description: Will remain free from falls  8/14/2021 2145 by Lazara Dumont RN  Outcome: Ongoing  8/14/2021 1049 by Flor Corley RN  Outcome: Ongoing  Goal: Absence of physical injury  Description: Absence of physical injury  8/14/2021 2145 by Lazara Dumont RN  Outcome: Ongoing  8/14/2021 1049 by Flor Corley RN  Outcome: Ongoing     Problem: Infection:  Goal: Will remain free from infection  Description: Will remain free from infection  8/14/2021 2145 by Lazara Dumont RN  Outcome: Ongoing  8/14/2021 1049 by Flor Corley RN  Outcome: Ongoing     Problem: Safety:  Goal: Free from accidental physical injury  Description: Free from accidental physical injury  8/14/2021 2145 by Lazara Dumont RN  Outcome: Ongoing  8/14/2021 1049 by Flor Corley RN  Outcome: Ongoing  Goal: Free from intentional harm  Description: Free from intentional harm  8/14/2021 2145 by Lazara Dumont RN  Outcome: Ongoing  8/14/2021 1049 by Flor Corley RN  Outcome: Ongoing     Problem: Daily Care:  Goal: Daily care needs are met  Description: Daily care needs are met  8/14/2021 2145 by Lazara Dumont RN  Outcome: Ongoing  8/14/2021 1049 by Flor Corley RN  Outcome: Ongoing     Problem: Pain:  Goal: Patient's pain/discomfort is manageable  Description: Patient's pain/discomfort is manageable  8/14/2021 2145 by Lazara Dumont RN  Outcome: Ongoing  8/14/2021 1049 by Flor Corley RN  Outcome: Ongoing     Problem: Skin Integrity:  Goal: Skin integrity will stabilize  Description: Skin integrity will stabilize  8/14/2021 2145 by Lazara Dumont RN  Outcome: Ongoing  8/14/2021 1049 by Flor Corley RN  Outcome: Ongoing     Problem: Discharge Planning:  Goal: Patients continuum of care needs are met  Description: Patients continuum of care needs are met  8/14/2021 2145 by Lazara Dumont RN  Outcome: Ongoing  8/14/2021 1049 by Flor Corley RN  Outcome: Ongoing     Problem: Airway Clearance - Ineffective  Goal: Achieve or maintain patent airway  8/14/2021 2145 by Alessia Wiggins RN  Outcome: Ongoing  8/14/2021 1049 by Charlene Nogueira RN  Outcome: Ongoing     Problem: Gas Exchange - Impaired  Goal: Absence of hypoxia  8/14/2021 2145 by Alessia Wiggins RN  Outcome: Ongoing  8/14/2021 1049 by Charlene Nogueira RN  Outcome: Ongoing  Goal: Promote optimal lung function  8/14/2021 2145 by Alessia Wiggins RN  Outcome: Ongoing  8/14/2021 1049 by Charlene Nogueira RN  Outcome: Ongoing     Problem: Breathing Pattern - Ineffective  Goal: Ability to achieve and maintain a regular respiratory rate  8/14/2021 2145 by Alessia Wiggins RN  Outcome: Ongoing  8/14/2021 1049 by Charlene Nogueira RN  Outcome: Ongoing     Problem:  Body Temperature -  Risk of, Imbalanced  Goal: Ability to maintain a body temperature within defined limits  8/14/2021 2145 by Alessia Wiggins RN  Outcome: Ongoing  8/14/2021 1049 by Charlene Nogueira RN  Outcome: Ongoing  Goal: Will regain or maintain usual level of consciousness  8/14/2021 2145 by Alessia Wiggins RN  Outcome: Ongoing  8/14/2021 1049 by Charlene Nogueira RN  Outcome: Ongoing  Goal: Complications related to the disease process, condition or treatment will be avoided or minimized  8/14/2021 2145 by Alessia Wiggins RN  Outcome: Ongoing  8/14/2021 1049 by Charlene Nogueira RN  Outcome: Ongoing     Problem: Isolation Precautions - Risk of Spread of Infection  Goal: Prevent transmission of infection  8/14/2021 2145 by Alessia Wiggins RN  Outcome: Ongoing  8/14/2021 1049 by Charlene Nogueira RN  Outcome: Ongoing     Problem: Nutrition Deficits  Goal: Optimize nutritional status  8/14/2021 2145 by Alessia Wiggins RN  Outcome: Ongoing  8/14/2021 1049 by Charlene Nogueira RN  Outcome: Ongoing     Problem: Risk for Fluid Volume Deficit  Goal: Maintain normal heart rhythm  8/14/2021 2145 by Alessia Wiggins RN  Outcome: Ongoing  8/14/2021 1049 by Charlene Nogueira RN  Outcome: Ongoing  Goal: Maintain absence of muscle cramping  8/14/2021 2145 by Ronnie Karimi RN  Outcome: Ongoing  8/14/2021 1049 by Fang Sifuentes RN  Outcome: Ongoing  Goal: Maintain normal serum potassium, sodium, calcium, phosphorus, and pH  8/14/2021 2145 by Ronnie Karimi RN  Outcome: Ongoing  8/14/2021 1049 by Fang Sifuentes RN  Outcome: Ongoing     Problem: Loneliness or Risk for Loneliness  Goal: Demonstrate positive use of time alone when socialization is not possible  8/14/2021 2145 by Ronnie Karimi RN  Outcome: Ongoing  8/14/2021 1049 by Fang Sifuentes RN  Outcome: Ongoing     Problem: Fatigue  Goal: Verbalize increase energy and improved vitality  8/14/2021 2145 by Ronnie Karimi RN  Outcome: Ongoing  8/14/2021 1049 by Fang Sifuentes RN  Outcome: Ongoing     Problem: Patient Education: Go to Patient Education Activity  Goal: Patient/Family Education  8/14/2021 2145 by Ronnie Karimi RN  Outcome: Ongoing  8/14/2021 1049 by Fang Sifuentes RN  Outcome: Ongoing

## 2021-08-15 NOTE — PLAN OF CARE
Problem: Falls - Risk of:  Goal: Will remain free from falls  Description: Will remain free from falls  8/15/2021 1121 by Keisha Blair RN  Outcome: Ongoing  8/14/2021 2145 by Candis Sharma RN  Outcome: Ongoing  Goal: Absence of physical injury  Description: Absence of physical injury  8/15/2021 1121 by Keisha Blair RN  Outcome: Ongoing  8/14/2021 2145 by Candis Sharma RN  Outcome: Ongoing     Problem: Infection:  Goal: Will remain free from infection  Description: Will remain free from infection  8/15/2021 1121 by Keisha Blair RN  Outcome: Ongoing  8/14/2021 2145 by Candis Sharma RN  Outcome: Ongoing     Problem: Safety:  Goal: Free from accidental physical injury  Description: Free from accidental physical injury  8/15/2021 1121 by Keisha Blair RN  Outcome: Ongoing  8/14/2021 2145 by Candis Sharma RN  Outcome: Ongoing  Goal: Free from intentional harm  Description: Free from intentional harm  8/15/2021 1121 by Keisha Blair RN  Outcome: Ongoing  8/14/2021 2145 by Candis Sharma RN  Outcome: Ongoing     Problem: Daily Care:  Goal: Daily care needs are met  Description: Daily care needs are met  8/15/2021 1121 by Keisha Blair RN  Outcome: Ongoing  8/14/2021 2145 by Candis Sharma RN  Outcome: Ongoing     Problem: Pain:  Goal: Patient's pain/discomfort is manageable  Description: Patient's pain/discomfort is manageable  8/15/2021 1121 by Keisha Blair RN  Outcome: Ongoing  8/14/2021 2145 by Candis Sharma RN  Outcome: Ongoing     Problem: Skin Integrity:  Goal: Skin integrity will stabilize  Description: Skin integrity will stabilize  8/15/2021 1121 by Keisha Blair RN  Outcome: Ongoing  8/14/2021 2145 by Candis Sharma RN  Outcome: Ongoing     Problem: Discharge Planning:  Goal: Patients continuum of care needs are met  Description: Patients continuum of care needs are met  8/15/2021 1121 by Keisha Blair RN  Outcome: Ongoing  8/14/2021 2145 by Candis Sharma RN  Outcome: Ongoing     Problem: Airway Clearance - Ineffective  Goal: Achieve or maintain patent airway  8/15/2021 1121 by Jaya Stafford RN  Outcome: Ongoing  8/14/2021 2145 by Janet Snow RN  Outcome: Ongoing     Problem: Gas Exchange - Impaired  Goal: Absence of hypoxia  8/15/2021 1121 by Jaya Stafford RN  Outcome: Ongoing  8/14/2021 2145 by Janet Snow RN  Outcome: Ongoing  Goal: Promote optimal lung function  8/15/2021 1121 by Jaya Stafford RN  Outcome: Ongoing  8/14/2021 2145 by Janet Snow RN  Outcome: Ongoing     Problem: Breathing Pattern - Ineffective  Goal: Ability to achieve and maintain a regular respiratory rate  8/15/2021 1121 by Jaya Stafford RN  Outcome: Ongoing  8/14/2021 2145 by Janet Snow RN  Outcome: Ongoing     Problem:  Body Temperature -  Risk of, Imbalanced  Goal: Ability to maintain a body temperature within defined limits  8/15/2021 1121 by Jaya Stafford RN  Outcome: Ongoing  8/14/2021 2145 by Janet Snow RN  Outcome: Ongoing  Goal: Will regain or maintain usual level of consciousness  8/15/2021 1121 by Jaya Stafford RN  Outcome: Ongoing  8/14/2021 2145 by Janet Snow RN  Outcome: Ongoing  Goal: Complications related to the disease process, condition or treatment will be avoided or minimized  8/15/2021 1121 by Jaya Stafford RN  Outcome: Ongoing  8/14/2021 2145 by Janet Snow RN  Outcome: Ongoing     Problem: Isolation Precautions - Risk of Spread of Infection  Goal: Prevent transmission of infection  8/15/2021 1121 by Jaya Stafford RN  Outcome: Ongoing  8/14/2021 2145 by Janet Snow RN  Outcome: Ongoing     Problem: Nutrition Deficits  Goal: Optimize nutritional status  8/15/2021 1121 by Jaya Stafford RN  Outcome: Ongoing  8/14/2021 2145 by Janet Snow RN  Outcome: Ongoing     Problem: Risk for Fluid Volume Deficit  Goal: Maintain normal heart rhythm  8/15/2021 1121 by Jaya Stafford RN  Outcome: Ongoing  8/14/2021 2145 by Janet Foster, RN  Outcome: Ongoing  Goal: Maintain absence of muscle cramping  8/15/2021 1121 by Anne Waddell RN  Outcome: Ongoing  8/14/2021 2145 by Terese Ratliff RN  Outcome: Ongoing  Goal: Maintain normal serum potassium, sodium, calcium, phosphorus, and pH  8/15/2021 1121 by Anne Waddell RN  Outcome: Ongoing  8/14/2021 2145 by Terese Ratliff RN  Outcome: Ongoing     Problem: Loneliness or Risk for Loneliness  Goal: Demonstrate positive use of time alone when socialization is not possible  8/15/2021 1121 by Anne Waddell RN  Outcome: Ongoing  8/14/2021 2145 by Terese Ratliff RN  Outcome: Ongoing     Problem: Fatigue  Goal: Verbalize increase energy and improved vitality  8/15/2021 1121 by Anne Waddell RN  Outcome: Ongoing  8/14/2021 2145 by Terese Ratliff RN  Outcome: Ongoing     Problem: Patient Education: Go to Patient Education Activity  Goal: Patient/Family Education  8/15/2021 1121 by Anne Waddell RN  Outcome: Ongoing  8/14/2021 2145 by Terese Ratliff RN  Outcome: Ongoing     Problem: Skin Integrity:  Goal: Will show no infection signs and symptoms  Description: Will show no infection signs and symptoms  Outcome: Ongoing  Goal: Absence of new skin breakdown  Description: Absence of new skin breakdown  Outcome: Ongoing

## 2021-08-15 NOTE — FLOWSHEET NOTE
Pt A/Ox4. VSS. Denies pain. Pt unlabored, respirations even & easy. No distress noted. Bilateral lower lobe crackles noted. Shift assessment complete. See flowsheet. Blood glucose 248, 2 units of Humalog given. PM meds given. See MAR. Denies needs at this time. Bed alarm on. Bed in low position. Call light within reach.         08/14/21 2016 08/14/21 2140   Vital Signs   Temp 97.5 °F (36.4 °C)  --    Temp Source Oral  --    Pulse 73  --    Heart Rate Source Monitor  --    Resp 18  --    /75  --    BP Location Left upper arm  --    Patient Position Sitting  --    Level of Consciousness Alert (0) Alert (0)   MEWS Score 1  --    Patient Currently in Pain  --  Denies   Pain Assessment   Pain Assessment  --  0-10   Pain Level  --  0   Oxygen Therapy   SpO2 92 %  --    O2 Device Nasal cannula  --    O2 Flow Rate (L/min) 3 L/min  --

## 2021-08-15 NOTE — PROGRESS NOTES
Patient siting up on side of bed eating breakfast offers no c/o 02 96% on 02 at 5L/NC turned 02 down to 4L sat stayed at 94% tolerated well IV site to RAC unremarkable offers no c/o no SOB noted occasional dry cough continues

## 2021-08-15 NOTE — FLOWSHEET NOTE
Pt hypoxic. O2 saturation decreased to 86% on 2.5 L/min. Unlabored, no distress noted. Raised HOB, instructed patient to take frequent deep breaths. Pt verbalized unable to take deep breaths. RR 18, shallow, regular, easy. Titrated oxygen to 5 L/min, patient maintaining oxygen >90%.         08/15/21 0430 08/15/21 0432 08/15/21 0435   Vital Signs   Resp 18 18 18   Patient Position Semi fowlers High fowlers High fowlers   Oxygen Therapy   SpO2 (!) 86 % (!) 88 % (!) 86 %   O2 Device Nasal cannula Nasal cannula Nasal cannula   O2 Flow Rate (L/min) 2.5 L/min 3.5 L/min 4 L/min      08/15/21 0437 08/15/21 0440   Vital Signs   Resp 18 18   Patient Position High fowlers Sitting   Oxygen Therapy   SpO2 91 % 92 %   O2 Device Nasal cannula Nasal cannula   O2 Flow Rate (L/min) 5 L/min 5 L/min

## 2021-08-15 NOTE — PROGRESS NOTES
dextrose, sodium chloride flush, sodium chloride, ondansetron **OR** ondansetron, polyethylene glycol, acetaminophen **OR** acetaminophen, guaiFENesin-dextromethorphan, sodium chloride    Labs:  CBC:   Recent Labs     08/13/21  0025 08/14/21  0536 08/15/21  0555   WBC 4.5 6.4 9.7   HGB 13.1* 12.8* 13.3*   HCT 38.7* 37.9* 40.2*   MCV 90.6 90.2 91.0   * 144 193     BMP:   Recent Labs     08/13/21  0025 08/14/21  0536   * 138   K 4.2 4.1   CL 97* 102   CO2 24 22   BUN 20 25*   CREATININE 1.3 0.9     LIVER PROFILE:   Recent Labs     08/13/21 0025 08/14/21  0536   AST 34 30   ALT 24 23   BILITOT 0.6 0.4   ALKPHOS 90 83     PT/INR: No results for input(s): PROTIME, INR in the last 72 hours. APTT: No results for input(s): APTT in the last 72 hours. UA:No results for input(s): NITRITE, COLORU, PHUR, LABCAST, WBCUA, RBCUA, MUCUS, TRICHOMONAS, YEAST, BACTERIA, CLARITYU, SPECGRAV, LEUKOCYTESUR, UROBILINOGEN, BILIRUBINUR, BLOODU, GLUCOSEU, AMORPHOUS in the last 72 hours. Invalid input(s): KETONESU  No results for input(s): PHART, VKN1YTV, PO2ART in the last 72 hours.     Cultures:   8/4 sars Cov2- detected    Films:  CXR 8/13: The heart and mediastinum are normal.  Severe perihilar opacities have  developed centrally, worse on the right.  More diffuse reticular and  ground-glass opacities throughout both lungs.  Suspected small pleural  effusions.  No skeletal finding.           ASSESSMENT:  · Acute respiratory failure with hypoxemia  · COVID-19 pneumonia  · Hyponatremia  · Thrombocytopenia- improving     PLAN:  · Supplemental oxygen to maintain SaO2 >92%; wean as tolerated  · Droplet plus respiratory isolation  · D3 Decadron 6 mg daily  · D3 Remdesivir, will need daily laboratory monitoring to avoid toxicity  · CTX and azithro D4  · Monitor platelets

## 2021-08-16 ENCOUNTER — CARE COORDINATION (OUTPATIENT)
Dept: CARE COORDINATION | Age: 81
End: 2021-08-16

## 2021-08-16 LAB
ALBUMIN SERPL-MCNC: 2.6 G/DL (ref 3.4–5)
ALP BLD-CCNC: 96 U/L (ref 40–129)
ALT SERPL-CCNC: 24 U/L (ref 10–40)
AST SERPL-CCNC: 37 U/L (ref 15–37)
BASOPHILS ABSOLUTE: 0 K/UL (ref 0–0.2)
BASOPHILS RELATIVE PERCENT: 0 %
BILIRUB SERPL-MCNC: 0.5 MG/DL (ref 0–1)
BILIRUBIN DIRECT: <0.2 MG/DL (ref 0–0.3)
BILIRUBIN, INDIRECT: ABNORMAL MG/DL (ref 0–1)
EOSINOPHILS ABSOLUTE: 0 K/UL (ref 0–0.6)
EOSINOPHILS RELATIVE PERCENT: 0 %
GLUCOSE BLD-MCNC: 178 MG/DL (ref 70–99)
GLUCOSE BLD-MCNC: 184 MG/DL (ref 70–99)
GLUCOSE BLD-MCNC: 199 MG/DL (ref 70–99)
GLUCOSE BLD-MCNC: 240 MG/DL (ref 70–99)
GLUCOSE BLD-MCNC: 297 MG/DL (ref 70–99)
GLUCOSE BLD-MCNC: 72 MG/DL (ref 70–99)
HCT VFR BLD CALC: 39.3 % (ref 40.5–52.5)
HEMOGLOBIN: 13.2 G/DL (ref 13.5–17.5)
LYMPHOCYTES ABSOLUTE: 0.5 K/UL (ref 1–5.1)
LYMPHOCYTES RELATIVE PERCENT: 5.3 %
MCH RBC QN AUTO: 30.6 PG (ref 26–34)
MCHC RBC AUTO-ENTMCNC: 33.6 G/DL (ref 31–36)
MCV RBC AUTO: 91.2 FL (ref 80–100)
MONOCYTES ABSOLUTE: 0.7 K/UL (ref 0–1.3)
MONOCYTES RELATIVE PERCENT: 8 %
NEUTROPHILS ABSOLUTE: 7.5 K/UL (ref 1.7–7.7)
NEUTROPHILS RELATIVE PERCENT: 86.7 %
PDW BLD-RTO: 14.2 % (ref 12.4–15.4)
PERFORMED ON: ABNORMAL
PERFORMED ON: NORMAL
PLATELET # BLD: 199 K/UL (ref 135–450)
PMV BLD AUTO: 9.4 FL (ref 5–10.5)
RBC # BLD: 4.31 M/UL (ref 4.2–5.9)
TOTAL PROTEIN: 6.2 G/DL (ref 6.4–8.2)
WBC # BLD: 8.7 K/UL (ref 4–11)

## 2021-08-16 PROCEDURE — 6370000000 HC RX 637 (ALT 250 FOR IP): Performed by: INTERNAL MEDICINE

## 2021-08-16 PROCEDURE — 2580000003 HC RX 258: Performed by: PHYSICIAN ASSISTANT

## 2021-08-16 PROCEDURE — 6360000002 HC RX W HCPCS: Performed by: INTERNAL MEDICINE

## 2021-08-16 PROCEDURE — 94761 N-INVAS EAR/PLS OXIMETRY MLT: CPT

## 2021-08-16 PROCEDURE — 99232 SBSQ HOSP IP/OBS MODERATE 35: CPT | Performed by: INTERNAL MEDICINE

## 2021-08-16 PROCEDURE — 80076 HEPATIC FUNCTION PANEL: CPT

## 2021-08-16 PROCEDURE — 2580000003 HC RX 258: Performed by: INTERNAL MEDICINE

## 2021-08-16 PROCEDURE — 85025 COMPLETE CBC W/AUTO DIFF WBC: CPT

## 2021-08-16 PROCEDURE — 1200000000 HC SEMI PRIVATE

## 2021-08-16 PROCEDURE — 36415 COLL VENOUS BLD VENIPUNCTURE: CPT

## 2021-08-16 PROCEDURE — 2700000000 HC OXYGEN THERAPY PER DAY

## 2021-08-16 PROCEDURE — 6360000002 HC RX W HCPCS: Performed by: PHYSICIAN ASSISTANT

## 2021-08-16 PROCEDURE — 99233 SBSQ HOSP IP/OBS HIGH 50: CPT | Performed by: INTERNAL MEDICINE

## 2021-08-16 PROCEDURE — 2500000003 HC RX 250 WO HCPCS: Performed by: INTERNAL MEDICINE

## 2021-08-16 RX ORDER — DORZOLAMIDE HCL 20 MG/ML
1 SOLUTION/ DROPS OPHTHALMIC 2 TIMES DAILY
Status: DISCONTINUED | OUTPATIENT
Start: 2021-08-16 | End: 2021-08-20 | Stop reason: HOSPADM

## 2021-08-16 RX ORDER — POLYETHYLENE GLYCOL 3350 17 G/17G
17 POWDER, FOR SOLUTION ORAL DAILY PRN
Status: DISCONTINUED | OUTPATIENT
Start: 2021-08-16 | End: 2021-08-20 | Stop reason: HOSPADM

## 2021-08-16 RX ORDER — DOCUSATE SODIUM 100 MG/1
100 CAPSULE, LIQUID FILLED ORAL 2 TIMES DAILY PRN
Status: DISCONTINUED | OUTPATIENT
Start: 2021-08-16 | End: 2021-08-20 | Stop reason: HOSPADM

## 2021-08-16 RX ORDER — PANTOPRAZOLE SODIUM 40 MG/1
40 TABLET, DELAYED RELEASE ORAL
Status: DISCONTINUED | OUTPATIENT
Start: 2021-08-17 | End: 2021-08-20 | Stop reason: HOSPADM

## 2021-08-16 RX ORDER — VITAMIN B COMPLEX
5000 TABLET ORAL DAILY
Status: DISCONTINUED | OUTPATIENT
Start: 2021-08-16 | End: 2021-08-20 | Stop reason: HOSPADM

## 2021-08-16 RX ORDER — BRIMONIDINE TARTRATE 2 MG/ML
1 SOLUTION/ DROPS OPHTHALMIC 2 TIMES DAILY
Status: DISCONTINUED | OUTPATIENT
Start: 2021-08-16 | End: 2021-08-20 | Stop reason: HOSPADM

## 2021-08-16 RX ORDER — LIDOCAINE 4 G/G
1 PATCH TOPICAL DAILY
Status: DISCONTINUED | OUTPATIENT
Start: 2021-08-16 | End: 2021-08-18

## 2021-08-16 RX ORDER — AZITHROMYCIN 250 MG/1
500 TABLET, FILM COATED ORAL DAILY
Status: DISCONTINUED | OUTPATIENT
Start: 2021-08-16 | End: 2021-08-18

## 2021-08-16 RX ADMIN — ACETAMINOPHEN 650 MG: 325 TABLET ORAL at 21:39

## 2021-08-16 RX ADMIN — ATORVASTATIN CALCIUM 80 MG: 40 TABLET, FILM COATED ORAL at 21:39

## 2021-08-16 RX ADMIN — AZITHROMYCIN 500 MG: 250 TABLET, FILM COATED ORAL at 12:55

## 2021-08-16 RX ADMIN — DOCUSATE SODIUM 100 MG: 100 CAPSULE ORAL at 14:58

## 2021-08-16 RX ADMIN — PREGABALIN 50 MG: 25 CAPSULE ORAL at 21:38

## 2021-08-16 RX ADMIN — DORZOLAMIDE HYDROCHLORIDE 1 DROP: 20 SOLUTION/ DROPS OPHTHALMIC at 21:39

## 2021-08-16 RX ADMIN — Medication 10 MG: at 21:39

## 2021-08-16 RX ADMIN — PREGABALIN 50 MG: 25 CAPSULE ORAL at 09:26

## 2021-08-16 RX ADMIN — INSULIN LISPRO 2 UNITS: 100 INJECTION, SOLUTION INTRAVENOUS; SUBCUTANEOUS at 09:37

## 2021-08-16 RX ADMIN — B-COMPLEX W/ C & FOLIC ACID TAB 1 TABLET: TAB at 14:58

## 2021-08-16 RX ADMIN — INSULIN LISPRO 4 UNITS: 100 INJECTION, SOLUTION INTRAVENOUS; SUBCUTANEOUS at 17:36

## 2021-08-16 RX ADMIN — DORZOLAMIDE HYDROCHLORIDE 1 DROP: 20 SOLUTION/ DROPS OPHTHALMIC at 14:59

## 2021-08-16 RX ADMIN — REMDESIVIR 100 MG: 100 INJECTION, POWDER, LYOPHILIZED, FOR SOLUTION INTRAVENOUS at 05:30

## 2021-08-16 RX ADMIN — ASPIRIN 162 MG: 81 TABLET, CHEWABLE ORAL at 09:26

## 2021-08-16 RX ADMIN — SODIUM CHLORIDE, PRESERVATIVE FREE 10 ML: 5 INJECTION INTRAVENOUS at 09:35

## 2021-08-16 RX ADMIN — BRIMONIDINE TARTRATE 1 DROP: 2 SOLUTION OPHTHALMIC at 21:40

## 2021-08-16 RX ADMIN — SODIUM CHLORIDE 25 ML: 9 INJECTION, SOLUTION INTRAVENOUS at 12:58

## 2021-08-16 RX ADMIN — FAMOTIDINE 20 MG: 20 TABLET ORAL at 09:32

## 2021-08-16 RX ADMIN — ACETAMINOPHEN 650 MG: 325 TABLET ORAL at 14:11

## 2021-08-16 RX ADMIN — INSULIN LISPRO 6 UNITS: 100 INJECTION, SOLUTION INTRAVENOUS; SUBCUTANEOUS at 13:04

## 2021-08-16 RX ADMIN — SODIUM CHLORIDE 25 ML: 9 INJECTION, SOLUTION INTRAVENOUS at 05:29

## 2021-08-16 RX ADMIN — CEFTRIAXONE SODIUM 1000 MG: 1 INJECTION, POWDER, FOR SOLUTION INTRAMUSCULAR; INTRAVENOUS at 12:59

## 2021-08-16 RX ADMIN — FAMOTIDINE 20 MG: 20 TABLET ORAL at 21:39

## 2021-08-16 RX ADMIN — ENOXAPARIN SODIUM 30 MG: 30 INJECTION SUBCUTANEOUS at 21:39

## 2021-08-16 RX ADMIN — INSULIN GLARGINE 50 UNITS: 100 INJECTION, SOLUTION SUBCUTANEOUS at 22:35

## 2021-08-16 RX ADMIN — CARBOXYMETHYLCELLULOSE SODIUM 1 DROP: 10 GEL OPHTHALMIC at 13:01

## 2021-08-16 RX ADMIN — CARBOXYMETHYLCELLULOSE SODIUM 1 DROP: 10 GEL OPHTHALMIC at 09:27

## 2021-08-16 RX ADMIN — SODIUM CHLORIDE, PRESERVATIVE FREE 10 ML: 5 INJECTION INTRAVENOUS at 21:41

## 2021-08-16 RX ADMIN — INSULIN GLARGINE 50 UNITS: 100 INJECTION, SOLUTION SUBCUTANEOUS at 09:37

## 2021-08-16 RX ADMIN — DEXAMETHASONE SODIUM PHOSPHATE 6 MG: 10 INJECTION, SOLUTION INTRAMUSCULAR; INTRAVENOUS at 04:02

## 2021-08-16 RX ADMIN — ENOXAPARIN SODIUM 30 MG: 30 INJECTION SUBCUTANEOUS at 09:27

## 2021-08-16 RX ADMIN — DORZOLAMIDE HYDROCHLORIDE 1 DROP: 20 SOLUTION/ DROPS OPHTHALMIC at 15:08

## 2021-08-16 RX ADMIN — Medication 5 ML: at 04:03

## 2021-08-16 RX ADMIN — BRIMONIDINE TARTRATE 1 DROP: 2 SOLUTION OPHTHALMIC at 14:59

## 2021-08-16 RX ADMIN — Medication 5000 UNITS: at 14:58

## 2021-08-16 ASSESSMENT — PAIN DESCRIPTION - DESCRIPTORS: DESCRIPTORS: ACHING

## 2021-08-16 ASSESSMENT — PAIN - FUNCTIONAL ASSESSMENT: PAIN_FUNCTIONAL_ASSESSMENT: ACTIVITIES ARE NOT PREVENTED

## 2021-08-16 ASSESSMENT — PAIN SCALES - GENERAL
PAINLEVEL_OUTOF10: 2
PAINLEVEL_OUTOF10: 0
PAINLEVEL_OUTOF10: 3
PAINLEVEL_OUTOF10: 3
PAINLEVEL_OUTOF10: 0

## 2021-08-16 ASSESSMENT — PAIN DESCRIPTION - PAIN TYPE: TYPE: CHRONIC PAIN;NEUROPATHIC PAIN

## 2021-08-16 ASSESSMENT — PAIN DESCRIPTION - FREQUENCY: FREQUENCY: INTERMITTENT

## 2021-08-16 ASSESSMENT — PAIN DESCRIPTION - ORIENTATION: ORIENTATION: RIGHT;LEFT

## 2021-08-16 ASSESSMENT — PAIN DESCRIPTION - LOCATION: LOCATION: LEG

## 2021-08-16 ASSESSMENT — PAIN DESCRIPTION - PROGRESSION: CLINICAL_PROGRESSION: NOT CHANGED

## 2021-08-16 NOTE — PROGRESS NOTES
Progress Note    Admit Date:  8/12/2021    Admitted with COVID-19 pneumonia, hypoxemia. Patient was not vaccinated. He reports that all his family members now have Covid      Subjective:  Mr. Brit Nielsen  seen  Looks much better today sitting up in the bed no cough or shortness of breath oxygen saturation slowly improving he is at 4 L now. Objective:   /71   Pulse 76   Temp 98 °F (36.7 °C) (Oral)   Resp 18   Ht 5' 9\" (1.753 m)   Wt 212 lb 9 oz (96.4 kg)   SpO2 92%   BMI 31.39 kg/m²       Intake/Output Summary (Last 24 hours) at 8/16/2021 1702  Last data filed at 8/16/2021 1314  Gross per 24 hour   Intake 1215 ml   Output 600 ml   Net 615 ml         Physical Exam:  General: Elderly male , ill-appearing . Jacobo Salter No  distress . awake, alert, oriented. Vision and hearing is poor  Skin:  Warm and dry  Neck:  JVD absent. Neck supple  Chest: Diminished breath sounds. No wheezes or rhonchi. Cardiovascular:  RRR ,S1S2 normal  Abdomen:  Soft, non tender, non distended, BS +  Extremities:  No edema. Intact peripheral pulses.  Brisk cap refill, < 2 secs  Neuro: non focal      Medications:   Scheduled Meds:   azithromycin  500 mg Oral Daily    vitamin B complex w/C  1 tablet Oral Daily    brimonidine  1 drop Both Eyes BID    Vitamin D  5,000 Units Oral Daily    Cyanocobalamin  1 tablet Sublingual Daily    dorzolamide  1 drop Ophthalmic BID    [START ON 8/17/2021] pantoprazole  40 mg Oral QAM AC    lidocaine  1 patch Transdermal Daily    carboxymethylcellulose PF  1 drop Both Eyes TID    insulin glargine  50 Units Subcutaneous BID    insulin lispro  5 Units Subcutaneous TID WC    pregabalin  50 mg Oral BID    atorvastatin  80 mg Oral Nightly    aspirin  162 mg Oral Daily    sodium chloride flush  5-40 mL Intravenous 2 times per day    enoxaparin  30 mg Subcutaneous BID    famotidine  20 mg Oral BID    insulin lispro  0-12 Units Subcutaneous TID WC    insulin lispro  0-6 Units Subcutaneous Nightly    dexamethasone  6 mg Intravenous Q24H    melatonin  10 mg Oral Nightly    remdesivir IVPB  100 mg Intravenous Q24H    cefTRIAXone (ROCEPHIN) IV  1,000 mg Intravenous Q24H       Continuous Infusions:   dextrose      sodium chloride 25 mL (08/16/21 1258)    sodium chloride         Data:  CBC:   Recent Labs     08/14/21  0536 08/15/21  0555 08/16/21  0543   WBC 6.4 9.7 8.7   RBC 4.21 4.42 4.31   HGB 12.8* 13.3* 13.2*   HCT 37.9* 40.2* 39.3*   MCV 90.2 91.0 91.2   RDW 14.3 14.4 14.2    193 199     BMP:   Recent Labs     08/14/21  0536      K 4.1      CO2 22   BUN 25*   CREATININE 0.9     BNP: No results for input(s): BNP in the last 72 hours. PT/INR: No results for input(s): PROTIME, INR in the last 72 hours. APTT: No results for input(s): APTT in the last 72 hours. CARDIAC ENZYMES:   No results for input(s): CKMB, CKMBINDEX, TROPONINI in the last 72 hours. Invalid input(s): CKTOTAL;3  FASTING LIPID PANEL:  Lab Results   Component Value Date    CHOL 172 01/17/2020    HDL 42 01/17/2020    TRIG 219 (H) 01/17/2020     LIVER PROFILE:   Recent Labs     08/14/21  0536 08/16/21  0543   AST 30 37   ALT 23 24   BILIDIR  --  <0.2   BILITOT 0.4 0.5   ALKPHOS 83 96        Radiology  XR CHEST PORTABLE   Final Result   Severe perihilar and more diffuse reticular and ground-glass opacities   throughout the lungs likely represents pulmonary edema or viral pneumonitis. Assessment:  Active Problems:    COVID-19 virus infection    Acute respiratory failure with hypoxia (HCC)    Type 2 diabetes mellitus with hyperglycemia, with long-term current use of insulin (HCC)  Resolved Problems:    * No resolved hospital problems. *      Plan:     #COVID 19 Pneumonia  #Acute hypoxic respiratory failure  #Possible secondary bacterial pneumonia  - initially diagnosed 8/4/21. He was not vaccinated.   His symptoms have progressively worsened and he is now hypoxic .   -Continue Decadron D#4  -Continue remdesivir D#4  - elevated PCT, cannot rule out superimposed bacterial infection, started on Rocephin and Azithromycin D#4  -Seen by pulmonology  - supplemental O2, cont to wean as able. Continues to require supplemental O2 3 L-> 5L-> 4L. Desaturates with exertion. Monitor closely with continous pulse oximetry, telemetry   - droplet + isolation   - cont pulse oximetry, telemetry  -Lovenox twice daily     #Elevated troponin   - 0.03 on admit. No CP, no ischemia on EKG. Checked repeat trop < 0.01     #Elevated d dimer   - mild elevated at 390, suspect related to COVID 19      #Thrombocytopenia   - mild at 120k, could be 2/2 acute viral infection. Monitor CBC daily      #DM2  With steroid-induced hypoglycemia  - Cont Lantus-increased dose.  + SSI      #DM Neuropathy   - cont Lyrica     #Colonic lesion sp right colectomy 11/2020     #Pancreatic mass  - f/w hem/onc who is monitoring size of lesion    # glaucoma  - resume eye drops         DVT Prophylaxis: Lovenox   Diet: ADULT DIET;  Regular; 4 carb choices (60 gm/meal)  Code Status: Full Code          Shea Harrington MD, MD 8/16/2021 5:02 PM

## 2021-08-16 NOTE — FLOWSHEET NOTE
Consult from staff/family. I attempted to call into patient's room, no answer, reached spouse, Holly Dubose and son Malu Funes at home. Malu Funes stated that 'the whole family got covid, not sure where.'  Malu Funes has spoken with his dad a couple times since admission, [place] has stated that he's 'afraid to die, which is new for him, he's not usually like that.'    Family is strong in tello, emphasizing [employer]astaf[employer] tello. Fernando's wife, Holly Dubose, is also struggling with some breathing issues/COPD according to son, Quinten Zazueta prayed as requested for family to have 'healing, peace, andrez.'  Family enthusiastically thanked , will continue to follow for support throughout admission. Malu Funes  [phone]       08/16/21 1041   Encounter Summary   Services provided to: Family  (Son Malu Funes, Wife Holly Dubose of 58+ yrs)   Referral/Consult From: Nurse;Family   Support System Spouse; Family members   Continue Visiting   (8/16: conv w/son, wife, prayer, encouragement)   Complexity of Encounter Moderate   Length of Encounter 45 minutes   Spiritual Assessment Completed Yes   Spiritual/Hoahaoism   Type Spiritual support   Assessment Approachable; Hopeful  (Son Malu Funes says his dad is 'scared of dying, which is new')   Intervention Nurtured hope; Active listening;Prayer;Discussed relationship with God;Discussed meaning/purpose;Discussed afterlife; Discussed death;Discussed belief system/Protestant practices/tello   Outcome Expressed gratitude; Connection/belonging;Coping

## 2021-08-16 NOTE — PROGRESS NOTES
Pulmonary Progress Note    CC: shortness of breath     Subjective:   Patient feels slightly better with less cough. Intake/Output Summary (Last 24 hours) at 8/16/2021 0753  Last data filed at 8/16/2021 0403  Gross per 24 hour   Intake 1335 ml   Output    Net 1335 ml       Exam:   /76   Pulse 73   Temp 96.9 °F (36.1 °C) (Oral)   Resp 16   Ht 5' 9\" (1.753 m)   Wt 212 lb 9 oz (96.4 kg)   SpO2 92%   BMI 31.39 kg/m²  on 4l NC  Gen: No distress. Normocephalic, atraumatic. Eyes: PERRL. No sclera icterus. No conjunctival injection. ENT: No discharge. Pharynx clear. Neck: Trachea midline. No obvious mass. Resp: No accessory muscle use. Scattered bilateral crackles. No wheezes. No rhonchi. No dullness on percussion. CV: Regular rate. Regular rhythm. No murmur or rub. No edema. GI: Non-tender. Non-distended. No hernia. Skin: Warm and dry. No nodule on exposed extremities. M/S: No cyanosis. No joint deformity. No clubbing. Neuro: Awake. Alert. Moves all four extremities. Psych: Oriented x 3. No anxiety.      Scheduled Meds:   carboxymethylcellulose PF  1 drop Both Eyes TID    insulin glargine  50 Units Subcutaneous BID    insulin lispro  5 Units Subcutaneous TID     pregabalin  50 mg Oral BID    atorvastatin  80 mg Oral Nightly    aspirin  162 mg Oral Daily    sodium chloride flush  5-40 mL Intravenous 2 times per day    enoxaparin  30 mg Subcutaneous BID    famotidine  20 mg Oral BID    insulin lispro  0-12 Units Subcutaneous TID     insulin lispro  0-6 Units Subcutaneous Nightly    dexamethasone  6 mg Intravenous Q24H    melatonin  10 mg Oral Nightly    remdesivir IVPB  100 mg Intravenous Q24H    cefTRIAXone (ROCEPHIN) IV  1,000 mg Intravenous Q24H    azithromycin  500 mg Intravenous Q24H     Continuous Infusions:   dextrose      sodium chloride 25 mL (08/16/21 0529)    sodium chloride       PRN Meds:  glucose, dextrose, glucagon (rDNA), dextrose, sodium chloride flush, sodium chloride, ondansetron **OR** ondansetron, polyethylene glycol, acetaminophen **OR** acetaminophen, guaiFENesin-dextromethorphan, sodium chloride    Labs:  CBC:   Recent Labs     08/14/21  0536 08/15/21  0555 08/16/21  0543   WBC 6.4 9.7 8.7   HGB 12.8* 13.3* 13.2*   HCT 37.9* 40.2* 39.3*   MCV 90.2 91.0 91.2    193 199     BMP:   Recent Labs     08/14/21  0536      K 4.1      CO2 22   BUN 25*   CREATININE 0.9     LIVER PROFILE:   Recent Labs     08/14/21  0536   AST 30   ALT 23   BILITOT 0.4   ALKPHOS 83     PT/INR: No results for input(s): PROTIME, INR in the last 72 hours. APTT: No results for input(s): APTT in the last 72 hours. UA:No results for input(s): NITRITE, COLORU, PHUR, LABCAST, WBCUA, RBCUA, MUCUS, TRICHOMONAS, YEAST, BACTERIA, CLARITYU, SPECGRAV, LEUKOCYTESUR, UROBILINOGEN, BILIRUBINUR, BLOODU, GLUCOSEU, AMORPHOUS in the last 72 hours. Invalid input(s): KETONESU  No results for input(s): PHART, [id number], PO2ART in the last 72 hours.     Cultures:   8/4 sars Cov2- detected    Films:  CXR 8/13: The heart and mediastinum are normal.  Severe perihilar opacities have  developed centrally, worse on the right.  More diffuse reticular and  ground-glass opacities throughout both lungs.  Suspected small pleural  effusions.  No skeletal finding.           ASSESSMENT:  · Acute respiratory failure with hypoxemia  · COVID-19 pneumonia  · Hyponatremia  · Thrombocytopenia- improving     PLAN:  · Supplemental oxygen to maintain SaO2 >92%; wean as tolerated  · Droplet plus respiratory isolation  · D4 Decadron 6 mg daily  · D4 Remdesivir, will need daily laboratory monitoring to avoid toxicity  · CTX and azithro D5  · Monitor platelets

## 2021-08-16 NOTE — ACP (ADVANCE CARE PLANNING)
Advance Care Planning     General Advance Care Planning (ACP) Conversation    Date of Conversation: 8/12/2021  Conducted with: Patient with Decision Making Capacity    Healthcare Decision Maker:      Primary Decision Maker: Estrella Callaway - [facility] - [phone]    Secondary Decision Maker: Yeimi Camacho - [phone]    Click here to complete [place] including selection of the Healthcare Decision Maker Relationship (ie \"Primary\")    Navi Little RN

## 2021-08-16 NOTE — PLAN OF CARE
Problem: Falls - Risk of:  Goal: Will remain free from falls  Description: Will remain free from falls  Outcome: Ongoing  Goal: Absence of physical injury  Description: Absence of physical injury  Outcome: Ongoing     Problem: Infection:  Goal: Will remain free from infection  Description: Will remain free from infection  Outcome: Ongoing     Problem: Safety:  Goal: Free from accidental physical injury  Description: Free from accidental physical injury  Outcome: Ongoing  Goal: Free from intentional harm  Description: Free from intentional harm  Outcome: Ongoing     Problem: Daily Care:  Goal: Daily care needs are met  Description: Daily care needs are met  Outcome: Ongoing     Problem: Pain:  Goal: Patient's pain/discomfort is manageable  Description: Patient's pain/discomfort is manageable  Outcome: Ongoing  Goal: Pain level will decrease  Description: Pain level will decrease  Outcome: Ongoing  Goal: Control of chronic pain  Description: Control of chronic pain  Outcome: Ongoing     Problem: Skin Integrity:  Goal: Skin integrity will stabilize  Description: Skin integrity will stabilize  Outcome: Ongoing     Problem: Discharge Planning:  Goal: Patients continuum of care needs are met  Description: Patients continuum of care needs are met  Outcome: Ongoing     Problem: Airway Clearance - Ineffective  Goal: Achieve or maintain patent airway  Outcome: Ongoing     Problem: Gas Exchange - Impaired  Goal: Absence of hypoxia  Outcome: Ongoing  Goal: Promote optimal lung function  Outcome: Ongoing     Problem: Breathing Pattern - Ineffective  Goal: Ability to achieve and maintain a regular respiratory rate  Outcome: Ongoing     Problem:  Body Temperature -  Risk of, Imbalanced  Goal: Ability to maintain a body temperature within defined limits  Outcome: Ongoing  Goal: Will regain or maintain usual level of consciousness  Outcome: Ongoing  Goal: Complications related to the disease process, condition or treatment will be avoided or minimized  Outcome: Ongoing     Problem: Isolation Precautions - Risk of Spread of Infection  Goal: Prevent transmission of infection  Outcome: Ongoing     Problem: Nutrition Deficits  Goal: Optimize nutritional status  Outcome: Ongoing     Problem: Risk for Fluid Volume Deficit  Goal: Maintain normal heart rhythm  Outcome: Ongoing  Goal: Maintain absence of muscle cramping  Outcome: Ongoing  Goal: Maintain normal serum potassium, sodium, calcium, phosphorus, and pH  Outcome: Ongoing     Problem: Loneliness or Risk for Loneliness  Goal: Demonstrate positive use of time alone when socialization is not possible  Outcome: Ongoing     Problem: Fatigue  Goal: Verbalize increase energy and improved vitality  Outcome: Ongoing     Problem: Patient Education: Go to Patient Education Activity  Goal: Patient/Family Education  Outcome: Ongoing     Problem: Skin Integrity:  Goal: Will show no infection signs and symptoms  Description: Will show no infection signs and symptoms  Outcome: Ongoing  Goal: Absence of new skin breakdown  Description: Absence of new skin breakdown  Outcome: Ongoing

## 2021-08-16 NOTE — PROGRESS NOTES
Blood glucose 72, patient given a turkey sandwich & milk per request.    2429: rechecked blood glucose 184.

## 2021-08-16 NOTE — CARE COORDINATION
Case Management Assessment  Initial Evaluation      Patient Name: Liliana Grimes  YOB: 1940  Diagnosis: Acute respiratory failure with hypoxia ([facility] [address] [J96.01]  COVID-19 virus infection [U07.1]  Pneumonia due to COVID-19 virus [U07.1, J12.82]  Date / Time: 8/12/2021 11:49 PM    Admission status/Date:inpt  Chart Reviewed: Yes      Patient Interviewed: Yes   Family Interviewed:  No      Hospitalization in the last 30 days:  No      Health Care Decision Maker :   Primary Decision Maker: Victoria Trent Lieberman 39 - [phone]    (CM - must 1st enter selection under Navigator - emergency contact- Health Care Decision Maker Relationship and pick relationship)   Who do you trust or have selected to make healthcare decisions for you      Met with: pt via TC  Interview conducted  (bedside/phone):    Current PCP:   Roger Molina MD      Financial  Medicare  Precert required for SNF : Y, N          3 night stay required - Y, N    ADLS  Support Systems/Care Needs: Spouse/Significant Other  Transportation: family    Meal Preparation: family    Housing  Living Arrangements: home with spouse  Steps: ramp  Intent for return to present living arrangements: Yes  Identified Issues: no    Home Care Information  Active with [address] : No Agency:(Services)  Type of Home Care Services: None  Passport/Waiver : No  :                      Phone Number:    Passport/Waiver Services: no          Durable Medical Equiptment   DME Provider: adriana  Equipment:   Walker___Cane___RTS___ BSC___Shower Chair___Hospital Bed___W/C____Other________  02 at ____Liter(s)---wears(frequency)_______ HHN ___ CPAP___ BiPap___   N/A____      Home O2 Use :  No    If No for home O2---if presently on O2 during hospitalization:  Yes  if yes CM to follow for potential DC O2 need  Informed of need for care provider to bring portable home O2 tank on day of discharge for nursing to connect prior to leaving:   Not Indicated  Verbalized agreement/Understanding:   Not Indicated    Community Service Affiliation  Dialysis:  No    · Agency:  · Location:  · Dialysis Schedule:  · Phone:   · Fax: Other Community Services: (ex:PT/OT,Mental Health,Wound Clinic, Cardio/Pul [address])    DISCHARGE PLAN: Explained Case Management role/services. Reviewed chart. Writer spoke with the pt via TC for initial interview. Pt from home with spouse and plan return. Pt states IPTA. Pt now on 4 liters O2. Following for hhc/dme needs.

## 2021-08-16 NOTE — PLAN OF CARE
Clearance - Ineffective  Goal: Achieve or maintain patent airway  8/16/2021 1244 by Ralph Middleton RN  Outcome: Ongoing  8/16/2021 0406 by Mayra Bess RN  Outcome: Ongoing     Problem: Gas Exchange - Impaired  Goal: Absence of hypoxia  8/16/2021 1244 by Ralph Middleton RN  Outcome: Ongoing  8/16/2021 0406 by Mayra Bess RN  Outcome: Ongoing  Goal: Promote optimal lung function  8/16/2021 1244 by Ralph Middleton RN  Outcome: Ongoing  8/16/2021 0406 by Mayra Bess RN  Outcome: Ongoing     Problem: Breathing Pattern - Ineffective  Goal: Ability to achieve and maintain a regular respiratory rate  8/16/2021 1244 by Ralph Middleton RN  Outcome: Ongoing  8/16/2021 0406 by Mayra Bess RN  Outcome: Ongoing     Problem:  Body Temperature -  Risk of, Imbalanced  Goal: Ability to maintain a body temperature within defined limits  8/16/2021 1244 by Ralph Middleton RN  Outcome: Ongoing  8/16/2021 0406 by Mayra Bess RN  Outcome: Ongoing  Goal: Will regain or maintain usual level of consciousness  8/16/2021 1244 by Ralph Middleton RN  Outcome: Ongoing  8/16/2021 0406 by Mayra Bess RN  Outcome: Ongoing  Goal: Complications related to the disease process, condition or treatment will be avoided or minimized  8/16/2021 1244 by Ralph Middleton RN  Outcome: Ongoing  8/16/2021 0406 by Mayra Bess RN  Outcome: Ongoing     Problem: Isolation Precautions - Risk of Spread of Infection  Goal: Prevent transmission of infection  8/16/2021 1244 by Ralph Middleton RN  Outcome: Ongoing  8/16/2021 0406 by Mayra Bess RN  Outcome: Ongoing     Problem: Nutrition Deficits  Goal: Optimize nutritional status  8/16/2021 1244 by Ralph Middleton RN  Outcome: Ongoing  8/16/2021 0406 by Mayra Bess RN  Outcome: Ongoing     Problem: Risk for Fluid Volume Deficit  Goal: Maintain normal heart rhythm  8/16/2021 1244 by Ralph Middleton RN  Outcome: Ongoing  8/16/2021 0406 by Florina Florian RN  Outcome: Ongoing  Goal: Maintain absence of muscle cramping  8/16/2021 1244 by Frederick Jin RN  Outcome: Ongoing  8/16/2021 0406 by Florina Florian RN  Outcome: Ongoing  Goal: Maintain normal serum potassium, sodium, calcium, phosphorus, and pH  8/16/2021 1244 by Frederick Jin RN  Outcome: Ongoing  8/16/2021 0406 by Florina Florian RN  Outcome: Ongoing     Problem: Loneliness or Risk for Loneliness  Goal: Demonstrate positive use of time alone when socialization is not possible  8/16/2021 1244 by Frederick Jin RN  Outcome: Ongoing  8/16/2021 0406 by Flornia Florian RN  Outcome: Ongoing     Problem: Fatigue  Goal: Verbalize increase energy and improved vitality  8/16/2021 1244 by Frederick Jin RN  Outcome: Ongoing  8/16/2021 0406 by Florina Florian RN  Outcome: Ongoing     Problem: Patient Education: Go to Patient Education Activity  Goal: Patient/Family Education  8/16/2021 1244 by Frederick Jin RN  Outcome: Ongoing  8/16/2021 0406 by Florina Florian RN  Outcome: Ongoing     Problem: Skin Integrity:  Goal: Will show no infection signs and symptoms  8/16/2021 1244 by Frederick Jin RN  Outcome: Ongoing  8/16/2021 0406 by Florina Florian RN  Outcome: Ongoing  Goal: Absence of new skin breakdown  8/16/2021 1244 by Frederick Jin RN  Outcome: Ongoing  8/16/2021 0406 by Florina Florian RN  Outcome: Ongoing

## 2021-08-16 NOTE — PROGRESS NOTES
Patient concerned about his glaucoma medications and medications for his neuropathy, writer notified Hospitalist.

## 2021-08-16 NOTE — FLOWSHEET NOTE
Pt A/Ox4. Reported neuropathic pain, see assessment below. Pt unlabored, respirations even & easy. No distress noted. Shift assessment complete. See flowsheet. Blood glucose 132, Humalog not given d/t parameters. PM meds given. See MAR. Denies needs at this time. Bed in low position. Call light within reach.       08/15/21 2110   Vital Signs   Temp 97.2 °F (36.2 °C)   Temp Source Oral   Pulse 74   Heart Rate Source Monitor   Resp 16   BP (!) 140/78   BP Location Left upper arm   Patient Position Sitting   Level of Consciousness Alert (0)   MEWS Score 1   Patient Currently in Pain Yes   Pain Assessment   Pain Assessment 0-10   Pain Level 4   Patient's Stated Pain Goal No pain   Pain Type Neuropathic pain   Pain Location Leg   Pain Orientation Right;Left   Pain Descriptors Numbness;Tingling   Pain Frequency Continuous   Pain Onset On-going   Clinical Progression Not changed   Functional Pain Assessment Activities are not prevented   Oxygen Therapy   SpO2 94 %   Pulse Oximeter Device Mode Continuous   Pulse Oximeter Device Location Right   O2 Device Nasal cannula   O2 Flow Rate (L/min) 3 L/min

## 2021-08-17 LAB
ALBUMIN SERPL-MCNC: 2.4 G/DL (ref 3.4–5)
ALP BLD-CCNC: 101 U/L (ref 40–129)
ALT SERPL-CCNC: 26 U/L (ref 10–40)
AST SERPL-CCNC: 38 U/L (ref 15–37)
BASOPHILS ABSOLUTE: 0 K/UL (ref 0–0.2)
BASOPHILS RELATIVE PERCENT: 0.1 %
BILIRUB SERPL-MCNC: 0.4 MG/DL (ref 0–1)
BILIRUBIN DIRECT: <0.2 MG/DL (ref 0–0.3)
BILIRUBIN, INDIRECT: ABNORMAL MG/DL (ref 0–1)
EOSINOPHILS ABSOLUTE: 0 K/UL (ref 0–0.6)
EOSINOPHILS RELATIVE PERCENT: 0 %
GLUCOSE BLD-MCNC: 137 MG/DL (ref 70–99)
GLUCOSE BLD-MCNC: 284 MG/DL (ref 70–99)
GLUCOSE BLD-MCNC: 286 MG/DL (ref 70–99)
GLUCOSE BLD-MCNC: 297 MG/DL (ref 70–99)
GLUCOSE BLD-MCNC: 85 MG/DL (ref 70–99)
HCT VFR BLD CALC: 39.8 % (ref 40.5–52.5)
HEMOGLOBIN: 13.3 G/DL (ref 13.5–17.5)
LYMPHOCYTES ABSOLUTE: 0.6 K/UL (ref 1–5.1)
LYMPHOCYTES RELATIVE PERCENT: 5.2 %
MCH RBC QN AUTO: 30.7 PG (ref 26–34)
MCHC RBC AUTO-ENTMCNC: 33.3 G/DL (ref 31–36)
MCV RBC AUTO: 92.3 FL (ref 80–100)
MONOCYTES ABSOLUTE: 0.7 K/UL (ref 0–1.3)
MONOCYTES RELATIVE PERCENT: 6.7 %
NEUTROPHILS ABSOLUTE: 9.6 K/UL (ref 1.7–7.7)
NEUTROPHILS RELATIVE PERCENT: 88 %
PDW BLD-RTO: 14.4 % (ref 12.4–15.4)
PERFORMED ON: ABNORMAL
PERFORMED ON: NORMAL
PLATELET # BLD: 202 K/UL (ref 135–450)
PMV BLD AUTO: 9.1 FL (ref 5–10.5)
RBC # BLD: 4.31 M/UL (ref 4.2–5.9)
TOTAL PROTEIN: 6.1 G/DL (ref 6.4–8.2)
WBC # BLD: 10.9 K/UL (ref 4–11)

## 2021-08-17 PROCEDURE — 94761 N-INVAS EAR/PLS OXIMETRY MLT: CPT

## 2021-08-17 PROCEDURE — 97162 PT EVAL MOD COMPLEX 30 MIN: CPT

## 2021-08-17 PROCEDURE — 97535 SELF CARE MNGMENT TRAINING: CPT

## 2021-08-17 PROCEDURE — 80076 HEPATIC FUNCTION PANEL: CPT

## 2021-08-17 PROCEDURE — 2580000003 HC RX 258: Performed by: INTERNAL MEDICINE

## 2021-08-17 PROCEDURE — 6360000002 HC RX W HCPCS: Performed by: INTERNAL MEDICINE

## 2021-08-17 PROCEDURE — 99233 SBSQ HOSP IP/OBS HIGH 50: CPT | Performed by: INTERNAL MEDICINE

## 2021-08-17 PROCEDURE — 6370000000 HC RX 637 (ALT 250 FOR IP): Performed by: INTERNAL MEDICINE

## 2021-08-17 PROCEDURE — 6360000002 HC RX W HCPCS: Performed by: PHYSICIAN ASSISTANT

## 2021-08-17 PROCEDURE — 2500000003 HC RX 250 WO HCPCS: Performed by: INTERNAL MEDICINE

## 2021-08-17 PROCEDURE — 2700000000 HC OXYGEN THERAPY PER DAY

## 2021-08-17 PROCEDURE — 99232 SBSQ HOSP IP/OBS MODERATE 35: CPT | Performed by: INTERNAL MEDICINE

## 2021-08-17 PROCEDURE — 97166 OT EVAL MOD COMPLEX 45 MIN: CPT

## 2021-08-17 PROCEDURE — 97116 GAIT TRAINING THERAPY: CPT

## 2021-08-17 PROCEDURE — 2580000003 HC RX 258: Performed by: PHYSICIAN ASSISTANT

## 2021-08-17 PROCEDURE — 97530 THERAPEUTIC ACTIVITIES: CPT

## 2021-08-17 PROCEDURE — 85025 COMPLETE CBC W/AUTO DIFF WBC: CPT

## 2021-08-17 PROCEDURE — 1200000000 HC SEMI PRIVATE

## 2021-08-17 PROCEDURE — 36415 COLL VENOUS BLD VENIPUNCTURE: CPT

## 2021-08-17 RX ADMIN — CARBOXYMETHYLCELLULOSE SODIUM 1 DROP: 10 GEL OPHTHALMIC at 13:31

## 2021-08-17 RX ADMIN — BRIMONIDINE TARTRATE 1 DROP: 2 SOLUTION OPHTHALMIC at 08:00

## 2021-08-17 RX ADMIN — FAMOTIDINE 20 MG: 20 TABLET ORAL at 07:54

## 2021-08-17 RX ADMIN — Medication 5000 UNITS: at 07:54

## 2021-08-17 RX ADMIN — DORZOLAMIDE HYDROCHLORIDE 1 DROP: 20 SOLUTION/ DROPS OPHTHALMIC at 22:27

## 2021-08-17 RX ADMIN — INSULIN LISPRO 6 UNITS: 100 INJECTION, SOLUTION INTRAVENOUS; SUBCUTANEOUS at 12:05

## 2021-08-17 RX ADMIN — SODIUM CHLORIDE, PRESERVATIVE FREE 10 ML: 5 INJECTION INTRAVENOUS at 22:31

## 2021-08-17 RX ADMIN — DORZOLAMIDE HYDROCHLORIDE 1 DROP: 20 SOLUTION/ DROPS OPHTHALMIC at 08:00

## 2021-08-17 RX ADMIN — INSULIN GLARGINE 50 UNITS: 100 INJECTION, SOLUTION SUBCUTANEOUS at 22:35

## 2021-08-17 RX ADMIN — B-COMPLEX W/ C & FOLIC ACID TAB 1 TABLET: TAB at 07:54

## 2021-08-17 RX ADMIN — AZITHROMYCIN 500 MG: 250 TABLET, FILM COATED ORAL at 07:53

## 2021-08-17 RX ADMIN — PANTOPRAZOLE SODIUM 40 MG: 40 TABLET, DELAYED RELEASE ORAL at 05:25

## 2021-08-17 RX ADMIN — ATORVASTATIN CALCIUM 80 MG: 40 TABLET, FILM COATED ORAL at 22:29

## 2021-08-17 RX ADMIN — PREGABALIN 50 MG: 25 CAPSULE ORAL at 22:29

## 2021-08-17 RX ADMIN — FAMOTIDINE 20 MG: 20 TABLET ORAL at 22:30

## 2021-08-17 RX ADMIN — ENOXAPARIN SODIUM 30 MG: 30 INJECTION SUBCUTANEOUS at 22:27

## 2021-08-17 RX ADMIN — CEFTRIAXONE SODIUM 1000 MG: 1 INJECTION, POWDER, FOR SOLUTION INTRAMUSCULAR; INTRAVENOUS at 13:33

## 2021-08-17 RX ADMIN — Medication 10 MG: at 22:30

## 2021-08-17 RX ADMIN — ENOXAPARIN SODIUM 30 MG: 30 INJECTION SUBCUTANEOUS at 07:54

## 2021-08-17 RX ADMIN — CARBOXYMETHYLCELLULOSE SODIUM 1 DROP: 10 GEL OPHTHALMIC at 08:00

## 2021-08-17 RX ADMIN — INSULIN LISPRO 6 UNITS: 100 INJECTION, SOLUTION INTRAVENOUS; SUBCUTANEOUS at 17:45

## 2021-08-17 RX ADMIN — REMDESIVIR 100 MG: 100 INJECTION, POWDER, LYOPHILIZED, FOR SOLUTION INTRAVENOUS at 05:25

## 2021-08-17 RX ADMIN — BRIMONIDINE TARTRATE 1 DROP: 2 SOLUTION OPHTHALMIC at 22:28

## 2021-08-17 RX ADMIN — SODIUM CHLORIDE 25 ML: 9 INJECTION, SOLUTION INTRAVENOUS at 13:32

## 2021-08-17 RX ADMIN — CARBOXYMETHYLCELLULOSE SODIUM 1 DROP: 10 GEL OPHTHALMIC at 22:28

## 2021-08-17 RX ADMIN — DEXAMETHASONE SODIUM PHOSPHATE 6 MG: 10 INJECTION, SOLUTION INTRAMUSCULAR; INTRAVENOUS at 04:02

## 2021-08-17 RX ADMIN — PREGABALIN 50 MG: 25 CAPSULE ORAL at 07:53

## 2021-08-17 RX ADMIN — SODIUM CHLORIDE, PRESERVATIVE FREE 10 ML: 5 INJECTION INTRAVENOUS at 07:54

## 2021-08-17 RX ADMIN — INSULIN GLARGINE 50 UNITS: 100 INJECTION, SOLUTION SUBCUTANEOUS at 09:23

## 2021-08-17 RX ADMIN — ASPIRIN 162 MG: 81 TABLET, CHEWABLE ORAL at 07:53

## 2021-08-17 ASSESSMENT — PAIN SCALES - GENERAL
PAINLEVEL_OUTOF10: 0
PAINLEVEL_OUTOF10: 0

## 2021-08-17 NOTE — FLOWSHEET NOTE
Called Pt's room for telephonic  visit due to Pt being in droplet plus isolation. Pt stated he was working with PT right now, but was open to follow up call later today. Assured him that we would attempt to call him back.     Rodney Cooneylim   Associate       08/17/21 5363   Encounter Summary   Services provided to: Patient   Referral/Consult From: Other    Support System Family members   Continue Visiting   (8/17 follow up call, Pt with PT)   Complexity of Encounter Low   Length of Encounter 15 minutes

## 2021-08-17 NOTE — PROGRESS NOTES
Progress Note    Admit Date:  8/12/2021    Admitted with COVID-19 pneumonia, hypoxemia. Patient was not vaccinated. He reports that all his family members now have Covid      Subjective:  Mr. Annette Palencia  seen  Looks much better today . sitting up in the bed. no cough or shortness of breath     He is improving. No shortness of breath. O2 requirement improved O2 titrated down to 1.5 L now    Objective:   BP (!) 143/74   Pulse 82   Temp 96.8 °F (36 °C) (Oral)   Resp 18   Ht 5' 9\" (1.753 m)   Wt 212 lb 9 oz (96.4 kg)   SpO2 93%   BMI 31.39 kg/m²     No intake or output data in the 24 hours ending 08/17/21 9128      Physical Exam:  General: Elderly male , awake alert and well-oriented  No  distress . awake, alert, oriented. Vision and hearing is poor  Skin:  Warm and dry  Neck:  JVD absent. Neck supple  Chest: Diminished breath sounds. No wheezes or rhonchi. Cardiovascular:  RRR ,S1S2 normal  Abdomen:  Soft, non tender, non distended, BS +  Extremities:  No edema. Intact peripheral pulses.  Brisk cap refill, < 2 secs  Neuro: non focal      Medications:   Scheduled Meds:   azithromycin  500 mg Oral Daily    vitamin B complex w/C  1 tablet Oral Daily    brimonidine  1 drop Both Eyes BID    Vitamin D  5,000 Units Oral Daily    Cyanocobalamin  1 tablet Sublingual Daily    dorzolamide  1 drop Ophthalmic BID    pantoprazole  40 mg Oral QAM AC    lidocaine  1 patch Transdermal Daily    carboxymethylcellulose PF  1 drop Both Eyes TID    insulin glargine  50 Units Subcutaneous BID    insulin lispro  5 Units Subcutaneous TID WC    pregabalin  50 mg Oral BID    atorvastatin  80 mg Oral Nightly    aspirin  162 mg Oral Daily    sodium chloride flush  5-40 mL Intravenous 2 times per day    enoxaparin  30 mg Subcutaneous BID    famotidine  20 mg Oral BID    insulin lispro  0-12 Units Subcutaneous TID WC    insulin lispro  0-6 Units Subcutaneous Nightly    dexamethasone  6 mg Intravenous Q24H    melatonin  10 mg Oral Nightly    cefTRIAXone (ROCEPHIN) IV  1,000 mg Intravenous Q24H       Continuous Infusions:   dextrose      sodium chloride 25 mL (08/17/21 1332)    sodium chloride         Data:  CBC:   Recent Labs     08/15/21  0555 08/16/21  0543 08/17/21  0559   WBC 9.7 8.7 10.9   RBC 4.42 4.31 4.31   HGB 13.3* 13.2* 13.3*   HCT 40.2* 39.3* 39.8*   MCV 91.0 91.2 92.3   RDW 14.4 14.2 14.4    199 202     BMP:   No results for input(s): NA, K, CL, CO2, PHOS, BUN, CREATININE in the last 72 hours. Invalid input(s): CA  BNP: No results for input(s): BNP in the last 72 hours. PT/INR: No results for input(s): PROTIME, INR in the last 72 hours. APTT: No results for input(s): APTT in the last 72 hours. CARDIAC ENZYMES:   No results for input(s): CKMB, CKMBINDEX, TROPONINI in the last 72 hours. Invalid input(s): CKTOTAL;3  FASTING LIPID PANEL:  Lab Results   Component Value Date    CHOL 172 01/17/2020    HDL 42 01/17/2020    TRIG 219 (H) 01/17/2020     LIVER PROFILE:   Recent Labs     08/16/21  0543 08/17/21  0559   AST 37 38*   ALT 24 26   BILIDIR <0.2 <0.2   BILITOT 0.5 0.4   ALKPHOS 96 101        Radiology  XR CHEST PORTABLE   Final Result   Severe perihilar and more diffuse reticular and ground-glass opacities   throughout the lungs likely represents pulmonary edema or viral pneumonitis. Assessment:  Active Problems:    COVID-19 virus infection    Acute respiratory failure with hypoxia (HCC)    Type 2 diabetes mellitus with hyperglycemia, with long-term current use of insulin (HCC)  Resolved Problems:    * No resolved hospital problems. *      Plan:     #COVID 19 Pneumonia  #Acute hypoxic respiratory failure  #Possible secondary bacterial pneumonia  - initially diagnosed 8/4/21. He was not vaccinated.   His symptoms have progressively worsened and he is now hypoxic .   -Continue Decadron D#5  -Continue remdesivir D#5  - elevated PCT, cannot rule out superimposed bacterial infection, started on Rocephin and Azithromycin D#5  -Seen by pulmonology  - supplemental O2, cont to wean as able. Continues to require supplemental O2 3 L-> 5L-> 4L-> 2L --> 1.5 L . Desaturates with exertion. Monitor closely with continous pulse oximetry, telemetry   - droplet + isolation   - cont pulse oximetry, telemetry  -Lovenox twice daily     #Elevated troponin   - 0.03 on admit. No CP, no ischemia on EKG. Checked repeat trop < 0.01     #Elevated d dimer   - mild elevated at 390, suspect related to COVID 19      #Thrombocytopenia   - mild at 120k, could be 2/2 acute viral infection. Monitor CBC daily      #DM2  With steroid-induced hypoglycemia  - Cont Lantus-increased dose.  + SSI      #DM Neuropathy   - cont Lyrica     #Colonic lesion sp right colectomy 11/2020     #Pancreatic mass  - f/w hem/onc who is monitoring size of lesion    # glaucoma  - resume eye drops         DVT Prophylaxis: Lovenox   Diet: ADULT DIET; Regular; 4 carb choices (60 gm/meal)  Code Status: Full Code     He is improving wean oxygen as tolerated.    hopefully  he can be discharged home soon    Sangeeta Ernst MD, MD 8/17/2021 5:36 PM

## 2021-08-17 NOTE — PROGRESS NOTES
Pt is lying in bed with their eyes closed. Respirations are easy and even. Call light within reach bed in lowest position with the wheels locked. Will continue to monitor.  Fatuma Canales RN

## 2021-08-17 NOTE — PROGRESS NOTES
Inpatient Occupational Therapy  Evaluation and Treatment    Unit: [address] [place]  Date:  8/17/2021  Patient Name:    Carlos Kirby  Admitting diagnosis:  Acute respiratory failure with hypoxia ([facility] [address] [J96.01]  COVID-19 virus infection [U07.1]  Pneumonia due to COVID-19 virus [U07.1, J12.82]  Admit Date:  8/12/2021  Precautions/Restrictions/WB Status/ Lines/ Wounds/ Oxygen: Fall risk, Bed/chair alarm, Lines -IV and Supplemental O2 (5 L) and Isolation Precautions: Droplet Plus - COVID, Impaired vision    Treatment Time:  984-778  Treatment Number: 1     Billable Treatment Time:   46 minutes   Total Treatment Time:   56 minutes    Patient Goals for Therapy:  \" to get stronger again \"      Discharge Recommendations: SNF  DME needs for discharge: defer to facility       Therapy recommendations for staff:   Assist of 1 with use of standard walker (SW) and gait belt for all transfers and ambulation to/from chair  to/from bathroom  within room    History of Present Illness: Per Dr. Marcial Orozco 8/13/21 note, The patient is an 58-year-old man with a past medical history of type 2 diabetes, diagnosed with COVID-19 infection on 8/3/2021 who was brought in by EMS for increasing shortness of breath and respiratory distress. Upon arrival to the ER he was noted to be hypoxemic with an ox saturation of 88%. He has multiple family members at home who was diagnosed with COVID-19 including his wife was recently admitted to [facility].  He has not been vaccinated. Home Health S4 Level Recommendation:  NA  AM-PAC Score:  15    Preadmission Environment    Pt.  Lives with family (wife, son, and uncle (who pt reports is in hospice))  Home environment:    one story home  Steps to enter first floor: 2 steps to enter and ramp (2 REGIS through garage with BHR; ramp through front door)  Steps to second floor: N/A  Bathroom: tub/shower unit, comfort height toilet and grab bars  Equipment owned: rollator, shower chair/bench and [facility]     Preadmission Status:  Pt. Able to drive: No  Pt Fully independent with ADLs: Yes  Pt. Required assistance from family for: Cleaning and Laundry   Pt. independent for transfers and gait and walked with No Device  History of falls Yes and reports it's been a while    Pain   No  Location: N/A  Rating: NA /10  Pain Medicine Status: Denies need     Cognition    A&O x4   Able to follow 2 step commands     Subjective  Patient lying supine in bed with no family present. Pt agreeable to this OT eval & tx. Upper Extremity ROM:    WFL,  pt able to perform all bed mobility, transfers, and gait without ROM limitation. Upper Extremity Strength:    BUE strength WFL    Coordination and Tone  WFL    Balance  Sitting:             Good ; Supervision  Comments: sitting EOB     Standing:         Fair +; Min A   Comments: No device while OT assists with hygiene; CGA-min(A) with SW     Bed Mobility   Supine to Sit:               Supervision  Sit to Supine:               Not Tested  Rolling:                        Not Tested  Scooting in sitting:       Supervision  Scooting in supine:     Not Tested  Bed to [facility]:  Not Tested    Dressing:      UE: Min A with hospital gown  LE:    Not Tested-Min A needed to rearrange socks    Bathing:    UE:  Not Tested  LE:  Max A    Eating:   Supervision/setup    Toileting: Total A -assist with wiping after BM-no brief was worn. Activity Tolerance   Pt completed therapy session with No adverse symptoms noted w/activity    After Transfer  SpO2: 94% on 4L O2; RN present and decreased O2 to 3L   HR: 76 bpm  BP: 126/68  After Ambulation  SpO2: 92% on 3 L O2  HR: 80 bpm    Positioning Needs:   Pt up in chair, alarm set, positioned in proper neutral alignment and pressure relief provided.    Call light provided and all needs within reach    Exercise / Activities Initiated:   ADL retraining/toileting    Patient/Family Education:   Role of OT  Recommendations for DC    Assessment of Deficits: Pt seen for Occupational therapy evaluation in acute care setting. Pt demonstrated decreased Activity tolerance, ADLs, IADLs, Balance , Bed mobility, Strength and Transfers. Pt functioning below baseline and will likely benefit from skilled occupational therapy services to maximize safety and independence. Goal(s) : To be met in 3 Visits:  1). Bed to toilet: Antolin with RW    To be met in 5 Visits:  1). Supine to/from Sit:  Independent  2). Upper Body Bathing:   Independent  3). Lower Body Bathing:   Min A  4). Upper Body Dressing:  Independent  5). Lower Body Dressing:  Min A  6). Pt to demonstrate UE exs x 15 reps with minimal cues    Rehabilitation Potential:  Good for goals listed above. Strengths for achieving goals include: Pt motivated, PLOF, Family Support and Pt cooperative  Barriers to achieving goals include:  No barriers     Plan: To be seen 3-5 x/wk while in acute care setting for therapeutic exercises, bed mobility, transfers, dressing, bathing, family/patient education, ADL/IADL retraining, energy conservation training.      Ana Doran MS, OTR/L  #[id number]            If patient discharges from this facility prior to next visit, this note will serve as the Discharge Summary

## 2021-08-17 NOTE — FLOWSHEET NOTE
Followed up with Pt via phone for telephonic  visit. Listened and offered emotional support. Pt shared about importance of his tello and how his beliefs have changed throughout the years. Shared about struggles with illness.  acknowledged and affirmed Pt's emotions. Prayed with Pt for his health. [address] [facility] Associate       08/17/21 1981   Encounter Summary   Services provided to: Patient   Referral/Consult From: [address] [place] [address] Family members   Continue Visiting   (8/17 follow up, sppt and prayer)   Complexity of Encounter Moderate   Length of Encounter 30 minutes   Spiritual/Shinto   Type Spiritual support   Assessment Calm; Approachable; Hopeful;Coping   Intervention Active listening;Explored feelings, thoughts, concerns;Prayer;Sustaining presence/ Ministry of presence; Discussed relationship with God;Discussed belief system/Buddhist practices/tello;Discussed illness/injury and it's impact   Outcome Comfort;Expressed gratitude;Engaged in conversation; Tearful

## 2021-08-17 NOTE — PROGRESS NOTES
hospice))  Home environment:  one story home  Steps to enter first floor: 2 steps to enter and ramp (2 REGIS through garage with BHR; ramp through front door)  Steps to second floor: N/A  Bathroom: tub/shower unit, comfort height toilet and grab bars  Equipment owned: rollator, shower chair/bench and SPC    Preadmission Status:  Pt. Able to drive: No  Pt Fully independent with ADLs: Yes  Pt. Required assistance from family for: Cleaning and Laundry   Pt. independent for transfers and gait and walked with No Device  History of falls Yes and reports it's been a while    Pain   No  Location: N/A  Rating: NA /10  Pain Medicine Status: Denies need    Cognition    A&O x4   Able to follow 2 step commands    Subjective  Patient lying supine in bed with no family present. Pt agreeable to this PT eval & tx. Upper Extremity ROM/Strength  Please see OT evaluation.       Lower Extremity ROM / Strength   AROM WFL: Yes  ROM limitations: None    Strength Assessment (measured on a 0-5 scale):  R LE   Quad   4+   Ant Tib  4+   Hamstring 4+   Iliopsoas 4+  L LE  Quad   4+   Ant Tib  4+   Hamstring 4+   Iliopsoas 4+    Lower Extremity Sensation    Impaired and pt reports numbness and tingling in B feet due to neuropathy    Lower Extremity Proprioception:   Diminished    Coordination and Tone  WNL    Balance  Sitting:  Good ; Supervision  Comments: sitting EOB    Standing: Fair +; Min A   Comments: No device while OT assists with hygiene; CGA-min(A) with SW    Bed Mobility   Supine to Sit:    Supervision  Sit to Supine:   Not Tested  Rolling:   Not Tested  Scooting in sitting: Supervision  Scooting in supine:  Not Tested    Transfer Training     Sit to stand:   Min A   Stand to sit:   Min A   Bed to Chair:   Mod A  with use of No AD for stand pivot transfer from bed > BSC >recliner due to decreased balance    Gait gait completed as indicated below  Distance:      20 ft  Deviations (firm surface/linoleum):  decreased yumiko and forward flexed posture  Assistive Device Used:    gait belt and standard walker (SW)  Level of Assist:    CGA and Min A   Comment: Increased time to complete, improved balance with use of SW vs no AD    Stair Training deferred, pt unsafe/ not appropriate to complete stairs at this time    Activity Tolerance   Pt completed therapy session with No adverse symptoms noted w/activity     After Transfer  SpO2: 94% on 4L O2; RN present and decreased O2 to 3L   HR: 76 bpm  BP: 126/68  After Ambulation  SpO2: 92% on 3 L O2  HR: 80 bpm    Positioning Needs   Pt up in chair, alarm set, positioned in proper neutral alignment and pressure relief provided. Call light provided and all needs within reach    Exercises Initiated  Nettie deferred secondary to treatment focus on functional mobility  NA Pt educated on seated and supine exercise to complete outside of PT treatment. Pt demonstrates one rep of each exercise and states he has a routine he completes supine in bed each morning at home. Pt encouraged to continue routine while in hospital     Patient/Family Education   Pt educated on role of inpatient PT, POC, importance of continued activity, safety awareness, transfer techniques, pursed lip breathing, energy conservation, pacing activity, HEP and calling for assist with mobility. Assessment  Pt seen for Physical Therapy evaluation in acute care setting. Pt demonstrated decreased Activity tolerance, Balance, Safety and Strength as well as decreased independence with Ambulation, Bed Mobility  and Transfers. Pt tolerates session well on 3L O2 with SpO2 remaining above 92% with activity. Recommending SNF upon discharge as patient functioning well below baseline, demonstrates good rehab potential and unable to return home due to inability to negotiate stairs to enter home/bedroom/bathroom, burden of care beyond caregiver ability and home environment not conducive to patient recovery. Goals : To be met in 3 visits:  1). Independent with LE Ex x 10 reps    To be met in 6 visits:  1). Supine to/from sit: Independent  2). Sit to/from stand: Supervision  3). Bed to chair: Supervision  4). Gait: Ambulate 50 ft.  with  Supervision and use of LRAD (least restrictive assistive device)  5). Tolerate B LE exercises 3 sets of 10-15 reps  6). Ascend/descend 2 steps with Supervision with use of hand rail bilateral and No AD    Rehabilitation Potential: Good  Strengths for achieving goals include:   Pt motivated, PLOF, Family Support and Pt cooperative   Barriers to achieving goals include:    No Barriers    Plan    To be seen 3-5 x / week  while in acute care setting for therapeutic exercises, bed mobility, transfers, progressive gait training, balance training, and family/patient education. Signature: Morgan Clinton, PT, DPT      If patient discharges from this facility prior to next visit, this note will serve as the Discharge Summary.

## 2021-08-17 NOTE — PROGRESS NOTES
Pulmonary Progress Note    CC: shortness of breath     Subjective:   Patient feels about the same, fatigued. Intake/Output Summary (Last 24 hours) at 8/17/2021 0856  Last data filed at 8/16/2021 1314  Gross per 24 hour   Intake 240 ml   Output 600 ml   Net -360 ml       Exam:   BP (!) 144/88   Pulse 76   Temp 97.5 °F (36.4 °C) (Oral)   Resp 16   Ht 5' 9\" (1.753 m)   Wt 212 lb 9 oz (96.4 kg)   SpO2 92%   BMI 31.39 kg/m²  on 4l NC  Gen: No distress. Normocephalic, atraumatic. Eyes: PERRL. No sclera icterus. No conjunctival injection. ENT: No discharge. Pharynx clear. Neck: Trachea midline. No obvious mass. Resp: No accessory muscle use.  bilateral  fine crackles. No wheezes. No rhonchi. No dullness on percussion. CV: Regular rate. Regular rhythm. No murmur or rub. No edema. GI: Non-tender. Non-distended. No hernia. Skin: Warm and dry. No nodule on exposed extremities. M/S: No cyanosis. No joint deformity. No clubbing. Neuro: Awake. Alert. Moves all four extremities. Psych: Oriented x 3. No anxiety.      Scheduled Meds:   azithromycin  500 mg Oral Daily    vitamin B complex w/C  1 tablet Oral Daily    brimonidine  1 drop Both Eyes BID    Vitamin D  5,000 Units Oral Daily    Cyanocobalamin  1 tablet Sublingual Daily    dorzolamide  1 drop Ophthalmic BID    pantoprazole  40 mg Oral QAM AC    lidocaine  1 patch Transdermal Daily    carboxymethylcellulose PF  1 drop Both Eyes TID    insulin glargine  50 Units Subcutaneous BID    insulin lispro  5 Units Subcutaneous TID WC    pregabalin  50 mg Oral BID    atorvastatin  80 mg Oral Nightly    aspirin  162 mg Oral Daily    sodium chloride flush  5-40 mL Intravenous 2 times per day    enoxaparin  30 mg Subcutaneous BID    famotidine  20 mg Oral BID    insulin lispro  0-12 Units Subcutaneous TID WC    insulin lispro  0-6 Units Subcutaneous Nightly    dexamethasone  6 mg Intravenous Q24H    melatonin  10 mg Oral Nightly    cefTRIAXone (ROCEPHIN) IV  1,000 mg Intravenous Q24H     Continuous Infusions:   dextrose      sodium chloride 25 mL (08/16/21 1258)    sodium chloride       PRN Meds:  docusate sodium, polyethylene glycol, glucose, dextrose, glucagon (rDNA), dextrose, sodium chloride flush, sodium chloride, ondansetron **OR** ondansetron, polyethylene glycol, acetaminophen **OR** acetaminophen, guaiFENesin-dextromethorphan, sodium chloride    Labs:  CBC:   Recent Labs     08/15/21  0555 08/16/21  0543 08/17/21  0559   WBC 9.7 8.7 10.9   HGB 13.3* 13.2* 13.3*   HCT 40.2* 39.3* 39.8*   MCV 91.0 91.2 92.3    199 202     BMP:   No results for input(s): NA, K, CL, CO2, PHOS, BUN, CREATININE in the last 72 hours. Invalid input(s): CA  LIVER PROFILE:   Recent Labs     08/16/21  0543 08/17/21  0559   AST 37 38*   ALT 24 26   BILIDIR <0.2 <0.2   BILITOT 0.5 0.4   ALKPHOS 96 101     PT/INR: No results for input(s): PROTIME, INR in the last 72 hours. APTT: No results for input(s): APTT in the last 72 hours. UA:No results for input(s): NITRITE, COLORU, PHUR, LABCAST, WBCUA, RBCUA, MUCUS, TRICHOMONAS, YEAST, BACTERIA, CLARITYU, SPECGRAV, LEUKOCYTESUR, UROBILINOGEN, BILIRUBINUR, BLOODU, GLUCOSEU, AMORPHOUS in the last 72 hours. Invalid input(s): KETONESU  No results for input(s): PHART, QUI2RAO, PO2ART in the last 72 hours.     Cultures:   8/4 sars Cov2- detected    Films:  CXR 8/13: The heart and mediastinum are normal.  Severe perihilar opacities have  developed centrally, worse on the right.  More diffuse reticular and  ground-glass opacities throughout both lungs.  Suspected small pleural  effusions.  No skeletal finding.           ASSESSMENT:  · Acute respiratory failure with hypoxemia  · COVID-19 pneumonia  · Hyponatremia  · Thrombocytopenia- improved     PLAN:  · Supplemental oxygen to maintain SaO2 >92%; wean as tolerated  · Droplet plus respiratory isolation  · D5 Decadron 6 mg daily  · D5 Remdesivir, will need daily laboratory monitoring to avoid toxicity  · CTX and azithro D6  · Monitor platelets

## 2021-08-17 NOTE — PROGRESS NOTES
Patient at 88% on 1.5 L/min while sitting up on side of bed. Oxygen increased to 2.5 L/min. Will continue to monitor.

## 2021-08-18 ENCOUNTER — APPOINTMENT (OUTPATIENT)
Dept: GENERAL RADIOLOGY | Age: 81
DRG: 177 | End: 2021-08-18
Payer: MEDICARE

## 2021-08-18 LAB
ALBUMIN SERPL-MCNC: 2.9 G/DL (ref 3.4–5)
ALP BLD-CCNC: 124 U/L (ref 40–129)
ALT SERPL-CCNC: 32 U/L (ref 10–40)
AST SERPL-CCNC: 43 U/L (ref 15–37)
BASOPHILS ABSOLUTE: 0 K/UL (ref 0–0.2)
BASOPHILS RELATIVE PERCENT: 0 %
BILIRUB SERPL-MCNC: 0.5 MG/DL (ref 0–1)
BILIRUBIN DIRECT: <0.2 MG/DL (ref 0–0.3)
BILIRUBIN, INDIRECT: ABNORMAL MG/DL (ref 0–1)
EOSINOPHILS ABSOLUTE: 0 K/UL (ref 0–0.6)
EOSINOPHILS RELATIVE PERCENT: 0 %
GLUCOSE BLD-MCNC: 113 MG/DL (ref 70–99)
GLUCOSE BLD-MCNC: 192 MG/DL (ref 70–99)
GLUCOSE BLD-MCNC: 216 MG/DL (ref 70–99)
GLUCOSE BLD-MCNC: 241 MG/DL (ref 70–99)
GLUCOSE BLD-MCNC: 258 MG/DL (ref 70–99)
GLUCOSE BLD-MCNC: 71 MG/DL (ref 70–99)
HCT VFR BLD CALC: 41.7 % (ref 40.5–52.5)
HEMOGLOBIN: 13.8 G/DL (ref 13.5–17.5)
LYMPHOCYTES ABSOLUTE: 1 K/UL (ref 1–5.1)
LYMPHOCYTES RELATIVE PERCENT: 6 %
MCH RBC QN AUTO: 30.1 PG (ref 26–34)
MCHC RBC AUTO-ENTMCNC: 33 G/DL (ref 31–36)
MCV RBC AUTO: 91 FL (ref 80–100)
MONOCYTES ABSOLUTE: 0.5 K/UL (ref 0–1.3)
MONOCYTES RELATIVE PERCENT: 3 %
NEUTROPHILS ABSOLUTE: 15.5 K/UL (ref 1.7–7.7)
NEUTROPHILS RELATIVE PERCENT: 91 %
PDW BLD-RTO: 14.3 % (ref 12.4–15.4)
PERFORMED ON: ABNORMAL
PERFORMED ON: NORMAL
PLATELET # BLD: 291 K/UL (ref 135–450)
PLATELET SLIDE REVIEW: ADEQUATE
PMV BLD AUTO: 9.3 FL (ref 5–10.5)
RBC # BLD: 4.58 M/UL (ref 4.2–5.9)
SLIDE REVIEW: ABNORMAL
TOTAL PROTEIN: 6.8 G/DL (ref 6.4–8.2)
WBC # BLD: 17 K/UL (ref 4–11)

## 2021-08-18 PROCEDURE — 99232 SBSQ HOSP IP/OBS MODERATE 35: CPT | Performed by: INTERNAL MEDICINE

## 2021-08-18 PROCEDURE — 2580000003 HC RX 258: Performed by: INTERNAL MEDICINE

## 2021-08-18 PROCEDURE — 97116 GAIT TRAINING THERAPY: CPT

## 2021-08-18 PROCEDURE — 6370000000 HC RX 637 (ALT 250 FOR IP): Performed by: INTERNAL MEDICINE

## 2021-08-18 PROCEDURE — 97110 THERAPEUTIC EXERCISES: CPT

## 2021-08-18 PROCEDURE — 71045 X-RAY EXAM CHEST 1 VIEW: CPT

## 2021-08-18 PROCEDURE — 6360000002 HC RX W HCPCS: Performed by: INTERNAL MEDICINE

## 2021-08-18 PROCEDURE — 36415 COLL VENOUS BLD VENIPUNCTURE: CPT

## 2021-08-18 PROCEDURE — 97535 SELF CARE MNGMENT TRAINING: CPT

## 2021-08-18 PROCEDURE — 6360000002 HC RX W HCPCS: Performed by: PHYSICIAN ASSISTANT

## 2021-08-18 PROCEDURE — 97530 THERAPEUTIC ACTIVITIES: CPT

## 2021-08-18 PROCEDURE — 1200000000 HC SEMI PRIVATE

## 2021-08-18 PROCEDURE — 80076 HEPATIC FUNCTION PANEL: CPT

## 2021-08-18 PROCEDURE — 2580000003 HC RX 258: Performed by: PHYSICIAN ASSISTANT

## 2021-08-18 PROCEDURE — 99233 SBSQ HOSP IP/OBS HIGH 50: CPT | Performed by: INTERNAL MEDICINE

## 2021-08-18 PROCEDURE — 85025 COMPLETE CBC W/AUTO DIFF WBC: CPT

## 2021-08-18 RX ORDER — CHOLECALCIFEROL (VITAMIN D3) 125 MCG
1000 CAPSULE ORAL DAILY
Status: DISCONTINUED | OUTPATIENT
Start: 2021-08-18 | End: 2021-08-20 | Stop reason: HOSPADM

## 2021-08-18 RX ORDER — FUROSEMIDE 10 MG/ML
20 INJECTION INTRAMUSCULAR; INTRAVENOUS ONCE
Status: COMPLETED | OUTPATIENT
Start: 2021-08-18 | End: 2021-08-18

## 2021-08-18 RX ADMIN — ENOXAPARIN SODIUM 30 MG: 30 INJECTION SUBCUTANEOUS at 20:50

## 2021-08-18 RX ADMIN — DORZOLAMIDE HYDROCHLORIDE 1 DROP: 20 SOLUTION/ DROPS OPHTHALMIC at 09:36

## 2021-08-18 RX ADMIN — B-COMPLEX W/ C & FOLIC ACID TAB 1 TABLET: TAB at 09:35

## 2021-08-18 RX ADMIN — FAMOTIDINE 20 MG: 20 TABLET ORAL at 20:52

## 2021-08-18 RX ADMIN — AZITHROMYCIN 500 MG: 250 TABLET, FILM COATED ORAL at 09:20

## 2021-08-18 RX ADMIN — PANTOPRAZOLE SODIUM 40 MG: 40 TABLET, DELAYED RELEASE ORAL at 07:01

## 2021-08-18 RX ADMIN — INSULIN LISPRO 4 UNITS: 100 INJECTION, SOLUTION INTRAVENOUS; SUBCUTANEOUS at 18:00

## 2021-08-18 RX ADMIN — BRIMONIDINE TARTRATE 1 DROP: 2 SOLUTION OPHTHALMIC at 20:56

## 2021-08-18 RX ADMIN — ASPIRIN 162 MG: 81 TABLET, CHEWABLE ORAL at 09:35

## 2021-08-18 RX ADMIN — Medication 10 ML: at 12:15

## 2021-08-18 RX ADMIN — CEFTRIAXONE SODIUM 1000 MG: 1 INJECTION, POWDER, FOR SOLUTION INTRAMUSCULAR; INTRAVENOUS at 12:18

## 2021-08-18 RX ADMIN — Medication 10 MG: at 20:51

## 2021-08-18 RX ADMIN — SODIUM CHLORIDE 25 ML: 9 INJECTION, SOLUTION INTRAVENOUS at 12:17

## 2021-08-18 RX ADMIN — BRIMONIDINE TARTRATE 1 DROP: 2 SOLUTION OPHTHALMIC at 09:36

## 2021-08-18 RX ADMIN — DORZOLAMIDE HYDROCHLORIDE 1 DROP: 20 SOLUTION/ DROPS OPHTHALMIC at 20:56

## 2021-08-18 RX ADMIN — PREGABALIN 50 MG: 25 CAPSULE ORAL at 09:35

## 2021-08-18 RX ADMIN — INSULIN GLARGINE 50 UNITS: 100 INJECTION, SOLUTION SUBCUTANEOUS at 09:33

## 2021-08-18 RX ADMIN — SODIUM CHLORIDE, PRESERVATIVE FREE 10 ML: 5 INJECTION INTRAVENOUS at 23:21

## 2021-08-18 RX ADMIN — PREGABALIN 50 MG: 25 CAPSULE ORAL at 20:52

## 2021-08-18 RX ADMIN — ENOXAPARIN SODIUM 30 MG: 30 INJECTION SUBCUTANEOUS at 09:36

## 2021-08-18 RX ADMIN — DEXAMETHASONE SODIUM PHOSPHATE 6 MG: 10 INJECTION, SOLUTION INTRAMUSCULAR; INTRAVENOUS at 04:04

## 2021-08-18 RX ADMIN — FAMOTIDINE 20 MG: 20 TABLET ORAL at 09:35

## 2021-08-18 RX ADMIN — INSULIN GLARGINE 50 UNITS: 100 INJECTION, SOLUTION SUBCUTANEOUS at 23:19

## 2021-08-18 RX ADMIN — CYANOCOBALAMIN TAB 500 MCG 1000 MCG: 500 TAB at 12:23

## 2021-08-18 RX ADMIN — FUROSEMIDE 20 MG: 10 INJECTION, SOLUTION INTRAMUSCULAR; INTRAVENOUS at 12:15

## 2021-08-18 RX ADMIN — ATORVASTATIN CALCIUM 80 MG: 40 TABLET, FILM COATED ORAL at 20:51

## 2021-08-18 RX ADMIN — SODIUM CHLORIDE, PRESERVATIVE FREE 10 ML: 5 INJECTION INTRAVENOUS at 09:37

## 2021-08-18 RX ADMIN — Medication 5000 UNITS: at 09:34

## 2021-08-18 NOTE — PROGRESS NOTES
Pulmonary Progress Note    CC: shortness of breath     Subjective:   Breathing feels okay      Intake/Output Summary (Last 24 hours) at 8/18/2021 1553  Last data filed at 8/18/2021 1324  Gross per 24 hour   Intake 928 ml   Output 800 ml   Net 128 ml       Exam:   /78   Pulse 74   Temp 97.8 °F (36.6 °C) (Oral)   Resp 16   Ht 5' 9\" (1.753 m)   Wt 212 lb 9 oz (96.4 kg)   SpO2 95%   BMI 31.39 kg/m²  on 5 L NC  General: ill appearing. Eyes: PERRL. No sclera icterus. No conjunctival injection. ENT: No discharge. Pharynx clear. Neck: Trachea midline. Normal thyroid. Resp: No accessory muscle use. No crackles. No wheezing. No rhonchi. No dullness on percussion. CV: Regular rate. Regular rhythm. No mumur or rub. No edema. Peripheral pulses are 2+. Capillary refill is less than 3 seconds. GI: Non-tender. Non-distended. No masses. No organomegaly. Normal bowel sounds. No hernia. Skin: Warm and dry. No nodule on exposed extremities. No rash on exposed extremities. Lymph: No cervical LAD. No supraclavicular LAD. M/S: No cyanosis. No joint deformity. No clubbing. Neuro: Hard of hearing but alert and oriented. Patellar reflexes are symmetric. Psych: No agitation, no anxiety, affect is full.      Scheduled Meds:   vitamin B-12  1,000 mcg Oral Daily    azithromycin  500 mg Oral Daily    vitamin B complex w/C  1 tablet Oral Daily    brimonidine  1 drop Both Eyes BID    Vitamin D  5,000 Units Oral Daily    dorzolamide  1 drop Ophthalmic BID    pantoprazole  40 mg Oral QAM AC    insulin glargine  50 Units Subcutaneous BID    insulin lispro  5 Units Subcutaneous TID WC    pregabalin  50 mg Oral BID    atorvastatin  80 mg Oral Nightly    aspirin  162 mg Oral Daily    sodium chloride flush  5-40 mL Intravenous 2 times per day    enoxaparin  30 mg Subcutaneous BID    famotidine  20 mg Oral BID    insulin lispro  0-12 Units Subcutaneous TID WC    insulin lispro  0-6 Units Subcutaneous Nightly isolation, droplet plus  -   · Supplemental oxygen to maintain SaO2 >92%; wean as tolerated  · Decadron 6 mg day #6  · S/P Remdesevir  · Ceftriaxone and azithromycin day #6, DC azithromycin today  · Monitor platelets  · Repeat chest x-ray given rising white count, f/u AM CBC

## 2021-08-18 NOTE — CARE COORDINATION
INTERDISCIPLINARY PLAN OF CARE CONFERENCE    Date/Time: 8/18/2021 3:40 PM  Completed by: Lafonda Kayser, RN, Case Management      Patient Name:  Jaison Driscoll  YOB: 1940  Admitting Diagnosis: Acute respiratory failure with hypoxia ([facility] [address] [J96.01]  COVID-19 virus infection [U07.1]  Pneumonia due to COVID-19 virus [U07.1, J12.82]     Admit Date/Time:  8/12/2021 11:49 PM    Chart reviewed. Interdisciplinary team contacted or reviewed plan related to patient progress and discharge plans. Disciplines included Case Management, Nursing, and Dietitian. Current Status:Stable  PT/OT recommendation for discharge plan of care: SNF (if declined with need home with 24hr assist and supervision with home PT    Expected D/C Disposition:  Home  Confirmed plan with patient   Yes    Met with: Spoke with pt via phone  Discharge Plan Comments:  Reviewed chart and spoke wioth pt via phone d/t covid. States plan remains to return home with Wife and Son.  states Son is able to assist physically if needed.  [facility]     Home O2 in place on admit: No  Pt informed of need to bring portable home O2 tank on day of discharge for nursing to connect prior to leaving:  Not Indicated  Verbalized agreement/Understanding:  Not Indicated

## 2021-08-18 NOTE — PLAN OF CARE
Problem: Risk for Fluid Volume Deficit  Goal: Maintain normal serum potassium, sodium, calcium, phosphorus, and pH  Outcome: Ongoing     Problem: Falls - Risk of:  Goal: Will remain free from falls  Description: Will remain free from falls  Outcome: Ongoing  Goal: Absence of physical injury  Description: Absence of physical injury  Outcome: Ongoing     Problem: Infection:  Goal: Will remain free from infection  Description: Will remain free from infection  Outcome: Ongoing     Problem: Safety:  Goal: Free from accidental physical injury  Description: Free from accidental physical injury  Outcome: Ongoing  Goal: Free from intentional harm  Description: Free from intentional harm  Outcome: Ongoing     Problem: Daily Care:  Goal: Daily care needs are met  Description: Daily care needs are met  Outcome: Ongoing     Problem: Pain:  Description: Pain management should include both nonpharmacologic and pharmacologic interventions.   Goal: Patient's pain/discomfort is manageable  Description: Patient's pain/discomfort is manageable  Outcome: Ongoing  Goal: Pain level will decrease  Description: Pain level will decrease  Outcome: Ongoing  Goal: Control of acute pain  Description: Control of acute pain  Outcome: Ongoing  Goal: Control of chronic pain  Description: Control of chronic pain  Outcome: Ongoing     Problem: Skin Integrity:  Goal: Skin integrity will stabilize  Description: Skin integrity will stabilize  Outcome: Ongoing     Problem: Discharge Planning:  Goal: Patients continuum of care needs are met  Description: Patients continuum of care needs are met  Outcome: Ongoing     Problem: Airway Clearance - Ineffective  Goal: Achieve or maintain patent airway  Outcome: Ongoing     Problem: Gas Exchange - Impaired  Goal: Absence of hypoxia  Outcome: Ongoing  Goal: Promote optimal lung function  Outcome: Ongoing     Problem: Breathing Pattern - Ineffective  Goal: Ability to achieve and maintain a regular respiratory rate  Outcome: Ongoing     Problem:  Body Temperature -  Risk of, Imbalanced  Goal: Ability to maintain a body temperature within defined limits  Outcome: Ongoing  Goal: Will regain or maintain usual level of consciousness  Outcome: Ongoing  Goal: Complications related to the disease process, condition or treatment will be avoided or minimized  Outcome: Ongoing     Problem: Isolation Precautions - Risk of Spread of Infection  Goal: Prevent transmission of infection  Outcome: Ongoing     Problem: Nutrition Deficits  Goal: Optimize nutritional status  Outcome: Ongoing     Problem: Risk for Fluid Volume Deficit  Goal: Maintain normal heart rhythm  Outcome: Ongoing  Goal: Maintain absence of muscle cramping  Outcome: Ongoing  Goal: Maintain normal serum potassium, sodium, calcium, phosphorus, and pH  Outcome: Ongoing     Problem: Loneliness or Risk for Loneliness  Goal: Demonstrate positive use of time alone when socialization is not possible  Outcome: Ongoing     Problem: Fatigue  Goal: Verbalize increase energy and improved vitality  Outcome: Ongoing     Problem: Patient Education: Go to Patient Education Activity  Goal: Patient/Family Education  Outcome: Ongoing     Problem: Skin Integrity:  Goal: Will show no infection signs and symptoms  Description: Will show no infection signs and symptoms  Outcome: Ongoing  Goal: Absence of new skin breakdown  Description: Absence of new skin breakdown  Outcome: Ongoing

## 2021-08-18 NOTE — PROGRESS NOTES
Inpatient Physical Therapy Daily Treatment Note    Unit: [address]  Date:  8/18/2021  Patient Name:    Delano Massey  Admitting diagnosis:  Acute respiratory failure with hypoxia ([facility] [address] [J96.01]  COVID-19 virus infection [U07.1]  Pneumonia due to COVID-19 virus [U07.1, J12.82]  Admit Date:  8/12/2021  Precautions/Restrictions:  Fall risk, Bed/chair alarm, Lines -IV and Supplemental O2 (5L/min), [place] (hard of hearing), Telemetry, Continuous pulse oximetry and Isolation Precautions: Droplet Plus - COVID, difficulty with vision needed well lit room. Discharge Recommendations: SNF (if declined with need home with 24hr assist and supervision with home PT  DME needs for discharge: defer to facility (Patient had all required equipment If goes home after hospital discharge)       Therapy recommendation for EMS Transport: can transport by wheelchair    Therapy recommendations for staff:   Assist of 1 (CGA) with use of rolling walker (RW) and gait belt for all transfers and ambulation to/from [facility]  to/from chair  to/from bathroom  within room    History of Present Illness: Per H&P from Dr. Abdiel Hernandez 8/13 \"The patient is I 20 y. o. male with COVID 19 diagnosed on 8/4/21, DM2, HTN, HLD who presented to [facility] with complaint of shortness of breath and cough.  He was initially seen in ER on 8/4 when he was diagnosed with COVID 19. He was not requiring oxygen and was discharged to home at time of diagnosis. Aristeo Yanes reports that over the past 2 weeks his symptoms have not improved and his shortness of breath has slowly worsened.  His cough was severe making it hard for him to breath.  He denies sputum or hemoptysis production. Aristeo Yanes has had fevers on and off. Aristeo Yanes came to ER for evaluation where he was hypoxic on RA and had temp 100F.  CXR consistent with viral PNA. Aristeo Yanes is admitted for further care. \"    Home Health S4 Level Recommendation: Level 3 Safety  AM-PAC Mobility Score   AM-PAC Inpatient Mobility Raw Score : 17       Treatment Time: 1117 - 1155  Treatment number: 2  Timed Code Treatment Minutes: 38 minutes  Total Treatment Minutes:  38  minutes    Cognition    A&O x4   Able to follow 2 step commands    Subjective  Patient sitting EOB with no family present   Pt agreeable to this PT tx. Pain   No  Location: N/A  Rating:    NA/10  Pain Medicine Status: Denies need     Bed Mobility   Supine to Sit:    Not Tested  Sit to Supine:   Not Tested  Rolling:   Not Tested  Scooting:   Modified Independent    Transfer Training     Sit to stand:   CGA  Stand to sit:   CGA  Bed to Chair:   CGA with use of gait belt and rolling walker (RW)    Gait Training gait completed as indicated below  Distance:      50 ft  Deviations (firm surface/linoleum):  decreased yumiko, increased MARSHAL, forward flexed posture, deviated path, decreased step length bilaterally and decreased stance time bilaterally  Assistive Device Used:    gait belt and rolling walker (RW)  Level of Assist:    CGA  Comment: Patient needed verbal and tactile cueing for sequencing with RW especially during turning and changing direction. Patient lost balance twice while walking but was able to catch himself with CGA from therapist and use of RW. Stair Training deferred, pt unsafe/not appropriate to complete stairs at this time  # of Steps:   N/A  Level of Assist:  Not Tested  UE Support:  NA  Assistive Device:  N/A  Pattern:   N/A  Comments:     Therapeutic Exercise all completed bilaterally unless indicated  Ankle Pumps x 20 reps  LAQ x 20 reps  marching x 20 reps    Balance  Sitting:  Good - ; Supervision  Comments:     Standing: Fair ; CGA  Comments: ~5 min    Patient Education      Role of PT, POC, Discharge recommendations, DC recommendations, safety awareness, transfer techniques, pursed lip breathing, energy conservation, pacing activity and calling for assist with mobility.      Positioning Needs       Pt up in chair, alarm set, positioned in proper neutral alignment and pressure relief provided. Call light provided and all needs within reach    ROM Measurements N/A  Knee Flexion:  Knee Extension:     Activity Tolerance   Pt completed therapy session with No adverse symptoms noted w/activity. Patient maintained SpO2 above 90% without any adverse effects throughout functional mobility    Other  N/A    Assessment :  Patient tolerated the session well. Patient reported that his uncle had passed away today and his wife had been discharged home yesterday. Patient reported his son had been taking care of wife so far. Recommending SNF upon discharge as patient functioning well below baseline, demonstrates good rehab potential and unable to return home due to limited or no family support, inability to negotiate stairs to enter home/bedroom/bathroom, burden of care beyond caregiver ability, home environment not conducive to patient recovery and limited safety awareness. Goals (all goals ongoing unless otherwise indicated)  To be met in 3 visits:  1). Independent with LE Ex x 10 reps     To be met in 6 visits:  1). Supine to/from sit: Independent  2). Sit to/from stand: Supervision  3). Bed to chair: Supervision  4). Gait: Ambulate 50 ft.  with  Supervision and use of LRAD (least restrictive assistive device)  5). Tolerate B LE exercises 3 sets of 10-15 reps  6). Ascend/descend 2 steps with Supervision with use of hand rail bilateral and No AD    Plan   Continue with plan of care. Signature: Horatio Goodpasture, MS PT, # [id number]    If patient discharges from this facility prior to next visit, this note will serve as the Discharge Summary.

## 2021-08-18 NOTE — PROGRESS NOTES
Report and pt care transferred to THE [facility] and [facility]. Stella Fuentes RN\

## 2021-08-18 NOTE — PROGRESS NOTES
Progress Note    Admit Date:  2021    Admitted with COVID-19 pneumonia, hypoxemia. Patient was not vaccinated. He reports that all his family members now have Covid    Patient's uncle-80year-old gentleman with Covid infection  yesterday ( )    Subjective:  Mr. Dejah Johnson  seen  His O2 sats had improved for the last couple of days , but hypoxemic overnight and O2 requirement up to 5 L this morning currently at 4 L after getting Lasix. patient is sitting up in the chair . No distress   no cough or shortness of breath       Objective:   /75   Pulse 75   Temp 97.6 °F (36.4 °C) (Oral)   Resp 18   Ht 5' 9\" (1.753 m)   Wt 212 lb 9 oz (96.4 kg)   SpO2 95%   BMI 31.39 kg/m²       Intake/Output Summary (Last 24 hours) at 2021 1417  Last data filed at 2021 1324  Gross per 24 hour   Intake 928 ml   Output 800 ml   Net 128 ml         Physical Exam:  General: Elderly male , awake alert and well-oriented  No  distress . awake, alert, oriented. Vision and hearing is poor  Skin:  Warm and dry  Neck:  JVD absent. Neck supple  Chest: Diminished breath sounds. No wheezes or rhonchi. Cardiovascular:  RRR ,S1S2 normal  Abdomen:  Soft, non tender, non distended, BS +  Extremities:  No edema. Intact peripheral pulses.  Brisk cap refill, < 2 secs  Neuro: non focal      Medications:   Scheduled Meds:   vitamin B-12  1,000 mcg Oral Daily    azithromycin  500 mg Oral Daily    vitamin B complex w/C  1 tablet Oral Daily    brimonidine  1 drop Both Eyes BID    Vitamin D  5,000 Units Oral Daily    dorzolamide  1 drop Ophthalmic BID    pantoprazole  40 mg Oral QAM AC    insulin glargine  50 Units Subcutaneous BID    insulin lispro  5 Units Subcutaneous TID WC    pregabalin  50 mg Oral BID    atorvastatin  80 mg Oral Nightly    aspirin  162 mg Oral Daily    sodium chloride flush  5-40 mL Intravenous 2 times per day    enoxaparin  30 mg Subcutaneous BID    famotidine  20 mg Oral BID    insulin lispro  0-12 Units Subcutaneous TID     insulin lispro  0-6 Units Subcutaneous Nightly    dexamethasone  6 mg Intravenous Q24H    melatonin  10 mg Oral Nightly    cefTRIAXone (ROCEPHIN) IV  1,000 mg Intravenous Q24H       Continuous Infusions:   dextrose      sodium chloride 25 mL (08/18/21 1217)    sodium chloride         Data:  CBC:   Recent Labs     08/16/21  0543 08/17/21  0559 08/18/21  0741   WBC 8.7 10.9 17.0*   RBC 4.31 4.31 4.58   HGB 13.2* 13.3* 13.8   HCT 39.3* 39.8* 41.7   MCV 91.2 92.3 91.0   RDW 14.2 14.4 14.3    202 291     BMP:   No results for input(s): NA, K, CL, CO2, PHOS, BUN, CREATININE in the last 72 hours. Invalid input(s): CA  BNP: No results for input(s): BNP in the last 72 hours. PT/INR: No results for input(s): PROTIME, INR in the last 72 hours. APTT: No results for input(s): APTT in the last 72 hours. CARDIAC ENZYMES:   No results for input(s): CKMB, CKMBINDEX, TROPONINI in the last 72 hours. Invalid input(s): CKTOTAL;3  FASTING LIPID PANEL:  Lab Results   Component Value Date    CHOL 172 01/17/2020    HDL 42 01/17/2020    TRIG 219 (H) 01/17/2020     LIVER PROFILE:   Recent Labs     08/16/21  0543 08/17/21  0559 08/18/21  0741   AST 37 38* 43*   ALT 24 26 32   BILIDIR <0.2 <0.2 <0.2   BILITOT 0.5 0.4 0.5   ALKPHOS 96 101 124        Radiology  XR CHEST PORTABLE   Final Result   Severe perihilar and more diffuse reticular and ground-glass opacities   throughout the lungs likely represents pulmonary edema or viral pneumonitis. Assessment:  Active Problems:    COVID-19 virus infection    Acute respiratory failure with hypoxia (HCC)    Type 2 diabetes mellitus with hyperglycemia, with long-term current use of insulin (HCC)  Resolved Problems:    * No resolved hospital problems. *      Plan:     #COVID 19 Pneumonia  #Acute hypoxic respiratory failure  #Possible secondary bacterial pneumonia  - initially diagnosed 8/4/21. He was not vaccinated. His symptoms have progressively worsened and he is now hypoxic .   -Continue Decadron D#6  -Completed 5 days of remdesivir  - elevated PCT, cannot rule out superimposed bacterial infection, started on Rocephin and Azithromycin. Completed 5 days of azithromycin. Continue Rocephin D#6  -Seen by pulmonology  - supplemental O2, cont to wean as able. Continues to require supplemental O2 3 L-> 5L-> 4L-> 2L --> 1.5 L--> back up to 5 L this morning, 4 L currently. Desaturates with exertion. Monitor closely with continous pulse oximetry, telemetry   - droplet + isolation   -Lovenox twice daily     #Elevated troponin   - 0.03 on admit. No CP, no ischemia on EKG. Checked repeat trop < 0.01     #Elevated d dimer   - mild elevated at 390, suspect related to COVID 19      #Thrombocytopenia   - mild at 120k, could be 2/2 acute viral infection. Monitor CBC daily      #DM2  With steroid-induced hypoglycemia  - Cont Lantus-increased dose.  + SSI      #DM Neuropathy   - cont Lyrica     #Colonic lesion sp right colectomy 11/2020     #Pancreatic mass  - f/w hem/onc who is monitoring size of lesion    # glaucoma  - resume eye drops         DVT Prophylaxis: Lovenox   Diet: ADULT DIET; Regular; 4 carb choices (60 gm/meal)  Code Status: Full Code      wean oxygen as tolerated. I have discussed with patient,  his oxygen levels need to stabilize before he can be discharged home. he will need home O2 on discharge and patient is aware of this.   I have discussed with him face-to-face    Seferino Goldstein MD, MD 8/18/2021 2:17 PM

## 2021-08-18 NOTE — PROGRESS NOTES
Spoke with Dr. Brandon Hutchins, informed of am assessment, crackles noted to kim lungs, one time dose william lasix ordered. See silvino Foote RN\

## 2021-08-18 NOTE — PROGRESS NOTES
Bedside Mobility Assessment Tool (BMAT):     Assessment Level 1- Sit and Shake    1. From a semi-reclined position, ask patient to sit up and rotate to a seated position at the side of the bed. Can use the bedrail. 2. Ask patient to reach out and grab your hand and shake making sure patient reaches across his/her midline. Pass- Patient is able to come to a seated position, maintain core strength. Maintains seated balance while reaching across midline. Move on to Assessment Level 2. Assessment Level 2- Stretch and Point   1. With patient in seated position at the side of the bed, have patient place both feet on the floor (or stool) with knees no higher than hips. 2. Ask patient to stretch one leg and straighten the knee, then bend the ankle/flex and point the toes. If appropriate, repeat with the other leg. Pass- Patient is able to demonstrate appropriate quad strength on intended weight bearing limb(s). Move onto Assessment Level 3. Assessment Level 3- Stand   1. Ask patient to elevate off the bed or chair (seated to standing) using an assistive device (cane, bedrail). 2. Patient should be able to raise buttocks off be and hold for a count of five. May repeat once. Pass- Patient maintains standing stability for at least 5 seconds, proceed to assessment level 4. Assessment Level 4- Walk   1. Ask patient to march in place at bedside. 2. Then ask patient to advance step and return each foot. Some medical conditions may render a patient from stepping backwards, use your best clinical judgement. Fail- Patient not able to complete tasks OR requires use of assistive device. Patient is MOBILITY LEVEL 3. Patient is able to demonstrate the ability to move from a reclining position to an upright position within the recliner.         Mobility Level- 3

## 2021-08-18 NOTE — PROGRESS NOTES
Occupational Therapy Daily Treatment Note    Unit: 2 [place]  Date:  8/18/2021  Patient Name:    Gracie Schneider  Admitting diagnosis:  Acute [facility] with [facility] ([facility] [J96.01]  COVID-19 virus infection [U07.1]  Pneumonia due to COVID-19 virus [U07.1, J12.82]  Admit Date:  8/12/2021  Precautions/Restrictions:  Fall risk, Bed/chair alarm, Lines -IV and Supplemental O2 (4L), Telemetry, Continuous pulse oximetry and Isolation Precautions: Droplet Plus - COVID        Discharge Recommendations: SNF  DME needs for discharge: defer to facility     If refuses, will need 24 hour hands on assistance and HHOT, RW       Therapy recommendations for staff:   Assist of 1 with use of rolling walker (RW) and gait belt for all ambulation to/from [facility]  to/from chair  to/from bathroom    AM-PAC Score: AM-PAC Inpatient Daily Activity Raw Score: 15  Home Health S4 Level: Level 3- Safety if going home       Treatment Time:  4371-1855  Treatment number:  2    Timed code treatment minutes: 70 minutes  Total treatment minutes:   70 minutes    History of Present Illness: Per Dr. Carla Crenshaw 8/13/21 note, \"The patient is an 27-year-old man with a past medical history of type 2 diabetes, diagnosed with COVID-19 infection on 8/3/2021 who was brought in by EMS for increasing shortness of breath and respiratory distress.  Upon arrival to the ER he was noted to be hypoxemic with an ox saturation of 88%.  He has multiple family members at home who was diagnosed with COVID-19 including his wife was recently admitted to [address] [place] [address] Road has not been vaccinated. \"    Subjective:  Patient up in chair and reports being \"bored. \" Agreeable to therapy.     Pain   No  Rating: NA  Location:  Pain Medicine Status: No request made      Bed Mobility:   Supine to Sit:  Not Tested  Sit to Supine:  Not Tested  Rolling:           Not Tested  Scooting:        SBA    Transfer Training:   Sit to stand:   Min A with cues for hand placement  Stand to sit:  Min A   BSC to Chair:  Min A  and with gait belt, RW, cues for hand placement  Bed to BSC:   Min A  and RW, gait belt  Standard toilet:   Not Tested    Activity Tolerance   Pt completed therapy session with No adverse symptoms noted w/activity   At rest:  SpO2 94% on 4L  HR 86    After functional mobility in room:  SpO2 90% on 4L  HR 84  Improved to 92% with 1 min seated rest, PLB    After seated UE exercises:  SpO2 95% on 4L  HR 90    ADL Training:   Upper body dressing: Mod A change gown  Upper body bathing:  Not Tested  Lower body dressing:  Not Tested  Lower body bathing:  Not Tested  Toileting:   SBA -urinated on [facility]  Grooming/Hygiene:  Not Tested    Therapeutic Exercise: all completed bilaterally unless indicated  Scapular retraction:  x10  Shoulder rolls CW/CCW: x10  Arm circles at 90 degrees CW/CCW:  x10, 2 sets   Hands clasped and punching up toward ceiling from chest level to facilitate BUE flex/ext of multiple joints: x10, 2 sets    Patient Education:   Role of OT  Recommendations for DC  Energy conservation techniques  Safe RW use/hand placement  Oxygen tubing management-extensive education provided; this is a significant risk factor for patient as he has low vision and his wife is also newly on continuous oxygen so there will be multiple O2 lines in home environment. Positioning Needs:   Pt up in chair, alarm set, positioned in proper neutral alignment and pressure relief provided. Call light provided and all needs within reach    Family Present:  No    Assessment: Making progress, continue as tolerated. GOALS  To be met in 3 Visits:  1). Bed to toilet: Antolin with RW     To be met in 5 Visits:  1). Supine to/from Sit:             Independent  2). Upper Body Bathing:         Independent  3). Lower Body Bathing:         Min A  4). Upper Body Dressing:       Independent  5). Lower Body Dressing:       Min A  6).  Pt to demonstrate UE exs x 15 reps with minimal cues      Plan: cont with [address], OTR/L 0957        If patient discharges from this facility prior to next visit, this note will serve as the Discharge Summary

## 2021-08-18 NOTE — PLAN OF CARE
Problem: Falls - Risk of:  Goal: Will remain free from falls  Description: Will remain free from falls  8/17/2021 2340 by Aliza Maloney RN  Outcome: Met This Shift  8/17/2021 1125 by Russ Rocha RN  Outcome: Ongoing  Goal: Absence of physical injury  Description: Absence of physical injury  8/17/2021 2340 by Aliza Maloney RN  Outcome: Met This Shift  8/17/2021 1125 by Russ Rocha RN  Outcome: Ongoing     Problem: Infection:  Goal: Will remain free from infection  Description: Will remain free from infection  8/17/2021 2340 by Aliza Maloney RN  Outcome: Met This Shift  8/17/2021 1125 by Russ Rocha RN  Outcome: Ongoing     Problem: Safety:  Goal: Free from accidental physical injury  Description: Free from accidental physical injury  8/17/2021 2340 by Aliza Maloney RN  Outcome: Met This Shift  8/17/2021 1125 by Russ Rocha RN  Outcome: Ongoing  Goal: Free from intentional harm  Description: Free from intentional harm  8/17/2021 1125 by Russ Rocha RN  Outcome: Ongoing     Problem: Daily Care:  Goal: Daily care needs are met  Description: Daily care needs are met  8/17/2021 2340 by Aliza Maloney RN  Outcome: Met This Shift  8/17/2021 1125 by Russ Rocha RN  Outcome: Ongoing

## 2021-08-18 NOTE — PROGRESS NOTES
Shift assessment complete - See flow sheet. Nightly medications given - See [facility]         Patient with no complaints of pain at this time. Patient denies any further needs. Bed alarm is set for patient safety. Call light explained and in reach      Pt needed to be increased to 4.5L as he was dropping into the 80's (87-89%) on 2.5 L Will continue to monitor.  Freddie Hood, RN, RN

## 2021-08-19 LAB
ANION GAP SERPL CALCULATED.3IONS-SCNC: 10 MMOL/L (ref 3–16)
BASOPHILS ABSOLUTE: 0 K/UL (ref 0–0.2)
BASOPHILS RELATIVE PERCENT: 0 %
BUN BLDV-MCNC: 22 MG/DL (ref 7–20)
CALCIUM SERPL-MCNC: 8.6 MG/DL (ref 8.3–10.6)
CHLORIDE BLD-SCNC: 104 MMOL/L (ref 99–110)
CO2: 26 MMOL/L (ref 21–32)
CREAT SERPL-MCNC: 0.9 MG/DL (ref 0.8–1.3)
EOSINOPHILS ABSOLUTE: 0 K/UL (ref 0–0.6)
EOSINOPHILS RELATIVE PERCENT: 0 %
GFR AFRICAN AMERICAN: >60
GFR NON-AFRICAN AMERICAN: >60
GLUCOSE BLD-MCNC: 100 MG/DL (ref 70–99)
GLUCOSE BLD-MCNC: 130 MG/DL (ref 70–99)
GLUCOSE BLD-MCNC: 145 MG/DL (ref 70–99)
GLUCOSE BLD-MCNC: 167 MG/DL (ref 70–99)
GLUCOSE BLD-MCNC: 175 MG/DL (ref 70–99)
GLUCOSE BLD-MCNC: 228 MG/DL (ref 70–99)
HCT VFR BLD CALC: 40.5 % (ref 40.5–52.5)
HEMOGLOBIN: 13.3 G/DL (ref 13.5–17.5)
LYMPHOCYTES ABSOLUTE: 0.6 K/UL (ref 1–5.1)
LYMPHOCYTES RELATIVE PERCENT: 5 %
MCH RBC QN AUTO: 29.9 PG (ref 26–34)
MCHC RBC AUTO-ENTMCNC: 32.8 G/DL (ref 31–36)
MCV RBC AUTO: 91.2 FL (ref 80–100)
METAMYELOCYTES RELATIVE PERCENT: 1 %
MONOCYTES ABSOLUTE: 0.3 K/UL (ref 0–1.3)
MONOCYTES RELATIVE PERCENT: 3 %
MYELOCYTE PERCENT: 1 %
NEUTROPHILS ABSOLUTE: 10.6 K/UL (ref 1.7–7.7)
NEUTROPHILS RELATIVE PERCENT: 90 %
PDW BLD-RTO: 14.3 % (ref 12.4–15.4)
PERFORMED ON: ABNORMAL
PLATELET # BLD: 263 K/UL (ref 135–450)
PLATELET SLIDE REVIEW: ADEQUATE
PMV BLD AUTO: 9.7 FL (ref 5–10.5)
POIKILOCYTES: ABNORMAL
POLYCHROMASIA: ABNORMAL
POTASSIUM REFLEX MAGNESIUM: 4.1 MMOL/L (ref 3.5–5.1)
PROCALCITONIN: 0.08 NG/ML (ref 0–0.15)
RBC # BLD: 4.44 M/UL (ref 4.2–5.9)
SLIDE REVIEW: ABNORMAL
SODIUM BLD-SCNC: 140 MMOL/L (ref 136–145)
WBC # BLD: 11.5 K/UL (ref 4–11)

## 2021-08-19 PROCEDURE — 97110 THERAPEUTIC EXERCISES: CPT

## 2021-08-19 PROCEDURE — 84145 PROCALCITONIN (PCT): CPT

## 2021-08-19 PROCEDURE — 85025 COMPLETE CBC W/AUTO DIFF WBC: CPT

## 2021-08-19 PROCEDURE — 97535 SELF CARE MNGMENT TRAINING: CPT

## 2021-08-19 PROCEDURE — 6360000002 HC RX W HCPCS: Performed by: INTERNAL MEDICINE

## 2021-08-19 PROCEDURE — 99233 SBSQ HOSP IP/OBS HIGH 50: CPT | Performed by: INTERNAL MEDICINE

## 2021-08-19 PROCEDURE — 2580000003 HC RX 258: Performed by: INTERNAL MEDICINE

## 2021-08-19 PROCEDURE — 99232 SBSQ HOSP IP/OBS MODERATE 35: CPT | Performed by: INTERNAL MEDICINE

## 2021-08-19 PROCEDURE — 2700000000 HC OXYGEN THERAPY PER DAY

## 2021-08-19 PROCEDURE — 97116 GAIT TRAINING THERAPY: CPT

## 2021-08-19 PROCEDURE — 2580000003 HC RX 258: Performed by: PHYSICIAN ASSISTANT

## 2021-08-19 PROCEDURE — 36415 COLL VENOUS BLD VENIPUNCTURE: CPT

## 2021-08-19 PROCEDURE — 97112 NEUROMUSCULAR REEDUCATION: CPT

## 2021-08-19 PROCEDURE — 6360000002 HC RX W HCPCS: Performed by: PHYSICIAN ASSISTANT

## 2021-08-19 PROCEDURE — 6370000000 HC RX 637 (ALT 250 FOR IP): Performed by: INTERNAL MEDICINE

## 2021-08-19 PROCEDURE — 80048 BASIC METABOLIC PNL TOTAL CA: CPT

## 2021-08-19 PROCEDURE — 97530 THERAPEUTIC ACTIVITIES: CPT

## 2021-08-19 PROCEDURE — 1200000000 HC SEMI PRIVATE

## 2021-08-19 PROCEDURE — 94761 N-INVAS EAR/PLS OXIMETRY MLT: CPT

## 2021-08-19 RX ADMIN — PREGABALIN 50 MG: 25 CAPSULE ORAL at 20:20

## 2021-08-19 RX ADMIN — INSULIN GLARGINE 50 UNITS: 100 INJECTION, SOLUTION SUBCUTANEOUS at 10:08

## 2021-08-19 RX ADMIN — SODIUM CHLORIDE, PRESERVATIVE FREE 10 ML: 5 INJECTION INTRAVENOUS at 20:21

## 2021-08-19 RX ADMIN — BRIMONIDINE TARTRATE 1 DROP: 2 SOLUTION OPHTHALMIC at 10:09

## 2021-08-19 RX ADMIN — ENOXAPARIN SODIUM 30 MG: 30 INJECTION SUBCUTANEOUS at 10:08

## 2021-08-19 RX ADMIN — Medication 5000 UNITS: at 10:06

## 2021-08-19 RX ADMIN — CEFTRIAXONE SODIUM 1000 MG: 1 INJECTION, POWDER, FOR SOLUTION INTRAMUSCULAR; INTRAVENOUS at 13:18

## 2021-08-19 RX ADMIN — SODIUM CHLORIDE 25 ML: 9 INJECTION, SOLUTION INTRAVENOUS at 13:16

## 2021-08-19 RX ADMIN — B-COMPLEX W/ C & FOLIC ACID TAB 1 TABLET: TAB at 10:07

## 2021-08-19 RX ADMIN — ASPIRIN 162 MG: 81 TABLET, CHEWABLE ORAL at 10:06

## 2021-08-19 RX ADMIN — Medication 10 MG: at 20:20

## 2021-08-19 RX ADMIN — DEXAMETHASONE SODIUM PHOSPHATE 6 MG: 10 INJECTION, SOLUTION INTRAMUSCULAR; INTRAVENOUS at 02:26

## 2021-08-19 RX ADMIN — ATORVASTATIN CALCIUM 80 MG: 40 TABLET, FILM COATED ORAL at 20:20

## 2021-08-19 RX ADMIN — BRIMONIDINE TARTRATE 1 DROP: 2 SOLUTION OPHTHALMIC at 23:49

## 2021-08-19 RX ADMIN — ENOXAPARIN SODIUM 30 MG: 30 INJECTION SUBCUTANEOUS at 20:20

## 2021-08-19 RX ADMIN — DORZOLAMIDE HYDROCHLORIDE 1 DROP: 20 SOLUTION/ DROPS OPHTHALMIC at 10:05

## 2021-08-19 RX ADMIN — PREGABALIN 50 MG: 25 CAPSULE ORAL at 10:07

## 2021-08-19 RX ADMIN — PANTOPRAZOLE SODIUM 40 MG: 40 TABLET, DELAYED RELEASE ORAL at 05:13

## 2021-08-19 RX ADMIN — SODIUM CHLORIDE, PRESERVATIVE FREE 10 ML: 5 INJECTION INTRAVENOUS at 10:09

## 2021-08-19 RX ADMIN — CYANOCOBALAMIN TAB 500 MCG 1000 MCG: 500 TAB at 10:07

## 2021-08-19 RX ADMIN — DORZOLAMIDE HYDROCHLORIDE 1 DROP: 20 SOLUTION/ DROPS OPHTHALMIC at 23:50

## 2021-08-19 RX ADMIN — INSULIN GLARGINE 50 UNITS: 100 INJECTION, SOLUTION SUBCUTANEOUS at 20:26

## 2021-08-19 RX ADMIN — FAMOTIDINE 20 MG: 20 TABLET ORAL at 20:21

## 2021-08-19 RX ADMIN — FAMOTIDINE 20 MG: 20 TABLET ORAL at 10:07

## 2021-08-19 NOTE — PROGRESS NOTES
Spoke with PT/ pt's spo2 dropped to 85% 3lnc with activity,during coughing exacerbation, oxygen increased 4lnc spo2 93% with activity. Sherley Singh RN\

## 2021-08-19 NOTE — PLAN OF CARE
Problem: Infection:  Goal: Will remain free from infection  Description: Will remain free from infection  8/18/2021 2359 by Samina Sommer RN  Outcome: Ongoing  8/18/2021 1612 by Sonal Costa RN  Outcome: Ongoing

## 2021-08-19 NOTE — PROGRESS NOTES
Pt a/o. Am assessment completed. See flowsheet. spo2 94% 4lnc at this time. Bed alarm in place for safety, call light within reach. Yolanda Wadsworth RN\

## 2021-08-19 NOTE — PROGRESS NOTES
Shift assessment complete. See flow sheet. Scheduled meds given. See MAR. Patients head-toe complete, VS are logged, active bowel sound noted in all four quadrants. No needs are noted at this time. Call light and bedside table are within reach. The bed is locked and is in the lowest position.       April Ritchie RN

## 2021-08-19 NOTE — PROGRESS NOTES
Bedside report given and pt care transferred to [address] [place] [address]. Pt denies needs at this time. Call light within reach.

## 2021-08-19 NOTE — PROGRESS NOTES
Report and pt care transferred to [facility]. Arlette Ramon RN

## 2021-08-19 NOTE — PROGRESS NOTES
Shift assessment complete. See flow sheet. Scheduled meds given. See MAR. Patients head-toe complete, VS are logged, active bowel sound noted in all four quadrants. Got pt a milk and spit cup        No needs are noted at this time. Call light and bedside table are within reach.  The bed is locked and is in the lowest position possible   Eleanor Gill RN

## 2021-08-19 NOTE — PROGRESS NOTES
Pt wanted to understand each of his medications better and went through each one thouroughly. He requested a milk and spit cup at this time.  Provided pt at request

## 2021-08-19 NOTE — PROGRESS NOTES
Occupational Therapy Daily Treatment Note    Unit: 2 [place]  Date:  8/19/2021  Patient Name:    Zahira Marte  Admitting diagnosis:  Acute respiratory failure with hypoxia ([facility] [J96.01]  COVID-19 virus infection [U07.1]  Pneumonia due to COVID-19 virus [U07.1, J12.82]  Admit Date:  8/12/2021  Precautions/Restrictions:  Fall risk, Bed/chair alarm, Lines -IV and Supplemental O2 (4L), Telemetry, Continuous pulse oximetry and Isolation Precautions: Droplet Plus - COVID      Discharge Recommendations: SNF  DME needs for discharge: defer to facility     If refuses, will need 24 hour hands on assistance and HHOT, RW       Therapy recommendations for staff:   Assist of 1 with use of rolling walker (RW) and gait belt for all ambulation to/from [facility]  to/from chair  to/from bathroom    AM-PAC Score: AM-PAC Inpatient Daily Activity Raw Score: 16  Home Health S4 Level: Level 3- Safety if going home    Treatment Time:  9250-8666  Treatment number:  3    Timed code treatment minutes: 70 minutes  Total treatment minutes:   70 minutes    History of Present Illness: Per Dr. Miguel Patiño 8/13/21 note, \"The patient is an 22-year-old man with a past medical history of type 2 diabetes, diagnosed with COVID-19 infection on 8/3/2021 who was brought in by EMS for increasing shortness of breath and respiratory distress.  Upon arrival to the ER he was noted to be hypoxemic with an ox saturation of 88%.  He has multiple family members at home who was diagnosed with COVID-19 including his wife was recently admitted to [address] [place] [address] Road has not been vaccinated. \"    Subjective:  Patient up at EOB. Agreeable to therapy.     Pain   No  Rating: NA  Location:  Pain Medicine Status: No request made      Bed Mobility:   Supine to Sit:  Not Tested  Sit to Supine:  Not Tested  Rolling:           Not Tested  Scooting:        SBA    Transfer Training:   Sit to stand:   CG-Min A with cues for hand placement; multiple trials  Stand to sit:  CG-Min A with cues for hand placement; multiple trials  Bed to Chair to Bed:  CGA with RW, gait belt, cues for hand placement and to attend to obstacles (low vision), assist to manage O2 line   Bed to [facility]:   Not Tested  Standard toilet:   Not Tested     Functional mobility in room CGA with RW, gait belt, cues for hand placement and to attend to obstacles (low vision), assist to manage O2 line    Activity Tolerance   Pt completed therapy session with SOB noted with exertion, coughing episodes   At rest:  SpO2 93% on 3L  HR 86    After functional mobility in room:  SpO2 85% on 3L, difficulty recovering to 90s so increased to 4L for recovery to 93%; reduced back to 3L and able to maintain >90%  HR 80s-90s    After seated UE exercises:  SpO2 92% on 3L  HR 90    ADL Training:   Upper body dressing: Mod A change gown  Upper body bathing:  Not Tested  Lower body dressing:  Not Tested  Lower body bathing:  Not Tested  Toileting:   Not Tested  Grooming/Hygiene:  Not Tested    Therapeutic Exercise: all completed bilaterally unless indicated  Scapular retraction:  x12  Shoulder rolls CW/CCW: x12  Arm circles at 90 degrees CW/CCW:  x12   Hands clasped and punching up toward ceiling from chest level to facilitate BUE flex/ext of multiple joints: x12, 2 sets    Patient Education:   Role of OT  Recommendations for DC  Energy conservation techniques  Safe RW use/hand placement  Oxygen tubing management-extensive education provided; this is a significant risk factor for patient as he has low vision and his wife is also newly on continuous oxygen so there will be multiple O2 lines in home environment. Positioning Needs:   Pt at EOB, alarm set, positioned in proper neutral alignment and pressure relief provided. Call light provided and all needs within reach    Family Present:  No    Assessment: Making progress, continue as tolerated. Improved dynamic balance this date, good motivation. GOALS  To be met in 3 Visits:  1).  Bed to toilet: Antolin with RW      To be met in 5 Visits:  1). Supine to/from Sit:             Independent  2). Upper Body Bathing:         Independent  3). Lower Body Bathing:         Min A  4). Upper Body Dressing:       Independent  5). Lower Body Dressing:       Min A  6).  Pt to demonstrate UE exs x 15 reps with minimal cues      Plan: cont with [address] [place], OTR/L 6096        If patient discharges from this facility prior to next visit, this note will serve as the Discharge Summary

## 2021-08-19 NOTE — PROGRESS NOTES
Inpatient Physical Therapy Daily Treatment Note    Unit: 2 [place]  Date:  8/19/2021  Patient Name:    Kadie Bain  Admitting diagnosis:  Acute respiratory failure with hypoxia ([facility] [J96.01]  COVID-19 virus infection [U07.1]  Pneumonia due to COVID-19 virus [U07.1, J12.82]  Admit Date:  8/12/2021  Precautions/Restrictions:  Fall risk, Bed/chair alarm, Lines -IV and Supplemental O2 (3L/min for sitting and standing activities and 4L/min for ambulation), [place] (hard of hearing), Telemetry, Continuous pulse oximetry and Isolation Precautions: Droplet Plus - COVID, difficulty with vision needed well lit room. Discharge Recommendations: SNF (if SNF is declined, patient will need home with 24hr assist and supervision with home PT)  DME needs for discharge: defer to facility (Patient had all required equipment If goes home after hospital discharge)       Therapy recommendation for EMS Transport: can transport by wheelchair    Therapy recommendations for staff:   Assist of 1 (SBA) with use of rolling walker (RW) and gait belt for all transfers and ambulation to/from [facility]  to/from chair  to/from bathroom  within room (keep room obstacle free during ambulation)    History of Present Illness: Per H&P from Dr. Rubia Huffman 8/13 \"The patient is H 54 y. o. male with COVID 19 diagnosed on 8/4/21, DM2, HTN, HLD who presented to [facility] with complaint of shortness of breath and cough.  He was initially seen in ER on 8/4 when he was diagnosed with COVID 19. He was not requiring oxygen and was discharged to home at time of diagnosis. Reece Vanegas reports that over the past 2 weeks his symptoms have not improved and his shortness of breath has slowly worsened.  His cough was severe making it hard for him to breath.  He denies sputum or hemoptysis production. Reece Vanegas has had fevers on and off. Reece Vanegas came to ER for evaluation where he was hypoxic on RA and had temp 100F.  CXR consistent with viral PNA. Reece Vanegas is admitted for further care. \"    Home Health S4 Level Recommendation: Level 3 Safety  -PAC Mobility Score   -PAC Inpatient Mobility Raw Score : 17       Treatment Time: 1522 - 1613  Treatment number: 3  Timed Code Treatment Minutes: 51 minutes  Total Treatment Minutes: 51 minutes    Cognition    A&O x4   Able to follow 2 step commands    Subjective  Patient sitting EOB with no family present   Pt agreeable to this PT tx. Pain   No  Location: N/A  Rating:    NA/10  Pain Medicine Status: Denies need     Bed Mobility   Supine to Sit:    CGA  Sit to Supine:   CGA  Rolling:   Not Tested  Scooting:   Modified Independent    Transfer Training     Sit to stand:   SBA  Stand to sit:   SBA  Bed to Chair:   SBA with use of gait belt and rolling walker (RW)    Gait Training gait completed as indicated below  Distance:      50 ft  Deviations (firm surface/linoleum):  decreased yumiko, increased MARSHAL, forward flexed posture, deviated path, decreased step length bilaterally and decreased stance time bilaterally  Assistive Device Used:    gait belt and rolling walker (RW)  Level of Assist:    SBA  Comment: Patient performed well with obstacle walking and negotiating the furniture in the room during ambulation. Patient needed occasional cueing for managing O2 line. Patient's walking distance was limited due to coughing bouts and fatigue post coughing. Stair Training deferred, pt unsafe/not appropriate to complete stairs at this time  # of Steps:   N/A  Level of Assist:  Not Tested  UE Support:  NA  Assistive Device:  N/A  Pattern:   N/A  Comments:     Therapeutic Exercise all completed bilaterally unless indicated  Ankle Pumps x 20 reps  LAQ x 20 reps  marching x 20 reps   Thoracic expansion exercises in un supported standing. Unsupported standing eye closed hold x 20 sec  Unsupported standing with eyes closed x moderate resistance perturbations for 10 reps in all directions.      Balance  Sitting:  Good - ; Supervision  Comments:     Standing: Fair +; SBA  Comments: ~8 min + 5 min    Patient Education      Role of PT, POC, Discharge recommendations, DC recommendations, safety awareness, transfer techniques, pursed lip breathing, energy conservation, pacing activity and calling for assist with mobility. Positioning Needs       Pt in bed, alarm set, positioned in proper neutral alignment and pressure relief provided. Call light provided and all needs within reach    ROM Measurements N/A  Knee Flexion:  Knee Extension:     Activity Tolerance   Pt completed therapy session with No adverse symptoms noted w/activity. Patient maintained SpO2 above 90% without any adverse effects throughout functional mobility  Resting in seated position  SpO2: 94% on 3L/min of O2  HR: 84 bpm    Ambulation 50 ft  SpO2: 85% on 3L/min of O2, patient recovered to baseline with increase in O2 levels to 4L/min in 5 min. HR: 96 bpm    Standing activities for 5 min  SpO2: 94% on 3L/min of O2  HR: 86 bpm    Other  N/A    Assessment :  Patient tolerated the session well. Patient reported that his uncle had passed away today and his wife had been discharged home yesterday. Patient reported his son had been taking care of wife so far. Recommending SNF upon discharge as patient functioning well below baseline, demonstrates good rehab potential and unable to return home due to limited or no family support, inability to negotiate stairs to enter home/bedroom/bathroom, burden of care beyond caregiver ability, home environment not conducive to patient recovery and limited safety awareness. If goes home, patient will need uncluttered pathways and 24hr assist with supervision with home PT. Goals (all goals ongoing unless otherwise indicated)  To be met in 3 visits:  1). Independent with LE Ex x 10 reps     To be met in 6 visits:  1). Supine to/from sit: Independent  2). Sit to/from stand: Supervision  3). Bed to chair: Supervision  4).   Gait: Ambulate 50 ft.  with  Supervision and use of LRAD (least restrictive assistive device)  5). Tolerate B LE exercises 3 sets of 10-15 reps  6). Ascend/descend 2 steps with Supervision with use of hand rail bilateral and No AD    Plan   Continue with plan of care. Signature: Raeann Gauthier MS PT, # [id number]    If patient discharges from this facility prior to next visit, this note will serve as the Discharge Summary.

## 2021-08-19 NOTE — PLAN OF CARE
Problem: Falls - Risk of:  Goal: Will remain free from falls  Description: Will remain free from falls  8/19/2021 1217 by Brandi Sarkar RN  Outcome: Ongoing  8/19/2021 0000 by Tushar Bates RN  Outcome: Ongoing  8/18/2021 2359 by Tushar Bates RN  Outcome: Ongoing  Goal: Absence of physical injury  Description: Absence of physical injury  8/19/2021 1217 by Brandi Sarkar RN  Outcome: Ongoing  8/19/2021 0000 by Tushar Bates RN  Outcome: Ongoing  8/18/2021 2359 by Tushar Bates RN  Outcome: Ongoing     Problem: Infection:  Goal: Will remain free from infection  Description: Will remain free from infection  8/19/2021 1217 by Brandi Sarkar RN  Outcome: Ongoing  8/19/2021 0000 by Tushar Bates RN  Outcome: Ongoing  8/18/2021 2359 by Tushar Bates RN  Outcome: Ongoing     Problem: Safety:  Goal: Free from accidental physical injury  Description: Free from accidental physical injury  8/19/2021 1217 by Brandi Sarkar RN  Outcome: Ongoing  8/19/2021 0000 by Tushar Bates RN  Outcome: Ongoing  8/18/2021 2359 by Tushar Bates RN  Outcome: Ongoing  Goal: Free from intentional harm  Description: Free from intentional harm  8/19/2021 1217 by Brandi Sarkar RN  Outcome: Ongoing  8/19/2021 0000 by Tushar Bates RN  Outcome: Ongoing  8/18/2021 2359 by Tushar Bates RN  Outcome: Ongoing     Problem: Daily Care:  Goal: Daily care needs are met  Description: Daily care needs are met  8/19/2021 1217 by Brandi Sarkar RN  Outcome: Ongoing  8/19/2021 0000 by Tushar Bates RN  Outcome: Ongoing  8/18/2021 2359 by Tushar Bates RN  Outcome: Ongoing     Problem: Pain:  Description: Pain management should include both nonpharmacologic and pharmacologic interventions.   Goal: Patient's pain/discomfort is manageable  Description: Patient's pain/discomfort is manageable  8/19/2021 1217 by Brandi Sarkar RN  Outcome: Ongoing  8/19/2021 0000 by Tushar Jana, RN  Outcome: Ongoing  8/18/2021 2359 by Tushar Bates, CAROL  Outcome: Ongoing  Goal: Pain level will decrease  Description: Pain level will decrease  8/19/2021 1217 by Salo Soler RN  Outcome: Ongoing  8/19/2021 0000 by April Ritchie RN  Outcome: Ongoing  8/18/2021 2359 by April Ritchie RN  Outcome: Ongoing  Goal: Control of acute pain  Description: Control of acute pain  8/19/2021 1217 by Salo Soler RN  Outcome: Ongoing  8/19/2021 0000 by April Ritchie RN  Outcome: Ongoing  8/18/2021 2359 by April Ritchie RN  Outcome: Ongoing  Goal: Control of chronic pain  Description: Control of chronic pain  8/19/2021 1217 by Salo Soler RN  Outcome: Ongoing  8/19/2021 0000 by April Ritchie RN  Outcome: Ongoing  8/18/2021 2359 by April Ritchie RN  Outcome: Ongoing     Problem: Skin Integrity:  Goal: Skin integrity will stabilize  Description: Skin integrity will stabilize  8/19/2021 1217 by Salo Soler RN  Outcome: Ongoing  8/19/2021 0000 by April Ritchie RN  Outcome: Ongoing  8/18/2021 2359 by April Ritchie RN  Outcome: Ongoing     Problem: Discharge Planning:  Goal: Patients continuum of care needs are met  Description: Patients continuum of care needs are met  8/19/2021 1217 by Salo Soler RN  Outcome: Ongoing  8/19/2021 0000 by April Ritchie RN  Outcome: Ongoing  8/18/2021 2359 by April Ritchie RN  Outcome: Ongoing     Problem: Airway Clearance - Ineffective  Goal: Achieve or maintain patent airway  8/19/2021 1217 by Salo Soler RN  Outcome: Ongoing  8/19/2021 0000 by April Ritchie RN  Outcome: Ongoing  8/18/2021 2359 by April Ritchie RN  Outcome: Ongoing     Problem: Gas Exchange - Impaired  Goal: Absence of hypoxia  8/19/2021 1217 by Salo Soler RN  Outcome: Ongoing  8/19/2021 0000 by April Ritchie RN  Outcome: Ongoing  8/18/2021 2359 by April Ritchie RN  Outcome: Ongoing  Goal: Promote optimal lung function  8/19/2021 1217 by Mardeen Cape Verdean, RN  Outcome: Ongoing  8/19/2021 0000 by April Ritchie RN  Outcome: Ongoing  8/18/2021 2359 by [place] Tiara Jimenez RN  Outcome: Ongoing     Problem: Breathing Pattern - Ineffective  Goal: Ability to achieve and maintain a regular respiratory rate  8/19/2021 1217 by Rosa Raphael RN  Outcome: Ongoing  8/19/2021 0000 by Misty Schumacher RN  Outcome: Ongoing  8/18/2021 2359 by Misty Schumacher RN  Outcome: Ongoing     Problem:  Body Temperature -  Risk of, Imbalanced  Goal: Ability to maintain a body temperature within defined limits  8/19/2021 1217 by Rosa Raphael RN  Outcome: Ongoing  8/19/2021 0000 by Misty Schumacher RN  Outcome: Ongoing  8/18/2021 2359 by Misty Schumacher RN  Outcome: Ongoing  Goal: Will regain or maintain usual level of consciousness  8/19/2021 1217 by Rosa Raphael RN  Outcome: Ongoing  8/19/2021 0000 by Misty Schumacher RN  Outcome: Ongoing  8/18/2021 2359 by Misty Schumacher RN  Outcome: Ongoing  Goal: Complications related to the disease process, condition or treatment will be avoided or minimized  8/19/2021 1217 by Rosa Raphael RN  Outcome: Ongoing  8/19/2021 0000 by Misty Schumacher RN  Outcome: Ongoing  8/18/2021 2359 by Misty Schumacher RN  Outcome: Ongoing     Problem: Isolation Precautions - Risk of Spread of Infection  Goal: Prevent transmission of infection  8/19/2021 1217 by Rosa Raphael RN  Outcome: Ongoing  8/19/2021 0000 by Misty Schumacher RN  Outcome: Ongoing  8/18/2021 2359 by Misty Schumacher RN  Outcome: Ongoing     Problem: Nutrition Deficits  Goal: Optimize nutritional status  8/19/2021 1217 by Rosa Raphael RN  Outcome: Ongoing  8/19/2021 0000 by Misty Schumacher RN  Outcome: Ongoing  8/18/2021 2359 by Misty Schumacher RN  Outcome: Ongoing     Problem: Risk for Fluid Volume Deficit  Goal: Maintain normal heart rhythm  8/19/2021 1217 by Rosa Raphael RN  Outcome: Ongoing  8/19/2021 0000 by Misty Schumacher RN  Outcome: Ongoing  8/18/2021 2359 by Misty Schumacher RN  Outcome: Ongoing  Goal: Maintain absence of muscle cramping  8/19/2021 1217 by Rosa Raphael RN  Outcome: Ongoing  8/19/2021

## 2021-08-19 NOTE — PROGRESS NOTES
Progress Note    Admit Date:  8/12/2021    Admitted with COVID-19 pneumonia, hypoxemia. Patient was not vaccinated. He reports that all his family members now have Covid. Patient's uncle passed away recently from Covid infection. Subjective:  Mr. Mario Zavala seen. He is doing about the same today. Still looks ill. His oxygen saturations are transiently improved a couple of days ago , he was all the way down to 1.5 L , tobacco 0.4 L of oxygen for the last couple of days . continues to require 4 L of oxygen. .  Having persistent cough. Decreased shortness of breath. Chest x-ray showed worsening airspace disease. .  White count is elevated up to 17 K yesterday, white count is better today. Frank Madhu He is afebrile. .      Objective:   BP (!) 144/83   Pulse 77   Temp 95.5 °F (35.3 °C) (Oral)   Resp 20   Ht 5' 9\" (1.753 m)   Wt 212 lb 9 oz (96.4 kg)   SpO2 91%   BMI 31.39 kg/m²       Intake/Output Summary (Last 24 hours) at 8/19/2021 1252  Last data filed at 8/19/2021 1000  Gross per 24 hour   Intake 318 ml   Output 200 ml   Net 118 ml         Physical Exam:  General: Elderly male , awake alert and well-oriented  No  distress . Vision and hearing is poor  He is sitting up on the side of the bed, appears ill. Skin:  Warm and dry  Neck:  JVD absent. Neck supple  Chest: Diminished breath sounds. No wheezes or rhonchi. Cardiovascular:  RRR ,S1S2 normal  Abdomen:  Soft, non tender, non distended, BS +  Extremities:  No edema. Intact peripheral pulses.  Brisk cap refill, < 2 secs  Neuro: non focal      Medications:   Scheduled Meds:   vitamin B-12  1,000 mcg Oral Daily    vitamin B complex w/C  1 tablet Oral Daily    brimonidine  1 drop Both Eyes BID    Vitamin D  5,000 Units Oral Daily    dorzolamide  1 drop Ophthalmic BID    pantoprazole  40 mg Oral QAM AC    insulin glargine  50 Units Subcutaneous BID    insulin lispro  5 Units Subcutaneous TID WC    pregabalin  50 mg Oral BID    atorvastatin  80 mg Oral Nightly    aspirin  162 mg Oral Daily    sodium chloride flush  5-40 mL Intravenous 2 times per day    enoxaparin  30 mg Subcutaneous BID    famotidine  20 mg Oral BID    insulin lispro  0-12 Units Subcutaneous TID WC    insulin lispro  0-6 Units Subcutaneous Nightly    dexamethasone  6 mg Intravenous Q24H    melatonin  10 mg Oral Nightly    cefTRIAXone (ROCEPHIN) IV  1,000 mg Intravenous Q24H       Continuous Infusions:   dextrose      sodium chloride 25 mL (08/18/21 1217)    sodium chloride         Data:  CBC:   Recent Labs     08/17/21  0559 08/18/21  0741 08/19/21  0534   WBC 10.9 17.0* 11.5*   RBC 4.31 4.58 4.44   HGB 13.3* 13.8 13.3*   HCT 39.8* 41.7 40.5   MCV 92.3 91.0 91.2   RDW 14.4 14.3 14.3    291 263     BMP:   Recent Labs     08/19/21  0534      K 4.1      CO2 26   BUN 22*   CREATININE 0.9     BNP: No results for input(s): BNP in the last 72 hours. PT/INR: No results for input(s): PROTIME, INR in the last 72 hours. APTT: No results for input(s): APTT in the last 72 hours. CARDIAC ENZYMES:   No results for input(s): CKMB, CKMBINDEX, TROPONINI in the last 72 hours. Invalid input(s): CKTOTAL;3  FASTING LIPID PANEL:  Lab Results   Component Value Date    CHOL 172 01/17/2020    HDL 42 01/17/2020    TRIG 219 (H) 01/17/2020     LIVER PROFILE:   Recent Labs     08/17/21  0559 08/18/21  0741   AST 38* 43*   ALT 26 32   BILIDIR <0.2 <0.2   BILITOT 0.4 0.5   ALKPHOS 101 124        Radiology  XR CHEST PORTABLE   Final Result   Worsening bilateral pneumonia. XR CHEST PORTABLE   Final Result   Severe perihilar and more diffuse reticular and ground-glass opacities   throughout the lungs likely represents pulmonary edema or viral pneumonitis.                Assessment:  Active Problems:    COVID-19 virus infection    Acute respiratory failure with hypoxia (HCC)    Type 2 diabetes mellitus with hyperglycemia, with long-term current use of insulin ([facility]  Resolved Problems:    * No resolved hospital problems. *      Plan:     #COVID 19 Pneumonia  #Acute hypoxic respiratory failure  #Possible secondary bacterial pneumonia  - initially diagnosed 8/4/21. He was not vaccinated. His symptoms have progressively worsened and he is now hypoxic .   -Continue Decadron D#7  -Completed 5 days of remdesivir  - elevated PCT, cannot rule out superimposed bacterial infection, started on Rocephin and Azithromycin. Completed 5 days of azithromycin. Continue Rocephin D#7  -Seen by pulmonology  - supplemental O2, cont to wean as able. Continues to require supplemental O2 3 L-> 5L-> 4L-> 2L --> 1.5 L--> back up to 5 L--> 4 L currently. Desaturates with exertion. Monitor closely with continous pulse oximetry, telemetry   - droplet + isolation   -Lovenox twice daily    #Leukocytosis  -White count improved from 17->11.4 today  CXR worsening ASD     #Elevated troponin   - 0.03 on admit. No CP, no ischemia on EKG. Checked repeat trop < 0.01     #Elevated d dimer   - mild elevated at 390, suspect related to COVID 19      #Thrombocytopenia   - mild at 120k, could be 2/2 acute viral infection. Monitor CBC daily      #DM2  With steroid-induced hypoglycemia  - Cont Lantus-increased dose.  + SSI      #DM Neuropathy   - cont Lyrica     #Colonic lesion sp right colectomy 11/2020     #Pancreatic mass  - f/w hem/onc who is monitoring size of lesion    # Glaucoma  - resume eye drops         DVT Prophylaxis: Lovenox   Diet: ADULT DIET; Regular; 4 carb choices (60 gm/meal)  Code Status: Full Code      wean oxygen as tolerated. I have discussed with patient,  his oxygen levels need to stabilize before he can be discharged home. he will need home O2 on discharge and patient is aware of this.   I have discussed with him face-to-face    Yvette Wright MD, MD 8/19/2021 12:52 PM

## 2021-08-20 VITALS
SYSTOLIC BLOOD PRESSURE: 144 MMHG | HEIGHT: 69 IN | TEMPERATURE: 97 F | HEART RATE: 85 BPM | DIASTOLIC BLOOD PRESSURE: 79 MMHG | BODY MASS INDEX: 31.48 KG/M2 | RESPIRATION RATE: 18 BRPM | WEIGHT: 212.56 LBS | OXYGEN SATURATION: 97 %

## 2021-08-20 LAB
BANDED NEUTROPHILS RELATIVE PERCENT: 1 % (ref 0–7)
BASOPHILS ABSOLUTE: 0 K/UL (ref 0–0.2)
BASOPHILS RELATIVE PERCENT: 0 %
EOSINOPHILS ABSOLUTE: 0.1 K/UL (ref 0–0.6)
EOSINOPHILS RELATIVE PERCENT: 1 %
GLUCOSE BLD-MCNC: 105 MG/DL (ref 70–99)
GLUCOSE BLD-MCNC: 145 MG/DL (ref 70–99)
GLUCOSE BLD-MCNC: 292 MG/DL (ref 70–99)
GLUCOSE BLD-MCNC: 313 MG/DL (ref 70–99)
HCT VFR BLD CALC: 38.9 % (ref 40.5–52.5)
HEMOGLOBIN: 13 G/DL (ref 13.5–17.5)
LYMPHOCYTES ABSOLUTE: 1.3 K/UL (ref 1–5.1)
LYMPHOCYTES RELATIVE PERCENT: 12 %
MCH RBC QN AUTO: 30.5 PG (ref 26–34)
MCHC RBC AUTO-ENTMCNC: 33.4 G/DL (ref 31–36)
MCV RBC AUTO: 91.4 FL (ref 80–100)
MONOCYTES ABSOLUTE: 0.3 K/UL (ref 0–1.3)
MONOCYTES RELATIVE PERCENT: 3 %
NEUTROPHILS ABSOLUTE: 9.1 K/UL (ref 1.7–7.7)
NEUTROPHILS RELATIVE PERCENT: 83 %
PDW BLD-RTO: 14.3 % (ref 12.4–15.4)
PERFORMED ON: ABNORMAL
PLATELET # BLD: 233 K/UL (ref 135–450)
PLATELET SLIDE REVIEW: ADEQUATE
PMV BLD AUTO: 9.2 FL (ref 5–10.5)
RBC # BLD: 4.25 M/UL (ref 4.2–5.9)
SLIDE REVIEW: ABNORMAL
WBC # BLD: 10.8 K/UL (ref 4–11)

## 2021-08-20 PROCEDURE — 6370000000 HC RX 637 (ALT 250 FOR IP): Performed by: INTERNAL MEDICINE

## 2021-08-20 PROCEDURE — 94761 N-INVAS EAR/PLS OXIMETRY MLT: CPT

## 2021-08-20 PROCEDURE — 2580000003 HC RX 258: Performed by: INTERNAL MEDICINE

## 2021-08-20 PROCEDURE — 99239 HOSP IP/OBS DSCHRG MGMT >30: CPT | Performed by: INTERNAL MEDICINE

## 2021-08-20 PROCEDURE — 6360000002 HC RX W HCPCS: Performed by: INTERNAL MEDICINE

## 2021-08-20 PROCEDURE — 2700000000 HC OXYGEN THERAPY PER DAY

## 2021-08-20 PROCEDURE — 36415 COLL VENOUS BLD VENIPUNCTURE: CPT

## 2021-08-20 PROCEDURE — 85025 COMPLETE CBC W/AUTO DIFF WBC: CPT

## 2021-08-20 RX ORDER — DEXAMETHASONE 2 MG/1
TABLET ORAL
Qty: 21 TABLET | Refills: 0 | Status: SHIPPED | OUTPATIENT
Start: 2021-08-20 | End: 2021-09-27

## 2021-08-20 RX ADMIN — BRIMONIDINE TARTRATE 1 DROP: 2 SOLUTION OPHTHALMIC at 10:38

## 2021-08-20 RX ADMIN — ASPIRIN 162 MG: 81 TABLET, CHEWABLE ORAL at 10:39

## 2021-08-20 RX ADMIN — DEXAMETHASONE SODIUM PHOSPHATE 6 MG: 10 INJECTION, SOLUTION INTRAMUSCULAR; INTRAVENOUS at 04:24

## 2021-08-20 RX ADMIN — INSULIN GLARGINE 50 UNITS: 100 INJECTION, SOLUTION SUBCUTANEOUS at 10:41

## 2021-08-20 RX ADMIN — PANTOPRAZOLE SODIUM 40 MG: 40 TABLET, DELAYED RELEASE ORAL at 05:21

## 2021-08-20 RX ADMIN — ENOXAPARIN SODIUM 30 MG: 30 INJECTION SUBCUTANEOUS at 10:38

## 2021-08-20 RX ADMIN — PREGABALIN 50 MG: 25 CAPSULE ORAL at 10:39

## 2021-08-20 RX ADMIN — DORZOLAMIDE HYDROCHLORIDE 1 DROP: 20 SOLUTION/ DROPS OPHTHALMIC at 10:37

## 2021-08-20 RX ADMIN — INSULIN LISPRO 6 UNITS: 100 INJECTION, SOLUTION INTRAVENOUS; SUBCUTANEOUS at 13:37

## 2021-08-20 RX ADMIN — B-COMPLEX W/ C & FOLIC ACID TAB 1 TABLET: TAB at 10:39

## 2021-08-20 RX ADMIN — CYANOCOBALAMIN TAB 500 MCG 1000 MCG: 500 TAB at 10:39

## 2021-08-20 RX ADMIN — SODIUM CHLORIDE, PRESERVATIVE FREE 10 ML: 5 INJECTION INTRAVENOUS at 10:40

## 2021-08-20 RX ADMIN — FAMOTIDINE 20 MG: 20 TABLET ORAL at 10:39

## 2021-08-20 RX ADMIN — Medication 5000 UNITS: at 10:39

## 2021-08-20 NOTE — PROGRESS NOTES
Writer spoke with son Agustín Hayward and gave him discharge instructions. Expressed full understanding. Agustín Hayward is to come and get patient later on this afternoon.

## 2021-08-20 NOTE — DISCHARGE INSTR - COC
Continuity of Care Form    Patient Name: Berlin Rangel   :  1940  MRN:  [id number]    Admit date:  2021  Discharge date:  ***    Code Status Order: Full Code   Advance Directives:      Admitting Physician:  Danica Page MD  PCP: Norberto Garner MD    Discharging Nurse: [facility] Unit/Room#: 0225/0225-02  Discharging Unit Phone Number: ***    Emergency Contact:   Extended Emergency Contact Information  Primary Emergency Contact: Selam Ayala  Address: [address] [address] [facility],[address], [address] Phone: [phone]  Work Phone: [phone]  Relation: Spouse  Secondary Emergency Contact: Alan Marie  Address: [address] [facility], [facility] [address]  Home Phone: [phone]  Work Phone: [phone]  Relation: Child    Past Surgical History:  Past Surgical History:   Procedure Laterality Date    COLON SURGERY      COLONOSCOPY      CYSTOSCOPY  12    with stent placement    EYE SURGERY      left eyemed valve, bilateral retinal repair    FOOT SURGERY      right, tendon repair    KIDNEY STONE SURGERY      OTHER SURGICAL HISTORY Left 2017    CYSTOSCOPY, LEFT URETEROSCOPY, STENT PLACEMENT, URETHERAL dilation, stone manipulation        Immunization History:   Immunization History   Administered Date(s) Administered    Tdap (Boostrix, Adacel) 2013       Active Problems:  Patient Active Problem List   Diagnosis Code    Renal colic on left side R63    Prepatellar bursitis M70.40    Osteoarthrosis, hip M16.9    Obesity E66.9    HTN (hypertension), benign I10    DM (diabetes mellitus), type 2, uncontrolled with complications ([address] G10.5, E11.65    Chest pain R07.9    TIA (transient ischemic attack) G45.9    Arterial ischemic stroke, ICA, left, acute ([address] M00.090    Dyslipidemia E78.5    Acute cerebrovascular accident (CVA) ([address] I63.9    COVID-19 virus infection U07.1    Acute respiratory failure with hypoxia ([facility]) J96.01    Type 2 diabetes mellitus with hyperglycemia, with long-term current use of insulin ([facility]) E11.65, Z79.4    Glaucoma H40.9       Isolation/Infection:   Isolation            Droplet Plus          Patient Infection Status       Infection Onset Added Last Indicated Last Indicated By Review Planned Expiration Resolved Resolved By    COVID-19 08/04/21 08/04/21 08/04/21 COVID-19 & Influenza Combo 08/11/21 09/01/21      Resolved    COVID-19 Rule Out 08/04/21 08/04/21 08/04/21 COVID-19 & Influenza Combo (Ordered)   08/04/21 Rule-Out Test Resulted    COVID-19 Rule Out 07/27/21 07/27/21 07/27/21 COVID-19 & Influenza Combo (Ordered)   07/27/21 Rule-Out Test Resulted            Nurse Assessment:  Last Vital Signs: BP (!) 164/88   Pulse 84   Temp 97.4 °F (36.3 °C) (Oral)   Resp 18   Ht 5' 9\" (1.753 m)   Wt 212 lb 9 oz (96.4 kg)   SpO2 95%   BMI 31.39 kg/m²     Last documented pain score (0-10 scale): Pain Level: 0  Last Weight:   Wt Readings from Last 1 Encounters:   08/13/21 212 lb 9 oz (96.4 kg)     Mental Status:  {IP PT MENTAL STATUS:20030:::0}    IV Access:  { BEA IV ACCESS:824983860:::0}    Nursing Mobility/ADLs:  Walking   {CHP DME ADLs:245180544:::0}  Transfer  {CHP DME ADLs:998289818:::0}  Bathing  {CHP DME ADLs:813749279:::0}  Dressing  {CHP DME ADLs:022270719:::0}  Toileting  {CHP DME ADLs:954401286:::0}  Feeding  {CHP DME ADLs:887962658:::0}  Med Admin  {CHP DME ADLs:346566498:::0}  Med Delivery   { BEA MED Delivery:570137124:::0}    Wound Care Documentation and Therapy:        Elimination:  Continence:   · Bowel: {YES / GV:12542}  · Bladder: {YES / KN:60920}  Urinary Catheter: {Urinary Catheter:185007730:::0}   Colostomy/Ileostomy/Ileal Conduit: {YES / PV:14325}       Date of Last BM: ***    Intake/Output Summary (Last 24 hours) at 8/20/2021 1321  Last data filed at 8/19/2021 1823  Gross per 24 hour   Intake 944.25 ml   Output 175 ml   Net 769.25 ml     I/O last 3 completed shifts:   In: 1184.3 [P.O.:960; I.V.:174.3; IV Piggyback:50]  Out: 625 [Urine:625]    Safety Concerns:     [address] Safety Concerns:819290854:::0}    Impairments/Disabilities:      [address] Impairments/Disabilities:227059980:::0}    Nutrition Therapy:  Current Nutrition Therapy:   [address] [facility] Diet List:812289023:::0}    Routes of Feeding: {[facility] DME Other Feedings:565284950:::0}  Liquids: {Slp liquid thickness:74784}  Daily Fluid Restriction: {CHP DME Yes amt example:001453816:::0}  Last Modified Barium Swallow with Video (Video Swallowing Test): {Done Not Done TPIA:170549857:::0}    Treatments at the Time of Hospital Discharge:   Respiratory Treatments: ***  Oxygen Therapy:  {Therapy; copd oxygen:98423:::0}  Ventilator:    {[facility] Vent List:411256329:::0}    Rehab Therapies: {THERAPEUTIC INTERVENTION:3576901158}  Weight Bearing Status/Restrictions: [address]  Weight Bearin:::0}  Other Medical Equipment (for information only, NOT a DME order):  {EQUIPMENT:679937629}  Other Treatments: ***    Patient's personal belongings (please select all that are sent with patient):  {[facility] DME Belongings:281916355:::0}    RN SIGNATURE:  {Esignature:063663331:::0}    CASE MANAGEMENT/SOCIAL WORK SECTION    Inpatient Status Date: 21    Readmission Risk Assessment Score:  Readmission Risk              Risk of Unplanned Readmission:  25           Discharging to Facility/ Agency   · Discharging to Facility/ Agency   · Name:  [facility]    · Address: [address] [place] [address] [address] [facility] [address] [facility]  · Phone: [phone]  · Fax: [phone]: [address] [place] [address],[facility] agency to establish plan of care for patient over 60 day period    Nursing   Initial home SN evaluation visit to occur within 24-48 hours for:   medication management   VS and clinical assessment   S&S chronic disease exacerbation education + when to contact MD / NP   care coordination   Medication Reconciliation during 1st SN visit     PT/OT   Evaluations in home within 24-48 hours of discharge to include DME and home safety   Chryl Bors therapy 5 days, then 3x a week    OT to evaluate if patient has [address] needs for personal care       PCP Visit scheduled within 3 - 7 days of hospital discharge      Telehealth-Homecare Vitals(If patient is agreeable and meets guidelines)      / signature: Electronically signed by Khushbu Hawkins RN on 8/20/21 at 2:26 PM EDT    PHYSICIAN SECTION    Prognosis: Good    Condition at Discharge: Stable    Rehab Potential (if transferring to Rehab): Good    Recommended Labs or Other Treatments After Discharge: SN, PT/OT  HCV and Zoom Programs    Physician Certification: I certify the above information and transfer of Aidan Seymour  is necessary for the continuing treatment of the diagnosis listed and that he requires [address] for less 30 days.      Update Admission H&P: No change in H&P    PHYSICIAN SIGNATURE:  Electronically signed by Patsy Bell MD on 8/20/21 at 1:21 PM EDT

## 2021-08-20 NOTE — CARE COORDINATION
DISCHARGE ORDER  Date/Time 2021 1:49 PM  Completed by: David Saldana RN, Case Management    Patient Name: Areli Fry      : 1940  Admitting Diagnosis: Acute respiratory failure with hypoxia ([facility] [address] [address] [J96.01]  COVID-19 virus infection [U07.1]  Pneumonia due to COVID-19 virus [U07.1, J12.82]      Admit order Date and Status: 21 inpt  (verify MD's last order for status of admission)      Noted discharge order. If applicable PT/OT recommendation at Discharge: SNF (if SNF is declined, patient will need home with 24hr assist and supervision with home PT)  DME recommendation by PT/OT: N/A  Confirmed discharge plan  YES whom patient  If pt confirmed DC plan does family need to be contacted by CM Yes if yes who Son  Discharge Plan:  Order for dc noted. Spoke with pt who cont to decline STR and plans to go home. States Son is there to assist.. Discussed HHC and pt would like [facility]. Referral called to [facility] who will arrange for [place] [address] needs. Also discussed home O2 and pt would like [facility] as this is who his wife has. Spoke with Ash at [place] [address] who states can accept and provide O2. Orders faxed to [facility]. Chart reviewed and no other dc needs identified. Nsg to  Provide pulse oximeter. Reviewed chart. Role of discharge planner explained and patient verbalized understanding. Discharge order is noted. Has Home O2 in place on admit:  No  Informed of need to bring portable home O2 tank on day of discharge for nursing to connect prior to leaving:   Not Indicated  Verbalized agreement/Understanding:   Not Indicated  Pt is being d/c'd to home today. Pt's O2 sats are 93% on 3L NC with activity. Discharge timeout done with  Nsg, CM and pt. All discharge needs and concerns addressed.

## 2021-08-20 NOTE — PROGRESS NOTES
Pulmonary Progress Note    CC: shortness of breath     Subjective:   No new complaints      Intake/Output Summary (Last 24 hours) at 8/19/2021 2109  Last data filed at 8/19/2021 1823  Gross per 24 hour   Intake 1184.25 ml   Output 625 ml   Net 559.25 ml       Exam:   BP (!) 150/78   Pulse 83   Temp 99.3 °F (37.4 °C) (Oral)   Resp 20   Ht 5' 9\" (1.753 m)   Wt 212 lb 9 oz (96.4 kg)   SpO2 92%   BMI 31.39 kg/m²  on 3 L NC  General: ill appearing. Eyes: PERRL. No sclera icterus. No conjunctival injection. ENT: No discharge. Pharynx clear. Neck: Trachea midline. Normal thyroid. Resp: No accessory muscle use. Few crackles. No wheezing. No rhonchi. No dullness on percussion. CV: Regular rate. Regular rhythm. No mumur or rub. No edema. Peripheral pulses are 2+. Capillary refill is less than 3 seconds. GI: Non-tender. Non-distended. No masses. No organomegaly. Normal bowel sounds. No hernia. Skin: Warm and dry. No nodule on exposed extremities. No rash on exposed extremities. Lymph: No cervical LAD. No supraclavicular LAD. M/S: No cyanosis. No joint deformity. No clubbing. Neuro: Hard of hearing but alert and oriented. Patellar reflexes are symmetric. Psych: No agitation, no anxiety, affect is full.      Scheduled Meds:   vitamin B-12  1,000 mcg Oral Daily    vitamin B complex w/C  1 tablet Oral Daily    brimonidine  1 drop Both Eyes BID    Vitamin D  5,000 Units Oral Daily    dorzolamide  1 drop Ophthalmic BID    pantoprazole  40 mg Oral QAM AC    insulin glargine  50 Units Subcutaneous BID    insulin lispro  5 Units Subcutaneous TID WC    pregabalin  50 mg Oral BID    atorvastatin  80 mg Oral Nightly    aspirin  162 mg Oral Daily    sodium chloride flush  5-40 mL Intravenous 2 times per day    enoxaparin  30 mg Subcutaneous BID    famotidine  20 mg Oral BID    insulin lispro  0-12 Units Subcutaneous TID WC    insulin lispro  0-6 Units Subcutaneous Nightly    dexamethasone  6 mg Intravenous Q24H    melatonin  10 mg Oral Nightly    cefTRIAXone (ROCEPHIN) IV  1,000 mg Intravenous Q24H     Continuous Infusions:   dextrose      sodium chloride Stopped (08/19/21 1456)    sodium chloride       PRN Meds:  sodium chloride, docusate sodium, polyethylene glycol, glucose, dextrose, glucagon (rDNA), dextrose, sodium chloride flush, sodium chloride, ondansetron **OR** ondansetron, polyethylene glycol, acetaminophen **OR** acetaminophen, guaiFENesin-dextromethorphan, sodium chloride    Labs:  CBC:   Recent Labs     08/17/21  0559 08/18/21  0741 08/19/21  0534   WBC 10.9 17.0* 11.5*   HGB 13.3* 13.8 13.3*   HCT 39.8* 41.7 40.5   MCV 92.3 91.0 91.2    291 263     BMP:   Recent Labs     08/19/21  0534      K 4.1      CO2 26   BUN 22*   CREATININE 0.9     LIVER PROFILE:   Recent Labs     08/17/21  0559 08/18/21  0741   AST 38* 43*   ALT 26 32   BILIDIR <0.2 <0.2   BILITOT 0.4 0.5   ALKPHOS 101 124     PT/INR: No results for input(s): PROTIME, INR in the last 72 hours. APTT: No results for input(s): APTT in the last 72 hours. UA:No results for input(s): NITRITE, COLORU, PHUR, LABCAST, WBCUA, RBCUA, MUCUS, TRICHOMONAS, YEAST, BACTERIA, CLARITYU, SPECGRAV, LEUKOCYTESUR, UROBILINOGEN, BILIRUBINUR, BLOODU, GLUCOSEU, AMORPHOUS in the last 72 hours. Invalid input(s): KETONESU  No results for input(s): PHART, JHQ5FUD, PO2ART in the last 72 hours.     Cultures:   8/4 sars Cov2- detected    Films:  CXR 8/13: The heart and mediastinum are normal.  Severe perihilar opacities have developed centrally, worse on the right.  More diffuse reticular and  ground-glass opacities throughout both lungs.  Suspected small pleural  effusions.  No skeletal finding.     CXR 8/18/2021 personally reviewed slightly increased bilateral airspace disease consistent with Covid pneumonia     ASSESSMENT:  · Acute respiratory failure with hypoxemia  · COVID-19 pneumonia  · Hyponatremia  · Thrombocytopenia- improved  · Leukocytosis has improved  · Infiltrates on chest x-ray slightly worse     PLAN:  · - COVID-19 isolation, droplet plus  -   · Supplemental oxygen to maintain SaO2 >92%; wean as tolerated  · Decadron 6 mg day #7  · S/P Remdesevir  · Ceftriaxone D#7/8; completed 6 days azithromycin

## 2021-08-20 NOTE — FLOWSHEET NOTE
08/20/21 0845   Vital Signs   Temp 97.4 °F (36.3 °C)   Temp Source Oral   Pulse 84   Heart Rate Source Monitor   Resp 18   BP (!) 164/88   BP Location Left upper arm   Patient Position Semi fowlers   Oxygen Therapy   SpO2 95 %   O2 Device Nasal cannula   O2 Flow Rate (L/min) 5 L/min     Am assessment completed. See flowsheet. Am meds taken one at time without difficulty. Pt spo2 95% 5lnc, oxygen decreased to 4lnc, spo2 92%. Pt denies needs at this time. Bed alarm on for safety. Call light within reach. Sunil De La Cruz RN

## 2021-08-20 NOTE — PROGRESS NOTES
O2 Sat at rest on room air is 92 %. O2 Sat with activity on room air is 86 %. O2 Sat at rest with oxygen @ 3  lpm is 93 %. O2 Sat with activity with oxygen @ 3 lpm is 92 %.

## 2021-08-20 NOTE — DISCHARGE SUMMARY
Name:  Alberto Hazel  Room:  8239/0719-38  MRN:    [id number]    Discharge Summary      This discharge summary is in conjunction with a complete physical exam done on the day of discharge. Discharging Physician: Dr. Mckoy Marine: 8/12/2021  Discharge:  8/20/2021    HPI taken from admission H&P:      The patient is a 78 y.o. male with COVID 23 diagnosed on 8/4/21, DM2, HTN, HLD who presented to [facility] ED with complaint of shortness of breath and cough. He was initially seen in ER on 8/4 when he was diagnosed with COVID 19. He was not requiring oxygen and was discharged to home at time of diagnosis. He reports that over the past 2 weeks his symptoms have not improved and his shortness of breath has slowly worsened. His cough was severe making it hard for him to breath. He denies sputum or hemoptysis production. He has had fevers on and off. He came to ER for evaluation where he was hypoxic on RA and had temp 100F. CXR consistent with viral PNA. He is admitted for further care.       Multiple family members including wife have been ill with COVID 19. The patient has not had the COVID 19 vaccine. Diagnoses this Admission and Hospital Course     #COVID 19 Pneumonia  #Acute hypoxic respiratory failure  #Possible secondary bacterial pneumonia  - initially diagnosed 8/4/21. [facility] was not vaccinated.  His symptoms progressively worsened and he became hypoxic  - treated with Decadron D#7, completed 5 days of remdesivir  - He did have an elevated PCT, cannot rule out superimposed bacterial infection & thus started on Rocephin and Azithromycin. Completed 5 days of azithromycin. Given Rocephin D#7/7  - Seen by pulmonology  - supplemental O2, weaned as able.   - Continued to require supplemental O2 3 L-> 5L-> 4L-> 2L --> 1.5 L--> back up to 5 L--> 4 L    - Monitored closely with continous pulse oximetry, telemetry   - droplet + isolation, Lovenox twice daily  - O2 ambulation trial as below:     \"O2 Sat at rest on room air is 92 %. O2 Sat with activity on room air is 86 %. O2 Sat at rest with oxygen @ 3  lpm is 93 %. O2 Sat with activity with oxygen @ 3 lpm is 92 %. \"    - discharged home on 3L NC with Decadron      #Leukocytosis - Resolved  -White count improved from 17->11.4 > 10.8     #Elevated troponin   - 0.03 on admit. No CP, no ischemia on EKG.    - Checked repeat trop < 0.01     #Elevated d dimer   - mild elevated at 390, suspect related to COVID 19      #Thrombocytopenia - Resolved  - mild at 120k, could be 2/2 acute viral infection.  Monitored CBC daily - improved     #DM2  With steroid-induced hypoglycemia  - Cont Lantus-increased dose.  + SSI      #DM Neuropathy   - cont Lyrica     #Colonic lesion sp right colectomy 11/2020     #Pancreatic mass  - f/w hem/onc who is monitoring size of lesion     # Glaucoma  - resumed eye drops     Procedures (Please Review Full Report for Details)  None    Consults    Pulmonology    Physical Exam at Discharge:    BP (!) 144/79   Pulse 85   Temp 97 °F (36.1 °C) (Oral)   Resp 18   Ht 5' 9\" (1.753 m)   Wt 212 lb 9 oz (96.4 kg)   SpO2 97%   BMI 31.39 kg/m²   General: Elderly male , awake alert and well-oriented  No  distress . Vision and hearing is poor  He is sitting up on the side of the bed  Skin:  Warm and dry  Neck:  JVD absent. Neck supple  Chest: Diminished breath sounds. No wheezes or rhonchi. Cardiovascular:  RRR ,S1S2 normal  Abdomen:  Soft, non tender, non distended, BS +  Extremities:  No edema. Intact peripheral pulses.  Brisk cap refill, < 2 secs  Neuro: non focal    CBC:   Recent Labs     08/18/21  0741 08/19/21  0534 08/20/21  0553   WBC 17.0* 11.5* 10.8   HGB 13.8 13.3* 13.0*   HCT 41.7 40.5 38.9*   MCV 91.0 91.2 91.4    263 233     BMP:   Recent Labs     08/19/21  0534      K 4.1      CO2 26   BUN 22*   CREATININE 0.9     LIVER PROFILE:   Recent Labs     08/18/21  0741   AST 43*   ALT 32   BILIDIR <0.2   BILITOT 0.5   ALKPHOS 124         RADIOLOGY    XR CHEST PORTABLE 8/18   Final Result   Worsening bilateral pneumonia. XR CHEST PORTABLE 8/13   Final Result   Severe perihilar and more diffuse reticular and ground-glass opacities   throughout the lungs likely represents pulmonary edema or viral pneumonitis. Discharge Medications     Medication List      START taking these medications    dexamethasone 2 MG tablet  Commonly known as: DECADRON  Take 3 tabs a day for 2 days,  Then 2 tabs a day for 5 days ,  Then 1 tab a day for 5 days. sodium chloride 0.65 % nasal spray  Commonly known as: OCEAN, BABY AYR  1 spray by Nasal route every 4 hours as needed for Congestion        CONTINUE taking these medications    aspirin 81 MG tablet     atorvastatin 80 MG tablet  Commonly known as: LIPITOR  Take 1 tablet by mouth nightly     b complex vitamins capsule     brimonidine 0.2 % ophthalmic solution  Commonly known as: ALPHAGAN     Colace 100 MG capsule  Generic drug: docusate sodium     [employer] VITAMIN B-12 SL     insulin glargine 100 UNIT/ML injection vial  Commonly known as: LANTUS     irbesartan 300 MG tablet  Commonly known as: AVAPRO     lidocaine 5 %  Commonly known as: Lidoderm  Place 1 patch onto the skin daily 12 hours on, 12 hours off.      MULTI VITAMIN MENS PO     NONFORMULARY     NovoLOG 100 UNIT/ML injection vial  Generic drug: insulin aspart     ondansetron 4 MG disintegrating tablet  Commonly known as: Zofran ODT  Take 1-2 tablets by mouth every 12 hours as needed for Nausea May Sub regular tablet (non-ODT) if insurance does not cover ODT.     polyethylene glycol 17 g packet  Commonly known as: GLYCOLAX     pregabalin 50 MG capsule  Commonly known as: LYRICA     Prevacid 30 MG delayed release capsule  Generic drug: lansoprazole     TRUSOPT OP     Vitamin D3 125 MCG (5000 UT) Tabs        STOP taking these medications    hydroCHLOROthiazide 25 MG tablet  Commonly known as: HYDRODIURIL           Where to Get Your Medications      These medications were sent to [address] [facility], [address] [facility] [address], [address] [address]    Phone: [phone]   · sodium chloride 0.65 % nasal spray     You can get these medications from any pharmacy    Bring a paper prescription for each of these medications  · dexamethasone 2 MG tablet           Discharged in stable condition to home. D/C home with [facility]. Total time 40 minutes. > 50%  dominated by counseling and coordination of care. Plan of care discussed with the patient's nurse, discussed with pulmonologist, and  Discharge planner . Patient can be discharged home on oxygen . I  have had a face-to-face discussion with the patient and he is aware that he needs home O2.     Follow Up: Follow up with PCP in 1 week.     Adriana Morrow MD   8/20/21

## 2021-08-20 NOTE — PROGRESS NOTES
Pt o2 sensor not reading. Adjusted the apparatus and now reading appropriately. Pt continues to need assisstance to fix his connections as he moves around a bit.

## 2021-08-20 NOTE — PROGRESS NOTES
Shift assessment complete. See flow sheet. Scheduled meds given. See MAR. Patients head-toe complete, VS are logged, active bowel sound noted in all four quadrants. Pt needed assistance adjusting his nasal cannula. Assisted him. No needs are noted at this time. Call light and bedside table are within reach. The bed is locked and is in the lowest position.       Yumiko Walter RN

## 2021-08-20 NOTE — PROGRESS NOTES
Patient discharged to home. Patient left via wheelchair via car accompanied by son. Paperwork given on [employer] from [employer].

## 2021-08-20 NOTE — PROGRESS NOTES
Bedside report given and pt care transferred to [address] [place] [address]. Pt denies needs at this time. Call light within reach.

## 2021-08-20 NOTE — PLAN OF CARE
Problem: Falls - Risk of:  Goal: Will remain free from falls  Description: Will remain free from falls  8/20/2021 0138 by Coreen Peacock RN  Outcome: Ongoing  8/20/2021 0134 by Coreen Peacock RN  Outcome: Ongoing  8/19/2021 1217 by Vi Wang RN  Outcome: Ongoing  Goal: Absence of physical injury  Description: Absence of physical injury  8/20/2021 0138 by Coreen Peacock RN  Outcome: Ongoing  8/20/2021 0134 by Coreen Peacock RN  Outcome: Ongoing  8/19/2021 1217 by Vi Wang RN  Outcome: Ongoing     Problem: Infection:  Goal: Will remain free from infection  Description: Will remain free from infection  8/20/2021 0138 by Coreen Peacock RN  Outcome: Ongoing  8/20/2021 0134 by Coreen Peacock RN  Outcome: Ongoing  8/19/2021 1217 by Vi Wang RN  Outcome: Ongoing     Problem: Safety:  Goal: Free from accidental physical injury  Description: Free from accidental physical injury  8/20/2021 0138 by Coreen Peacock RN  Outcome: Ongoing  8/20/2021 0134 by Coreen Peacock RN  Outcome: Ongoing  8/19/2021 1217 by Vi Wang RN  Outcome: Ongoing  Goal: Free from intentional harm  Description: Free from intentional harm  8/20/2021 0138 by Coreen Peacock RN  Outcome: Ongoing  8/20/2021 0134 by Coreen Peacock RN  Outcome: Ongoing  8/19/2021 1217 by Vi Wang RN  Outcome: Ongoing     Problem: Daily Care:  Goal: Daily care needs are met  Description: Daily care needs are met  8/20/2021 0138 by Coreen Peacock RN  Outcome: Ongoing  8/20/2021 0134 by Coreen Peacock RN  Outcome: Ongoing  8/19/2021 1217 by Vi Wang RN  Outcome: Ongoing     Problem: Pain:  Goal: Patient's pain/discomfort is manageable  Description: Patient's pain/discomfort is manageable  8/20/2021 0138 by Coreen Peacock RN  Outcome: Ongoing  8/20/2021 0134 by Coreen Peacock RN  Outcome: Ongoing  8/19/2021 1217 by Patrici Croak, RN  Outcome: Ongoing  Goal: Pain level will decrease  Description: Pain level will decrease  8/20/2021 0138 by Dora Singh RN  Outcome: Ongoing  8/20/2021 0134 by Dora Singh RN  Outcome: Ongoing  8/19/2021 1217 by Lacy Valladares RN  Outcome: Ongoing  Goal: Control of acute pain  Description: Control of acute pain  8/20/2021 0138 by Dora Singh RN  Outcome: Ongoing  8/20/2021 0134 by Dora Singh RN  Outcome: Ongoing  8/19/2021 1217 by Lacy Valladares RN  Outcome: Ongoing  Goal: Control of chronic pain  Description: Control of chronic pain  8/20/2021 0138 by Dora Singh RN  Outcome: Ongoing  8/20/2021 0134 by Dora Singh RN  Outcome: Ongoing  8/19/2021 1217 by Lacy Valladares RN  Outcome: Ongoing     Problem: Skin Integrity:  Goal: Skin integrity will stabilize  Description: Skin integrity will stabilize  8/20/2021 0138 by Dora Singh RN  Outcome: Ongoing  8/20/2021 0134 by Dora Singh RN  Outcome: Ongoing  8/19/2021 1217 by Lacy Valladares RN  Outcome: Ongoing     Problem: Discharge Planning:  Goal: Patients continuum of care needs are met  Description: Patients continuum of care needs are met  8/20/2021 0138 by Dora Singh RN  Outcome: Ongoing  8/20/2021 0134 by Dora Singh RN  Outcome: Ongoing  8/19/2021 1217 by Lcay Valladares RN  Outcome: Ongoing     Problem: Airway Clearance - Ineffective  Goal: Achieve or maintain patent airway  8/20/2021 0138 by Dora Singh RN  Outcome: Ongoing  8/20/2021 0134 by Dora Singh RN  Outcome: Ongoing  8/19/2021 1217 by Lacy Valladares RN  Outcome: Ongoing     Problem: Gas Exchange - Impaired  Goal: Absence of hypoxia  8/20/2021 0138 by Dora Singh RN  Outcome: Ongoing  8/20/2021 0134 by Dora Singh RN  Outcome: Ongoing  8/19/2021 1217 by Lacy Valladares RN  Outcome: Ongoing  Goal: Promote optimal lung function  8/20/2021 0138 by Dora Singh RN  Outcome: Ongoing  8/20/2021 0134 by Dora Singh RN  Outcome: Ongoing  8/19/2021 1217 by Lacy Valladares RN  Outcome: Ongoing     Problem: Breathing Pattern - Ineffective  Goal: Ability to achieve and maintain a regular respiratory rate  8/20/2021 0138 by Steve Doe RN  Outcome: Ongoing  8/20/2021 0134 by Steve Doe RN  Outcome: Ongoing  8/19/2021 1217 by Poornima rAreaga RN  Outcome: Ongoing     Problem:  Body Temperature -  Risk of, Imbalanced  Goal: Ability to maintain a body temperature within defined limits  8/20/2021 0138 by Steve Doe RN  Outcome: Ongoing  8/20/2021 0134 by Steve Doe RN  Outcome: Ongoing  8/19/2021 1217 by Poornima Arreaga RN  Outcome: Ongoing  Goal: Will regain or maintain usual level of consciousness  8/20/2021 0138 by Steve Doe RN  Outcome: Ongoing  8/20/2021 0134 by Steve Doe RN  Outcome: Ongoing  8/19/2021 1217 by Poornima Arreaga RN  Outcome: Ongoing  Goal: Complications related to the disease process, condition or treatment will be avoided or minimized  8/20/2021 0138 by Steve Doe RN  Outcome: Ongoing  8/20/2021 0134 by Steve Doe RN  Outcome: Ongoing  8/19/2021 1217 by Poornima Arreaga RN  Outcome: Ongoing     Problem: Isolation Precautions - Risk of Spread of Infection  Goal: Prevent transmission of infection  8/20/2021 0138 by Steve Doe RN  Outcome: Ongoing  8/20/2021 0134 by Steve Doe RN  Outcome: Ongoing  8/19/2021 1217 by Poornima Arreaga RN  Outcome: Ongoing     Problem: Nutrition Deficits  Goal: Optimize nutritional status  8/20/2021 0138 by Steve Doe RN  Outcome: Ongoing  8/20/2021 0134 by Steve Doe RN  Outcome: Ongoing  8/19/2021 1217 by Poornima Arreaga RN  Outcome: Ongoing     Problem: Risk for Fluid Volume Deficit  Goal: Maintain normal heart rhythm  8/20/2021 0138 by Steve Doe RN  Outcome: Ongoing  8/20/2021 0134 by Steve Doe RN  Outcome: Ongoing  8/19/2021 1217 by Poornima Arreaga RN  Outcome: Ongoing  Goal: Maintain absence of muscle cramping  8/20/2021 0138 by Steve Doe RN  Outcome: Ongoing  8/20/2021 0134 by Steve Doe RN  Outcome: Ongoing  8/19/2021 1217 by Poornima Arreaga RN  Outcome: Ongoing  Goal: Maintain normal serum potassium, sodium, calcium, phosphorus, and pH  8/20/2021 0138 by Piyush Rios RN  Outcome: Ongoing  8/20/2021 0134 by Piyush Rios RN  Outcome: Ongoing  8/19/2021 1217 by Arelis Sifuentes RN  Outcome: Ongoing     Problem: Loneliness or Risk for Loneliness  Goal: Demonstrate positive use of time alone when socialization is not possible  8/20/2021 0138 by Piyush Rios RN  Outcome: Ongoing  8/20/2021 0134 by Piyush Rios RN  Outcome: Ongoing  8/19/2021 1217 by Arelis Sifuentes RN  Outcome: Ongoing     Problem: Fatigue  Goal: Verbalize increase energy and improved vitality  8/20/2021 0138 by Piyush Rios RN  Outcome: Ongoing  8/20/2021 0134 by Piyush Rios RN  Outcome: Ongoing  8/19/2021 1217 by Arelis Sifuentes RN  Outcome: Ongoing     Problem: Patient Education: Go to Patient Education Activity  Goal: Patient/Family Education  8/20/2021 0138 by Piyush Rios RN  Outcome: Ongoing  8/20/2021 0134 by Piyush Rios RN  Outcome: Ongoing  8/19/2021 1217 by Arelis Sifuentes RN  Outcome: Ongoing     Problem: Skin Integrity:  Goal: Will show no infection signs and symptoms  Description: Will show no infection signs and symptoms  8/20/2021 0138 by Piyush Rios RN  Outcome: Ongoing  8/20/2021 0134 by Piyush Rios RN  Outcome: Ongoing  8/19/2021 1217 by Arelis Sifuentes RN  Outcome: Ongoing  Goal: Absence of new skin breakdown  Description: Absence of new skin breakdown  8/20/2021 0138 by Piyush Rios RN  Outcome: Ongoing  8/20/2021 0134 by Piyush Rios RN  Outcome: Ongoing  8/19/2021 1217 by Arelis Sifuentes RN  Outcome: Ongoing

## 2021-08-20 NOTE — CARE COORDINATION
[facility]  Received referral regarding HC services from [address] [place] [address]. Pt's wife is active with [facility]. Notified [facility] of pt referral and plan for discharge home today. Attempt to follow up with pt/son. LM. Faxed referral with orders to [facility].     Electronically signed by Marcelle Lr RN on 8/20/2021 at 2:49 PM

## 2021-08-23 ENCOUNTER — CARE COORDINATION (OUTPATIENT)
Dept: CASE MANAGEMENT | Age: 81
End: 2021-08-23

## 2021-08-23 NOTE — CARE COORDINATION
Patient contacted regarding COVID-19 diagnosis. COVID-19 Detected on 8/4/2021. Call within 2 business days of discharge: Yes  Discharge Date: 8/20/21   RARS: Readmission Risk Score: 25    Was this an external facility discharge? No Discharge Facility: NA    1st attempt - Unable to reach patient by phone at this time. Message left requesting return call. Message left with [facility]) to check status of referral.     Bud Zaman RN  Care Transitions Nurse  [phone] mobile    No future appointments.

## 2021-08-24 ENCOUNTER — CARE COORDINATION (OUTPATIENT)
Dept: CASE MANAGEMENT | Age: 81
End: 2021-08-24

## 2021-08-24 NOTE — CARE COORDINATION
Patient contacted regarding COVID-19 diagnosis. COVID-19 Detected on 8/4/2021. Call within 2 business days of discharge: Yes  Discharge Date: 8/20/21   RARS: Readmission Risk Score: 25    Was this an external facility discharge? No Discharge Facility: NA    2nd attempt - Unable to reach patient by phone at this time. Message left requesting return call. Per [facility] liaison Neyda, the patient has been contacted and the orders are extended to see the patient today, 8/24/2021. No further CTN outreach scheduled at this time. Bud Zaman RN  Care Transitions Nurse  [phone] mobile    No future appointments.

## 2021-09-08 ENCOUNTER — HOSPITAL ENCOUNTER (OUTPATIENT)
Dept: GENERAL RADIOLOGY | Age: 81
Discharge: HOME | End: 2021-09-08
Payer: MEDICARE

## 2021-09-08 ENCOUNTER — HOSPITAL ENCOUNTER (OUTPATIENT)
Age: 81
Discharge: HOME | End: 2021-09-08
Payer: MEDICARE

## 2021-09-08 DIAGNOSIS — U07.1 INFECTION DUE TO 2019-NCOV: ICD-10-CM

## 2021-09-08 PROCEDURE — 71046 X-RAY EXAM CHEST 2 VIEWS: CPT

## 2021-09-10 ENCOUNTER — TELEPHONE (OUTPATIENT)
Dept: PULMONOLOGY | Age: 81
End: 2021-09-10

## 2021-09-10 NOTE — TELEPHONE ENCOUNTER
Received NPT referral from Dr. Matilde Harris for COVID-19.     SW pt and scheduled pt for 9/13/21 @ 9:30 AM

## 2021-09-13 ENCOUNTER — OFFICE VISIT (OUTPATIENT)
Dept: PULMONOLOGY | Age: 81
End: 2021-09-13
Payer: MEDICARE

## 2021-09-13 VITALS
HEART RATE: 98 BPM | HEIGHT: 69 IN | OXYGEN SATURATION: 99 % | BODY MASS INDEX: 31.39 KG/M2 | DIASTOLIC BLOOD PRESSURE: 73 MMHG | TEMPERATURE: 98.7 F | SYSTOLIC BLOOD PRESSURE: 147 MMHG | RESPIRATION RATE: 20 BRPM

## 2021-09-13 DIAGNOSIS — J96.01 ACUTE RESPIRATORY FAILURE WITH HYPOXIA (HCC): ICD-10-CM

## 2021-09-13 DIAGNOSIS — J84.9 ILD (INTERSTITIAL LUNG DISEASE) (HCC): Primary | ICD-10-CM

## 2021-09-13 DIAGNOSIS — I50.9 CONGESTIVE HEART FAILURE, UNSPECIFIED HF CHRONICITY, UNSPECIFIED HEART FAILURE TYPE (HCC): ICD-10-CM

## 2021-09-13 DIAGNOSIS — J12.82 PNEUMONIA DUE TO COVID-19 VIRUS: ICD-10-CM

## 2021-09-13 DIAGNOSIS — U07.1 PNEUMONIA DUE TO COVID-19 VIRUS: ICD-10-CM

## 2021-09-13 DIAGNOSIS — R60.1 GENERALIZED EDEMA: ICD-10-CM

## 2021-09-13 PROCEDURE — 1111F DSCHRG MED/CURRENT MED MERGE: CPT | Performed by: INTERNAL MEDICINE

## 2021-09-13 PROCEDURE — 99204 OFFICE O/P NEW MOD 45 MIN: CPT | Performed by: INTERNAL MEDICINE

## 2021-09-13 PROCEDURE — 4040F PNEUMOC VAC/ADMIN/RCVD: CPT | Performed by: INTERNAL MEDICINE

## 2021-09-13 PROCEDURE — G8427 DOCREV CUR MEDS BY ELIG CLIN: HCPCS | Performed by: INTERNAL MEDICINE

## 2021-09-13 PROCEDURE — 1036F TOBACCO NON-USER: CPT | Performed by: INTERNAL MEDICINE

## 2021-09-13 PROCEDURE — G8417 CALC BMI ABV UP PARAM F/U: HCPCS | Performed by: INTERNAL MEDICINE

## 2021-09-13 PROCEDURE — 1123F ACP DISCUSS/DSCN MKR DOCD: CPT | Performed by: INTERNAL MEDICINE

## 2021-09-13 RX ORDER — BENZONATATE 200 MG/1
200 CAPSULE ORAL 3 TIMES DAILY PRN
COMMUNITY
Start: 2021-08-12

## 2021-09-13 ASSESSMENT — SLEEP AND FATIGUE QUESTIONNAIRES
HOW LIKELY ARE YOU TO NOD OFF OR FALL ASLEEP WHILE WATCHING TV: 2
ESS TOTAL SCORE: 10
HOW LIKELY ARE YOU TO NOD OFF OR FALL ASLEEP WHILE SITTING INACTIVE IN A PUBLIC PLACE: 2
HOW LIKELY ARE YOU TO NOD OFF OR FALL ASLEEP WHILE SITTING QUIETLY AFTER LUNCH WITHOUT ALCOHOL: 1
HOW LIKELY ARE YOU TO NOD OFF OR FALL ASLEEP WHILE LYING DOWN TO REST IN THE AFTERNOON WHEN CIRCUMSTANCES PERMIT: 1
HOW LIKELY ARE YOU TO NOD OFF OR FALL ASLEEP IN A CAR, WHILE STOPPED FOR A FEW MINUTES IN TRAFFIC: 1
HOW LIKELY ARE YOU TO NOD OFF OR FALL ASLEEP WHEN YOU ARE A PASSENGER IN A CAR FOR AN HOUR WITHOUT A BREAK: 2
HOW LIKELY ARE YOU TO NOD OFF OR FALL ASLEEP WHILE SITTING AND TALKING TO SOMEONE: 0
HOW LIKELY ARE YOU TO NOD OFF OR FALL ASLEEP WHILE SITTING AND READING: 1
NECK CIRCUMFERENCE (INCHES): 17.5

## 2021-09-13 ASSESSMENT — ENCOUNTER SYMPTOMS
SHORTNESS OF BREATH: 1
COUGH: 1

## 2021-09-13 NOTE — PROGRESS NOTES
MA Communication:   The following orders are received by verbal communication from Heidy Otoole MD    Orders include:    CT wo contrast:08/23/2021 8:00 am at [facility]  Echo: 08/23/2021 9:30 am at [facility]    Follow up: 08/27/2021 1:00 PM

## 2021-09-13 NOTE — PROGRESS NOTES
Pulmonary Outpatient Note   Ricki Owens MD       9/13/2021    1. ILD (interstitial lung disease) ([facility] [address]    2. Pneumonia due to COVID-19 virus    3. Congestive heart failure, unspecified HF chronicity, unspecified heart failure type ([facility] [address]    4. Generalized edema     5. Acute respiratory failure with hypoxia (HCC)          ASSESSMENT/PLAN:  ILD, pneumonia due to COVID-19 infection. The patient likely has very severe pulmonary involvement, would be at risk for developing pulmonary fibrosis. I have asked him to obtain CT chest, will consider higher dose of steroid in spite of his elevated blood sugar. We will have to coordinate this with Dr. Sarah Lorenzo regarding tight glycemic control while on steroid. CHF, generalized edema. Normal echocardiogram in the past, will repeat it as COVID-19 could produce cardiomyopathy with CHF  Acute hypoxemic respiratory failure. Continue supplemental oxygen for now. Obesity. Difficult for him to lose weight due to shortness of breath, poor vision  Prophylaxis. Recommend COVID-19 vaccine in the latter part of November. Continue with annual flu shots, has received Prevnar    RTC with testing      Orders Placed This Encounter   Procedures    CT CHEST WO CONTRAST    ECHO 2D WO Color Doppler Complete       No follow-ups on file. Chief Complaint:   New Patient (Covid pneumonia r/b Dr. Yazmin Hyman)       HPI: Otilia Christine is an 78y.o. year old male here for evaluation and management of Covid pneumonia, referred by his PCP Dr. Eric Uriarte. Audra Roberto is a pleasant 49-year-old male with multiple medical problems including hypertension, hyperlipidemia, DM 2 with polyneuropathy and retinopathy, glaucoma, kidney stones, DJD, patella bursitis, BPH, CVA, fatty liver, umbilical and ventral hernia. The patient has been a non-smoker, does not drink alcohol. He was in the [employer] for 4 years, was posted in [place], usually in [place].   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 He lives with his wife and son. The patient was not vaccinated against COVID-19, developed infection on 8/3. He had severe respiratory failure and was hospitalized at [facility], treated with steroid, remdesivir, Rocephin, azithromycin. He was hospitalized from 8/12 through 8/20. He had been discharged on steroid and supplemental oxygen at 2 LPM.  He is accompanied by his grandson Stuart Braun, who says he was 4 LPM in the hospital.  Since his discharge, the patient has been very short of breath, has cough and feels that his \"bronchial tubes are irritated\". He develops cough with trying to talk. He is trying to use the incentive spirometer 2-4 times a day. His dyspnea somewhat relieved after coughing. He sleeps well as he is on Lyrica for RLS, this appears to be suppressing his cough as well. He has no nausea, vomiting, diarrhea, but has not cut back his sense of taste. He says he \"eats like a horse\". He denies PND or orthopnea, has noted ankle swelling. CXR 9/8/2021  Bilateral interstitial groundglass opacities likely representing COVID-19   pneumonia. CXR 8/13/2021  Severe perihilar and more diffuse reticular and ground-glass opacities   throughout the lungs likely represents pulmonary edema or viral pneumonitis.            Past Medical History:   Diagnosis Date    COVID-19 08/04/2021    Diabetes mellitus ([facility] [address]     type 2    Glaucoma     Hyperlipidemia     Hypertension     Kidney stone     Neuropathy     Osteoarthrosis, hip 03/21/2016    Prepatellar bursitis 03/21/2016       Past Surgical History:   Procedure Laterality Date    COLON SURGERY      COLONOSCOPY  2008    CYSTOSCOPY  2/2/12    with stent placement    EYE SURGERY      left eyemed valve, bilateral retinal repair    FOOT SURGERY      right, tendon repair    KIDNEY STONE SURGERY      OTHER SURGICAL HISTORY Left 07/17/2017 CYSTOSCOPY, LEFT URETEROSCOPY, STENT PLACEMENT, URETHERAL dilation, stone manipulation        Social History     Tobacco Use    Smoking status: Never Smoker    Smokeless tobacco: Former User   Substance Use Topics    Alcohol use: No       History reviewed. No pertinent family history. Current Outpatient Medications:     benzonatate (TESSALON) 200 MG capsule, Take 200 mg by mouth 3 times daily as needed, Disp: , Rfl:     lansoprazole (PREVACID) 30 MG delayed release capsule, Take 30 mg by mouth daily, Disp: , Rfl:     polyethylene glycol (GLYCOLAX) 17 g packet, Take 17 g by mouth daily as needed for Constipation, Disp: , Rfl:     pregabalin (LYRICA) 50 MG capsule, Take 50 mg by mouth 2 times daily. , Disp: , Rfl:     atorvastatin (LIPITOR) 80 MG tablet, Take 1 tablet by mouth nightly, Disp: 30 tablet, Rfl: 3    Cyanocobalamin (CVS VITAMIN B-12 SL), Place under the tongue daily, Disp: , Rfl:     aspirin 81 MG tablet, Take 162 mg by mouth daily , Disp: , Rfl:     docusate sodium (COLACE) 100 MG capsule, Take 100 mg by mouth 2 times daily as needed for Constipation, Disp: , Rfl:     Cholecalciferol (VITAMIN D3) 5000 units TABS, Take by mouth daily, Disp: , Rfl:     brimonidine (ALPHAGAN) 0.2 % ophthalmic solution, Place 1 drop into both eyes 2 times daily , Disp: , Rfl:     b complex vitamins capsule, Take 1 capsule by mouth daily, Disp: , Rfl:     Dorzolamide HCl (TRUSOPT OP), Apply 1 drop to eye 2 times daily , Disp: , Rfl:     insulin aspart (NOVOLOG) 100 UNIT/ML injection, Inject  into the skin 3 times daily (before meals). Sliding scale based on meal calculation , Disp: , Rfl:     insulin glargine (LANTUS) 100 UNIT/ML injection, Inject 40 Units into the skin 2 times daily. , Disp: , Rfl:     dexamethasone (DECADRON) 2 MG tablet, Take 3 tabs a day for 2 days, Then 2 tabs a day for 5 days , Then 1 tab a day for 5 days.  (Patient not taking: Reported on 9/13/2021), Disp: 21 tablet, Rfl: 0   sodium chloride (OCEAN, BABY AYR) 0.65 % nasal spray, 1 spray by Nasal route every 4 hours as needed for Congestion (Patient not taking: Reported on 9/13/2021), Disp: 1 Bottle, Rfl: 0    irbesartan (AVAPRO) 300 MG tablet, Take 300 mg by mouth daily (Patient not taking: Reported on 9/13/2021), Disp: , Rfl:     lidocaine (LIDODERM) 5 %, Place 1 patch onto the skin daily 12 hours on, 12 hours off., Disp: 30 patch, Rfl: 0    ondansetron (ZOFRAN ODT) 4 MG disintegrating tablet, Take 1-2 tablets by mouth every 12 hours as needed for Nausea May Sub regular tablet (non-ODT) if insurance does not cover ODT. (Patient not taking: Reported on 9/13/2021), Disp: 12 tablet, Rfl: 0    NONFORMULARY, Pt takes a probiotic dose [id number] [facility] that has about 8 pills in it, Unknown what each pill is. (Patient not taking: Reported on 9/13/2021), Disp: , Rfl:     Multiple Vitamin (MULTI VITAMIN MENS PO), Take  by mouth. (Patient not taking: Reported on 9/13/2021), Disp: , Rfl:     Patient has no known allergies. Vitals:    09/13/21 0929   BP: (!) 147/73   Site: Left Upper Arm   Position: Sitting   Cuff Size: Large Adult   Pulse: 98   Resp: 20   Temp: 98.7 °F (37.1 °C)   TempSrc: Oral   SpO2: 99%   Height: 5' 9\" (1.753 m)       Review of Systems   Constitutional: Positive for activity change and unexpected weight change. Eyes: Positive for visual disturbance. Respiratory: Positive for cough and shortness of breath. Cardiovascular: Positive for leg swelling. All other systems reviewed and are negative. Physical Exam  Vitals reviewed. Constitutional:       General: He is not in acute distress. Appearance: He is well-developed. He is obese. He is ill-appearing (Mildly). He is not toxic-appearing or diaphoretic. Comments: Pleasant, overweight, in a wheelchair, mildly disheveled   HENT:      Head: Normocephalic and atraumatic. Nose: Nose normal. No congestion or rhinorrhea.       Mouth/Throat:      Pharynx: No

## 2021-09-15 ENCOUNTER — TELEPHONE (OUTPATIENT)
Dept: PULMONOLOGY | Age: 81
End: 2021-09-15

## 2021-09-15 NOTE — TELEPHONE ENCOUNTER
Grandson called back. Explained that Dr. Azalia Araya want's pt. To have testing first and see him on the 27 th then he will decide about the steroids. Voiced understanding.

## 2021-09-15 NOTE — TELEPHONE ENCOUNTER
Pt saw Dr. Delano Massey on Monday. Pts grandson stated tat you were supposed to call in a script fo Prednisone. I do not see tht it was ordered. Please send script to [facility].

## 2021-09-23 ENCOUNTER — TELEPHONE (OUTPATIENT)
Dept: PULMONOLOGY | Age: 81
End: 2021-09-23

## 2021-09-23 ENCOUNTER — HOSPITAL ENCOUNTER (OUTPATIENT)
Dept: CT IMAGING | Age: 81
Discharge: HOME | End: 2021-09-23
Payer: MEDICARE

## 2021-09-23 ENCOUNTER — HOSPITAL ENCOUNTER (OUTPATIENT)
Dept: NON INVASIVE DIAGNOSTICS | Age: 81
Discharge: HOME | End: 2021-09-23
Payer: MEDICARE

## 2021-09-23 DIAGNOSIS — R60.1 GENERALIZED EDEMA: ICD-10-CM

## 2021-09-23 DIAGNOSIS — I50.9 CONGESTIVE HEART FAILURE, UNSPECIFIED HF CHRONICITY, UNSPECIFIED HEART FAILURE TYPE (HCC): Primary | ICD-10-CM

## 2021-09-23 DIAGNOSIS — J84.9 ILD (INTERSTITIAL LUNG DISEASE) (HCC): ICD-10-CM

## 2021-09-23 DIAGNOSIS — I50.9 CONGESTIVE HEART FAILURE, UNSPECIFIED HF CHRONICITY, UNSPECIFIED HEART FAILURE TYPE (HCC): ICD-10-CM

## 2021-09-23 DIAGNOSIS — R60.1 GENERALIZED EDEMA: Primary | ICD-10-CM

## 2021-09-23 DIAGNOSIS — J12.82 PNEUMONIA DUE TO COVID-19 VIRUS: ICD-10-CM

## 2021-09-23 DIAGNOSIS — U07.1 PNEUMONIA DUE TO COVID-19 VIRUS: ICD-10-CM

## 2021-09-23 LAB
LV EF: 65 %
LVEF MODALITY: NORMAL

## 2021-09-23 PROCEDURE — 93306 TTE W/DOPPLER COMPLETE: CPT

## 2021-09-23 PROCEDURE — 71250 CT THORAX DX C-: CPT

## 2021-09-27 ENCOUNTER — OFFICE VISIT (OUTPATIENT)
Dept: PULMONOLOGY | Age: 81
End: 2021-09-27
Payer: MEDICARE

## 2021-09-27 VITALS
SYSTOLIC BLOOD PRESSURE: 126 MMHG | RESPIRATION RATE: 16 BRPM | OXYGEN SATURATION: 97 % | DIASTOLIC BLOOD PRESSURE: 65 MMHG | HEIGHT: 69 IN | TEMPERATURE: 98.7 F | HEART RATE: 78 BPM | WEIGHT: 217 LBS | BODY MASS INDEX: 32.14 KG/M2

## 2021-09-27 DIAGNOSIS — U07.1 PNEUMONIA DUE TO COVID-19 VIRUS: ICD-10-CM

## 2021-09-27 DIAGNOSIS — J84.9 ILD (INTERSTITIAL LUNG DISEASE) (HCC): Primary | ICD-10-CM

## 2021-09-27 DIAGNOSIS — J12.82 PNEUMONIA DUE TO COVID-19 VIRUS: ICD-10-CM

## 2021-09-27 DIAGNOSIS — J96.01 ACUTE RESPIRATORY FAILURE WITH HYPOXIA (HCC): ICD-10-CM

## 2021-09-27 DIAGNOSIS — R60.1 GENERALIZED EDEMA: ICD-10-CM

## 2021-09-27 PROCEDURE — 4040F PNEUMOC VAC/ADMIN/RCVD: CPT | Performed by: INTERNAL MEDICINE

## 2021-09-27 PROCEDURE — 1123F ACP DISCUSS/DSCN MKR DOCD: CPT | Performed by: INTERNAL MEDICINE

## 2021-09-27 PROCEDURE — 1036F TOBACCO NON-USER: CPT | Performed by: INTERNAL MEDICINE

## 2021-09-27 PROCEDURE — 99213 OFFICE O/P EST LOW 20 MIN: CPT | Performed by: INTERNAL MEDICINE

## 2021-09-27 PROCEDURE — G8427 DOCREV CUR MEDS BY ELIG CLIN: HCPCS | Performed by: INTERNAL MEDICINE

## 2021-09-27 PROCEDURE — G8417 CALC BMI ABV UP PARAM F/U: HCPCS | Performed by: INTERNAL MEDICINE

## 2021-09-27 RX ORDER — IBUPROFEN 200 MG
1 CAPSULE ORAL DAILY
COMMUNITY
End: 2021-11-14

## 2021-09-27 RX ORDER — PREDNISONE 20 MG/1
TABLET ORAL
Qty: 7 TABLET | Refills: 0 | Status: SHIPPED | OUTPATIENT
Start: 2021-09-27 | End: 2021-11-14

## 2021-09-27 RX ORDER — PREDNISONE 10 MG/1
10 TABLET ORAL DAILY
COMMUNITY
End: 2021-09-27

## 2021-09-27 ASSESSMENT — ENCOUNTER SYMPTOMS
SHORTNESS OF BREATH: 1
COUGH: 1

## 2021-09-27 NOTE — PATIENT INSTRUCTIONS
Remember to bring a list of pulmonary medications and any CPAP or BiPAP machines to your next appointment with the office. Please keep all of your future appointments scheduled by [address]. Out of respect for other patients and providers, you may be asked to reschedule your appointment if you arrive later than your scheduled appointment time. Appointments cancelled less than 24hrs in advance will be considered a no show. Patients with three missed appointments within 1 year or four missed appointments within 2 years can be dismissed from the practice. Please be aware that our physicians are required to work in the Intensive Care Unit at [facility].  Your appointment may need to be rescheduled if they are designated to work during your appointment time. You may receive a survey regarding the care you received during your visit. Your input is valuable to us. We encourage you to complete and return your survey. We hope you will choose us in the future for your healthcare needs. Pt instructed of all future appointment dates & times, including radiology, labs, procedures & referrals. If procedures were scheduled preparation instructions provided. Instructions on future appointments with [facility] were given.

## 2021-09-27 NOTE — PROGRESS NOTES
9/12, persistent shortness of breath and cough after a severe bout of COVID-19 infection with pneumonia and respiratory failure requiring hospitalization in August.  The patient was told to get a CT chest and echocardiogram, return in follow-up. He is accompanied by his grandson. Patient says he is doing better, has reduced his oxygen from 4 LPM to 1 LPM.  He also has had some reduction in his cough. He does have exertional dyspnea, has to sit down. He has had swelling of his ankles, particularly the left. He has not got back his taste. He has started over-the-counter N-acetylcysteine. There is no other change in his medical or surgical history since his last visit. Rest of his ROS is negative. CT chest 9[birth year]1931  1. Dense multifocal airspace opacities bilaterally representing viral   pneumonitis in a patient with known COVID-19 infection. 2. Incidental cholelithiasis in the visualized abdomen. Echocardiogram 8/74/5479   LV systolic function is hyperdynamic with EF estimated > 65%. No regional wall motion abnormalities are noted. Left ventricular size is decreased. There is mild concentric left ventricular hypertrophy. Grade I diastolic dysfunction with normal filling pressure. Moderate posterior mitral annular calcification is present. Mild mitral stenosis is present. Mild-moderate aortic regurgitation is present. Aortic valve sclerosis without aortic stenosis. Mild tricuspid and pulmonic regurgitation. Systolic pulmonary artery pressure (SPAP) estimated at 36 mmHg (RA pressure   8 mmHg). Previous notes reviewed and edited as necessary. Onesimo Ybarra is a pleasant 78-year-old male with multiple medical problems including hypertension, hyperlipidemia, DM 2 with polyneuropathy and retinopathy, glaucoma, kidney stones, DJD, patella bursitis, BPH, CVA, fatty liver, umbilical and ventral hernia. The patient has been a non-smoker, does not drink alcohol.   He was in the "[employer] for 4 years, was posted in [place], usually in [facility]. 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 He lives with his wife and son. The patient was not vaccinated against COVID-19, developed infection on 8/3. He had severe respiratory failure and was hospitalized at [facility], treated with steroid, remdesivir, Rocephin, azithromycin. He was hospitalized from 8/12 through 8/20. He had been discharged on steroid and supplemental oxygen at 2 LPM.  He is accompanied by his grandson [facility], who says he was on 4 LPM in the hospital.  Since his discharge, the patient has been very short of breath, has cough and feels that his \"bronchial tubes are irritated\". He develops cough with trying to talk. He is trying to use the incentive spirometer 2-4 times a day. His dyspnea somewhat relieved after coughing. He sleeps well as he is on Lyrica for RLS, this appears to be suppressing his cough as well. He has no nausea, vomiting, diarrhea, but has not cut back his sense of taste. He says he \"eats like a horse\". He denies PND or orthopnea, has noted ankle swelling. CXR 9/8/2021  Bilateral interstitial groundglass opacities likely representing COVID-19   pneumonia. CXR 8/13/2021  Severe perihilar and more diffuse reticular and ground-glass opacities   throughout the lungs likely represents pulmonary edema or viral pneumonitis.            Past Medical History:   Diagnosis Date    COVID-19 08/04/2021    Diabetes mellitus ([address]     type 2    Glaucoma     Hyperlipidemia     Hypertension     Kidney stone     Neuropathy     Osteoarthrosis, hip 03/21/2016    Prepatellar bursitis 03/21/2016       Past Surgical History:   Procedure Laterality Date    COLON SURGERY      COLONOSCOPY  2008    CYSTOSCOPY  2/2/12    with stent placement    EYE SURGERY      left eyemed valve, bilateral retinal repair    FOOT SURGERY right, tendon repair    KIDNEY STONE SURGERY      OTHER SURGICAL HISTORY Left 07/17/2017    CYSTOSCOPY, LEFT URETEROSCOPY, STENT PLACEMENT, URETHERAL dilation, stone manipulation        Social History     Tobacco Use    Smoking status: Never Smoker    Smokeless tobacco: Never Used   Substance Use Topics    Alcohol use: No       History reviewed. No pertinent family history. Current Outpatient Medications:     calcium carbonate (OYSTER SHELL CALCIUM 500 MG) 1250 (500 Ca) MG tablet, Take 1 tablet by mouth daily, Disp: , Rfl:     predniSONE (DELTASONE) 20 MG tablet, Take 1.5 tablets by mouth daily for 7 days, THEN 1 tablet daily for 7 days, THEN 0.5 tablets daily for 7 days, THEN 0.5 tablets daily for 28 days. , Disp: 7 tablet, Rfl: 0    benzonatate (TESSALON) 200 MG capsule, Take 200 mg by mouth 3 times daily as needed, Disp: , Rfl:     lansoprazole (PREVACID) 30 MG delayed release capsule, Take 30 mg by mouth daily, Disp: , Rfl:     pregabalin (LYRICA) 50 MG capsule, Take 50 mg by mouth 2 times daily. , Disp: , Rfl:     atorvastatin (LIPITOR) 80 MG tablet, Take 1 tablet by mouth nightly, Disp: 30 tablet, Rfl: 3    ondansetron (ZOFRAN ODT) 4 MG disintegrating tablet, Take 1-2 tablets by mouth every 12 hours as needed for Nausea May Sub regular tablet (non-ODT) if insurance does not cover ODT., Disp: 12 tablet, Rfl: 0    Cyanocobalamin (CVS VITAMIN B-12 SL), Place under the tongue daily, Disp: , Rfl:     aspirin 81 MG tablet, Take 162 mg by mouth daily , Disp: , Rfl:     docusate sodium (COLACE) 100 MG capsule, Take 100 mg by mouth 2 times daily as needed for Constipation, Disp: , Rfl:     Cholecalciferol (VITAMIN D3) 5000 units TABS, Take by mouth daily, Disp: , Rfl:     brimonidine (ALPHAGAN) 0.2 % ophthalmic solution, Place 1 drop into both eyes 2 times daily , Disp: , Rfl:     b complex vitamins capsule, Take 1 capsule by mouth daily, Disp: , Rfl:     Dorzolamide HCl (TRUSOPT OP), Apply 1 drop to eye 2 times daily , Disp: , Rfl:     insulin aspart (NOVOLOG) 100 UNIT/ML injection, Inject  into the skin 3 times daily (before meals). Sliding scale based on meal calculation , Disp: , Rfl:     insulin glargine (LANTUS) 100 UNIT/ML injection, Inject 40 Units into the skin 2 times daily. , Disp: , Rfl:     Patient has no known allergies. Vitals:    09/27/21 1300   BP: 126/65   Site: Right Upper Arm   Position: Sitting   Cuff Size: Medium Adult   Pulse: 78   Resp: 16   Temp: 98.7 °F (37.1 °C)   TempSrc: Oral   SpO2: 97%   Weight: 217 lb (98.4 kg)   Height: 5' 9\" (1.753 m)       Review of Systems   Constitutional: Positive for activity change and unexpected weight change. Eyes: Positive for visual disturbance. Respiratory: Positive for cough and shortness of breath. Cardiovascular: Positive for leg swelling. Psychiatric/Behavioral: Positive for sleep disturbance. All other systems reviewed and are negative. Physical Exam  Vitals reviewed. Constitutional:       General: He is not in acute distress. Appearance: He is well-developed. He is obese. He is ill-appearing (Mildly). He is not toxic-appearing or diaphoretic. Comments: Pleasant, overweight, in a wheelchair, mildly disheveled   HENT:      Head: Normocephalic and atraumatic. Nose: Nose normal. No congestion or rhinorrhea. Mouth/Throat:      Pharynx: No oropharyngeal exudate. Comments: Upper and lower dentures, class II airway  Eyes:      General: No scleral icterus. Right eye: No discharge. Left eye: No discharge. Conjunctiva/sclera: Conjunctivae normal.      Pupils: Pupils are equal, round, and reactive to light. Neck:      Thyroid: No thyromegaly. Vascular: No JVD. Trachea: No tracheal deviation. Comments: Short and large neck  Cardiovascular:      Rate and Rhythm: Normal rate and regular rhythm. Heart sounds: Normal heart sounds. No murmur heard. No friction rub.  No gallop. Pulmonary:      Effort: Pulmonary effort is normal. No respiratory distress. Breath sounds: Normal breath sounds. No stridor. No wheezing, rhonchi or rales (Basilar crackles). Abdominal:      Comments: Large protuberant abdomen   Musculoskeletal:      Right lower leg: Edema present. Left lower leg: Edema (3+ ankle, 2+ leg edema up to the knees) present. Lymphadenopathy:      Cervical: No cervical adenopathy. Skin:     General: Skin is warm and dry. Coloration: Skin is not jaundiced or pale. Findings: No bruising, erythema, lesion or rash. Comments: Dry scaly skin   Neurological:      Mental Status: He is alert and oriented to person, place, and time. Motor: No weakness.    Psychiatric:         Mood and Affect: Mood normal.         Behavior: Behavior normal.

## 2021-09-27 NOTE — PROGRESS NOTES
MA Communication:   The following orders are received by verbal communication from Vera Gilman MD    Orders include:  2 month follow up: 11/23/2021 3:00 pm  Patient to have chest xray prior to appt

## 2021-09-27 NOTE — TELEPHONE ENCOUNTER
[facility] pharmacy needs clarification on medication. Script for prednisone was ordered for only 7 tablets. Directions state 1.5 tablets x 7 days, then 1 tablet x7 days, then 0.5 tablet x 7 days, then 0.5 tablets x 28 days. Quantity changed to 35 tablets? Please advise.

## 2021-11-14 ENCOUNTER — HOSPITAL ENCOUNTER (EMERGENCY)
Age: 81
Discharge: HOME | End: 2021-11-14
Attending: EMERGENCY MEDICINE
Payer: MEDICARE

## 2021-11-14 ENCOUNTER — APPOINTMENT (OUTPATIENT)
Dept: CT IMAGING | Age: 81
End: 2021-11-14
Payer: MEDICARE

## 2021-11-14 VITALS
WEIGHT: 209 LBS | HEIGHT: 69 IN | OXYGEN SATURATION: 92 % | HEART RATE: 91 BPM | RESPIRATION RATE: 16 BRPM | DIASTOLIC BLOOD PRESSURE: 76 MMHG | SYSTOLIC BLOOD PRESSURE: 154 MMHG | TEMPERATURE: 98.3 F | BODY MASS INDEX: 30.96 KG/M2

## 2021-11-14 DIAGNOSIS — K86.9 PANCREATIC LESION: ICD-10-CM

## 2021-11-14 DIAGNOSIS — K86.89 PANCREATIC MASS: ICD-10-CM

## 2021-11-14 DIAGNOSIS — K59.00 CONSTIPATION, UNSPECIFIED CONSTIPATION TYPE: Primary | ICD-10-CM

## 2021-11-14 DIAGNOSIS — K55.069 OMENTAL INFARCTION (HCC): ICD-10-CM

## 2021-11-14 LAB
A/G RATIO: 1.2 (ref 1.1–2.2)
ALBUMIN SERPL-MCNC: 3.5 G/DL (ref 3.4–5)
ALP BLD-CCNC: 127 U/L (ref 40–129)
ALT SERPL-CCNC: 44 U/L (ref 10–40)
ANION GAP SERPL CALCULATED.3IONS-SCNC: 9 MMOL/L (ref 3–16)
AST SERPL-CCNC: 27 U/L (ref 15–37)
BASOPHILS ABSOLUTE: 0 K/UL (ref 0–0.2)
BASOPHILS RELATIVE PERCENT: 0.7 %
BILIRUB SERPL-MCNC: 0.7 MG/DL (ref 0–1)
BUN BLDV-MCNC: 13 MG/DL (ref 7–20)
CALCIUM SERPL-MCNC: 8.8 MG/DL (ref 8.3–10.6)
CHLORIDE BLD-SCNC: 102 MMOL/L (ref 99–110)
CO2: 27 MMOL/L (ref 21–32)
CREAT SERPL-MCNC: 0.8 MG/DL (ref 0.8–1.3)
EOSINOPHILS ABSOLUTE: 0.1 K/UL (ref 0–0.6)
EOSINOPHILS RELATIVE PERCENT: 2 %
GFR AFRICAN AMERICAN: >60
GFR NON-AFRICAN AMERICAN: >60
GLUCOSE BLD-MCNC: 291 MG/DL (ref 70–99)
HCT VFR BLD CALC: 46.8 % (ref 40.5–52.5)
HEMOGLOBIN: 15.3 G/DL (ref 13.5–17.5)
LACTIC ACID: 1.1 MMOL/L (ref 0.4–2)
LYMPHOCYTES ABSOLUTE: 1 K/UL (ref 1–5.1)
LYMPHOCYTES RELATIVE PERCENT: 18.1 %
MCH RBC QN AUTO: 30.1 PG (ref 26–34)
MCHC RBC AUTO-ENTMCNC: 32.8 G/DL (ref 31–36)
MCV RBC AUTO: 91.8 FL (ref 80–100)
MONOCYTES ABSOLUTE: 0.4 K/UL (ref 0–1.3)
MONOCYTES RELATIVE PERCENT: 6.5 %
NEUTROPHILS ABSOLUTE: 4.1 K/UL (ref 1.7–7.7)
NEUTROPHILS RELATIVE PERCENT: 72.7 %
PDW BLD-RTO: 17 % (ref 12.4–15.4)
PLATELET # BLD: 160 K/UL (ref 135–450)
PMV BLD AUTO: 9.5 FL (ref 5–10.5)
POTASSIUM REFLEX MAGNESIUM: 4.2 MMOL/L (ref 3.5–5.1)
RBC # BLD: 5.09 M/UL (ref 4.2–5.9)
SODIUM BLD-SCNC: 138 MMOL/L (ref 136–145)
TOTAL PROTEIN: 6.5 G/DL (ref 6.4–8.2)
WBC # BLD: 5.7 K/UL (ref 4–11)

## 2021-11-14 PROCEDURE — 6360000004 HC RX CONTRAST MEDICATION: Performed by: PHYSICIAN ASSISTANT

## 2021-11-14 PROCEDURE — 80053 COMPREHEN METABOLIC PANEL: CPT

## 2021-11-14 PROCEDURE — 83605 ASSAY OF LACTIC ACID: CPT

## 2021-11-14 PROCEDURE — 99284 EMERGENCY DEPT VISIT MOD MDM: CPT

## 2021-11-14 PROCEDURE — 36415 COLL VENOUS BLD VENIPUNCTURE: CPT

## 2021-11-14 PROCEDURE — 74177 CT ABD & PELVIS W/CONTRAST: CPT

## 2021-11-14 PROCEDURE — 85025 COMPLETE CBC W/AUTO DIFF WBC: CPT

## 2021-11-14 RX ORDER — POLYETHYLENE GLYCOL 3350 17 G/17G
17 POWDER, FOR SOLUTION ORAL DAILY
Qty: 238 G | Refills: 0 | Status: SHIPPED | OUTPATIENT
Start: 2021-11-14 | End: 2021-11-21

## 2021-11-14 RX ORDER — DOCUSATE SODIUM 100 MG/1
100 CAPSULE, LIQUID FILLED ORAL 2 TIMES DAILY
Qty: 60 CAPSULE | Refills: 0 | Status: SHIPPED | OUTPATIENT
Start: 2021-11-14 | End: 2021-12-14

## 2021-11-14 RX ADMIN — IOPAMIDOL 75 ML: 755 INJECTION, SOLUTION INTRAVENOUS at 15:51

## 2021-11-14 NOTE — ED PROVIDER NOTES
ED Attending Attestation Note    This patient was seen by the advanced practice provider. I have seen and examined the patient. I agree with the workup, evaluation, management, and diagnosis. The care plan has been discussed. 78 y.o. male presents with complaint of constipation and abdominal pain. Was able to have a BM in the ED. Focused exam:   Gen: 78 y.o. male, NAD  HEENT: NCAT. PERRL. EOMI. Abdomen: Soft, nontender. Ventral hernia, soft, reducible. No rebound/guarding. CT ABDOMEN PELVIS W IV CONTRAST Additional Contrast? None   Preliminary Result   1. Interval increased size of a 7.3 cm complex cystic lesion arising from   the head of the pancreas, previously measuring 5.3 cm in 2018. This mass   results in moderate mass effect upon the portal vein, splenic vein, and SMV   at the confluence. Differential considerations include serous and mucinous   cystadenomas, less likely pancreatic adenocarcinoma given slow increase in   size over time. 2.  Increased size of numerous additional cystic splenic lesions which may   represent side branch IPMNs. Consider nonemergent MRI for further evaluation   of these lesions. 3.  Small right upper quadrant omental infarct. 4.  No significant change in bibasilar ground-glass and reticular opacities,   likely representing sequelae of known prior COVID-19 infection. 5.  No evidence of bowel obstruction. Moderate colonic stool burden. Follows with physician in [address] [place] [address] for pancreatic lesion. Pt informed of incidental findings. For further details of the patient's emergency department visit, please see the [facility]'s documentation. Carmelo Sacks, MD     This report has been produced using speech recognition software and may contain errors related to that system including errors in grammar, punctuation, and spelling, as well as words and phrases that may be inappropriate.  If there are any questions or concerns please feel

## 2021-11-14 NOTE — ED TRIAGE NOTES
Pt amb to BR to void with 1 assist. Gait  Unsteady. A litlle weak and not picking up his feet. Says he has a cane but doesn't use it.  Just holds on to things

## 2021-11-15 NOTE — ED PROVIDER NOTES
[address] ED  EMERGENCY DEPARTMENT ENCOUNTER        Pt Name: Osiris Mason  MRN: [id number]  [place] 1940  Date of evaluation: 11/14/2021  Provider: GERA Bob  PCP: Timothy Puri MD    This patient was seen and evaluated by the attending physician Daniela Spencer       Chief Complaint   Patient presents with    Constipation     pt states constipation for 5 days, used enema at home. HISTORY OF PRESENT ILLNESS   (Location/Symptom, Timing/Onset, Context/Setting, Quality, Duration, Modifying Factors, Severity)  Note limiting factors. Osiris Mason is a 78 y.o. male with past medical history of hypertension, diabetes, hyperlipidemia, TIA, obesity, pancreatic mass who presents via private vehicle from his home with his wife for evaluation of constipation. ED Course as of 11/14/21 2332   Sun Nov 14, 2021   1519 Today is day 6 of no BM. He has taken 3 types of laxitives. He has no abdominal pain. He is still passing gas. He has no nausea or emesis. He denies fevers. He has always had constipation but he presents today because this feels like a blockage that he has never had before. He has had colectomy done last year, along with polypectomy. He has not been able to schedule his repeat colonoscopy. He denies recent melena or hematochezia. He notes that upon entering the ER he did have a small bowel movement, he notes \"about a cups worth\". [CS]      ED Course User Index  [CS] Kayla Bobma     Nursing Notes were all reviewed and agreed with or any disagreements were addressed  in the HPI. Pt was seen during the [place] 19 pandemic. Appropriate PPE worn by ME during patient encounters. Pt seen during a time with constrained hospital bed capacity and other potential inpatient and outpatient resources were constrained due to the viral pandemic.      REVIEW OF SYSTEMS    (2-9 systems for level 4, 10 or more for level 5)     Review of Systems    Positives and Pertinent negatives as per HPI. Except as noted abovein the ROS, all other systems were reviewed and negative. PAST MEDICAL HISTORY     Past Medical History:   Diagnosis Date    COVID-19 08/04/2021    Diabetes mellitus ([address]     type 2    Glaucoma     Hyperlipidemia     Hypertension     Kidney stone     Neuropathy     Osteoarthrosis, hip 03/21/2016    Prepatellar bursitis 03/21/2016         SURGICAL HISTORY     Past Surgical History:   Procedure Laterality Date    COLON SURGERY      COLONOSCOPY  2008    CYSTOSCOPY  2/2/12    with stent placement    EYE SURGERY      left eyemed valve, bilateral retinal repair    FOOT SURGERY      right, tendon repair    KIDNEY STONE SURGERY      OTHER SURGICAL HISTORY Left 07/17/2017    CYSTOSCOPY, LEFT URETEROSCOPY, STENT PLACEMENT, URETHERAL dilation, stone manipulation          CURRENTMEDICATIONS       Discharge Medication List as of 11/14/2021  6:41 PM      CONTINUE these medications which have NOT CHANGED    Details   Acetylcysteine ( PO) Take by mouthHistorical Med      benzonatate (TESSALON) 200 MG capsule Take 200 mg by mouth 3 times daily as neededHistorical Med      lansoprazole (PREVACID) 30 MG delayed release capsule Take 30 mg by mouth dailyHistorical Med      pregabalin (LYRICA) 50 MG capsule Take 50 mg by mouth 2 times daily. Historical Med      atorvastatin (LIPITOR) 80 MG tablet Take 1 tablet by mouth nightly, Disp-30 tablet, R-3Normal      ondansetron (ZOFRAN ODT) 4 MG disintegrating tablet Take 1-2 tablets by mouth every 12 hours as needed for Nausea May Sub regular tablet (non-ODT) if insurance does not cover ODT., Disp-12 tablet, R-0Print      Cyanocobalamin ([employer] VITAMIN B-12 SL) Place under the tongue dailyHistorical Med      aspirin 81 MG tablet Take 162 mg by mouth daily Historical Med      !! docusate sodium (COLACE) 100 MG capsule Take 100 mg by mouth 2 times daily as needed for ConstipationHistorical Med Cholecalciferol (VITAMIN D3) 5000 units TABS Take by mouth dailyHistorical Med      brimonidine (ALPHAGAN) 0.2 % ophthalmic solution Place 1 drop into both eyes 2 times daily Historical Med      b complex vitamins capsule Take 1 capsule by mouth daily      Dorzolamide HCl (TRUSOPT OP) Apply 1 drop to eye 2 times daily Historical Med      insulin aspart (NOVOLOG) 100 UNIT/ML injection Inject  into the skin 3 times daily (before meals). Sliding scale based on meal calculation       insulin glargine (LANTUS) 100 UNIT/ML injection Inject 40 Units into the skin 2 times daily. !! - Potential duplicate medications found. Please discuss with provider. ALLERGIES     Patient has no known allergies. FAMILYHISTORY     History reviewed. No pertinent family history. SOCIAL HISTORY       Social History     Socioeconomic History    Marital status:      Spouse name: None    Number of children: None    Years of education: None    Highest education level: None   Occupational History    None   Tobacco Use    Smoking status: Never Smoker    Smokeless tobacco: Never Used   Vaping Use    Vaping Use: Never used   Substance and Sexual Activity    Alcohol use: No    Drug use: No    Sexual activity: Not Currently     Partners: Female   Other Topics Concern    None   Social History Narrative    None     Social Determinants of Health     Financial Resource Strain:     Difficulty of Paying Living Expenses: Not on file   Food Insecurity:     Worried About Running Out of Food in the Last Year: Not on file    Yamel of Food in the Last Year: Not on file   Transportation Needs:     Lack of Transportation (Medical): Not on file    Lack of Transportation (Non-Medical):  Not on file   Physical Activity:     Days of Exercise per Week: Not on file    Minutes of Exercise per Session: Not on file   Stress:     Feeling of Stress : Not on file   Social Connections:     Frequency of Communication with Friends and Family: Not on file    Frequency of Social Gatherings with Friends and Family: Not on file    Attends Scientologist Services: Not on file    Active Member of Clubs or Organizations: Not on file    Attends Club or Organization Meetings: Not on file    Marital Status: Not on file   Intimate Partner Violence:     Fear of Current or Ex-Partner: Not on file    Emotionally Abused: Not on file    Physically Abused: Not on file    Sexually Abused: Not on file   Housing Stability:     Unable to Pay for Housing in the Last Year: Not on file    Number of [place] in the Last Year: Not on file    Unstable Housing in the Last Year: Not on file       SCREENINGS    [place] Coma Scale  Eye Opening: Spontaneous  Best Verbal Response: Oriented  Best Motor Response: Obeys commands  [place] Coma Scale Score: 15        PHYSICAL EXAM    (up to 7 for level 4, 8 or more for level 5)     ED Triage Vitals [11/14/21 1420]   BP Temp Temp Source Pulse Resp SpO2 Height Weight   (!) 191/99 98.3 °F (36.8 °C) Oral 91 16 98 % 5' 9\" (1.753 m) 209 lb (94.8 kg)       Physical Exam  PHYSICAL EXAM  BP (!) 154/76   Pulse 91   Temp 98.3 °F (36.8 °C) (Oral)   Resp 16   Ht 5' 9\" (1.753 m)   Wt 209 lb (94.8 kg)   SpO2 92%   BMI 30.86 kg/m²   GENERAL APPEARANCE: Awake and alert. Cooperative. Overweight adult male sitting upright in exam bed, nondiaphoretic, breathing comfortably on room air, showing no sign of acute respiratory distress. HEAD: Normocephalic. Atraumatic. EYES: PERRL. EOM's grossly intact. ENT: Mucous membranes are moist.. NECK: Supple. HEART: RRR. No murmurs. Radial pulses 2+ symmetric, PT pulses 1+, symmetric  LUNGS: Respirations unlabored. CTAB. Good air exchange. Speaking comfortably in full sentences. ABDOMEN: Soft. Non-distended. Appropriate abdominal sounds appreciated throughout. Non-tender. No masses. No organomegaly. No guarding or rebound. EXTREMITIES: No peripheral edema.  Moves all extremities equally. All extremities neurovascularly intact. SKIN: Warm and dry. No acute rashes. NEUROLOGICAL: Alert and oriented. CN grossly intact. No gross facial drooping. Power intact to UE and LE, sensation intact x 4. No tremors or ataxia. PSYCHIATRIC: Normal mood and affect. DIAGNOSTIC RESULTS   LABS:    Labs Reviewed   CBC WITH AUTO DIFFERENTIAL - Abnormal; Notable for the following components:       Result Value    RDW 17.0 (*)     All other components within normal limits    Narrative:     Performed at:  [address]   Phone [phone]   COMPREHENSIVE METABOLIC PANEL W/ REFLEX TO MG FOR LOW K - Abnormal; Notable for the following components:    Glucose 291 (*)     ALT 44 (*)     All other components within normal limits    Narrative:     Performed at:  [address]   Phone [phone]   LACTIC ACID, PLASMA    Narrative:     Performed at:  [address]   Phone [phone]       All other labs were within normal range or not returned as of this dictation. EKG: All EKG's are interpreted by the Emergency Department Physician who either signs orCo-signs this chart in the absence of a cardiologist.  Please see their note for interpretation of EKG. RADIOLOGY:   Non-plain film images such as CT, Ultrasound and MRI are read by the radiologist. Plain radiographic images are visualized andpreliminarily interpreted by the  ED Provider with the below findings:        Interpretation perthe Radiologist below, if available at the time of this note:    CT ABDOMEN PELVIS W IV CONTRAST Additional Contrast? None   Final Result   1. Interval increased size of a 7.3 cm complex cystic lesion arising from   the head of the pancreas, previously measuring 5.3 cm in 2018.   This mass liver is normal in contour. No suspicious liver lesion. Scattered hepatic calcified granulomas. Biliary and Gallbladder: No biliary ductal dilation. Cholelithiasis without evidence of acute cholecystitis. Spleen: Scattered splenic calcified granulomas. A 1.0 cm hypoattenuating lesion within the anterior spleen is not significantly changed in comparison with December 2018 CT, likely a benign cyst or hemangioma. Pancreas: Increased size of a multicystic lesion arising from the head of the pancreas measuring 7.3 x 6.0 cm (series 2, image 55), previously 5.3 x 5.2 cm on December 2018 CT. This mass results in moderate mass effect upon the proximal splenic vein, portal vein, and SMV at the confluence. There is moderate upstream pancreatic ductal dilation and atrophy. Additionally, there are numerous pancreatic cystic lesions which have increased in size since the prior exam.  A reference lesion arising from the pancreatic body measures 1.5 cm (series 2, image 62), previously 0.7 cm. Adrenal Glands: The adrenal glands are normal in appearance. Kidneys: Normal renal contour. No suspicious renal lesion. No hydronephrosis. Multiple bilateral nonobstructing renal calculi, the largest of which in the left upper pole measures 0.6 cm (series 2, image 61). A large cystic lesion arising from the superior pole of left kidney with multiple thin internal septations measures 8.0 x 6.4 cm (series 2, image 75), previously 6.5 x 5.8 cm. An additional cyst arises from the interpolar region of the left kidney. No hydronephrosis. No ureteral calculi. Bladder: Unremarkable. Abdominal Vasculature: Moderate atherosclerosis of the abdominal aorta and iliac arteries without aneurysm. Gastrointestinal Tract: No evidence of bowel obstruction. Prior partial colectomy. Moderate colonic stool burden. Peritoneum/Mesentery/Retroperitoneum: No peritoneal or retroperitoneal masses.   A small omental infarct is visualized in the right upper quadrant. Lymph Nodes: No retroperitoneal or pelvic lymphadenopathy. Pelvic Organs: Prostatomegaly. Musculoskeletal: No suspicious osseous findings. Small fat-containing supraumbilical ventral hernia. Small fat-containing umbilical hernia. Moderate bilateral fat-containing inguinal hernias. 1.  Interval increased size of a 7.3 cm complex cystic lesion arising from the head of the pancreas, previously measuring 5.3 cm in 2018. This mass results in moderate mass effect upon the portal vein, splenic vein, and SMV at the confluence. Differential considerations include serous cystadenoma, less likely mucinous cystadenoma and IPMN. Pancreatic adenocarcinoma is unlikely given imaging features and slow increase in size over time. 2.  Increased size of numerous additional cystic pancreatic lesions which may represent side branch IPMNs. Consider nonemergent MRI for further evaluation of these lesions. 3.  Small right upper quadrant omental infarct. 4.  No significant change in bibasilar ground-glass and reticular opacities, likely representing sequelae of known prior COVID-19 infection. 5.  No evidence of bowel obstruction. Moderate colonic stool burden.           PROCEDURES   Unless otherwise noted below, none     Procedures    CRITICAL CARE TIME   N/A    CONSULTS:  None      EMERGENCY DEPARTMENT COURSE and DIFFERENTIALDIAGNOSIS/MDM:   Vitals:    Vitals:    11/14/21 1420 11/14/21 1500 11/14/21 1700   BP: (!) 191/99 (!) 156/76 (!) 154/76   Pulse: 91     Resp: 16     Temp: 98.3 °F (36.8 °C)     TempSrc: Oral     SpO2: 98% 96% 92%   Weight: 209 lb (94.8 kg)     Height: 5' 9\" (1.753 m)         Patient was given thefollowing medications:  Medications   iopamidol (ISOVUE-370) 76 % injection 75 mL (75 mLs IntraVENous Given 11/14/21 1551)       PDMP Monitoring:    Last PDMP Jhonny as Reviewed [facility]):  Review User Review Instant Review Result          Last Controlled Substance Monitoring Documentation      [facility] [place] [facility]   Attestation The Prescription Monitoring Report for this patient was reviewed today. filed at 12/16/2018 1905   Periodic Controlled Substance Monitoring Possible medication side effects, risk of tolerance/dependence & alternative treatments discussed. filed at 12/16/2018 1905        Urine Drug Screenings (1 yr)    No resulted procedures found. Medication Contract and Consent for Opioid Use Documents Filed      No documents found                MDM:   Patient seen and evaluated. Old records reviewed. Diagnostic testing reviewed and results discussed. I have independently evaluated this patient based upon my scope of practice. Supervising physician was in the department for consultation as needed. 76-year-old male presents for evaluation of constipation. Lab work reassuring, CT reveals interval change of pancreatic mass. I reviewed patient's past recent charts it appears that he had lab work done 1110 for monitoring of this pancreatic mass and he had CA 19 within normal limits. He has not coming appointment with his general surgeon in regards to this. He was advised of these interval changes. He appears to have constipation but no acute bowel obstruction. He did have a bowel movement in the department, I do not believe he requires admission for this. At this time I believe he is reasonable candidate for discharge home with close follow-up with his treating specialist and strict ER return precautions. Pt is in agreement with the current plan and all questions were addressed. Discharge Time out:  CC Reviewed Yes   Test Results Yes     Vitals:    11/14/21 1700   BP: (!) 154/76   Pulse:    Resp:    Temp:    SpO2: 92%              FINAL IMPRESSION      1. Constipation, unspecified constipation type    2. Pancreatic mass    3. Pancreatic lesion    4.  Omental infarction [facility])          DISPOSITION/PLAN   DISPOSITION Decision To Discharge 11/14/2021 06:40:26 PM      PATIENT REFERREDTO:  Jeff Ibarra MD  [address] [facility]  [phone]    Schedule an appointment as soon as possible for a visit       Aniket Cruz MD  [address]  [phone]    Go to   If symptoms worsen    [facility]  [address] [facility]  [phone]  Go to   If symptoms worsen      DISCHARGE MEDICATIONS:  Discharge Medication List as of 11/14/2021  6:41 PM      START taking these medications    Details   !! docusate sodium (COLACE) 100 MG capsule Take 1 capsule by mouth 2 times daily, Disp-60 capsule, R-0Normal      polyethylene glycol (MIRALAX) 17 GM/SCOOP powder Take 17 g by mouth daily for 7 days PRN constipation, Disp-238 g, R-0Normal       !! - Potential duplicate medications found. Please discuss with provider.           DISCONTINUED MEDICATIONS:  Discharge Medication List as of 11/14/2021  6:41 PM      STOP taking these medications       calcium carbonate (OYSTER SHELL CALCIUM 500 MG) 1250 (500 Ca) MG tablet Comments:   Reason for Stopping:         predniSONE (DELTASONE) 20 MG tablet Comments:   Reason for Stopping:                      (Please note that portions ofthis note were completed with a voice recognition program.  Efforts were made to edit the dictations but occasionally words are mis-transcribed.)    GERA Kenyon (electronically signed)        Pat Kenyon  11/14/21 7722

## 2021-11-23 ENCOUNTER — HOSPITAL ENCOUNTER (OUTPATIENT)
Dept: GENERAL RADIOLOGY | Age: 81
Discharge: HOME | End: 2021-11-23
Payer: MEDICARE

## 2021-11-23 ENCOUNTER — HOSPITAL ENCOUNTER (OUTPATIENT)
Age: 81
Discharge: HOME | End: 2021-11-23
Payer: MEDICARE

## 2021-11-23 DIAGNOSIS — U07.1 PNEUMONIA DUE TO COVID-19 VIRUS: ICD-10-CM

## 2021-11-23 DIAGNOSIS — M25.552 HIP PAIN, LEFT: ICD-10-CM

## 2021-11-23 DIAGNOSIS — J12.82 PNEUMONIA DUE TO COVID-19 VIRUS: ICD-10-CM

## 2021-11-23 PROCEDURE — 73501 X-RAY EXAM HIP UNI 1 VIEW: CPT

## 2021-11-23 PROCEDURE — 71046 X-RAY EXAM CHEST 2 VIEWS: CPT

## 2021-12-29 ENCOUNTER — HOSPITAL ENCOUNTER (OUTPATIENT)
Dept: MRI IMAGING | Age: 81
Discharge: HOME | End: 2021-12-29
Payer: MEDICARE

## 2021-12-29 DIAGNOSIS — M51.16 INTERVERTEBRAL DISC DISORDERS WITH RADICULOPATHY, LUMBAR REGION: ICD-10-CM

## 2021-12-29 PROCEDURE — 72148 MRI LUMBAR SPINE W/O DYE: CPT

## 2022-01-07 ENCOUNTER — TELEPHONE (OUTPATIENT)
Dept: PULMONOLOGY | Age: 82
End: 2022-01-07

## 2022-02-07 ENCOUNTER — APPOINTMENT (OUTPATIENT)
Dept: GENERAL RADIOLOGY | Age: 82
End: 2022-02-07
Payer: MEDICARE

## 2022-02-07 ENCOUNTER — APPOINTMENT (OUTPATIENT)
Dept: CT IMAGING | Age: 82
End: 2022-02-07
Payer: MEDICARE

## 2022-02-07 ENCOUNTER — HOSPITAL ENCOUNTER (EMERGENCY)
Age: 82
Discharge: ANOTHER HOSPITAL | End: 2022-02-08
Attending: STUDENT IN AN ORGANIZED HEALTH CARE EDUCATION/TRAINING PROGRAM
Payer: MEDICARE

## 2022-02-07 DIAGNOSIS — G45.9 TIA (TRANSIENT ISCHEMIC ATTACK): Primary | ICD-10-CM

## 2022-02-07 DIAGNOSIS — M25.552 LEFT HIP PAIN: ICD-10-CM

## 2022-02-07 DIAGNOSIS — I10 HYPERTENSION, UNSPECIFIED TYPE: ICD-10-CM

## 2022-02-07 DIAGNOSIS — R77.8 ELEVATED TROPONIN: ICD-10-CM

## 2022-02-07 LAB
A/G RATIO: 1.4 (ref 1.1–2.2)
ALBUMIN SERPL-MCNC: 3.9 G/DL (ref 3.4–5)
ALP BLD-CCNC: 126 U/L (ref 40–129)
ALT SERPL-CCNC: 26 U/L (ref 10–40)
ANION GAP SERPL CALCULATED.3IONS-SCNC: 9 MMOL/L (ref 3–16)
AST SERPL-CCNC: 23 U/L (ref 15–37)
BASOPHILS ABSOLUTE: 0.1 K/UL (ref 0–0.2)
BASOPHILS RELATIVE PERCENT: 0.6 %
BILIRUB SERPL-MCNC: 0.6 MG/DL (ref 0–1)
BUN BLDV-MCNC: 14 MG/DL (ref 7–20)
CALCIUM SERPL-MCNC: 9.2 MG/DL (ref 8.3–10.6)
CHLORIDE BLD-SCNC: 104 MMOL/L (ref 99–110)
CO2: 27 MMOL/L (ref 21–32)
CREAT SERPL-MCNC: 0.8 MG/DL (ref 0.8–1.3)
EOSINOPHILS ABSOLUTE: 0.1 K/UL (ref 0–0.6)
EOSINOPHILS RELATIVE PERCENT: 1.2 %
GFR AFRICAN AMERICAN: >60
GFR NON-AFRICAN AMERICAN: >60
GLUCOSE BLD-MCNC: 282 MG/DL (ref 70–99)
HCT VFR BLD CALC: 47.7 % (ref 40.5–52.5)
HEMOGLOBIN: 16.1 G/DL (ref 13.5–17.5)
LYMPHOCYTES ABSOLUTE: 1.6 K/UL (ref 1–5.1)
LYMPHOCYTES RELATIVE PERCENT: 18.5 %
MCH RBC QN AUTO: 29.7 PG (ref 26–34)
MCHC RBC AUTO-ENTMCNC: 33.8 G/DL (ref 31–36)
MCV RBC AUTO: 87.8 FL (ref 80–100)
MONOCYTES ABSOLUTE: 0.6 K/UL (ref 0–1.3)
MONOCYTES RELATIVE PERCENT: 6.8 %
NEUTROPHILS ABSOLUTE: 6.2 K/UL (ref 1.7–7.7)
NEUTROPHILS RELATIVE PERCENT: 72.9 %
PDW BLD-RTO: 14.2 % (ref 12.4–15.4)
PLATELET # BLD: 128 K/UL (ref 135–450)
PMV BLD AUTO: 8.8 FL (ref 5–10.5)
POTASSIUM SERPL-SCNC: 4.2 MMOL/L (ref 3.5–5.1)
RBC # BLD: 5.43 M/UL (ref 4.2–5.9)
SODIUM BLD-SCNC: 140 MMOL/L (ref 136–145)
TOTAL PROTEIN: 6.6 G/DL (ref 6.4–8.2)
TROPONIN: 0.02 NG/ML
TROPONIN: 0.02 NG/ML
WBC # BLD: 8.6 K/UL (ref 4–11)

## 2022-02-07 PROCEDURE — 71045 X-RAY EXAM CHEST 1 VIEW: CPT

## 2022-02-07 PROCEDURE — 85025 COMPLETE CBC W/AUTO DIFF WBC: CPT

## 2022-02-07 PROCEDURE — 96372 THER/PROPH/DIAG INJ SC/IM: CPT

## 2022-02-07 PROCEDURE — 96374 THER/PROPH/DIAG INJ IV PUSH: CPT

## 2022-02-07 PROCEDURE — 80053 COMPREHEN METABOLIC PANEL: CPT

## 2022-02-07 PROCEDURE — 96375 TX/PRO/DX INJ NEW DRUG ADDON: CPT

## 2022-02-07 PROCEDURE — 84484 ASSAY OF TROPONIN QUANT: CPT

## 2022-02-07 PROCEDURE — 99285 EMERGENCY DEPT VISIT HI MDM: CPT

## 2022-02-07 PROCEDURE — 70450 CT HEAD/BRAIN W/O DYE: CPT

## 2022-02-07 PROCEDURE — 70496 CT ANGIOGRAPHY HEAD: CPT

## 2022-02-07 PROCEDURE — 6370000000 HC RX 637 (ALT 250 FOR IP): Performed by: PHYSICIAN ASSISTANT

## 2022-02-07 PROCEDURE — 93005 ELECTROCARDIOGRAM TRACING: CPT | Performed by: STUDENT IN AN ORGANIZED HEALTH CARE EDUCATION/TRAINING PROGRAM

## 2022-02-07 PROCEDURE — 6360000004 HC RX CONTRAST MEDICATION: Performed by: PHYSICIAN ASSISTANT

## 2022-02-07 RX ORDER — PREGABALIN 25 MG/1
50 CAPSULE ORAL 2 TIMES DAILY
Status: DISCONTINUED | OUTPATIENT
Start: 2022-02-08 | End: 2022-02-08 | Stop reason: HOSPADM

## 2022-02-07 RX ORDER — AMLODIPINE BESYLATE 5 MG/1
10 TABLET ORAL DAILY
Status: DISCONTINUED | OUTPATIENT
Start: 2022-02-08 | End: 2022-02-08 | Stop reason: HOSPADM

## 2022-02-07 RX ORDER — ASPIRIN 81 MG/1
81 TABLET, CHEWABLE ORAL DAILY
Status: DISCONTINUED | OUTPATIENT
Start: 2022-02-08 | End: 2022-02-08 | Stop reason: HOSPADM

## 2022-02-07 RX ORDER — ATORVASTATIN CALCIUM 40 MG/1
80 TABLET, FILM COATED ORAL DAILY
Status: DISCONTINUED | OUTPATIENT
Start: 2022-02-08 | End: 2022-02-08 | Stop reason: HOSPADM

## 2022-02-07 RX ORDER — OXYCODONE HYDROCHLORIDE AND ACETAMINOPHEN 5; 325 MG/1; MG/1
1 TABLET ORAL ONCE
Status: COMPLETED | OUTPATIENT
Start: 2022-02-07 | End: 2022-02-07

## 2022-02-07 RX ORDER — ROPINIROLE 0.25 MG/1
0.5 TABLET, FILM COATED ORAL 3 TIMES DAILY
Status: DISCONTINUED | OUTPATIENT
Start: 2022-02-07 | End: 2022-02-08 | Stop reason: HOSPADM

## 2022-02-07 RX ORDER — HYDROCHLOROTHIAZIDE 25 MG/1
25 TABLET ORAL ONCE
Status: COMPLETED | OUTPATIENT
Start: 2022-02-07 | End: 2022-02-07

## 2022-02-07 RX ORDER — PANTOPRAZOLE SODIUM 20 MG/1
20 TABLET, DELAYED RELEASE ORAL
Status: DISCONTINUED | OUTPATIENT
Start: 2022-02-08 | End: 2022-02-08 | Stop reason: HOSPADM

## 2022-02-07 RX ADMIN — ASPIRIN 325 MG: 325 TABLET, COATED ORAL at 22:59

## 2022-02-07 RX ADMIN — HYDROCHLOROTHIAZIDE 25 MG: 25 TABLET ORAL at 22:59

## 2022-02-07 RX ADMIN — OXYCODONE HYDROCHLORIDE AND ACETAMINOPHEN 1 TABLET: 5; 325 TABLET ORAL at 22:59

## 2022-02-07 RX ADMIN — IOPAMIDOL 85 ML: 755 INJECTION, SOLUTION INTRAVENOUS at 21:43

## 2022-02-07 RX ADMIN — ROPINIROLE HYDROCHLORIDE 0.5 MG: 0.25 TABLET, FILM COATED ORAL at 22:59

## 2022-02-07 ASSESSMENT — PAIN DESCRIPTION - PAIN TYPE: TYPE: CHRONIC PAIN

## 2022-02-07 ASSESSMENT — PAIN DESCRIPTION - ORIENTATION: ORIENTATION: LEFT

## 2022-02-07 ASSESSMENT — PAIN SCALES - GENERAL: PAINLEVEL_OUTOF10: 3

## 2022-02-07 ASSESSMENT — PAIN DESCRIPTION - DESCRIPTORS: DESCRIPTORS: ACHING

## 2022-02-07 ASSESSMENT — PAIN DESCRIPTION - LOCATION: LOCATION: HIP

## 2022-02-08 ENCOUNTER — APPOINTMENT (OUTPATIENT)
Dept: MRI IMAGING | Age: 82
End: 2022-02-08
Payer: MEDICARE

## 2022-02-08 ENCOUNTER — HOSPITAL ENCOUNTER (OUTPATIENT)
Age: 82
Setting detail: OBSERVATION
Discharge: HOME | End: 2022-02-08
Attending: INTERNAL MEDICINE | Admitting: INTERNAL MEDICINE
Payer: MEDICARE

## 2022-02-08 VITALS
TEMPERATURE: 97.4 F | SYSTOLIC BLOOD PRESSURE: 178 MMHG | WEIGHT: 210 LBS | DIASTOLIC BLOOD PRESSURE: 90 MMHG | BODY MASS INDEX: 31.1 KG/M2 | HEART RATE: 81 BPM | HEIGHT: 69 IN | RESPIRATION RATE: 15 BRPM | OXYGEN SATURATION: 96 %

## 2022-02-08 VITALS
TEMPERATURE: 98.5 F | SYSTOLIC BLOOD PRESSURE: 162 MMHG | RESPIRATION RATE: 16 BRPM | HEART RATE: 81 BPM | DIASTOLIC BLOOD PRESSURE: 79 MMHG | OXYGEN SATURATION: 94 %

## 2022-02-08 LAB
CHOLESTEROL, TOTAL: 131 MG/DL (ref 0–199)
EKG ATRIAL RATE: 89 BPM
EKG DIAGNOSIS: NORMAL
EKG P AXIS: 44 DEGREES
EKG P-R INTERVAL: 186 MS
EKG Q-T INTERVAL: 384 MS
EKG QRS DURATION: 82 MS
EKG QTC CALCULATION (BAZETT): 467 MS
EKG R AXIS: 21 DEGREES
EKG T AXIS: 32 DEGREES
EKG VENTRICULAR RATE: 89 BPM
GLUCOSE BLD-MCNC: 140 MG/DL (ref 70–99)
GLUCOSE BLD-MCNC: 143 MG/DL (ref 70–99)
GLUCOSE BLD-MCNC: 205 MG/DL (ref 70–99)
HCT VFR BLD CALC: 47 % (ref 40.5–52.5)
HDLC SERPL-MCNC: 56 MG/DL (ref 40–60)
HEMOGLOBIN: 15.5 G/DL (ref 13.5–17.5)
INFLUENZA A: NOT DETECTED
INFLUENZA B: NOT DETECTED
LDL CHOLESTEROL CALCULATED: 48 MG/DL
MCH RBC QN AUTO: 29.5 PG (ref 26–34)
MCHC RBC AUTO-ENTMCNC: 33 G/DL (ref 31–36)
MCV RBC AUTO: 89.3 FL (ref 80–100)
PDW BLD-RTO: 13.9 % (ref 12.4–15.4)
PERFORMED ON: ABNORMAL
PLATELET # BLD: 129 K/UL (ref 135–450)
PMV BLD AUTO: 8.7 FL (ref 5–10.5)
RBC # BLD: 5.27 M/UL (ref 4.2–5.9)
SARS-COV-2 RNA, RT PCR: NOT DETECTED
TRIGL SERPL-MCNC: 137 MG/DL (ref 0–150)
VLDLC SERPL CALC-MCNC: 27 MG/DL
WBC # BLD: 8.2 K/UL (ref 4–11)

## 2022-02-08 PROCEDURE — 97530 THERAPEUTIC ACTIVITIES: CPT

## 2022-02-08 PROCEDURE — 96372 THER/PROPH/DIAG INJ SC/IM: CPT

## 2022-02-08 PROCEDURE — 97166 OT EVAL MOD COMPLEX 45 MIN: CPT

## 2022-02-08 PROCEDURE — 6360000002 HC RX W HCPCS: Performed by: EMERGENCY MEDICINE

## 2022-02-08 PROCEDURE — 99223 1ST HOSP IP/OBS HIGH 75: CPT | Performed by: NURSE PRACTITIONER

## 2022-02-08 PROCEDURE — G0379 DIRECT REFER HOSPITAL OBSERV: HCPCS

## 2022-02-08 PROCEDURE — G0378 HOSPITAL OBSERVATION PER HR: HCPCS

## 2022-02-08 PROCEDURE — 97535 SELF CARE MNGMENT TRAINING: CPT

## 2022-02-08 PROCEDURE — 80061 LIPID PANEL: CPT

## 2022-02-08 PROCEDURE — 97162 PT EVAL MOD COMPLEX 30 MIN: CPT

## 2022-02-08 PROCEDURE — 6370000000 HC RX 637 (ALT 250 FOR IP): Performed by: PHYSICIAN ASSISTANT

## 2022-02-08 PROCEDURE — 87636 SARSCOV2 & INF A&B AMP PRB: CPT

## 2022-02-08 PROCEDURE — 83036 HEMOGLOBIN GLYCOSYLATED A1C: CPT

## 2022-02-08 PROCEDURE — 70551 MRI BRAIN STEM W/O DYE: CPT

## 2022-02-08 PROCEDURE — 6360000002 HC RX W HCPCS: Performed by: STUDENT IN AN ORGANIZED HEALTH CARE EDUCATION/TRAINING PROGRAM

## 2022-02-08 PROCEDURE — 97116 GAIT TRAINING THERAPY: CPT

## 2022-02-08 PROCEDURE — 93010 ELECTROCARDIOGRAM REPORT: CPT | Performed by: INTERNAL MEDICINE

## 2022-02-08 PROCEDURE — 96375 TX/PRO/DX INJ NEW DRUG ADDON: CPT

## 2022-02-08 PROCEDURE — 6370000000 HC RX 637 (ALT 250 FOR IP): Performed by: STUDENT IN AN ORGANIZED HEALTH CARE EDUCATION/TRAINING PROGRAM

## 2022-02-08 PROCEDURE — 96374 THER/PROPH/DIAG INJ IV PUSH: CPT

## 2022-02-08 PROCEDURE — 36415 COLL VENOUS BLD VENIPUNCTURE: CPT

## 2022-02-08 PROCEDURE — 85027 COMPLETE CBC AUTOMATED: CPT

## 2022-02-08 RX ORDER — SODIUM CHLORIDE 0.9 % (FLUSH) 0.9 %
5-40 SYRINGE (ML) INJECTION PRN
Status: DISCONTINUED | OUTPATIENT
Start: 2022-02-08 | End: 2022-02-08 | Stop reason: HOSPADM

## 2022-02-08 RX ORDER — DEXTROSE MONOHYDRATE 25 G/50ML
12.5 INJECTION, SOLUTION INTRAVENOUS PRN
Status: DISCONTINUED | OUTPATIENT
Start: 2022-02-08 | End: 2022-02-08 | Stop reason: HOSPADM

## 2022-02-08 RX ORDER — ACETAMINOPHEN 650 MG/1
650 SUPPOSITORY RECTAL EVERY 6 HOURS PRN
Status: DISCONTINUED | OUTPATIENT
Start: 2022-02-08 | End: 2022-02-08 | Stop reason: HOSPADM

## 2022-02-08 RX ORDER — DEXTROSE MONOHYDRATE 50 MG/ML
100 INJECTION, SOLUTION INTRAVENOUS PRN
Status: DISCONTINUED | OUTPATIENT
Start: 2022-02-08 | End: 2022-02-08 | Stop reason: HOSPADM

## 2022-02-08 RX ORDER — ONDANSETRON 2 MG/ML
4 INJECTION INTRAMUSCULAR; INTRAVENOUS EVERY 6 HOURS PRN
Status: DISCONTINUED | OUTPATIENT
Start: 2022-02-08 | End: 2022-02-08 | Stop reason: HOSPADM

## 2022-02-08 RX ORDER — ASPIRIN 81 MG/1
81 TABLET, CHEWABLE ORAL DAILY
Qty: 30 TABLET | Refills: 3 | Status: SHIPPED | OUTPATIENT
Start: 2022-02-09

## 2022-02-08 RX ORDER — LOSARTAN POTASSIUM 100 MG/1
100 TABLET ORAL DAILY
Status: DISCONTINUED | OUTPATIENT
Start: 2022-02-08 | End: 2022-02-08 | Stop reason: HOSPADM

## 2022-02-08 RX ORDER — ONDANSETRON 4 MG/1
4 TABLET, ORALLY DISINTEGRATING ORAL EVERY 8 HOURS PRN
Status: DISCONTINUED | OUTPATIENT
Start: 2022-02-08 | End: 2022-02-08 | Stop reason: HOSPADM

## 2022-02-08 RX ORDER — ATORVASTATIN CALCIUM 80 MG/1
80 TABLET, FILM COATED ORAL NIGHTLY
Status: DISCONTINUED | OUTPATIENT
Start: 2022-02-08 | End: 2022-02-08 | Stop reason: HOSPADM

## 2022-02-08 RX ORDER — NICOTINE POLACRILEX 4 MG
15 LOZENGE BUCCAL PRN
Status: DISCONTINUED | OUTPATIENT
Start: 2022-02-08 | End: 2022-02-08 | Stop reason: HOSPADM

## 2022-02-08 RX ORDER — ASPIRIN 81 MG/1
81 TABLET, CHEWABLE ORAL DAILY
Status: DISCONTINUED | OUTPATIENT
Start: 2022-02-08 | End: 2022-02-08 | Stop reason: HOSPADM

## 2022-02-08 RX ORDER — ATORVASTATIN CALCIUM 80 MG/1
80 TABLET, FILM COATED ORAL NIGHTLY
Qty: 30 TABLET | Refills: 3 | Status: SHIPPED | OUTPATIENT
Start: 2022-02-08

## 2022-02-08 RX ORDER — AMLODIPINE BESYLATE 5 MG/1
5 TABLET ORAL DAILY
Status: DISCONTINUED | OUTPATIENT
Start: 2022-02-08 | End: 2022-02-08 | Stop reason: HOSPADM

## 2022-02-08 RX ORDER — MORPHINE SULFATE 4 MG/ML
4 INJECTION, SOLUTION INTRAMUSCULAR; INTRAVENOUS EVERY 4 HOURS PRN
Status: DISCONTINUED | OUTPATIENT
Start: 2022-02-08 | End: 2022-02-08 | Stop reason: HOSPADM

## 2022-02-08 RX ORDER — LOSARTAN POTASSIUM 100 MG/1
100 TABLET ORAL DAILY
Qty: 30 TABLET | Refills: 3
Start: 2022-02-09

## 2022-02-08 RX ORDER — SODIUM CHLORIDE 9 MG/ML
25 INJECTION, SOLUTION INTRAVENOUS PRN
Status: DISCONTINUED | OUTPATIENT
Start: 2022-02-08 | End: 2022-02-08 | Stop reason: HOSPADM

## 2022-02-08 RX ORDER — POLYETHYLENE GLYCOL 3350 17 G/17G
17 POWDER, FOR SOLUTION ORAL DAILY PRN
Status: DISCONTINUED | OUTPATIENT
Start: 2022-02-08 | End: 2022-02-08 | Stop reason: HOSPADM

## 2022-02-08 RX ORDER — ACETAMINOPHEN 325 MG/1
650 TABLET ORAL EVERY 6 HOURS PRN
Status: DISCONTINUED | OUTPATIENT
Start: 2022-02-08 | End: 2022-02-08 | Stop reason: HOSPADM

## 2022-02-08 RX ORDER — SODIUM CHLORIDE 0.9 % (FLUSH) 0.9 %
5-40 SYRINGE (ML) INJECTION EVERY 12 HOURS SCHEDULED
Status: DISCONTINUED | OUTPATIENT
Start: 2022-02-08 | End: 2022-02-08 | Stop reason: HOSPADM

## 2022-02-08 RX ORDER — AMLODIPINE BESYLATE 5 MG/1
5 TABLET ORAL DAILY
Qty: 30 TABLET | Refills: 0
Start: 2022-02-09

## 2022-02-08 RX ORDER — INSULIN GLARGINE 100 [IU]/ML
20 INJECTION, SOLUTION SUBCUTANEOUS DAILY
Status: DISCONTINUED | OUTPATIENT
Start: 2022-02-08 | End: 2022-02-08 | Stop reason: HOSPADM

## 2022-02-08 RX ADMIN — LOSARTAN POTASSIUM 100 MG: 100 TABLET, FILM COATED ORAL at 14:03

## 2022-02-08 RX ADMIN — ASPIRIN 81 MG 81 MG: 81 TABLET ORAL at 14:00

## 2022-02-08 RX ADMIN — INSULIN LISPRO 1 UNITS: 100 INJECTION, SOLUTION INTRAVENOUS; SUBCUTANEOUS at 02:11

## 2022-02-08 RX ADMIN — MORPHINE SULFATE 4 MG: 4 INJECTION, SOLUTION INTRAMUSCULAR; INTRAVENOUS at 07:50

## 2022-02-08 RX ADMIN — ONDANSETRON HYDROCHLORIDE 4 MG: 2 INJECTION, SOLUTION INTRAMUSCULAR; INTRAVENOUS at 07:50

## 2022-02-08 RX ADMIN — ENOXAPARIN SODIUM 40 MG: 40 INJECTION SUBCUTANEOUS at 14:00

## 2022-02-08 RX ADMIN — AMLODIPINE BESYLATE 5 MG: 5 TABLET ORAL at 14:03

## 2022-02-08 ASSESSMENT — PAIN SCALES - GENERAL
PAINLEVEL_OUTOF10: 5
PAINLEVEL_OUTOF10: 1

## 2022-02-08 ASSESSMENT — PAIN DESCRIPTION - FREQUENCY: FREQUENCY: INTERMITTENT

## 2022-02-08 ASSESSMENT — PAIN SCALES - WONG BAKER: WONGBAKER_NUMERICALRESPONSE: 2

## 2022-02-08 ASSESSMENT — PAIN DESCRIPTION - ORIENTATION: ORIENTATION: LEFT

## 2022-02-08 ASSESSMENT — PAIN DESCRIPTION - LOCATION: LOCATION: HIP

## 2022-02-08 ASSESSMENT — PAIN DESCRIPTION - DESCRIPTORS: DESCRIPTORS: ACHING

## 2022-02-08 ASSESSMENT — PAIN DESCRIPTION - PAIN TYPE: TYPE: CHRONIC PAIN

## 2022-02-08 NOTE — ED NOTES
Patient going to bed 538 at [facility]. Call report to [phone]. Transfer center will set up transport after 7am due to no availability of crews.       Nupur Scruggs  02/08/22 0323

## 2022-02-08 NOTE — CARE COORDINATION
[facility]    DC order noted, all docs needed have been faxed to [facility] for home care services.     Home care to see patient within 24-48 hrs    Joe Dowell RN, BSN CTN  [facility] [phone]

## 2022-02-08 NOTE — ED NOTES
Transport set up with [facility] for 1145.  Transfer center will attempt to get earlier ETA after 7am     Nupur Scruggs  02/08/22 0422

## 2022-02-08 NOTE — ED NOTES
4310- Call to [facility] to discuss finding earlier transport for this pt.  0800- updated ETA of 0830 with [facility].      Steov Mccormick  02/08/22 [address] [facility]  02/08/22 0802

## 2022-02-08 NOTE — ED NOTES
Bed: 14  Expected date:   Expected time:   Means of arrival:   Comments:     Narinder Faulkner RN  02/07/22 2038

## 2022-02-08 NOTE — H&P
Hospital Medicine History & Physical      PCP: Chayo Zamarripa MD    Date of Admission: 2/8/2022    Date of Service: Pt seen/examined on 02/08/2022 and Admitted to Inpatient with expected LOS greater than two midnights due to medical therapy. Chief Complaint:  Weakness and slurred speech      History Of Present Illness:    78 y.o. male who presented to [address] [place] [address] with weakness and lsurred speech described as \"feeling on edge\". The pt describes feeling very \"on edge\" on 02/07. Because fo this he checked his BP which was elevated to 215/90. He then proceeded to take his BP medications and sxs resolved. He denies associated slurred speech, nausea, emesis, visual disturbances, focal neuro deficits or paraesthesias. He does have chronic left sided peripheral neuropathy which has been exacerbated in the acute setting by MRI imaging. Per his son, the pt developed 30 mins of slurred speech that resolved either on Sunday or Monday prior to admission but otherwise no other focal neuro deficits. Pt denies CP, fever, sob, cough, GI sxs, dysuria. Pt has not fallen in the last 6 weeks. He walks with a walker or is in wheelchair at times. He lives with his Son and wife. Past Medical History:          Diagnosis Date    COVID-19 08/04/2021    Diabetes mellitus ([facility] [address]     type 2    Glaucoma     Hyperlipidemia     Hypertension     Kidney stone     Neuropathy     Osteoarthrosis, hip 03/21/2016    Prepatellar bursitis 03/21/2016       Past Surgical History:          Procedure Laterality Date    COLON SURGERY      COLONOSCOPY  2008    CYSTOSCOPY  02/02/2012    with stent placement    EYE SURGERY      left eyemed valve, bilateral retinal repair    FOOT SURGERY      right, tendon repair    GLAUCOMA SURGERY N/A     AHMED GLAUCOMA VALVE/Model S-2/SN [id number] Safe per [employer]    KIDNEY STONE SURGERY      OTHER SURGICAL HISTORY Left 07/17/2017    CYSTOSCOPY, LEFT URETEROSCOPY, STENT PLACEMENT, URETHERAL dilation, stone manipulation        Medications Prior to Admission:      Prior to Admission medications    Medication Sig Start Date End Date Taking? Authorizing Provider   Acetylcysteine ( PO) Take by mouth    Historical Provider, MD   benzonatate (TESSALON) 200 MG capsule Take 200 mg by mouth 3 times daily as needed 8/12/21   Historical Provider, MD   lansoprazole (PREVACID) 30 MG delayed release capsule Take 30 mg by mouth daily    Historical Provider, MD   pregabalin (LYRICA) 50 MG capsule Take 50 mg by mouth 2 times daily. Historical Provider, MD   atorvastatin (LIPITOR) 80 MG tablet Take 1 tablet by mouth nightly 1/20/20   TRESSA Wagner - CNP   ondansetron (ZOFRAN ODT) 4 MG disintegrating tablet Take 1-2 tablets by mouth every 12 hours as needed for Nausea May Sub regular tablet (non-ODT) if insurance does not cover ODT. 12/16/18   Kapil Rodriguez PA-C   Cyanocobalamin ([employer] VITAMIN B-12 SL) Place under the tongue daily    Historical Provider, MD   aspirin 81 MG tablet Take 162 mg by mouth daily  9/7/17   Historical Provider, MD   docusate sodium (COLACE) 100 MG capsule Take 100 mg by mouth 2 times daily as needed for Constipation    Historical Provider, MD   Cholecalciferol (VITAMIN D3) 5000 units TABS Take by mouth daily    Historical Provider, MD   brimonidine (ALPHAGAN) 0.2 % ophthalmic solution Place 1 drop into both eyes 2 times daily  2/18/16   Historical Provider, MD   b complex vitamins capsule Take 1 capsule by mouth daily    Historical Provider, MD   Dorzolamide HCl (TRUSOPT OP) Apply 1 drop to eye 2 times daily     Historical Provider, MD   insulin aspart (NOVOLOG) 100 UNIT/ML injection Inject  into the skin 3 times daily (before meals). Sliding scale based on meal calculation     Historical Provider, MD   insulin glargine (LANTUS) 100 UNIT/ML injection Inject 40 Units into the skin 2 times daily.       Historical Provider, MD       Allergies:  Patient has no known allergies. Social History:      The patient currently lives at home    TOBACCO:   reports that he has never smoked. He has never used smokeless tobacco.  ETOH:   reports no history of alcohol use. E-Cigarettes/Vaping Use     Questions Responses    E-Cigarette/Vaping Use Never User    Start Date     Passive Exposure     Quit Date     Counseling Given     Comments             Family History:       Reviewed in detail and negative for DM, CAD, Cancer, CVA. Positive as follows:    No family history on file. REVIEW OF SYSTEMS COMPLETED:   Pertinent positives as noted in the HPI. All other systems reviewed and negative. PHYSICAL EXAM PERFORMED:    BP (!) 162/79   Pulse 81   Temp 98.5 °F (36.9 °C) (Oral)   Resp 16   SpO2 94%     General appearance:  No apparent distress, appears stated age and cooperative. HEENT:  Normal cephalic, atraumatic without obvious deformity. Pupils equal, round, and reactive to light. Extra ocular muscles intact. Conjunctivae/corneas clear. Neck: Supple, with full range of motion. No jugular venous distention. Trachea midline. Respiratory:  Normal respiratory effort. Clear to auscultation, bilaterally without Rales/Wheezes/Rhonchi. Cardiovascular:  Regular rate and rhythm with normal S1/S2 without murmurs, rubs or gallops. Abdomen: Soft, non-tender, non-distended with normal bowel sounds. Musculoskeletal:  No clubbing, cyanosis or edema bilaterally. Full range of motion without deformity. Slowed gait but able to ambulate independently  Skin: Skin color, texture, turgor normal.  No rashes or lesions. Neurologic:  Neurovascularly intact without any focal sensory/motor deficits.  Cranial nerves: II-XII intact, grossly non-focal.  Psychiatric:  Alert and oriented, thought content appropriate, normal insight  Capillary Refill: Brisk,3 seconds, normal  Peripheral Pulses: +2 palpable, equal bilaterally       Labs:     Recent Labs     02/07/22  2103 02/08/22  1147   WBC 8.6 8.2 HGB 16.1 15.5   HCT 47.7 47.0   * 129*     Recent Labs     02/07/22 2103      K 4.2      CO2 27   BUN 14   CREATININE 0.8   CALCIUM 9.2     Recent Labs     02/07/22 2103   AST 23   ALT 26   BILITOT 0.6   ALKPHOS 126     No results for input(s): INR in the last 72 hours. Recent Labs     02/07/22  2103 02/07/22  2305   TROPONINI 0.02* 0.02*       Urinalysis:      Lab Results   Component Value Date    NITRU Negative 07/27/2021    WBCUA 3-5 12/19/2018    BACTERIA Rare 12/19/2018    RBCUA  12/19/2018    BLOODU Negative 07/27/2021    SPECGRAV 1.020 07/27/2021    GLUCOSEU >=1000 07/27/2021    GLUCOSEU >=1000 02/02/2012       No orders to display       ASSESSMENT:    Active Hospital Problems    Diagnosis Date Noted    TIA (transient ischemic attack) [G45.9] 01/16/2020         PLAN:    Suspect TIA vs uncontrolled hypertension  -CT head- no acute intracranial pathology  -CTA head- no LVO  -MRI-chronic lacunar infarcts involving right thalamus and bilateral cerebellar hemispheres, no acute pathology  -echo 2020 without shunting  -neurology, PT/OT consulted  -recommend continued ASA, statin  - recommend outpt cardiac monitor (d/w pt and Son who will d/w pts PCP)  - continue BP, TIIDM mgmt    HTN- continue home arb, HCTZ, and ccb    TIIDM- a1c pending at time of dc. Recommend continued home insulin regimen. Pt aware of risk of hypo and hyperglycemia    Recommend out pt thyroid follow up for incidental nodule noted on imaging. Thrombocytopenia appears chronic, recommend outpt follow up    Elevated troponin- pt without ischemic changes on EKG. No clinical signs of cardiac disease. Troponin flat. Recommend outpt cardiac work up with possible stress test. Could have been from elevated BP. DVT Prophylaxis: non  Diet: ADULT DIET;  Regular  Code Status: Full Code    PT/OT Eval Status: ordered    Dispo - home today if safe after Pt/OT reccs       Linda Bocanegra MD    Thank you Odette Prince MD for the opportunity to be involved in this patient's care. If you have any questions or concerns please feel free to contact me at [phone].

## 2022-02-08 NOTE — PROGRESS NOTES
Occupational Therapy   Occupational Therapy Initial Assessment/Treatment  Date: 2022   Patient Name: Gladys Leavitt  MRN: [id number]     : 1940    Date of Service: 2022    Discharge Recommendations:  24 hour supervision or assist,S Level 3,Home with Home health OT (if family unable to provide will need SNF)       Assessment   Performance deficits / Impairments: Decreased functional mobility ; Decreased safe awareness;Decreased balance;Decreased ADL status; Decreased endurance;Decreased strength  Patient admitted for TIA, noted L facial droop. Pt CGA for toilet transfers using grab bars, Dawit progressing to CGA for mobility with RW. Pt After evaluation, pt found to be presenting with the above mentioned occupational performance deficits which are affecting participation in daily living skills. Pt would benefit from continued skilled occupational therapy to address ADLs, functional mobility, and safety while in acute care. Prognosis: Good  Decision Making: Medium Complexity  OT Education: OT Role;Transfer Training;Equipment;Plan of Care;Precautions; ADL Adaptive Strategies  Patient Education: Disease specific: Pt educated on benefits of OOB activity to decrease risks associated with prolonged bedrest while in hospital. Pt verb understanding. REQUIRES OT FOLLOW UP: Yes  Activity Tolerance  Activity Tolerance: Patient Tolerated treatment well  Safety Devices  Safety Devices in place: Yes  Type of devices: Gait belt;Call light within reach; Chair alarm in place; Left in chair;Nurse notified           Patient Diagnosis(es): There were no encounter diagnoses. has a past medical history of COVID-19, Diabetes mellitus ([facility] [address] Glaucoma, Hyperlipidemia, Hypertension, Kidney stone, Neuropathy, Osteoarthrosis, hip, and Prepatellar bursitis. has a past surgical history that includes eye surgery; Kidney stone surgery; Foot surgery; Colonoscopy ();  Cystoscopy (2012); other surgical history (Left, 07/17/2017); Colon surgery; and Glaucoma surgery (N/A). Restrictions  Restrictions/Precautions  Restrictions/Precautions: General Precautions,Fall Risk,Up as Tolerated    Subjective   General  Chart Reviewed: Yes,Progress Notes,History and Physical,Imaging  Patient assessed for rehabilitation services?: Yes  Additional Pertinent Hx: MRI 2/8/22 Chronic lacunar infarcts involving the right thalamus and bilateralcerebellar hemispheres  Family / Caregiver Present: No  Referring Practitioner: Desiree Glez MD 2/08/22  Diagnosis: TIA  Subjective  Subjective: Pt pleasant and agreeable to OT evaluation, states wife just admitted to [facility] for [facility]. Pt also reports he feels back to baseline. Patient Currently in Pain: Yes  Pain Assessment  Pain Assessment: Faces  Canchola-Baker Pain Rating: Hurts a little bit  Pain Type: Chronic pain  Pain Location: Hip  Pain Orientation: Left  Pain Descriptors: Aching  Pain Frequency: Intermittent (with movement of LLE)  Non-Pharmaceutical Pain Intervention(s): Ambulation/Increased Activity; Distraction; Emotional support;Repositioned  Vital Signs  Temp: 98.5 °F (36.9 °C)  Temp Source: Oral  Pulse: 81  Heart Rate Source: Monitor  Resp: 16  BP: (!) 162/79  BP Location: Right upper arm  MAP (mmHg): 106  Patient Position: Semi fowlers  Patient Currently in Pain: Yes  Oxygen Therapy  SpO2: 94 %  O2 Device: None (Room air)  Social/Functional History  Social/Functional History  Lives 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))  Type of Home: House  Home Layout: One level,Work area in basement  Home Access: Stairs to enter without rails,Ramped entrance  Entrance Stairs - Number of Steps: 2-3 blaine from garage-ramp to front  Bathroom Shower/Tub: Tub/Shower unit  [employer]: Standard  Bathroom Equipment: Grab bars in shower,Grab bars around GROUNDFLOOR chair  Home Equipment: 4 wheeled walker,Cane,Wheelchair-manual  ADL Assistance: Independent (sits to bath and dress)  Homemaking Responsibilities: No (son-Donal completes)  Ambulation Assistance: Independent (however the past couple days d/t weakness self-propelling himself in the w/c with BLE)  Transfer Assistance: Independent  Active : No  Patient's  Info: reports has not driven in a couple of years due to decreased vision  Occupation: Retired  Type of occupation: [employer], Elementary , , [email or web address] , farming  Leisure & Hobbies: farming       Objective   Vision: Impaired  Vision Exceptions: Wears glasses for reading (magnifier glasses; glaucoma, diabetic retinopathy)  Hearing: Exceptions to [facility]  Hearing Exceptions: Hard of hearing/hearing concerns    Orientation  Overall Orientation Status: Within Functional Limits     Balance  Sitting Balance: Supervision  Standing Balance: Contact guard assistance (with RW for UE support)  Functional Mobility  Functional - Mobility Device: Rolling Walker  Activity: To/from bathroom  Assist Level: Minimal assistance-CGA    Toilet Transfers  Toilet - Technique: Ambulating (w/RW)  Equipment Used: Standard toilet (R grab bar)  Toilet Transfer: Contact guard assistance     ADL  LE Dressing: Minimal assistance  Toileting: Stand by assistance     Tone RUE  RUE Tone: Normotonic  Tone LUE  LUE Tone: Normotonic  Coordination  Movements Are Fluid And Coordinated: Yes     Bed mobility  Supine to Sit: Supervision  Sit to Supine: Unable to assess  Transfers  Sit to stand: Minimal assistance (up to RW)  Stand to sit: Contact guard assistance     Cognition  Overall Cognitive Status: Exceptions  Arousal/Alertness: Appropriate responses to stimuli  Following Commands: Follows multistep commands with repitition; Follows multistep commands with increased time  Attention Span: Attends with cues to redirect  Memory: Appears intact  Safety Judgement: Decreased awareness of need for safety  Problem Solving: Decreased awareness of errors  Insights: Decreased awareness of deficits  Initiation: Requires cues for some  Sequencing: Requires cues for some        LUE AROM (degrees)  LUE AROM : WFL  RUE AROM (degrees)  RUE AROM : WFL      BUE strength: WFL        Plan   Plan  Times per week: 3-5x's a week while in acute care                          AM-PAC Score        AM-PAC Inpatient Daily Activity Raw Score: 18 (02/08/22 1600)  AM-PAC Inpatient ADL T-Scale Score : 38.66 (02/08/22 1600)  ADL Inpatient CMS 0-100% Score: 46.65 (02/08/22 1600)  ADL Inpatient CMS G-Code Modifier : CK (02/08/22 1600)    Goals  Short term goals  Time Frame for Short term goals: 1 week unless otherwise specified 2/15  Short term goal 1: Pt will complete LE dressing with CGA  Short term goal 2: Pt will complete standing level ADLs with SBA for balance  Short term goal 3: Pt will complete 10 reps of BUE AROM exercises to increase strength for ADLs/transfers by 2/12  Patient Goals   Patient goals : \"to go home today\"       Therapy Time   Individual Concurrent Group Co-treatment   Time In 1426         Time Out 1500         Minutes 34         Timed Code Treatment Minutes: 24 Minutes       Hadley Sales OTR/L  If pt is unable to be seen after this session, please let this note serve as discharge summary. Please see case management note for discharge disposition. Thank you.

## 2022-02-08 NOTE — PROGRESS NOTES
Physical Therapy    Facility/Department: [facility] C5 - MED SURG/ORTHO  Initial Assessment    NAME: Yobani Thomas  : 1940  MRN: [id number]    Date of Service: 2022    Discharge Recommendations:  24 hour supervision or assist,Home with Home health PT,S Level 3   PT Equipment Recommendations  Equipment Needed: No    Assessment   Body structures, Functions, Activity limitations: Decreased functional mobility ; Decreased strength;Decreased endurance;Decreased balance;Decreased posture  Assessment: Pt referred for PT evaluation during current hospital stay with dx of TIA/HTN. Pt currently functioning slightly below his baseline, requiring Dawit x 1 to CGA x 1 for functional mobility tasks with support of walker. Pt doesn't typically use a device to ambulate, although his balance improved with support of RW today. Recommend pt return home with 24-hr sup/assist from son (which son can provide, per pt) and [facility] home PT services. Pt already has RW for home use. Treatment Diagnosis: Difficulty walking  Specific instructions for Next Treatment: Progress ther ex and mobility as tolerated  Prognosis: Good  Decision Making: Medium Complexity  PT Education: Goals; General Safety;Gait Training;PT Role;Plan of Care;Disease Specific Education; Functional Mobility Training;Equipment;Precautions;Transfer Training;Energy Conservation; Injury Prevention;Home Exercise Program  Patient Education: Disease-specific education: Pt educated on role of PT, benefits of mobility, safety in transfers/amb with RW, current D/C recs - pt verbalizes understanding but may need some reinforcement. REQUIRES PT FOLLOW UP: Yes  Activity Tolerance: Patient Tolerated treatment well;Patient limited by pain  Activity Tolerance: Vitals during session on room air: BP = 174/84, HR = 80 bpm, O2 sat = 93% on room air. Patient Diagnosis(es): There were no encounter diagnoses.      has a past medical history of COVID-19, Diabetes mellitus ([facility] [address] Glaucoma, Hyperlipidemia, Hypertension, Kidney stone, Neuropathy, Osteoarthrosis, hip, and Prepatellar bursitis. has a past surgical history that includes eye surgery; Kidney stone surgery; Foot surgery; Colonoscopy (2008); Cystoscopy (02/02/2012); other surgical history (Left, 07/17/2017); Colon surgery; and Glaucoma surgery (N/A). Restrictions  Restrictions/Precautions  Restrictions/Precautions: General Precautions,Fall Risk,Up as Tolerated  Vision/Hearing  Vision: Impaired  Vision Exceptions: Wears glasses for reading (wears magnifier glasses, pt has glaucoma & diabetic retinopathy)  Hearing: Exceptions to [facility]  Hearing Exceptions: Hard of hearing/hearing concerns     Subjective  General  Chart Reviewed: Yes  Patient assessed for rehabilitation services?: Yes  Family / Caregiver Present: No  Referring Practitioner: Dr. Kaela Lenz  Referral Date : 02/08/22  Diagnosis: HTN, TIA  Follows Commands: Within Functional Limits  General Comment  Comments: Pt resting in bed upon entry of therapy staff  Subjective  Subjective: Pt agreeable to work with PT this afternoon, pleasant and cooperative. States his L hip is bothering him when he gets up. \"It usually gives me trouble, but today it's a little worse than usual.\"  Pain Screening  Patient Currently in Pain: Yes  Pain Assessment  Pain Assessment: Faces  Canchola-Baker Pain Rating: Hurts a little bit  Pain Type: Chronic pain  Pain Location: Hip  Pain Orientation: Left  Pain Descriptors: Aching  Pain Frequency: Intermittent (with movement of LLE)  Non-Pharmaceutical Pain Intervention(s): Ambulation/Increased Activity; Distraction; Emotional support;Repositioned  Intervention List: Patient able to continue with treatment    Orientation  Orientation: Within Functional Limits    Social/Functional History  Social/Functional History  Lives 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))  Type of Home: House  Home Layout: One level,Work area in basement  Home Access: Stairs to enter without rails,Ramped entrance  Entrance Stairs - Number of Steps: 2-3 blaine from garage-ramp to front  Bathroom Shower/Tub: Tub/Shower unit  Bathroom Toilet: Standard  Bathroom Equipment: Grab bars in shower,Grab bars around [employer] chair  Home Equipment: 4 wheeled walker,Cane,Wheelchair-manual  ADL Assistance: Independent (sits to bath and dress)  Homemaking Responsibilities: No (son-Donal completes)  Ambulation Assistance: Independent (however the past couple days d/t weakness self-propelling himself in the w/c with BLE)  Transfer Assistance: Independent  Active : No  Patient's  Info: reports has not driven in a couple of years due to decreased vision  Occupation: Retired  Type of occupation: [employer], Elementary , , [employer] , farming  Leisure & Hobbies: farming    Objective  AROM RLE (degrees)  RLE AROM: WFL  AROM LLE (degrees)  LLE AROM : WFL  LLE General AROM: Hip flexion ROM slightly decreased compared to L due to chronic pain  Strength RLE  Comment: Grossly 4/5 to 4+/5 throughout  Strength LLE  Comment: 3-/5 in hip flexion, 4-/5 to 4/5 throughout remainder of LLE     Bed mobility  Supine to Sit: Unable to assess (pt sitting EOB upon entry of PT)  Scooting: Supervision (to scoot forward to EOB)     Transfers  Sit to Stand: Contact guard assistance;Minimal Assistance (Dawit x 1 when standing without AD, CGA x 1 when using RW)  Stand to sit: Contact guard assistance  Bed to Chair: Contact guard assistance (using RW)     Ambulation  Surface: level tile  Device: Rolling Walker  Assistance: Contact guard assistance  Quality of Gait: Pt amb with slow kaila with decreased stride length B and slight decrease in stance time on LLE 2* chronic hip pain. Mildly unsteady with turns, although no LOB.   Gait Deviations: Slow Kaila;Decreased step length;Decreased step height  Distance: x 15 feet, x 40 feet     Balance  Posture: Fair  Sitting - Static: Good  Sitting - Dynamic: Good  Standing - Static: Good;-  Standing - Dynamic: Fair (using RW)     Other exercises  Other exercises?: Yes  Other exercises 1: Performed SKTC stretching on LLE due help with hip mobility and sciatica-related pain, 2 x 30-sec holds. Instructed pt on proper form during exercise. Plan   Times per week: 3-5x/week in acute care  Times per day: Daily  Specific instructions for Next Treatment: Progress ther ex and mobility as tolerated  Current Treatment Recommendations: Strengthening,Balance Training,Endurance Training,Functional Mobility Training,Transfer Training,Gait Training,Home Exercise Program,Safety Education & Training,Equipment Evaluation, Education, & procurement,Patient/Caregiver Education & Training  Safety Devices: All fall risk precautions in place,Left in chair,Call light within reach,Chair alarm in place,Nurse notified,Gait belt,Patient at risk for falls    AM-PAC Score  AM-PAC Inpatient Mobility Raw Score : 19 (02/08/22 1654)  AM-PAC Inpatient T-Scale Score : 45.44 (02/08/22 1654)  Mobility Inpatient CMS 0-100% Score: 41.77 (02/08/22 1654)  Mobility Inpatient CMS G-Code Modifier : CK (02/08/22 1654)    Goals  Short term goals  Time Frame for Short term goals: 1 week, 2/15/22 (unless otherwise specified)  Short term goal 1: Pt will transfer supine <-> sit with modified(I)  Short term goal 2: Pt will transfer sit <-> stand and bed>chair using RW with supervision  Short term goal 3: Pt will ambulate x 100 feet using RW with supervision  Short term goal 4: By 2/10/22: Pt will tolerate 12-15 reps appropriate LE ther ex for ROM/strengthening, balance, and endurance  Short term goal 5: Pt will ambulate up/down 3 steps using handrail(s) PRN with CGA/SBA x 1  Patient Goals   Patient goals :  \"To go home\"       Therapy Time   Individual Concurrent Group Co-treatment   Time In 1505         Time Out 1540         Minutes 35         Timed Code Treatment Minutes: [address], PT, DPT #[id number]    If pt is unable to be seen after this session, please let this note serve as discharge summary. Please see case management note for discharge disposition. Thank you.

## 2022-02-08 NOTE — ED PROVIDER NOTES
[address] ED  EMERGENCY DEPARTMENT ENCOUNTER        Pt Name: Chris Epstein  MRN: [id number]  [place] 1940  Date of evaluation: 2/7/2022  Provider: GERA Tate  PCP: Abelino Witt MD    This patient was seen and evaluated by the attending Oskar Andrew COMPLAINT       Chief Complaint   Patient presents with    Hypertension     pt c/o htn past 2 days, states has been 248 systolic at home, has been having ongoing left hip pain that he is being seen for which has been worse recently       HISTORY OF PRESENT ILLNESS   (Location/Symptom, Timing/Onset, Context/Setting, Quality, Duration, Modifying Factors, Severity)  Note limiting factors. Chris Epstein is a 78 y.o. male with past medical history of diabetes, glaucoma, hypertension, hyperlipidemia, history of stroke, who presents via private vehicle from his home with his son for evaluation of elevated blood pressure. Patient had an episode yesterday in which he had an observed aphasia for about 30 minutes. He was slurring his words. His son noted that it came on very quickly and then left and patient had no other acute signs or symptoms of stroke. Patient notes that he woke up this morning and he felt slightly odd so he took his blood pressure and it was significantly elevated, his systolic was 707+ and his diastolic was in the 896X. He called his family doctor as he has been off of blood pressure medicine for the last 2 years because he did not think he needed it anymore. His family doctor resumed his amlodipine and losartan which she had previously been on. Patient notes that he did feel some improvement however his pressures have continued to be elevated, systolic in the 405Y and so he decided to seek evaluation in the emergency room. He denies any current headaches vision changes chest pain shortness of breath. No nausea vomiting or abdominal pain.   He denies any recent head injuries. Nursing Notes were all reviewed and agreed with or any disagreements were addressed  in the HPI. Pt was seen during the [place] 19 pandemic. Appropriate PPE worn by ME during patient encounters. Pt seen during a time with constrained hospital bed capacity and other potential inpatient and outpatient resources were constrained due to the viral pandemic. REVIEW OF SYSTEMS    (2-9 systems for level 4, 10 or more for level 5)     Review of Systems    Positives and Pertinent negatives as per HPI. Except as noted abovein the ROS, all other systems were reviewed and negative. PAST MEDICAL HISTORY     Past Medical History:   Diagnosis Date    COVID-19 08/04/2021    Diabetes mellitus ([facility] [address]     type 2    Glaucoma     Hyperlipidemia     Hypertension     Kidney stone     Neuropathy     Osteoarthrosis, hip 03/21/2016    Prepatellar bursitis 03/21/2016         SURGICAL HISTORY     Past Surgical History:   Procedure Laterality Date    COLON SURGERY      COLONOSCOPY  2008    CYSTOSCOPY  02/02/2012    with stent placement    EYE SURGERY      left eyemed valve, bilateral retinal repair    FOOT SURGERY      right, tendon repair    GLAUCOMA SURGERY N/A     [employer] GLAUCOMA VALVE/Model S-2/SN [id number] Safe per [employer]    KIDNEY STONE SURGERY      OTHER SURGICAL HISTORY Left 07/17/2017    CYSTOSCOPY, LEFT URETEROSCOPY, STENT PLACEMENT, URETHERAL dilation, stone manipulation          CURRENTMEDICATIONS       Previous Medications    ACETYLCYSTEINE ( PO)    Take by mouth    ASPIRIN 81 MG TABLET    Take 162 mg by mouth daily     ATORVASTATIN (LIPITOR) 80 MG TABLET    Take 1 tablet by mouth nightly    B COMPLEX VITAMINS CAPSULE    Take 1 capsule by mouth daily    BENZONATATE (TESSALON) 200 MG CAPSULE    Take 200 mg by mouth 3 times daily as needed    BRIMONIDINE (ALPHAGAN) 0.2 % OPHTHALMIC SOLUTION    Place 1 drop into both eyes 2 times daily     CHOLECALCIFEROL (VITAMIN D3) 5000 UNITS TABS    Take by mouth daily    CYANOCOBALAMIN (CVS VITAMIN B-12 SL)    Place under the tongue daily    DOCUSATE SODIUM (COLACE) 100 MG CAPSULE    Take 100 mg by mouth 2 times daily as needed for Constipation    DORZOLAMIDE HCL (TRUSOPT OP)    Apply 1 drop to eye 2 times daily     INSULIN ASPART (NOVOLOG) 100 UNIT/ML INJECTION    Inject  into the skin 3 times daily (before meals). Sliding scale based on meal calculation     INSULIN GLARGINE (LANTUS) 100 UNIT/ML INJECTION    Inject 40 Units into the skin 2 times daily. LANSOPRAZOLE (PREVACID) 30 MG DELAYED RELEASE CAPSULE    Take 30 mg by mouth daily    ONDANSETRON (ZOFRAN ODT) 4 MG DISINTEGRATING TABLET    Take 1-2 tablets by mouth every 12 hours as needed for Nausea May Sub regular tablet (non-ODT) if insurance does not cover ODT. PREGABALIN (LYRICA) 50 MG CAPSULE    Take 50 mg by mouth 2 times daily. ALLERGIES     Patient has no known allergies. FAMILYHISTORY     History reviewed. No pertinent family history. SOCIAL HISTORY       Social History     Socioeconomic History    Marital status:      Spouse name: None    Number of children: None    Years of education: None    Highest education level: None   Occupational History    None   Tobacco Use    Smoking status: Never Smoker    Smokeless tobacco: Never Used   Vaping Use    Vaping Use: Never used   Substance and Sexual Activity    Alcohol use: No    Drug use: No    Sexual activity: Not Currently     Partners: Female   Other Topics Concern    None   Social History Narrative    None     Social Determinants of Health     Financial Resource Strain:     Difficulty of Paying Living Expenses: Not on file   Food Insecurity:     Worried About Running Out of Food in the Last Year: Not on file    Yamel of Food in the Last Year: Not on file   Transportation Needs:     Lack of Transportation (Medical): Not on file    Lack of Transportation (Non-Medical):  Not on file   Physical Activity:     Days of Exercise per Week: Not on file    Minutes of Exercise per Session: Not on file   Stress:     Feeling of Stress : Not on file   Social Connections:     Frequency of Communication with Friends and Family: Not on file    Frequency of Social Gatherings with Friends and Family: Not on file    Attends Druze Services: Not on file    Active Member of [address] [place] [address] [employer] or Organizations: Not on file    Attends Club or Organization Meetings: Not on file    Marital Status: Not on file   Intimate Partner Violence:     Fear of Current or Ex-Partner: Not on file    Emotionally Abused: Not on file    Physically Abused: Not on file    Sexually Abused: Not on file   Housing Stability:     Unable to Pay for Housing in the Last Year: Not on file    Number of [place] in the Last Year: Not on file    Unstable Housing in the Last Year: Not on file       SCREENINGS              NIH Stroke Scale     Person Administering Scale: GERA Carreno   Administer stroke scale items in the order listed. Record performance in each category after each subscale exam. Do not go back and change scores. Follow directions provided for each exam technique. Scores should reflect what the patient does, not what the clinician thinks the patient can do. The clinician should record answers while administering the exam and work quickly. Except where indicated, the patient should not be coached (i.e., repeated requests to patient to make a special effort). 1a  Level of consciousness: 0=alert; keenly responsive   1b. LOC questions:  0=Performs both tasks correctly   1c. LOC commands: 0=Performs both tasks correctly   2. Best Gaze: 0=normal   3. Visual: 0=No visual loss   4. Facial Palsy: 0=Normal symmetric movement   5a. Motor left arm: 0=No drift, limb holds 90 (or 45) degrees for full 10 seconds   5b.   Motor right arm: 0=No drift, limb holds 90 (or 45) degrees for full 10 seconds   6a. motor left le=No drift, limb holds 90 (or 45) degrees for full 10 seconds   6b  Motor right le=No drift, limb holds 90 (or 45) degrees for full 10 seconds   7. Limb Ataxia: 0=Absent   8. Sensory: 0=Normal; no sensory loss   9. Best Language:  0=No aphasia, normal   10. Dysarthria: 0=Normal   11. Extinction and Inattention: 0=No abnormality   12. Distal motor function: 0=Normal    Total:  0         PHYSICAL EXAM    (up to 7 for level 4, 8 or more for level 5)     ED Triage Vitals [22]   BP Temp Temp Source Pulse Resp SpO2 Height Weight   (!) 182/92 97.4 °F (36.3 °C) Oral 92 16 96 % 5' 9\" (1.753 m) 210 lb (95.3 kg)       Physical Exam  PHYSICAL EXAM  BP (!) 182/92   Pulse 92   Temp 97.4 °F (36.3 °C) (Oral)   Resp 16   Ht 5' 9\" (1.753 m)   Wt 210 lb (95.3 kg)   SpO2 96%   BMI 31.01 kg/m²   GENERAL APPEARANCE: Awake and alert. Cooperative. Elderly adult male sitting upright in exam bed nondiaphoretic breathing comfortably on room air showing no sign of acute respiratory distress. HEAD: Normocephalic. Atraumatic. EYES: PERRL. EOM's grossly intact. ENT: Mucous membranes are moist.. NECK: Supple. Trachea midline. Phonation normal.  No gross JVD. HEART: RRR. No murmurs. Radial pulses 2+ symmetric. PT pulses 1+, symmetric. LUNGS: Respirations unlabored. CTAB. Good air exchange. Speaking comfortably in full sentences. ABDOMEN: Soft. Non-distended. Non-tender. No guarding or rebound. EXTREMITIES: No peripheral edema. Moves all extremities equally. All extremities neurovascularly intact. Patient endorses pain to the lower extremities from RLS and notes pain to the hip. No sign of acute injury. SKIN: Warm and dry. No acute rashes. NEUROLOGICAL: Alert and oriented. CN 2-9, 11, 12 intact. No gross facial drooping. Power intact to UE and LE, sensation intact x 4. No tremors or ataxia. Gait intact. PSYCHIATRIC: Normal mood and affect.     DIAGNOSTIC RESULTS   LABS:    Labs Reviewed - No data to display    All other labs were within normal range or not returned as of this dictation. EKG: All EKG's are interpreted by the Emergency Department Physician who either signs orCo-signs this chart in the absence of a cardiologist.  Please see their note for interpretation of EKG. RADIOLOGY:   Non-plain film images such as CT, Ultrasound and MRI are read by the radiologist. Plain radiographic images are visualized andpreliminarily interpreted by the  ED Provider with the below findings:        Interpretation perthe Radiologist below, if available at the time of this note:    No orders to display     No results found. PROCEDURES   Unless otherwise noted below, none     Procedures    CRITICAL CARE TIME   I personally saw the patient and independently provided 20 minutes of non-concurrent critical care out of the total shared critical care time provided. This excludes time spent doing separately billable procedures. This includes time at the bedside, data interpretation, medication management, obtaining critical history from collateral sources if the patient is unable to provide it directly, and physician consultation. Specifics of interventions taken and potentially life-threatening diagnostic considerations are listed above in the medical decision making. If this was a shared visit with a Physician the time in this attestation is non-concurrent critical care time out of the total shared critical care time provided by the Physician and myself.         CONSULTS:  IP CONSULT TO HOSPITALIST      EMERGENCY DEPARTMENT COURSE and DIFFERENTIALDIAGNOSIS/MDM:   Vitals:    Vitals:    02/07/22 1918   BP: (!) 182/92   Pulse: 92   Resp: 16   Temp: 97.4 °F (36.3 °C)   TempSrc: Oral   SpO2: 96%   Weight: 210 lb (95.3 kg)   Height: 5' 9\" (1.753 m)       Patient was given thefollowing medications:  Medications - No data to display    PDMP Monitoring:    Last PDMP [place] Mack as Reviewed [facility]):  Review User Review Instant Review Result Last Controlled Substance Monitoring Documentation      ED from 12/16/2018 in [address]   Attestation The Prescription Monitoring Report for this patient was reviewed today. filed at 12/16/2018 1905   Periodic Controlled Substance Monitoring Possible medication side effects, risk of tolerance/dependence & alternative treatments discussed. filed at 12/16/2018 1905        Urine Drug Screenings (1 yr)    No resulted procedures found. Medication Contract and Consent for Opioid Use Documents Filed      No documents found                MDM:   Patient seen and evaluated. Old records reviewed. Diagnostic testing reviewed and results discussed. Patient is an 78-year-old male who presents for evaluation of hypertension. Patient with significantly elevated blood pressures,He is not complaining of any active symptoms, NIH SS score of 0, GCS 15. No focal neuro deficits on physical exam.  Work-up included labs imaging which included CT head CTA head neck given concern for TIA. Patient does have an elevated troponin, no significant twelve-lead changes. He continues to not have any chest pain. Repeat Trope in the emergency department is without acute change. This may be secondary to his recent significantly elevated pressures however in absence of chest pain and no significant change in level full dose aspirin administered and will continue to monitor. Patient was given a dose of hydrochlorothiazide in the emergency room as he continued to have increased pressures and he was previously on hydrochlorothiazide. CT head CTA neck and neck are negative for acute sign of ischemia however given his history of stroke, frequent TIAs and recently significantly elevated pressures with signs and symptoms concerning for TIA at this time I believe he warrants admission for continued stroke work-up. Patient will require transfer to [facility] for neurology evaluation.   Patient is excepted to [facility] however Jb Garcia is discharge dependent, at time of signout patient is stable and hold orders have been placed. I spoke with Dr. Chaka Luna, [place] [address]. We thoroughly discussed the history, physical exam, laboratory and imaging studies, as well as, current course of treatment within the emergency department. Based upon that discussion, we've decided to transfer Sayra Mcdowell to [facility] , for further observation and evaluation of Sayra Mcdowell current condition. As I have deemed necessary from their history, physical, and studies, I have considered and evaluated Sayra Mcdowell for the following diagnoses:      CLINICAL IMPRESSION:  1. TIA (transient ischemic attack)    2. Hypertension, unspecified type    3. Elevated troponin    4. Left hip pain        BP (!) 153/74   Pulse 77   Temp 97.4 °F (36.3 °C) (Oral)   Resp 15   Ht 5' 9\" (1.753 m)   Wt 210 lb (95.3 kg)   SpO2 97%   BMI 31.01 kg/m²           FINAL IMPRESSION      1. TIA (transient ischemic attack)    2. Hypertension, unspecified type    3. Elevated troponin    4. Left hip pain          DISPOSITION/PLAN   DISPOSITION        PATIENT REFERREDTO:  No follow-up provider specified.     DISCHARGE MEDICATIONS:  New Prescriptions    No medications on file       DISCONTINUED MEDICATIONS:  Discontinued Medications    No medications on file              (Please note that portions ofthis note were completed with a voice recognition program.  Efforts were made to edit the dictations but occasionally words are mis-transcribed.)    GERA Lovelace (electronically signed)       Pat Lovelace  02/08/22 9082

## 2022-02-08 NOTE — DISCHARGE INSTR - COC
Continuity of Care Form    Patient Name: Andre Masters   :  1940  MRN:  [id number]    Admit date:  2022  Discharge date:  22    Code Status Order: Full Code   Advance Directives:      Admitting Physician:  Eloy Burkitt, DO  PCP: Lehman Kawasaki, MD    Discharging Nurse: [facility] Unit/Room#: 3301/8719-53  Discharging Unit Phone Number: ***    Emergency Contact:   Extended Emergency Contact Information  Primary Emergency Contact: Jarred Esqueda  Address: [facility] [address] [place] [address],[place] [address], [address] Phone: [phone]  Work Phone: [phone]  Relation: Spouse  Secondary Emergency Contact: Cherelle Waddell  Address: [facility] [address]  Home Phone: [phone]  Relation: Child    Past Surgical History:  Past Surgical History:   Procedure Laterality Date    COLON SURGERY      COLONOSCOPY      CYSTOSCOPY  2012    with stent placement    EYE SURGERY      left eyemed valve, bilateral retinal repair    FOOT SURGERY      right, tendon repair    GLAUCOMA SURGERY N/A     AHMED GLAUCOMA VALVE/Model S-2/SN [id number] Safe per [employer]    KIDNEY STONE SURGERY      OTHER SURGICAL HISTORY Left 2017    CYSTOSCOPY, LEFT URETEROSCOPY, STENT PLACEMENT, URETHERAL dilation, stone manipulation        Immunization History:   Immunization History   Administered Date(s) Administered    Influenza Virus Vaccine 2001    Influenza, High-dose, Quadv, 65 yrs +, IM (Fluzone) 10/22/2020    Pneumococcal Conjugate 13-valent (Nish Molina) 2019    Td vaccine (adult) 2013    Tdap (Boostrix, Adacel) 2013       Active Problems:  Patient Active Problem List   Diagnosis Code    Renal colic on left side E75    Prepatellar bursitis M70.40    Osteoarthrosis, hip M16.9    Obesity E66.9    HTN (hypertension), benign I10    DM (diabetes mellitus), type 2, uncontrolled with complications ([facility] [address] Y47.4, E11.65    Chest pain R07.9    TIA (transient ischemic attack) G45.9    Arterial ischemic stroke, ICA, left, acute (HCC) Z80.732    Dyslipidemia E78.5    Acute cerebrovascular accident (CVA) ([facility] [address] I63.9    COVID-19 virus infection U07.1    Acute respiratory failure with hypoxia ([facility]) J96.01    Type 2 diabetes mellitus with hyperglycemia, with long-term current use of insulin (HCC) E11.65, Z79.4    Glaucoma H40.9       Isolation/Infection:   Isolation            No Isolation          Patient Infection Status       Infection Onset Added Last Indicated Last Indicated By Review Planned Expiration Resolved Resolved By    None active    Resolved    COVID-19 (Rule Out) 22 COVID-19 & Influenza Combo (Ordered)   22 Rule-Out Test Resulted    COVID-19 21 COVID-19 & Influenza Combo   21     COVID-19 (Rule Out) 21 COVID-19 & Influenza Combo (Ordered)   21 Rule-Out Test Resulted    COVID-19 (Rule Out) 21 COVID-19 & Influenza Combo (Ordered)   21 Rule-Out Test Resulted            Nurse Assessment:  Last Vital Signs: BP (!) 143/75   Pulse 82   SpO2 94%     Last documented pain score (0-10 scale):    Last Weight:   Wt Readings from Last 1 Encounters:   22 210 lb (95.3 kg)     Mental Status:  oriented and alert    IV Access:  - None    Nursing Mobility/ADLs:  Walking   Assisted  Transfer  Assisted  Bathing  Independent  Dressing  Independent  Toileting  Independent  Feeding  Independent  Med Admin  Independent  Med Delivery   whole    Wound Care Documentation and Therapy:        Elimination:  Continence: Bowel: Yes  Bladder: Yes  Urinary Catheter: None   Colostomy/Ileostomy/Ileal Conduit: No       Date of Last BM:   No intake or output data in the 24 hours ending 22 1612  No intake/output data recorded.     Safety Concerns:     None    Impairments/Disabilities:      Vision    Nutrition Therapy:  Current Nutrition Therapy:   - Oral Diet:  General    Routes of Feeding: Oral  Liquids: No Restrictions  Daily Fluid Restriction: no  Last Modified Barium Swallow with Video (Video Swallowing Test): not done    Treatments at the Time of Hospital Discharge:   Respiratory Treatments: ***  Oxygen Therapy:  is not on home oxygen therapy.   Ventilator:    - No ventilator support    Rehab Therapies: Nurse, Physical Therapy and Occupational Therapy (therapy start of care approved)  Weight Bearing Status/Restrictions: No weight bearing restirctions  Other Medical Equipment (for information only, NOT a DME order):  cane  Other Treatments: HOME HEALTH CARE: LEVEL 1 STANDARD    Home health agency to establish plan of care for patient over 60 day period   Nursing  Initial home SN evaluation visit to occur within 24-48 hours for:  1)  medication management  2)  VS and clinical assessment  3)  S&S chronic disease exacerbation education + when to contact MD/NP  4)  care coordination  Medication Reconciliation during 1st SN visit    PT/OT   Evaluate with goal of regaining prior level of functioning   OT to evaluate if patient has [address] needs for personal care    PCP Visit scheduled within 7 days of hospital discharge   Telehealth-Homecare Vitals(If patient is agreeable and meets guidelines)      Patient's personal belongings (please select all that are sent with patient):  None    RN SIGNATURE:  Electronically signed by Frank Adamson RN on 2/8/22 at 4:19 PM EST    CASE MANAGEMENT/SOCIAL WORK SECTION    Observation Status Date: 2/8/22    Readmission Risk Assessment Score:  Readmission Risk              Risk of Unplanned Readmission:  0           Discharging to Facility/ [address]   [address] [place] [address] [address] [place] [address]   [phone]     / signature: Electronically signed by Raciel Garcia RN on 2/8/22 at 4:20 PM EST    PHYSICIAN SECTION    Prognosis: Good    Condition at Discharge: Stable    Rehab Potential (if transferring to Rehab): Good    Recommended Labs or Other Treatments After Discharge: PCP follow up within 1 week of DC    Physician Certification: I certify the above information and transfer of Thedora Charter  is necessary for the continuing treatment of the diagnosis listed and that he requires Home Care for greater 30 days.      Update Admission H&P: No change in H&P    PHYSICIAN SIGNATURE:  Electronically signed by Aftab Palm MD on 2/8/22 at 4:12 PM EST

## 2022-02-08 NOTE — ED TRIAGE NOTES
Chief Complaint   Patient presents with    Hypertension     pt c/o htn past 2 days, states has been 543 systolic at home, has been having ongoing left hip pain that he is being seen for which has been worse recently

## 2022-02-08 NOTE — ED PROVIDER NOTES
I independently examined and evaluated Chris Epstein. In brief, patient is an 23-year-old male, who presented with concerns for hypertension, episode of slurred speech yesterday, chronic left hip pain radiating to his left leg. Patient states that he took himself off of his blood pressure medications a while ago, is supposed to be on 3 different medications but noted that his blood pressure was high and called his PCP who restarted his medications. He took his first dose of his medications today, however noted that his blood sugar has been 814 systolic at home. Also reports that he had an episode yesterday of slurred speech which is since resolved. He does have a previous history of stroke/TIA. He denies any current complaints aside from his left leg pain which he states is chronic and unchanged. Focused exam revealed patient resting comfortably, no acute distress. Heart regular rhythm. Lungs clear to auscultation bilaterally. Abdomen soft, nondistended, nontender. Cranial nerves II through XII grossly intact bilaterally. No sensory deficits. Strength 5 out of 5 in bilateral upper and lower extremities. Left lower extremity is neurovascularly intact with soft compartments, capillary refill less than 2 seconds.     Labs Reviewed   CBC WITH AUTO DIFFERENTIAL - Abnormal; Notable for the following components:       Result Value    Platelets 585 (*)     All other components within normal limits    Narrative:     Performed at:  [facility],  [place], [facility]   Phone [phone]   COMPREHENSIVE METABOLIC PANEL - Abnormal; Notable for the following components:    Glucose 282 (*)     All other components within normal limits    Narrative:     Performed at:  [facility],  [place], [facility]   Phone [phone]   TROPONIN - Abnormal; Notable for the following components:    Troponin 0.02 (*)     All other components within normal limits    Narrative:     Performed at:  [facility] - [facility],  [place], [facility]   Phone [phone]   TROPONIN - Abnormal; Notable for the following components:    Troponin 0.02 (*)     All other components within normal limits    Narrative:     Performed at:  [facility],  [place], [place] [facility]   Phone [phone]   COVID-19 & INFLUENZA COMBO     XR CHEST PORTABLE   Final Result   1. Persistent bibasilar opacities, and may represent residual pneumonia and   or atelectasis/scarring. Overall appearance is not significantly changed         CTA HEAD NECK W CONTRAST   Final Result   No evidence of large vessel occlusion or hemodynamically significant stenosis   involving the head and neck arteries. 11 mm hypodense nodule in the right lobe of the thyroid gland. Recommend   further evaluation with thyroid ultrasound. RECOMMENDATIONS:   Unavailable         CT HEAD WO CONTRAST   Final Result   No acute intracranial abnormality. MRI BRAIN W WO CONTRAST    (Results Pending)     The Ekg interpreted by me shows  normal sinus rhythm with a rate of 89  Axis is   Normal  QTc is  prolonged at 467  Intervals and Durations are unremarkable. ST Segments: nonspecific changes  No significant change from prior EKG dated 8/12/2021    ED course: Patient is an 77-year-old male, with history of previous CVA and TIA, presenting with concerns for episode of slurred speech, elevated blood pressure, and chronic left hip and left leg pain. Patient was seen in conjunction with CHAKA Telles, please refer to her note for further details of the patient's work-up and treatment. NIH stroke scale here is 0, however did have transient episode of dysarthria yesterday. Also with hypertension here, was restarted on 2 of his 3 home hypertensive medications earlier today.   Given ongoing elevated blood pressure here, was given an additional dose of hydrochlorothiazide with some improvement. CT and CTA of the head were negative for any acute concerning findings, did show an incidental finding of an hypodense nodule in the right lobe of the thyroid gland. Troponin is elevated at .02, with repeat similar. He has no chest pain or shortness of breath. EKG without evidence of acute ischemia or dysrhythmias. Given concern for hypertension and possible TIA symptoms, patient will be transferred to [facility] for further stroke work-up. Findings were discussed with patient and his family member at bedside who are comfortable in agreement with plan of care. All diagnostic, treatment, and disposition decisions were made by myself in conjunction with the advanced practice provider. For all further details of the patient's emergency department visit, please see the advanced practice provider's documentation. 1. TIA (transient ischemic attack)    2. Hypertension, unspecified type    3. Elevated troponin    4. Left hip pain      Comment: Please note this report has been produced using speech recognition software and may contain errors related to that system including errors in grammar, punctuation, and spelling, as well as words and phrases that may be inappropriate. If there are any questions or concerns please feel free to contact the dictating provider for clarification.        Araceli Francisco MD  02/08/22 0637

## 2022-02-08 NOTE — CONSULTS
In patient Neurology consult        [facility] Neurology      Legrand Severs, MD Sherian Grater  1940    Date of Service: 2/8/2022    Referring Physician: Matthew Valles MD      Reason for the consult and CC: Hypertension, possible stroke    HPI:   The patient is a 78 y.o.  male, with a PMH of HTN, HLD, and left sciatic nerve pain, who presented to [facility] with uncontrolled HTN. He monitors his BP at home and noted his SBP to be 215. Per review of the records, the patient complained of slurred speech and left facial droop, lasting around 30 minutes. He was most concerned with his BP, so came to the hospital.  He reportedly stopped taking his BP meds on his own accord approximately 3 years ago. He denies fever, chills, headache, dizziness, vision changes, dysphagia, tinnitus, focal weakness. He notes his left leg has been bothering him for 2 months and he has had several MRIs, which have all been negative. Family history is non-contributory. Past Surgical History:   Procedure Laterality Date    COLON SURGERY      COLONOSCOPY  2008    CYSTOSCOPY  02/02/2012    with stent placement    EYE SURGERY      left eyemed valve, bilateral retinal repair    FOOT SURGERY      right, tendon repair    GLAUCOMA SURGERY N/A     AHMED GLAUCOMA VALVE/Model S-2/SN [id number]/RWC Safe per [employer]    KIDNEY STONE SURGERY      OTHER SURGICAL HISTORY Left 07/17/2017    CYSTOSCOPY, LEFT URETEROSCOPY, STENT PLACEMENT, URETHERAL dilation, stone manipulation         Past Medical History:   Diagnosis Date    COVID-19 08/04/2021    Diabetes mellitus ([address]     type 2    Glaucoma     Hyperlipidemia     Hypertension     Kidney stone     Neuropathy     Osteoarthrosis, hip 03/21/2016    Prepatellar bursitis 03/21/2016     Social History     Tobacco Use    Smoking status: Never Smoker    Smokeless tobacco: Never Used   Vaping Use    Vaping Use: Never used   Substance Use Topics    Alcohol use: No    Drug use: No     No Known Allergies  Current Facility-Administered Medications   Medication Dose Route Frequency Provider Last Rate Last Admin    sodium chloride flush 0.9 % injection 5-40 mL  5-40 mL IntraVENous 2 times per day Quinton Mcgill MD        sodium chloride flush 0.9 % injection 5-40 mL  5-40 mL IntraVENous PRN Quinton Mcgill MD        0.9 % sodium chloride infusion  25 mL IntraVENous PRN Quinton Mcgill MD        enoxaparin (LOVENOX) injection 40 mg  40 mg SubCUTAneous Q24H Quinton Mcgill MD        ondansetron (ZOFRAN-ODT) disintegrating tablet 4 mg  4 mg Oral Q8H PRN Quinton Mcgill MD        Or    ondansetron [facility]) injection 4 mg  4 mg IntraVENous Q6H PRN Quinton Mcgill MD        polyethylene glycol (GLYCOLAX) packet 17 g  17 g Oral Daily PRN Quinton Mcgill MD        acetaminophen (TYLENOL) tablet 650 mg  650 mg Oral Q6H PRN Quinton Mcgill MD        Or    acetaminophen (TYLENOL) suppository 650 mg  650 mg Rectal Q6H PRN Quinton Mcgill MD        aspirin chewable tablet 81 mg  81 mg Oral Daily Quinton Mcgill MD           ROS : A 10-14 system review of constitutional, cardiovascular, respiratory, eyes, musculoskeletal, endocrine, GI, ENT, skin, hematological, genitourinary, psychiatric and neurologic systems was obtained and updated today and is unremarkable except as mentioned in my HPI      Exam:     Constitutional: There were no vitals filed for this visit. General appearance and observation: Normal development and appear in no acute distress. Neck: supple  Cardiovascular: No lower leg edema with good pulsation. Mental Status:   Oriented to person, place, problem, and time. Memory: Good immediate recall. Intact remote memory  Normal attention span and concentration. Language: intact naming, repeating and fluency   Good fund of Knowledge. Aware of current events and vocabulary   Cranial Nerves:   II: Visual fields: Full. Pupils: equal, round, reactive to light. Poor vision.     III,IV,VI: Extra Ocular discharge given remote infarcts in bilaterally. F/u with [employer] to evaluate MRI of spine as previously arranged. Thank you for referring such patient. If you have any questions regarding my consult note, please don't hesitate to call me. Sary Garcia, JIA  Attending Supervising Physician's Estrellita Pitts Statement   I reviewed and agree with the findings and plan as documented in NP consult note   Admitted for TIA work-up  Came to see the patient, he was discharged home. Agree with above note. Electronically signed by Dieudonne Santos MD on 2/8/22 at 6:00 PM EST     This dictation was generated by voice recognition computer software.  Although all attempts are made to edit the dictation for accuracy, there may be errors in the  transcription that are not intended

## 2022-02-08 NOTE — DISCHARGE SUMMARY
Hospital Medicine Discharge Summary    Patient ID: Dallin Mckenzie      Patient's PCP: Mart Acevedo MD    Admit Date: 2/8/2022     Discharge Date:   02/08/2022    Admitting Provider: Kobi Isaac DO     Discharge Provider: Jimmie Diaz MD     Discharge Diagnoses: Active Hospital Problems    Diagnosis     TIA (transient ischemic attack) [G45.9]        The patient was seen and examined on day of discharge and this discharge summary is in conjunction with any daily progress note from day of discharge. Hospital Course:   Please see same day H&P copied below. History Of Present Illness:    78 y.o. male who presented to [address] [place] [address] with weakness and lsurred speech described as \"feeling on edge\".    The pt describes feeling very \"on edge\" on 02/07. Because fo this he checked his BP which was elevated to 215/90. He then proceeded to take his BP medications and sxs resolved. He denies associated slurred speech, nausea, emesis, visual disturbances, focal neuro deficits or paraesthesias. He does have chronic left sided peripheral neuropathy which has been exacerbated in the acute setting by MRI imaging.     Per his son, the pt developed 30 mins of slurred speech that resolved either on Sunday or Monday prior to admission but otherwise no other focal neuro deficits.     Pt denies CP, fever, sob, cough, GI sxs, dysuria. Pt has not fallen in the last 6 weeks. He walks with a walker or is in wheelchair at times. He lives with his Son and wife.      Suspect TIA vs uncontrolled hypertension  -CT head- no acute intracranial pathology  -CTA head- no LVO  -MRI-chronic lacunar infarcts involving right thalamus and bilateral cerebellar hemispheres, no acute pathology  -echo 2020 without shunting  -neurology, PT/OT consulted  -recommend continued ASA, statin  - recommend outpt cardiac monitor (d/w pt and Son who will d/w pts PCP)  - continue BP, TIIDM mgmt     HTN- continue home arb, HCTZ, and ccb     TIIDM- a1c pending at time of dc. Recommend continued home insulin regimen. Pt aware of risk of hypo and hyperglycemia    Recommend out pt thyroid follow up for incidental nodule noted on imaging.     Thrombocytopenia appears chronic, recommend outpt follow up     Elevated troponin- pt without ischemic changes on EKG. No clinical signs of cardiac disease. Troponin flat. Recommend outpt cardiac work up with possible stress test. Could have been from elevated BP. Physical Exam Performed:     BP (!) 162/79   Pulse 81   Temp 98.5 °F (36.9 °C) (Oral)   Resp 16   SpO2 94%       General appearance:  No apparent distress, appears stated age and cooperative. HEENT:  Normal cephalic, atraumatic without obvious deformity. Pupils equal, round, and reactive to light. Extra ocular muscles intact. Conjunctivae/corneas clear. Neck: Supple, with full range of motion. No jugular venous distention. Trachea midline. Respiratory:  Normal respiratory effort. Clear to auscultation, bilaterally without Rales/Wheezes/Rhonchi. Cardiovascular:  Regular rate and rhythm with normal S1/S2 without murmurs, rubs or gallops. Abdomen: Soft, non-tender, non-distended with normal bowel sounds. Musculoskeletal:  No clubbing, cyanosis or edema bilaterally. Full range of motion without deformity. Skin: Skin color, texture, turgor normal.  No rashes or lesions. Neurologic:  Neurovascularly intact without any focal sensory/motor deficits. Cranial nerves: II-XII intact, grossly non-focal.  Psychiatric:  Alert and oriented, thought content appropriate, normal insight  Capillary Refill: Brisk,< 3 seconds   Peripheral Pulses: +2 palpable, equal bilaterally       Labs:  For convenience and continuity at follow-up the following most recent labs are provided:      CBC:    Lab Results   Component Value Date    WBC 8.2 02/08/2022    HGB 15.5 02/08/2022    HCT 47.0 02/08/2022     02/08/2022       Renal:    Lab Results   Component Value Date    NA 140 02/07/2022    K 4.2 02/07/2022    K 4.2 11/14/2021     02/07/2022    CO2 27 02/07/2022    BUN 14 02/07/2022    CREATININE 0.8 02/07/2022    CALCIUM 9.2 02/07/2022         Significant Diagnostic Studies    Radiology:   No orders to display          Consults:     IP CONSULT TO NEUROLOGY  IP CONSULT TO HOME CARE NEEDS    Disposition:  home     Condition at Discharge: Stable    Discharge Instructions/Follow-up:  PCP for cardiac monitor    Code Status:  Full Code     Activity: activity as tolerated    Diet: regular diet      Discharge Medications:     Current Discharge Medication List           Details   aspirin 81 MG chewable tablet Take 1 tablet by mouth daily  Qty: 30 tablet, Refills: 3      losartan (COZAAR) 100 MG tablet Take 1 tablet by mouth daily  Qty: 30 tablet, Refills: 3      amLODIPine (NORVASC) 5 MG tablet Take 1 tablet by mouth daily  Qty: 30 tablet, Refills: 0              Details   atorvastatin (LIPITOR) 80 MG tablet Take 1 tablet by mouth nightly  Qty: 30 tablet, Refills: 3              Details   Acetylcysteine ( PO) Take by mouth      benzonatate (TESSALON) 200 MG capsule Take 200 mg by mouth 3 times daily as needed      lansoprazole (PREVACID) 30 MG delayed release capsule Take 30 mg by mouth daily      pregabalin (LYRICA) 50 MG capsule Take 50 mg by mouth 2 times daily. ondansetron (ZOFRAN ODT) 4 MG disintegrating tablet Take 1-2 tablets by mouth every 12 hours as needed for Nausea May Sub regular tablet (non-ODT) if insurance does not cover ODT.   Qty: 12 tablet, Refills: 0      Cyanocobalamin ([employer] VITAMIN B-12 SL) Place under the tongue daily      docusate sodium (COLACE) 100 MG capsule Take 100 mg by mouth 2 times daily as needed for Constipation      Cholecalciferol (VITAMIN D3) 5000 units TABS Take by mouth daily      brimonidine (ALPHAGAN) 0.2 % ophthalmic solution Place 1 drop into both eyes 2 times daily       b complex vitamins capsule Take 1 capsule by mouth daily Dorzolamide HCl (TRUSOPT OP) Apply 1 drop to eye 2 times daily       insulin aspart (NOVOLOG) 100 UNIT/ML injection Inject  into the skin 3 times daily (before meals). Sliding scale based on meal calculation       insulin glargine (LANTUS) 100 UNIT/ML injection Inject 40 Units into the skin 2 times daily. Time Spent on discharge is more than 45 minutes in the examination, evaluation, counseling and review of medications and discharge plan. Signed:    Татьяна Veras MD   2/8/2022      Thank you Irina Melgar MD for the opportunity to be involved in this patient's care. If you have any questions or concerns please feel free to contact me at [phone].

## 2022-02-08 NOTE — ED NOTES
Dr. Silvana Pedroza returned call at [phone] [facility]  02/07/22 8057

## 2022-02-08 NOTE — CARE COORDINATION
CASE MANAGEMENT DISCHARGE SUMMARY      Discharge to: Home with [address]. PT and OT. New Durable Medical Equipment ordered/agency: None    Transportation: Son    Confirmed discharge plan with: Met with pt and son at bedside and both in agreement with DCP  Facility/Agency, name:  [facility] faxed- Spoke to Carlos Eduardo Hoffman with [facility].     RN, name: Ivett Anderson RN

## 2022-02-08 NOTE — CARE COORDINATION
CASE MANAGEMENT INITIAL ASSESSMENT      Reviewed chart and completed assessment with patient: Pt and son  Explained Case Management role/services. Yes    Primary contact information: Wife [facility] Decision Maker :   Primary Decision Maker: Van Mayer - Spouse - [phone]    Secondary Decision Maker: Liane Echeverria - [phone]          Can this person be reached and be able to respond quickly, such as within a few minutes or hours? Yes  Who would be your back-up decision maker? Name  René Washburn  Phone Dedra Celis date/status: Observation, 2/8/22  Diagnosis: Jena Gentleman  Is this a Readmission?:  No      Insurance: Medicare Primary. [employer] Secondary  Precert required for SNF: No       3 night stay required: No    Living arrangements, Adls, care needs, prior to admission: Lives in a two story house with wife and two sons. Independent in ADL's and sons drive. Transportation: family    Durable Medical Equipment at home:  Walker_X_Cane_X_RTS__ BSC__Shower Chair_X_  02__ HHN__ CPAP__  BiPap__  Hospital Bed__ W/C_X__ Other__________    Services in the home and/or outpatient, prior to admission: None    PT/OT recs: Home with 24 hr and PT/OT. Hospital Exemption Notification (HEN): Needed for SNF, not initiated. Barriers to discharge: None    Plan/comments: CM met with pt and son at bedside for initial assessment and to discuss therapy recs for Home PT/OT. Pt plans on returning back home with current support system in place and in agreement with [facility].. No agency preference. OK with referral to [facility]. Spoke to Rich Michael with [facility].  Sathya Cisneros RN       ECOC on chart for MD signature

## 2022-02-08 NOTE — CARE COORDINATION
[facility]    Referral received from  to follow for home care services. I will follow for needs, and speak with patient to verify demos.     Leida Pires RN, BSN CTN  [facility] [phone]

## 2022-02-09 ENCOUNTER — HOSPITAL ENCOUNTER (EMERGENCY)
Age: 82
Discharge: HOME | End: 2022-02-09
Attending: STUDENT IN AN ORGANIZED HEALTH CARE EDUCATION/TRAINING PROGRAM
Payer: MEDICARE

## 2022-02-09 ENCOUNTER — APPOINTMENT (OUTPATIENT)
Dept: CT IMAGING | Age: 82
End: 2022-02-09
Payer: MEDICARE

## 2022-02-09 VITALS
RESPIRATION RATE: 15 BRPM | WEIGHT: 210 LBS | SYSTOLIC BLOOD PRESSURE: 180 MMHG | TEMPERATURE: 97.2 F | OXYGEN SATURATION: 94 % | HEIGHT: 69 IN | HEART RATE: 85 BPM | BODY MASS INDEX: 31.1 KG/M2 | DIASTOLIC BLOOD PRESSURE: 93 MMHG

## 2022-02-09 DIAGNOSIS — N28.1 CYST OF LEFT KIDNEY: ICD-10-CM

## 2022-02-09 DIAGNOSIS — K59.00 CONSTIPATION, UNSPECIFIED CONSTIPATION TYPE: Primary | ICD-10-CM

## 2022-02-09 DIAGNOSIS — R39.11 URINARY HESITANCY: ICD-10-CM

## 2022-02-09 DIAGNOSIS — K86.89 MASS OF PANCREAS: ICD-10-CM

## 2022-02-09 DIAGNOSIS — I10 HYPERTENSION, UNSPECIFIED TYPE: ICD-10-CM

## 2022-02-09 DIAGNOSIS — K56.41 FECAL IMPACTION IN RECTUM (HCC): ICD-10-CM

## 2022-02-09 DIAGNOSIS — D73.4 CYST OF SPLEEN: ICD-10-CM

## 2022-02-09 LAB
A/G RATIO: 1.6 (ref 1.1–2.2)
ALBUMIN SERPL-MCNC: 4.2 G/DL (ref 3.4–5)
ALP BLD-CCNC: 118 U/L (ref 40–129)
ALT SERPL-CCNC: 24 U/L (ref 10–40)
ANION GAP SERPL CALCULATED.3IONS-SCNC: 13 MMOL/L (ref 3–16)
AST SERPL-CCNC: 21 U/L (ref 15–37)
BASOPHILS ABSOLUTE: 0 K/UL (ref 0–0.2)
BASOPHILS RELATIVE PERCENT: 0.3 %
BILIRUB SERPL-MCNC: 0.9 MG/DL (ref 0–1)
BILIRUBIN URINE: NEGATIVE
BLOOD, URINE: ABNORMAL
BUN BLDV-MCNC: 20 MG/DL (ref 7–20)
CALCIUM SERPL-MCNC: 9.4 MG/DL (ref 8.3–10.6)
CHLORIDE BLD-SCNC: 104 MMOL/L (ref 99–110)
CLARITY: CLEAR
CO2: 26 MMOL/L (ref 21–32)
COLOR: YELLOW
CREAT SERPL-MCNC: 0.8 MG/DL (ref 0.8–1.3)
EOSINOPHILS ABSOLUTE: 0 K/UL (ref 0–0.6)
EOSINOPHILS RELATIVE PERCENT: 0.4 %
ESTIMATED AVERAGE GLUCOSE: 237.4 MG/DL
GFR AFRICAN AMERICAN: >60
GFR NON-AFRICAN AMERICAN: >60
GLUCOSE BLD-MCNC: 341 MG/DL (ref 70–99)
GLUCOSE URINE: >=1000 MG/DL
HBA1C MFR BLD: 9.9 %
HCT VFR BLD CALC: 49.7 % (ref 40.5–52.5)
HEMOGLOBIN: 16.5 G/DL (ref 13.5–17.5)
KETONES, URINE: NEGATIVE MG/DL
LACTIC ACID, SEPSIS: 2.3 MMOL/L (ref 0.4–1.9)
LACTIC ACID, SEPSIS: 2.8 MMOL/L (ref 0.4–1.9)
LEUKOCYTE ESTERASE, URINE: NEGATIVE
LYMPHOCYTES ABSOLUTE: 0.8 K/UL (ref 1–5.1)
LYMPHOCYTES RELATIVE PERCENT: 7.2 %
MCH RBC QN AUTO: 29.5 PG (ref 26–34)
MCHC RBC AUTO-ENTMCNC: 33.3 G/DL (ref 31–36)
MCV RBC AUTO: 88.7 FL (ref 80–100)
MICROSCOPIC EXAMINATION: YES
MONOCYTES ABSOLUTE: 0.6 K/UL (ref 0–1.3)
MONOCYTES RELATIVE PERCENT: 5.3 %
NEUTROPHILS ABSOLUTE: 9.9 K/UL (ref 1.7–7.7)
NEUTROPHILS RELATIVE PERCENT: 86.8 %
NITRITE, URINE: NEGATIVE
PDW BLD-RTO: 14.1 % (ref 12.4–15.4)
PH UA: 5 (ref 5–8)
PLATELET # BLD: 131 K/UL (ref 135–450)
PMV BLD AUTO: 9.5 FL (ref 5–10.5)
POTASSIUM REFLEX MAGNESIUM: 4.3 MMOL/L (ref 3.5–5.1)
PROTEIN UA: 100 MG/DL
RBC # BLD: 5.6 M/UL (ref 4.2–5.9)
RBC UA: NORMAL /HPF (ref 0–4)
SODIUM BLD-SCNC: 143 MMOL/L (ref 136–145)
SPECIFIC GRAVITY UA: 1.01 (ref 1–1.03)
TOTAL PROTEIN: 6.9 G/DL (ref 6.4–8.2)
URINE REFLEX TO CULTURE: ABNORMAL
URINE TYPE: ABNORMAL
UROBILINOGEN, URINE: 0.2 E.U./DL
WBC # BLD: 11.4 K/UL (ref 4–11)
WBC UA: NORMAL /HPF (ref 0–5)

## 2022-02-09 PROCEDURE — 74177 CT ABD & PELVIS W/CONTRAST: CPT

## 2022-02-09 PROCEDURE — 6360000004 HC RX CONTRAST MEDICATION: Performed by: PHYSICIAN ASSISTANT

## 2022-02-09 PROCEDURE — 6360000002 HC RX W HCPCS: Performed by: PHYSICIAN ASSISTANT

## 2022-02-09 PROCEDURE — 81001 URINALYSIS AUTO W/SCOPE: CPT

## 2022-02-09 PROCEDURE — 2580000003 HC RX 258: Performed by: PHYSICIAN ASSISTANT

## 2022-02-09 PROCEDURE — 6370000000 HC RX 637 (ALT 250 FOR IP): Performed by: PHYSICIAN ASSISTANT

## 2022-02-09 PROCEDURE — 85025 COMPLETE CBC W/AUTO DIFF WBC: CPT

## 2022-02-09 PROCEDURE — 36415 COLL VENOUS BLD VENIPUNCTURE: CPT

## 2022-02-09 PROCEDURE — 96374 THER/PROPH/DIAG INJ IV PUSH: CPT

## 2022-02-09 PROCEDURE — 99285 EMERGENCY DEPT VISIT HI MDM: CPT

## 2022-02-09 PROCEDURE — 80053 COMPREHEN METABOLIC PANEL: CPT

## 2022-02-09 PROCEDURE — 83605 ASSAY OF LACTIC ACID: CPT

## 2022-02-09 PROCEDURE — 96375 TX/PRO/DX INJ NEW DRUG ADDON: CPT

## 2022-02-09 RX ORDER — ONDANSETRON 2 MG/ML
4 INJECTION INTRAMUSCULAR; INTRAVENOUS ONCE
Status: COMPLETED | OUTPATIENT
Start: 2022-02-09 | End: 2022-02-09

## 2022-02-09 RX ORDER — 0.9 % SODIUM CHLORIDE 0.9 %
500 INTRAVENOUS SOLUTION INTRAVENOUS ONCE
Status: COMPLETED | OUTPATIENT
Start: 2022-02-09 | End: 2022-02-09

## 2022-02-09 RX ORDER — MORPHINE SULFATE 2 MG/ML
2 INJECTION, SOLUTION INTRAMUSCULAR; INTRAVENOUS ONCE
Status: COMPLETED | OUTPATIENT
Start: 2022-02-09 | End: 2022-02-09

## 2022-02-09 RX ORDER — 0.9 % SODIUM CHLORIDE 0.9 %
1000 INTRAVENOUS SOLUTION INTRAVENOUS ONCE
Status: COMPLETED | OUTPATIENT
Start: 2022-02-09 | End: 2022-02-09

## 2022-02-09 RX ADMIN — MORPHINE SULFATE 2 MG: 2 INJECTION, SOLUTION INTRAMUSCULAR; INTRAVENOUS at 17:10

## 2022-02-09 RX ADMIN — MINERAL OIL 330 ML: 1000 LIQUID ORAL at 21:22

## 2022-02-09 RX ADMIN — SODIUM CHLORIDE 1000 ML: 9 INJECTION, SOLUTION INTRAVENOUS at 17:11

## 2022-02-09 RX ADMIN — SODIUM CHLORIDE 500 ML: 9 INJECTION, SOLUTION INTRAVENOUS at 18:16

## 2022-02-09 RX ADMIN — IOPAMIDOL 75 ML: 755 INJECTION, SOLUTION INTRAVENOUS at 18:14

## 2022-02-09 RX ADMIN — ONDANSETRON HYDROCHLORIDE 4 MG: 2 INJECTION, SOLUTION INTRAMUSCULAR; INTRAVENOUS at 17:09

## 2022-02-09 ASSESSMENT — ENCOUNTER SYMPTOMS
SORE THROAT: 0
RHINORRHEA: 0
SHORTNESS OF BREATH: 0
DIARRHEA: 0
CHEST TIGHTNESS: 0
CONSTIPATION: 1
SINUS PRESSURE: 0
VOMITING: 0
COUGH: 0
ABDOMINAL PAIN: 1
EYE DISCHARGE: 0
NAUSEA: 0
EYE REDNESS: 0
SINUS PAIN: 0

## 2022-02-09 ASSESSMENT — PAIN DESCRIPTION - PAIN TYPE: TYPE: ACUTE PAIN

## 2022-02-09 ASSESSMENT — PAIN DESCRIPTION - FREQUENCY: FREQUENCY: CONTINUOUS

## 2022-02-09 ASSESSMENT — PAIN SCALES - GENERAL
PAINLEVEL_OUTOF10: 9
PAINLEVEL_OUTOF10: 8
PAINLEVEL_OUTOF10: 0

## 2022-02-09 ASSESSMENT — PAIN DESCRIPTION - LOCATION: LOCATION: RECTUM

## 2022-02-09 ASSESSMENT — PAIN DESCRIPTION - DESCRIPTORS: DESCRIPTORS: PRESSURE

## 2022-02-09 NOTE — ED PROVIDER NOTES
[address] ED  EMERGENCY DEPARTMENT ENCOUNTER        Pt Name: Jossy Quispe  MRN: [id number]  [place] 1940  Date of evaluation: 2/9/2022  Provider: Bi Goodman PA-C  PCP: Atif Palencia MD  Attending Provider: Kathleen Bales    This patient was seen and evaluated by the attending physician  I have not independently evaluated this patient. CHIEF COMPLAINT       Chief Complaint   Patient presents with    Constipation     Patient arrives via EMS for constipation, took enema before calling 911. Patient was here Monday for high blood pressure and sent to [facility] and released last night. HISTORY OF PRESENT ILLNESS   (Location/Symptom, Timing/Onset, Context/Setting, Quality, Duration, Modifying Factors, Severity)  Note limiting factors. Jossy Quispe is a 78 y.o. male with a h/o CVA, HTN, HLD, DM2, for evaluation via EMS were patient was not provided with any treatment prior to arrival.  Patient complains of a 3-day history of constipation, no BM for 3 days. Patient tried an enema prior to coming to the ER with no improvement in his symptoms. He indicates that he is having cramping 8/10, rectal pain with nausea. Patient has taken MiraLAX and Colace for his symptoms with no improvement. Patient advised that he was given morphine for hip pain while admitted to the hospital yesterday and he thinks that this has contributed to his constipation. Pt takes a daily aspirin. Of note pt admitted 02/07/22-02/09/22 for TIA. Nursing Notes were all reviewed and agreed with or any disagreements were addressed  in the HPI. REVIEW OF SYSTEMS  (2-9 systems for level 4, 10 or more for level 5)     Review of Systems   Constitutional: Negative for chills and fever. HENT: Negative. Negative for congestion, rhinorrhea, sinus pressure, sinus pain and sore throat. Eyes: Negative for discharge, redness and visual disturbance.    Respiratory: Negative for cough, chest tightness and shortness of breath. Cardiovascular: Negative for chest pain and palpitations. Gastrointestinal: Positive for abdominal pain and constipation. Negative for diarrhea, nausea and vomiting. Genitourinary: Negative for difficulty urinating, dysuria and frequency. Musculoskeletal: Negative. Skin: Negative. Neurological: Negative. Negative for dizziness, weakness, numbness and headaches. Psychiatric/Behavioral: Negative. All other systems reviewed and are negative. Positivesand Pertinent negatives as per HPI. Except as noted above in the ROS, all other systems were reviewed and negative. PAST MEDICAL HISTORY     Past Medical History:   Diagnosis Date    COVID-19 08/04/2021    Diabetes mellitus ([facility] [address]     type 2    Glaucoma     Hyperlipidemia     Hypertension     Kidney stone     Neuropathy     Osteoarthrosis, hip 03/21/2016    Prepatellar bursitis 03/21/2016         SURGICAL HISTORY       Past Surgical History:   Procedure Laterality Date    COLON SURGERY      COLONOSCOPY  2008    CYSTOSCOPY  02/02/2012    with stent placement    EYE SURGERY      left eyemed valve, bilateral retinal repair    FOOT SURGERY      right, tendon repair    GLAUCOMA SURGERY N/A     Virax GLAUCOMA VALVE/Model S-2/SN [id number] Safe per [facility]    KIDNEY STONE SURGERY      OTHER SURGICAL HISTORY Left 07/17/2017    CYSTOSCOPY, LEFT URETEROSCOPY, STENT PLACEMENT, URETHERAL dilation, stone manipulation          CURRENT MEDICATIONS       Previous Medications    ACETYLCYSTEINE ( PO)    Take by mouth    AMLODIPINE (NORVASC) 5 MG TABLET    Take 1 tablet by mouth daily    ASPIRIN 81 MG CHEWABLE TABLET    Take 1 tablet by mouth daily    ATORVASTATIN (LIPITOR) 80 MG TABLET    Take 1 tablet by mouth nightly    B COMPLEX VITAMINS CAPSULE    Take 1 capsule by mouth daily    BENZONATATE (TESSALON) 200 MG CAPSULE    Take 200 mg by mouth 3 times daily as needed    BRIMONIDINE (ALPHAGAN) 0.2 % OPHTHALMIC SOLUTION    Place 1 drop into both eyes 2 times daily     CHOLECALCIFEROL (VITAMIN D3) 5000 UNITS TABS    Take by mouth daily    CYANOCOBALAMIN ([employer] VITAMIN B-12 SL)    Place under the tongue daily    DOCUSATE SODIUM (COLACE) 100 MG CAPSULE    Take 100 mg by mouth 2 times daily as needed for Constipation    DORZOLAMIDE HCL (TRUSOPT OP)    Apply 1 drop to eye 2 times daily     INSULIN ASPART (NOVOLOG) 100 UNIT/ML INJECTION    Inject  into the skin 3 times daily (before meals). Sliding scale based on meal calculation     INSULIN GLARGINE (LANTUS) 100 UNIT/ML INJECTION    Inject 40 Units into the skin 2 times daily. LANSOPRAZOLE (PREVACID) 30 MG DELAYED RELEASE CAPSULE    Take 30 mg by mouth daily    LOSARTAN (COZAAR) 100 MG TABLET    Take 1 tablet by mouth daily    ONDANSETRON (ZOFRAN ODT) 4 MG DISINTEGRATING TABLET    Take 1-2 tablets by mouth every 12 hours as needed for Nausea May Sub regular tablet (non-ODT) if insurance does not cover ODT. PREGABALIN (LYRICA) 50 MG CAPSULE    Take 50 mg by mouth 2 times daily. ALLERGIES     Patient has no known allergies. FAMILY HISTORY     No family history on file.       SOCIAL HISTORY       Social History     Socioeconomic History    Marital status:      Spouse name: Not on file    Number of children: Not on file    Years of education: Not on file    Highest education level: Not on file   Occupational History    Not on file   Tobacco Use    Smoking status: Never Smoker    Smokeless tobacco: Never Used   Vaping Use    Vaping Use: Never used   Substance and Sexual Activity    Alcohol use: No    Drug use: No    Sexual activity: Not Currently     Partners: Female   Other Topics Concern    Not on file   Social History Narrative    Not on file     Social Determinants of Health     Financial Resource Strain:     Difficulty of Paying Living Expenses: Not on file   Food Insecurity:     Worried About Running Out of Food in the Last Year: Not on file    Ran Out of Food in the Last Year: Not on file   Transportation Needs:     Lack of Transportation (Medical): Not on file    Lack of Transportation (Non-Medical): Not on file   Physical Activity:     Days of Exercise per Week: Not on file    Minutes of Exercise per Session: Not on file   Stress:     Feeling of Stress : Not on file   Social Connections:     Frequency of Communication with Friends and Family: Not on file    Frequency of Social Gatherings with Friends and Family: Not on file    Attends Scientologist Services: Not on file    Active Member of [address] [place] [address] Bestowed or Organizations: Not on file    Attends Club or Organization Meetings: Not on file    Marital Status: Not on file   Intimate Partner Violence:     Fear of Current or Ex-Partner: Not on file    Emotionally Abused: Not on file    Physically Abused: Not on file    Sexually Abused: Not on file   Housing Stability:     Unable to Pay for Housing in the Last Year: Not on file    Number of [place] in the Last Year: Not on file    Unstable Housing in the Last Year: Not on file       SCREENINGS     NIH Score       [place] Coma Scale  Eye Opening: Spontaneous  Best Verbal Response: Oriented  Best Motor Response: Obeys commands  [place] Coma Scale Score: 15    [facility]     Heart Score         PHYSICAL EXAM    (up to 7 for level 4, 8 ormore for level 5)     ED Triage Vitals [02/09/22 1532]   BP Temp Temp Source Pulse Resp SpO2 Height Weight   (!) 169/102 97.2 °F (36.2 °C) Temporal 90 15 96 % 5' 9\" (1.753 m) 210 lb (95.3 kg)       Physical Exam  Vitals and nursing note reviewed. Exam conducted with a chaperone present. Constitutional:       Appearance: He is well-developed. He is not diaphoretic. HENT:      Head: Normocephalic. Nose: Nose normal.      Mouth/Throat:      Pharynx: No oropharyngeal exudate. Eyes:      General:         Right eye: No discharge. Left eye: No discharge.       Conjunctiva/sclera: Conjunctivae normal.      Pupils: Pupils are equal, round, and reactive to light. Cardiovascular:      Rate and Rhythm: Normal rate and regular rhythm. Heart sounds: Normal heart sounds. No murmur heard. No friction rub. No gallop. Pulmonary:      Effort: Pulmonary effort is normal. No respiratory distress. Breath sounds: Normal breath sounds. No wheezing. Abdominal:      General: Bowel sounds are normal. There is no distension. Palpations: Abdomen is soft. Tenderness: There is abdominal tenderness. Hernia: A hernia is present. Comments: +midline hernia, reducible   Genitourinary:     Prostate: Enlarged. Rectum: External hemorrhoid present. Musculoskeletal:         General: Normal range of motion. Cervical back: Normal range of motion. Skin:     General: Skin is warm and dry. Capillary Refill: Capillary refill takes less than 2 seconds. Neurological:      General: No focal deficit present. Mental Status: He is alert and oriented to person, place, and time.    Psychiatric:         Behavior: Behavior normal.         DIAGNOSTIC RESULTS   LABS:    Labs Reviewed   CBC WITH AUTO DIFFERENTIAL - Abnormal; Notable for the following components:       Result Value    WBC 11.4 (*)     Platelets 841 (*)     Neutrophils Absolute 9.9 (*)     Lymphocytes Absolute 0.8 (*)     All other components within normal limits    Narrative:     Performed at:  [facility],  [place], [facility]   Phone [phone]   COMPREHENSIVE METABOLIC PANEL W/ REFLEX TO MG FOR LOW K - Abnormal; Notable for the following components:    Glucose 341 (*)     All other components within normal limits    Narrative:     Performed at:  [facility]) - [facility],  [place], [facility]   Phone [phone]   LACTATE, SEPSIS - Abnormal; Notable for the following components:    Lactic Acid, Sepsis 2.3 (*)     All other components within normal limits    Narrative:     Performed at:  [facility]) - [facility] [address],  [place], [facility]   Phone [phone]   LACTATE, SEPSIS - Abnormal; Notable for the following components:    Lactic Acid, Sepsis 2.8 (*)     All other components within normal limits    Narrative:     Performed at:  [facility] [address],  [place], [facility]   Phone [phone]   URINE RT REFLEX TO CULTURE - Abnormal; Notable for the following components:    Glucose, Ur >=1000 (*)     Blood, Urine TRACE-INTACT (*)     Protein,  (*)     All other components within normal limits    Narrative:     Performed at:  [facility]) - [facility],  [place], [facility]   Phone [phone]   CULTURE, BLOOD 1   CULTURE, BLOOD 2   MICROSCOPIC URINALYSIS    Narrative:     Performed at:  [facility]) - [facility],  [place], [facility]   Phone [phone]   LACTATE, SEPSIS   LACTATE, SEPSIS       All other labs were within normal range or notreturned as of this dictation. RADIOLOGY:     Interpretation per the Radiologist below, if available at the time of this note:    CT ABDOMEN PELVIS W IV CONTRAST Additional Contrast? None   Final Result   Cholelithiasis which is unchanged. 6 7 cm complex cystic mass arising from the head of the pancreas which is   grossly unchanged and is probably partially obstructing the pancreatic duct   with small hypodense cystic changes  distally in the pancreas which is   unchanged with no inflammatory changes. 8 mm hypodensity anterior aspect of the spleen which may represent a cyst but   is unchanged and too small to characterize. Previous right colectomy with mild to moderate constipation in the colon   which is most prominent in the rectum.       Mild prostatic enlargement with questionable small inguinal hernias bilaterally and moderate distention of the urinary bladder. Recommend   clinical follow-up. Multiple abdominal wall hernias which are unchanged. 7 cm cyst left kidney which is unchanged. Hazy ground-glass opacities along the lung bases which is less prominent and   could represent recurrent or residual pneumonitis vs residual fibrosis and   scarring from previous COVID-19 disease. EMERGENCY DEPARTMENT COURSE and DIFFERENTIAL DIAGNOSIS/MDM:   Vitals:    Vitals:    02/09/22 1825 02/09/22 1830 02/09/22 1935 02/09/22 2230   BP:   (!) 177/99 (!) 180/93   Pulse:    85   Resp:       Temp:       TempSrc:       SpO2: 96% 97% 94% 94%   Weight:       Height:           Patient was given the following medications:  Medications   0.9 % sodium chloride bolus (0 mLs IntraVENous Stopped 2/9/22 1816)   morphine (PF) injection 2 mg (2 mg IntraVENous Given 2/9/22 1710)   ondansetron (ZOFRAN) injection 4 mg (4 mg IntraVENous Given 2/9/22 1709)   0.9 % sodium chloride bolus (0 mLs IntraVENous Stopped 2/9/22 2052)   iopamidol (ISOVUE-370) 76 % injection 75 mL (75 mLs IntraVENous Given 2/9/22 1814)   SMOG Enema (330 mLs Rectal Given 2/9/22 2122)         Afebrile, stable, patient presents to the ED for evaluation. Non toxic, NAD. SpO2 on room air of 94% not hypoxic. Advised he is having pain in his stomach from cramping along with 3 days of no bowel movements. Provided with morphine and zofran, in addition to a small ampunt of IV fluids. Pt also advised he has had urinary hesitancy, difficulty emptying his bladder. Feels pressure. Has been told it is his prostate, pt has seen Dr Polo Gates in the past.   UA shows no signs of acute infection. Mild elevation in lactic acid on initial evaluation patient will be administered small amount of IV fluids and this will be repeated. Labs returned revealing mild leukocytosis with a white blood cell count of 11.4.   Hemoglobin of 16.5 hematocrit of 49.7 glucose of 341.   CT imaging to r/o SBO. CT shows mild to moderate constipation in the colon which is prominent in the rectum. Incidental findings of cysts in the kidney spleen and pancreas which are not new. Attempted disimpaction with tech at bedside, pt is unable to tolerate. Small amount of jona red blood on finger after removal. +internal and external hemorrhoids. Along with prostate enlargement. SMOG enema is ordered. And administered. Initially fluid from the smog enema came out maroon-colored however this cleared throughout the duration of the enema. And then patient had a very large bowel movement without any obvious blood in the stool. Patient's pain is significantly improved. He is comfortable with discharge into the care of his son at bedside. Patient is advised to follow-up on continually elevated high blood pressure, with his PCP. No signs of endorgan damage. Patient is advised to follow-up on enlarged prostate with urology. And to schedule a follow-up colonoscopy with GI. He is advised to have a low suspicion for return to the ER and he is discharged in stable condition. All questions are answered. The patient tolerated their visit well. The patient and / or the family were informed of the results of any tests, a time was given to answer questions, a plan was proposed and they agreed with plan. I estimate there is LOW risk for ACUTE APPENDICITIS, BOWEL OBSTRUCTION, CHOLECYSTITIS, DIVERTICULITIS, INCARCERATED HERNIA, PANCREATITIS, or PERFORATED BOWEL or ULCER, thus I consider the discharge disposition reasonable. Also, there is no evidence or peritonitis, sepsis, or toxicity. Hamilton Barth and I have discussed the diagnosis and risks, and we agree with discharging home to follow-up with their primary doctor. We also discussed returning to the Emergency Department immediately if new or worsening symptoms occur.  We have discussed the symptoms which are most concerning (e.g., bloody stool, fever, changing or worsening pain, vomiting) that necessitate immediate return. FINAL IMPRESSION      1. Constipation, unspecified constipation type    2. Fecal impaction in rectum ([address]    3. Urinary hesitancy    4. Hypertension, unspecified type    5. Mass of pancreas    6. Cyst of spleen    7. Cyst of left kidney          DISPOSITION/PLAN   DISPOSITION  D/c to home       PATIENT REFERRED TO:  Pierre Burrell MD  [address]  [phone]    Schedule an appointment as soon as possible for a visit   for a recheck in 2-3 days, to follow up on your elevated blood pressure    Sunita Hercules MD  [address] [place] [address] [address] [facility] [address]  [phone]    Schedule an appointment as soon as possible for a visit   to follow up on your urinary complaint    Luige Judd [address] [facility]  [address] [facility]  [phone]      Call to schedule colonoscopy, for a recheck in 2-3  days      DISCHARGE MEDICATIONS:  New Prescriptions    No medications on file       DISCONTINUED MEDICATIONS:  Discontinued Medications    No medications on file              Pt was seen during the [place] 19 pandemic. Appropriate PPE worn by ME during patient encounters. Pt seen during a time with constrained hospital bed capacity and other potential inpatient and outpatient resources were constrained due to the viral pandemic.    Please note that portions of this note were completed with a voice recognition program.  Efforts were made to edit the dictations but occasionally words are mis-transcribed.)    Shirley Weir PA-C (electronically signed)        Shirley Weir PA-C  02/09/22 9768

## 2022-02-10 NOTE — ED PROVIDER NOTES
I independently examined and evaluated Yrn Klein. All diagnostic, treatment, and disposition decisions were made by myself in conjunction with the advanced practice provider. For all further details of the patient's emergency department visit, please see the advanced practice provider's documentation. I personally saw the patient and performed a substantive portion of the visit including all aspects of the medical decision making      Primary Care Physician: Ciera Livingston MD    History: This is a 78 y.o. male who presents to the Emergency Department with complaint of constipation. Has not had a normal bowel movement over 3 days. Recently admitted for TIA and hypertension evaluation at [facility].  Patient does report history of hemorrhoids and is also describing internal hemorrhoids as well. Patient was noted to have rectal impaction which was unable to be disimpacted at bedside however patient had large volume stool following enema. MDM patient presents with constipation. CT without significant acute findings. No significant diverticula noted. See [facility] documentation however on rectal exam by CHAKA both external and internal hemorrhoids were palpated, there was some darker blood on the glove after exam, initial bowel movement after enema was blood-tinged however patient had large volume stool which was nonbloody. Suspect this is likely from hemorrhoidal bleeding. His H&H is stable. He is not hypotensive not tachycardic. Physical:     height is 5' 9\" (1.753 m) and weight is 210 lb (95.3 kg). His temporal temperature is 97.2 °F (36.2 °C). His blood pressure is 177/99 (abnormal) and his pulse is 88.  His respiration is 15 and oxygen saturation is 94%.    78 y.o. male   Alert, oriented  Abdomen soft nontender  No Respiratory distress    Impression: Constipation, hemorrhoid    Plan: Enema, outpatient PCP follow-up      CRITICAL CARE: There was a high probability of clinically

## 2022-05-18 ENCOUNTER — TELEPHONE (OUTPATIENT)
Dept: PULMONOLOGY | Age: 82
End: 2022-05-18

## 2022-05-18 NOTE — TELEPHONE ENCOUNTER
Pt wants to establish care with Dr. Luis Enrique Dia. Per Dr. Luis Enrique Dia, he is willing to take over pt care. Will reach out to pt to schedule appt.

## 2022-06-09 ENCOUNTER — OFFICE VISIT (OUTPATIENT)
Dept: PULMONOLOGY | Age: 82
End: 2022-06-09
Payer: MEDICARE

## 2022-06-09 VITALS
DIASTOLIC BLOOD PRESSURE: 70 MMHG | BODY MASS INDEX: 31.4 KG/M2 | HEART RATE: 72 BPM | WEIGHT: 212 LBS | SYSTOLIC BLOOD PRESSURE: 130 MMHG | OXYGEN SATURATION: 99 % | HEIGHT: 69 IN

## 2022-06-09 DIAGNOSIS — J84.9 ILD (INTERSTITIAL LUNG DISEASE) (HCC): Primary | ICD-10-CM

## 2022-06-09 DIAGNOSIS — U09.9 POST-COVID-19 CONDITION: ICD-10-CM

## 2022-06-09 PROCEDURE — 1123F ACP DISCUSS/DSCN MKR DOCD: CPT | Performed by: INTERNAL MEDICINE

## 2022-06-09 PROCEDURE — 99214 OFFICE O/P EST MOD 30 MIN: CPT | Performed by: INTERNAL MEDICINE

## 2022-06-09 PROCEDURE — G8427 DOCREV CUR MEDS BY ELIG CLIN: HCPCS | Performed by: INTERNAL MEDICINE

## 2022-06-09 PROCEDURE — 1036F TOBACCO NON-USER: CPT | Performed by: INTERNAL MEDICINE

## 2022-06-09 PROCEDURE — G8417 CALC BMI ABV UP PARAM F/U: HCPCS | Performed by: INTERNAL MEDICINE

## 2022-06-09 NOTE — PROGRESS NOTES
Pulmonary Follow-up Note  Chief Complaint: ILD f/u  Referring Provider: could not travel to [facility] to see Dr. Alok Dobbs, I see his spouse    Interval history: dyspnea on exertion with 40 feet, does perform own ADL's, never recovered activity level after COVID although dramatic improvement in oxygenation. Presenting history: COVID August 2021, sent home on 2 L oxygen. Treated with prolonged course of prednisone by Dr. Alok Dobbs. reports that he has never smoked. He has never used smokeless tobacco.     Review of Systems:    Physical Exam:  Blood pressure 130/70, pulse 72, height 5' 9\" (1.753 m), weight 212 lb (96.2 kg), SpO2 99 %. Constitutional:  No acute distress. HENT:  Oropharynx is clear and moist.   Eyes: Pupils equal, round, and reactive to light. No scleral icterus. Neck: No tracheal deviation present. Cardiovascular: Normal heart sounds. No lower extremity edema. Pulmonary/Chest: No wheezes. No rhonchi. No rales. No decreased breath sounds. No accessory muscle usage or stridor. Musculoskeletal: No cyanosis. No clubbing. Skin: Skin is warm and dry. Psychiatric: Normal mood and affect. Data:   ACT 2/9/22   Lower Chest:   There are hazy subpleural ground-glass opacities along the   lung bases posterolaterally which is less prominent. CHEST CT 9/23/21  FINDINGS:   Mediastinum: Heart size is normal.  There is dense calcification of the   mitral valve.  Mild atherosclerotic calcification of the coronary arteries. There are calcified mediastinal and right hilar lymph nodes indicating prior   granulomatous change.  Normal caliber of the aorta and pulmonary arteries. Thyroid gland and esophagus normal.  No lymphadenopathy.       Lungs/pleura: The airways are patent.  No pleural fluid or pneumothorax. Irregular pattern of patchy airspace opacification diffusely in both lungs. No discrete mass or nodule.       Upper Abdomen: Cholelithiasis is partially demonstrated in the abdomen. Calcified granulomata in the liver and spleen.       Soft Tissues/Bones: There are no significant skeletal findings.  2.0 cm cyst   just below the surface of the skin in the upper right back.  This may   represent an inclusion cyst or a small site of infection.           Impression   1. Dense multifocal airspace opacities bilaterally representing viral   pneumonitis in a patient with known COVID-19 infection. 2. Incidental cholelithiasis in the visualized abdomen. Assessment:  · Post COVID condition, ILD  · Peripheral edema   · Obesity   · Exercise intolerance, favor deconditioning     Plan:   · HRCT to evaluate ILD now  · Exercise program discussed extensively, his [employer] has a gym   · Continue NAC   · See me in 8-10 weeks.

## 2022-06-27 ENCOUNTER — HOSPITAL ENCOUNTER (OUTPATIENT)
Dept: CT IMAGING | Age: 82
Discharge: HOME | End: 2022-06-27
Payer: MEDICARE

## 2022-06-27 DIAGNOSIS — J84.9 ILD (INTERSTITIAL LUNG DISEASE) (HCC): ICD-10-CM

## 2022-06-27 PROCEDURE — 71250 CT THORAX DX C-: CPT

## 2022-06-28 NOTE — RESULT ENCOUNTER NOTE
Tell pt his CT scan shows some improvement in the changes that were caused by COVID pneumonia. REC: exercise program as discussed in office. Tell pt his CT also shows a cystic lesion in the pancreas that is stable compared with a prior CT he had. However, this cystic lesion should likely be followed over time to make sure it is not growing. I would like for him to discuss this with Dr. Tristan Jordan at his next visit with him. Please CC: this result and this result note to Dr. Tristan Jordan, along with my last office note. Thank you.

## 2022-08-09 ENCOUNTER — OFFICE VISIT (OUTPATIENT)
Dept: PULMONOLOGY | Age: 82
End: 2022-08-09
Payer: MEDICARE

## 2022-08-09 VITALS
SYSTOLIC BLOOD PRESSURE: 120 MMHG | HEART RATE: 58 BPM | HEIGHT: 69 IN | BODY MASS INDEX: 31.31 KG/M2 | OXYGEN SATURATION: 96 % | DIASTOLIC BLOOD PRESSURE: 75 MMHG

## 2022-08-09 DIAGNOSIS — J84.9 ILD (INTERSTITIAL LUNG DISEASE) (HCC): Primary | ICD-10-CM

## 2022-08-09 PROCEDURE — 99213 OFFICE O/P EST LOW 20 MIN: CPT | Performed by: INTERNAL MEDICINE

## 2022-08-09 PROCEDURE — G8427 DOCREV CUR MEDS BY ELIG CLIN: HCPCS | Performed by: INTERNAL MEDICINE

## 2022-08-09 PROCEDURE — G8417 CALC BMI ABV UP PARAM F/U: HCPCS | Performed by: INTERNAL MEDICINE

## 2022-08-09 PROCEDURE — 1123F ACP DISCUSS/DSCN MKR DOCD: CPT | Performed by: INTERNAL MEDICINE

## 2022-08-09 PROCEDURE — 1036F TOBACCO NON-USER: CPT | Performed by: INTERNAL MEDICINE

## 2022-08-09 NOTE — PROGRESS NOTES
Pulmonary Follow-up Note  Chief Complaint: ILD f/u  Referring Provider: could not travel to [facility] to see Dr. Rhys Deluca, I see his spouse    Interval History 2 on 8/9/22:   limited by hip pain. Does have shortness of breath but not progressive    Interval history: dyspnea on exertion with 40 feet, does perform own ADL's, never recovered activity level after COVID although dramatic improvement in oxygenation. Presenting history: COVID August 2021, sent home on 2 L oxygen. Treated with prolonged course of prednisone by Dr. Rhys Deluca. reports that he has never smoked. He has never used smokeless tobacco.     Review of Systems:    Physical Exam:  Blood pressure 120/75, pulse 58, height 5' 9\" (1.753 m), SpO2 96 %. Constitutional:  No acute distress. HENT:  Oropharynx is clear and moist.   Eyes: Pupils equal, round, and reactive to light. No scleral icterus. Neck: No tracheal deviation present. Cardiovascular: Normal heart sounds. No lower extremity edema. Pulmonary/Chest: No wheezes. No rhonchi. No rales. No decreased breath sounds. No accessory muscle usage or stridor. Musculoskeletal: No cyanosis. No clubbing. Skin: Skin is warm and dry. Psychiatric: Normal mood and affect. Data:   ACT 2/9/22   Lower Chest:   There are hazy subpleural ground-glass opacities along the   lung bases posterolaterally which is less prominent. CHEST CT 9/23/21  FINDINGS:   Mediastinum: Heart size is normal.  There is dense calcification of the   mitral valve. Mild atherosclerotic calcification of the coronary arteries. There are calcified mediastinal and right hilar lymph nodes indicating prior   granulomatous change. Normal caliber of the aorta and pulmonary arteries. Thyroid gland and esophagus normal.  No lymphadenopathy. Lungs/pleura: The airways are patent. No pleural fluid or pneumothorax. Irregular pattern of patchy airspace opacification diffusely in both lungs. No discrete mass or nodule. Upper Abdomen: Cholelithiasis is partially demonstrated in the abdomen. Calcified granulomata in the liver and spleen. Soft Tissues/Bones: There are no significant skeletal findings. 2.0 cm cyst   just below the surface of the skin in the upper right back. This may   represent an inclusion cyst or a small site of infection. Impression   1. Dense multifocal airspace opacities bilaterally representing viral   pneumonitis in a patient with known COVID-19 infection. 2. Incidental cholelithiasis in the visualized abdomen. HRCT 6/28/22  Mediastinum: There is mild cardiomegaly. Fluid is noted in the midesophagus. Coronary artery calcification is present. There is no mediastinal or hilar   adenopathy. HRCT Findings/Lungs/pleura: The lung volumes are normal.  There is minimal   bronchiectasis, for instance in the right lower lobe. Parabronchial   ground-glass and reticulonodular opacities are noted bilaterally. There is   mild improvement in the opacities compared to 09/23/2021. There is no   significant air trapping. Upper Abdomen: Gallbladder stones are present. There is a lobular cystic   structure of the pancreatic head measuring 7 x 6.9 x 5.8 cm. Left renal   cysts measure up to 4.7 cm in diameter; no follow-up recommended. There are   nonobstructing caliceal calculi measuring up to 7 mm. No hydronephrosis. Soft Tissues/Bones: There is a 2.1 cm sebaceous cyst of the right back. Multilevel spondylosis of the thoracic spine. No acute osseous abnormality. Impression   1. Persisting parabronchial ground-glass and interstitial opacities, with   mild improvement since 09/23/2021. There is minimal bronchiectasis. Idiopathic interstitial pneumonia is considered-  cryptogenic organizing   pneumonia or RB-ILD/DIP. 2. Small amount of fluid in the esophagus, gastroesophageal reflux considered.    3. Stable lobular 7 cm cyst of the pancreas, IPMN and versus cystadenoma. Continued surveillance recommended. 4. Cholelithiasis. Nonobstructive nephrolithiasis.          Assessment:  Post COVID condition, ILD  Peripheral edema   Obesity   Abnormal imaging of pancreas  Exercise intolerance, favor deconditioning     Plan:   Exercise program    Can continue NAC   F/U with me with any worsening of symptoms

## 2022-12-01 ENCOUNTER — HOSPITAL ENCOUNTER (OUTPATIENT)
Age: 82
Setting detail: OBSERVATION
Discharge: HOME | End: 2022-12-03
Attending: EMERGENCY MEDICINE | Admitting: INTERNAL MEDICINE
Payer: MEDICARE

## 2022-12-01 DIAGNOSIS — I44.1 SECOND DEGREE MOBITZ I AV BLOCK: ICD-10-CM

## 2022-12-01 DIAGNOSIS — I45.9 HEART BLOCK: ICD-10-CM

## 2022-12-01 DIAGNOSIS — R53.1 GENERAL WEAKNESS: Primary | ICD-10-CM

## 2022-12-01 PROCEDURE — 99285 EMERGENCY DEPT VISIT HI MDM: CPT

## 2022-12-01 ASSESSMENT — LIFESTYLE VARIABLES
HOW MANY STANDARD DRINKS CONTAINING ALCOHOL DO YOU HAVE ON A TYPICAL DAY: PATIENT DOES NOT DRINK
HOW OFTEN DO YOU HAVE A DRINK CONTAINING ALCOHOL: NEVER

## 2022-12-02 ENCOUNTER — TELEPHONE (OUTPATIENT)
Dept: CARDIOLOGY CLINIC | Age: 82
End: 2022-12-02

## 2022-12-02 ENCOUNTER — APPOINTMENT (OUTPATIENT)
Dept: CT IMAGING | Age: 82
End: 2022-12-02
Payer: MEDICARE

## 2022-12-02 ENCOUNTER — APPOINTMENT (OUTPATIENT)
Dept: GENERAL RADIOLOGY | Age: 82
End: 2022-12-02
Payer: MEDICARE

## 2022-12-02 PROBLEM — R29.90 STROKE-LIKE SYMPTOM: Status: ACTIVE | Noted: 2022-12-02

## 2022-12-02 LAB
A/G RATIO: 1.5 (ref 1.1–2.2)
ALBUMIN SERPL-MCNC: 4 G/DL (ref 3.4–5)
ALP BLD-CCNC: 123 U/L (ref 40–129)
ALT SERPL-CCNC: 20 U/L (ref 10–40)
ANION GAP SERPL CALCULATED.3IONS-SCNC: 9 MMOL/L (ref 3–16)
AST SERPL-CCNC: 20 U/L (ref 15–37)
BASOPHILS ABSOLUTE: 0.1 K/UL (ref 0–0.2)
BASOPHILS RELATIVE PERCENT: 0.8 %
BILIRUB SERPL-MCNC: 0.8 MG/DL (ref 0–1)
BILIRUBIN URINE: NEGATIVE
BLOOD, URINE: NEGATIVE
BUN BLDV-MCNC: 21 MG/DL (ref 7–20)
CALCIUM SERPL-MCNC: 8.6 MG/DL (ref 8.3–10.6)
CHLORIDE BLD-SCNC: 104 MMOL/L (ref 99–110)
CLARITY: CLEAR
CO2: 26 MMOL/L (ref 21–32)
COLOR: YELLOW
CREAT SERPL-MCNC: 1.1 MG/DL (ref 0.8–1.3)
EKG ATRIAL RATE: 83 BPM
EKG ATRIAL RATE: 98 BPM
EKG DIAGNOSIS: NORMAL
EKG DIAGNOSIS: NORMAL
EKG P AXIS: 11 DEGREES
EKG P AXIS: 23 DEGREES
EKG P-R INTERVAL: 232 MS
EKG Q-T INTERVAL: 378 MS
EKG Q-T INTERVAL: 382 MS
EKG QRS DURATION: 72 MS
EKG QRS DURATION: 80 MS
EKG QTC CALCULATION (BAZETT): 443 MS
EKG QTC CALCULATION (BAZETT): 444 MS
EKG R AXIS: 12 DEGREES
EKG R AXIS: 3 DEGREES
EKG T AXIS: 17 DEGREES
EKG T AXIS: 9 DEGREES
EKG VENTRICULAR RATE: 81 BPM
EKG VENTRICULAR RATE: 83 BPM
EOSINOPHILS ABSOLUTE: 0 K/UL (ref 0–0.6)
EOSINOPHILS RELATIVE PERCENT: 0.6 %
GFR SERPL CREATININE-BSD FRML MDRD: >60 ML/MIN/{1.73_M2}
GLUCOSE BLD-MCNC: 199 MG/DL (ref 70–99)
GLUCOSE BLD-MCNC: 207 MG/DL (ref 70–99)
GLUCOSE BLD-MCNC: 298 MG/DL (ref 70–99)
GLUCOSE BLD-MCNC: 318 MG/DL (ref 70–99)
GLUCOSE BLD-MCNC: 385 MG/DL (ref 70–99)
GLUCOSE URINE: >=1000 MG/DL
HCT VFR BLD CALC: 42.3 % (ref 40.5–52.5)
HEMOGLOBIN: 14.4 G/DL (ref 13.5–17.5)
INFLUENZA A: DETECTED
INFLUENZA B: NOT DETECTED
KETONES, URINE: ABNORMAL MG/DL
LEUKOCYTE ESTERASE, URINE: NEGATIVE
LYMPHOCYTES ABSOLUTE: 0.7 K/UL (ref 1–5.1)
LYMPHOCYTES RELATIVE PERCENT: 10.4 %
MAGNESIUM: 1.9 MG/DL (ref 1.8–2.4)
MCH RBC QN AUTO: 30.9 PG (ref 26–34)
MCHC RBC AUTO-ENTMCNC: 34.2 G/DL (ref 31–36)
MCV RBC AUTO: 90.6 FL (ref 80–100)
MICROSCOPIC EXAMINATION: YES
MONOCYTES ABSOLUTE: 0.6 K/UL (ref 0–1.3)
MONOCYTES RELATIVE PERCENT: 9.4 %
NEUTROPHILS ABSOLUTE: 5.1 K/UL (ref 1.7–7.7)
NEUTROPHILS RELATIVE PERCENT: 78.8 %
NITRITE, URINE: NEGATIVE
PDW BLD-RTO: 14.1 % (ref 12.4–15.4)
PERFORMED ON: ABNORMAL
PH UA: 5 (ref 5–8)
PLATELET # BLD: 125 K/UL (ref 135–450)
PMV BLD AUTO: 9.1 FL (ref 5–10.5)
POTASSIUM REFLEX MAGNESIUM: 3.9 MMOL/L (ref 3.5–5.1)
PROCALCITONIN: 0.11 NG/ML (ref 0–0.15)
PROTEIN UA: ABNORMAL MG/DL
RBC # BLD: 4.67 M/UL (ref 4.2–5.9)
RBC UA: NORMAL /HPF (ref 0–4)
SARS-COV-2 RNA, RT PCR: NOT DETECTED
SODIUM BLD-SCNC: 139 MMOL/L (ref 136–145)
SPECIFIC GRAVITY UA: 1.02 (ref 1–1.03)
TOTAL PROTEIN: 6.6 G/DL (ref 6.4–8.2)
TROPONIN: 0.03 NG/ML
TROPONIN: 0.03 NG/ML
TROPONIN: 0.04 NG/ML
TSH REFLEX FT4: 2.21 UIU/ML (ref 0.27–4.2)
URINE REFLEX TO CULTURE: ABNORMAL
URINE TYPE: ABNORMAL
UROBILINOGEN, URINE: 0.2 E.U./DL
WBC # BLD: 6.4 K/UL (ref 4–11)
WBC UA: NORMAL /HPF (ref 0–5)

## 2022-12-02 PROCEDURE — 70498 CT ANGIOGRAPHY NECK: CPT

## 2022-12-02 PROCEDURE — 6360000004 HC RX CONTRAST MEDICATION: Performed by: EMERGENCY MEDICINE

## 2022-12-02 PROCEDURE — 93005 ELECTROCARDIOGRAM TRACING: CPT | Performed by: INTERNAL MEDICINE

## 2022-12-02 PROCEDURE — 87636 SARSCOV2 & INF A&B AMP PRB: CPT

## 2022-12-02 PROCEDURE — 71046 X-RAY EXAM CHEST 2 VIEWS: CPT

## 2022-12-02 PROCEDURE — 97110 THERAPEUTIC EXERCISES: CPT

## 2022-12-02 PROCEDURE — 93010 ELECTROCARDIOGRAM REPORT: CPT | Performed by: INTERNAL MEDICINE

## 2022-12-02 PROCEDURE — 84145 PROCALCITONIN (PCT): CPT

## 2022-12-02 PROCEDURE — 84484 ASSAY OF TROPONIN QUANT: CPT

## 2022-12-02 PROCEDURE — G0378 HOSPITAL OBSERVATION PER HR: HCPCS

## 2022-12-02 PROCEDURE — 70450 CT HEAD/BRAIN W/O DYE: CPT

## 2022-12-02 PROCEDURE — 6370000000 HC RX 637 (ALT 250 FOR IP): Performed by: INTERNAL MEDICINE

## 2022-12-02 PROCEDURE — 99205 OFFICE O/P NEW HI 60 MIN: CPT | Performed by: INTERNAL MEDICINE

## 2022-12-02 PROCEDURE — C8929 TTE W OR WO FOL WCON,DOPPLER: HCPCS

## 2022-12-02 PROCEDURE — 93005 ELECTROCARDIOGRAM TRACING: CPT | Performed by: EMERGENCY MEDICINE

## 2022-12-02 PROCEDURE — 97535 SELF CARE MNGMENT TRAINING: CPT

## 2022-12-02 PROCEDURE — 36415 COLL VENOUS BLD VENIPUNCTURE: CPT

## 2022-12-02 PROCEDURE — 84443 ASSAY THYROID STIM HORMONE: CPT

## 2022-12-02 PROCEDURE — 80053 COMPREHEN METABOLIC PANEL: CPT

## 2022-12-02 PROCEDURE — 97161 PT EVAL LOW COMPLEX 20 MIN: CPT

## 2022-12-02 PROCEDURE — 81001 URINALYSIS AUTO W/SCOPE: CPT

## 2022-12-02 PROCEDURE — 2580000003 HC RX 258: Performed by: INTERNAL MEDICINE

## 2022-12-02 PROCEDURE — 97166 OT EVAL MOD COMPLEX 45 MIN: CPT

## 2022-12-02 PROCEDURE — 83036 HEMOGLOBIN GLYCOSYLATED A1C: CPT

## 2022-12-02 PROCEDURE — 85025 COMPLETE CBC W/AUTO DIFF WBC: CPT

## 2022-12-02 PROCEDURE — 97116 GAIT TRAINING THERAPY: CPT

## 2022-12-02 PROCEDURE — 83735 ASSAY OF MAGNESIUM: CPT

## 2022-12-02 PROCEDURE — 6360000004 HC RX CONTRAST MEDICATION: Performed by: INTERNAL MEDICINE

## 2022-12-02 RX ORDER — INSULIN LISPRO 100 [IU]/ML
0-8 INJECTION, SOLUTION INTRAVENOUS; SUBCUTANEOUS
Status: DISCONTINUED | OUTPATIENT
Start: 2022-12-02 | End: 2022-12-03 | Stop reason: HOSPADM

## 2022-12-02 RX ORDER — DOCUSATE SODIUM 100 MG/1
100 CAPSULE, LIQUID FILLED ORAL 2 TIMES DAILY PRN
Status: DISCONTINUED | OUTPATIENT
Start: 2022-12-02 | End: 2022-12-03 | Stop reason: HOSPADM

## 2022-12-02 RX ORDER — ONDANSETRON 4 MG/1
4 TABLET, ORALLY DISINTEGRATING ORAL EVERY 8 HOURS PRN
Status: DISCONTINUED | OUTPATIENT
Start: 2022-12-02 | End: 2022-12-03 | Stop reason: HOSPADM

## 2022-12-02 RX ORDER — MAGNESIUM SULFATE 1 G/100ML
1000 INJECTION INTRAVENOUS PRN
Status: DISCONTINUED | OUTPATIENT
Start: 2022-12-02 | End: 2022-12-03 | Stop reason: HOSPADM

## 2022-12-02 RX ORDER — SODIUM CHLORIDE 0.9 % (FLUSH) 0.9 %
5-40 SYRINGE (ML) INJECTION PRN
Status: DISCONTINUED | OUTPATIENT
Start: 2022-12-02 | End: 2022-12-03 | Stop reason: HOSPADM

## 2022-12-02 RX ORDER — OSELTAMIVIR PHOSPHATE 30 MG/1
30 CAPSULE ORAL 2 TIMES DAILY
Status: DISCONTINUED | OUTPATIENT
Start: 2022-12-02 | End: 2022-12-03 | Stop reason: HOSPADM

## 2022-12-02 RX ORDER — POLYETHYLENE GLYCOL 3350 17 G/17G
17 POWDER, FOR SOLUTION ORAL DAILY PRN
Status: DISCONTINUED | OUTPATIENT
Start: 2022-12-02 | End: 2022-12-03 | Stop reason: HOSPADM

## 2022-12-02 RX ORDER — PANTOPRAZOLE SODIUM 40 MG/1
40 TABLET, DELAYED RELEASE ORAL
Status: DISCONTINUED | OUTPATIENT
Start: 2022-12-02 | End: 2022-12-03 | Stop reason: HOSPADM

## 2022-12-02 RX ORDER — SODIUM CHLORIDE 0.9 % (FLUSH) 0.9 %
5-40 SYRINGE (ML) INJECTION EVERY 12 HOURS SCHEDULED
Status: DISCONTINUED | OUTPATIENT
Start: 2022-12-02 | End: 2022-12-03 | Stop reason: HOSPADM

## 2022-12-02 RX ORDER — ATORVASTATIN CALCIUM 80 MG/1
80 TABLET, FILM COATED ORAL NIGHTLY
Status: DISCONTINUED | OUTPATIENT
Start: 2022-12-02 | End: 2022-12-03 | Stop reason: HOSPADM

## 2022-12-02 RX ORDER — ACETAMINOPHEN 650 MG/1
650 SUPPOSITORY RECTAL EVERY 6 HOURS PRN
Status: DISCONTINUED | OUTPATIENT
Start: 2022-12-02 | End: 2022-12-03 | Stop reason: HOSPADM

## 2022-12-02 RX ORDER — INSULIN GLARGINE 100 [IU]/ML
40 INJECTION, SOLUTION SUBCUTANEOUS 2 TIMES DAILY
Status: DISCONTINUED | OUTPATIENT
Start: 2022-12-02 | End: 2022-12-03

## 2022-12-02 RX ORDER — PREGABALIN 25 MG/1
50 CAPSULE ORAL 2 TIMES DAILY
Status: DISCONTINUED | OUTPATIENT
Start: 2022-12-02 | End: 2022-12-03 | Stop reason: HOSPADM

## 2022-12-02 RX ORDER — INSULIN LISPRO 100 [IU]/ML
0-4 INJECTION, SOLUTION INTRAVENOUS; SUBCUTANEOUS NIGHTLY
Status: DISCONTINUED | OUTPATIENT
Start: 2022-12-02 | End: 2022-12-03 | Stop reason: HOSPADM

## 2022-12-02 RX ORDER — SODIUM CHLORIDE 9 MG/ML
INJECTION, SOLUTION INTRAVENOUS PRN
Status: DISCONTINUED | OUTPATIENT
Start: 2022-12-02 | End: 2022-12-03 | Stop reason: HOSPADM

## 2022-12-02 RX ORDER — ENOXAPARIN SODIUM 100 MG/ML
40 INJECTION SUBCUTANEOUS DAILY
Status: DISCONTINUED | OUTPATIENT
Start: 2022-12-02 | End: 2022-12-03 | Stop reason: HOSPADM

## 2022-12-02 RX ORDER — POTASSIUM CHLORIDE 7.45 MG/ML
10 INJECTION INTRAVENOUS PRN
Status: DISCONTINUED | OUTPATIENT
Start: 2022-12-02 | End: 2022-12-03 | Stop reason: HOSPADM

## 2022-12-02 RX ORDER — ASPIRIN 81 MG/1
81 TABLET, CHEWABLE ORAL DAILY
Status: DISCONTINUED | OUTPATIENT
Start: 2022-12-02 | End: 2022-12-03 | Stop reason: HOSPADM

## 2022-12-02 RX ORDER — LOSARTAN POTASSIUM 100 MG/1
100 TABLET ORAL DAILY
Status: DISCONTINUED | OUTPATIENT
Start: 2022-12-02 | End: 2022-12-03 | Stop reason: HOSPADM

## 2022-12-02 RX ORDER — POTASSIUM CHLORIDE 20 MEQ/1
40 TABLET, EXTENDED RELEASE ORAL PRN
Status: DISCONTINUED | OUTPATIENT
Start: 2022-12-02 | End: 2022-12-03 | Stop reason: HOSPADM

## 2022-12-02 RX ORDER — INSULIN GLARGINE 100 [IU]/ML
40 INJECTION, SOLUTION SUBCUTANEOUS 2 TIMES DAILY
Status: DISCONTINUED | OUTPATIENT
Start: 2022-12-02 | End: 2022-12-02

## 2022-12-02 RX ORDER — ACETAMINOPHEN 325 MG/1
650 TABLET ORAL EVERY 6 HOURS PRN
Status: DISCONTINUED | OUTPATIENT
Start: 2022-12-02 | End: 2022-12-03 | Stop reason: HOSPADM

## 2022-12-02 RX ORDER — DEXTROSE MONOHYDRATE 100 MG/ML
INJECTION, SOLUTION INTRAVENOUS CONTINUOUS PRN
Status: DISCONTINUED | OUTPATIENT
Start: 2022-12-02 | End: 2022-12-03 | Stop reason: HOSPADM

## 2022-12-02 RX ORDER — ONDANSETRON 2 MG/ML
4 INJECTION INTRAMUSCULAR; INTRAVENOUS EVERY 6 HOURS PRN
Status: DISCONTINUED | OUTPATIENT
Start: 2022-12-02 | End: 2022-12-03 | Stop reason: HOSPADM

## 2022-12-02 RX ORDER — AMLODIPINE BESYLATE 5 MG/1
5 TABLET ORAL DAILY
Status: DISCONTINUED | OUTPATIENT
Start: 2022-12-02 | End: 2022-12-03 | Stop reason: HOSPADM

## 2022-12-02 RX ADMIN — Medication 10 ML: at 09:00

## 2022-12-02 RX ADMIN — ASPIRIN 81 MG: 81 TABLET, CHEWABLE ORAL at 09:00

## 2022-12-02 RX ADMIN — AMLODIPINE BESYLATE 5 MG: 5 TABLET ORAL at 09:00

## 2022-12-02 RX ADMIN — PREGABALIN 50 MG: 25 CAPSULE ORAL at 20:32

## 2022-12-02 RX ADMIN — INSULIN LISPRO 8 UNITS: 100 INJECTION, SOLUTION INTRAVENOUS; SUBCUTANEOUS at 09:15

## 2022-12-02 RX ADMIN — IOPAMIDOL 75 ML: 755 INJECTION, SOLUTION INTRAVENOUS at 01:28

## 2022-12-02 RX ADMIN — INSULIN LISPRO 2 UNITS: 100 INJECTION, SOLUTION INTRAVENOUS; SUBCUTANEOUS at 17:46

## 2022-12-02 RX ADMIN — PREGABALIN 50 MG: 25 CAPSULE ORAL at 08:59

## 2022-12-02 RX ADMIN — INSULIN LISPRO 4 UNITS: 100 INJECTION, SOLUTION INTRAVENOUS; SUBCUTANEOUS at 12:16

## 2022-12-02 RX ADMIN — PANTOPRAZOLE SODIUM 40 MG: 40 TABLET, DELAYED RELEASE ORAL at 06:32

## 2022-12-02 RX ADMIN — Medication 10 ML: at 20:37

## 2022-12-02 RX ADMIN — OSELTAMIVIR PHOSPHATE 30 MG: 30 CAPSULE ORAL at 20:32

## 2022-12-02 RX ADMIN — INSULIN GLARGINE 40 UNITS: 100 INJECTION, SOLUTION SUBCUTANEOUS at 13:26

## 2022-12-02 RX ADMIN — ACETAMINOPHEN 650 MG: 325 TABLET ORAL at 17:55

## 2022-12-02 RX ADMIN — OSELTAMIVIR PHOSPHATE 30 MG: 30 CAPSULE ORAL at 15:47

## 2022-12-02 RX ADMIN — ATORVASTATIN CALCIUM 80 MG: 80 TABLET, FILM COATED ORAL at 20:32

## 2022-12-02 RX ADMIN — LOSARTAN POTASSIUM 100 MG: 100 TABLET, FILM COATED ORAL at 08:59

## 2022-12-02 RX ADMIN — INSULIN GLARGINE 40 UNITS: 100 INJECTION, SOLUTION SUBCUTANEOUS at 20:32

## 2022-12-02 RX ADMIN — PERFLUTREN 1.5 ML: 6.52 INJECTION, SUSPENSION INTRAVENOUS at 17:46

## 2022-12-02 ASSESSMENT — LIFESTYLE VARIABLES
HOW OFTEN DO YOU HAVE A DRINK CONTAINING ALCOHOL: NEVER
HOW MANY STANDARD DRINKS CONTAINING ALCOHOL DO YOU HAVE ON A TYPICAL DAY: PATIENT DOES NOT DRINK

## 2022-12-02 ASSESSMENT — PAIN DESCRIPTION - DESCRIPTORS: DESCRIPTORS: ACHING

## 2022-12-02 ASSESSMENT — PAIN SCALES - GENERAL: PAINLEVEL_OUTOF10: 3

## 2022-12-02 ASSESSMENT — PAIN DESCRIPTION - LOCATION: LOCATION: LEG

## 2022-12-02 ASSESSMENT — PAIN DESCRIPTION - ORIENTATION: ORIENTATION: LEFT

## 2022-12-02 NOTE — CARE COORDINATION
CASE MANAGEMENT INITIAL ASSESSMENT      Reviewed chart and completed assessment with patient  Family present: none      Health Care Decision Maker :   Primary Decision Maker: Minna Jones - Spouse - [phone]    Secondary Decision Maker: Mariajose Sanchez - [phone]    Secondary Decision Maker: Pallavi Norton - Child - 21           Can this person be reached and be able to respond quickly, such as within a few minutes or hours? Yes      Admit date/status: 12/1/22 Obs  Diagnosis:   weakness  Is this a Readmission?:  Yes      Insurance: Medicare, [employer]   Precert required for SNF: No       3 night stay required: No    Living arrangements, Adls, care needs, prior to admission: lives in a one level house with his wife and sons who assist as needed     [facility] at home:  Walker_X_Cane_X_RTS_X_ BSC__Shower Chair_X_  02__ HHN__ CPAP__  BiPap__  Hospital Bed_X_ W/C__X_ Other_stair chair lift (to basement where son lives but patient does not need to use)____    Services in the home and/or outpatient, prior to admission: none    Current PCP: Jose L Kim           Transportation needs:  family     Dialysis Facility (if applicable)   Name:  Address:  Dialysis Schedule:  Phone:  Fax:    PT/OT recs: home care     Hospital Exemption Notification (HEN): not initiated     Barriers to discharge: none    Plan/comments: Spoke with therapy who is just finishing up with their assessment. They state they are going to recommend home care. Discussed with patient but he states he does not feel as though he needs it. He states he has had it in the past but does not feel it is necessary at this time. He states he gets plenty of assistance from his family. Encouraged him to please notify CM should he change his mind. He verbalized understanding. Please notify CM should any other needs arise.

## 2022-12-02 NOTE — H&P
Hospital Medicine History & Physical      PCP: Malaika Jung MD    Date of Admission: 12/1/2022    Date of Service: Pt seen/examined on 12/02/22 and placed in Observation. Chief Complaint:  weakness in all extremities, dim vision    History Of Present Illness: The patient is a pleasant 78 Y M with a h/o HTN, HLD, DM2, and CVA. He woke up at 3 AM yesterday with weakness in all four extremities and dim vision. Apparently he waited until 11 PM to present to the ED, at which point his symptoms pretty much resolved within the next half hour. In the ED they saw that he had Mobitz type I, second degree AVB, so hospital medicine was called for admission. The patient denied any chest pain, palpitations, or lightheadedness. The following morning he admitted to a few days of a new cough, chills, and mild dyspnea, and tested positive for influenza. Past Medical History:          Diagnosis Date    COVID-19 08/04/2021    Diabetes mellitus ([address]     type 2    Glaucoma     Hyperlipidemia     Hypertension     Kidney stone     Neuropathy     Osteoarthrosis, hip 03/21/2016    Prepatellar bursitis 03/21/2016       Past Surgical History:          Procedure Laterality Date    COLON SURGERY      COLONOSCOPY  2008    CYSTOSCOPY  02/02/2012    with stent placement    EYE SURGERY      left eyemed valve, bilateral retinal repair    FOOT SURGERY      right, tendon repair    GLAUCOMA SURGERY N/A     AHMED GLAUCOMA VALVE/Model S-2/SN [id number] Safe per [employer]    KIDNEY STONE SURGERY      OTHER SURGICAL HISTORY Left 07/17/2017    CYSTOSCOPY, LEFT URETEROSCOPY, STENT PLACEMENT, URETHERAL dilation, stone manipulation        Medications Prior to Admission:      Prior to Admission medications    Medication Sig Start Date End Date Taking?  Authorizing Provider   aspirin 81 MG chewable tablet Take 1 tablet by mouth daily 2/9/22   Shimon Stanley MD   losartan (COZAAR) 100 MG tablet Take 1 tablet by mouth daily 2/9/22 Erick Bond MD   [facility]) 5 MG tablet Take 1 tablet by mouth daily 22   Erick Bond MD   atorvastatin (LIPITOR) 80 MG tablet Take 1 tablet by mouth nightly 22   Erick Bond MD   Acetylcysteine ( PO) Take by mouth    Historical Provider, MD   benzonatate (TESSALON) 200 MG capsule Take 200 mg by mouth 3 times daily as needed  Patient not taking: No sig reported 21   Historical Provider, MD   lansoprazole (PREVACID) 30 MG delayed release capsule Take 30 mg by mouth daily  Patient not taking: Reported on 2022    Historical Provider, MD   pregabalin (LYRICA) 50 MG capsule Take 50 mg by mouth 2 times daily. Historical Provider, MD   ondansetron (ZOFRAN ODT) 4 MG disintegrating tablet Take 1-2 tablets by mouth every 12 hours as needed for Nausea May Sub regular tablet (non-ODT) if insurance does not cover ODT. Patient not taking: No sig reported 18   Juliana Rodriguez PA-C   Cyanocobalamin ([employer] VITAMIN B-12 SL) Place under the tongue daily  Patient not taking: No sig reported    Historical Provider, MD   docusate sodium (COLACE) 100 MG capsule Take 100 mg by mouth 2 times daily as needed for Constipation    Historical Provider, MD   Cholecalciferol (VITAMIN D3) 5000 units TABS Take by mouth daily    Historical Provider, MD   brimonidine (ALPHAGAN) 0.2 % ophthalmic solution Place 1 drop into both eyes 2 times daily  16   Historical Provider, MD   b complex vitamins capsule Take 1 capsule by mouth daily    Historical Provider, MD   Dorzolamide HCl (TRUSOPT OP) Apply 1 drop to eye 2 times daily     Historical Provider, MD   insulin aspart (NOVOLOG) 100 UNIT/ML injection vial Inject  into the skin 3 times daily (before meals). Sliding scale based on meal calculation     Historical Provider, MD   insulin glargine (LANTUS) 100 UNIT/ML injection Inject 40 Units into the skin 2 times daily.       Historical Provider, MD       Allergies:  Patient has no known allergies. Social History:      The patient currently lives at home    TOBACCO:   reports that he has never smoked. He has never used smokeless tobacco.  ETOH:   reports no history of alcohol use. E-cigarette/Vaping       Questions Responses    E-cigarette/Vaping Use Never User    Start Date     Passive Exposure     Quit Date     Counseling Given     Comments               Family History:      Reviewed and negative in regards to presenting illness/complaint. History reviewed. No pertinent family history. REVIEW OF SYSTEMS COMPLETED:   Pertinent positives as noted in the HPI. All other systems reviewed and negative. PHYSICAL EXAM PERFORMED:    BP (!) 144/69   Pulse 73   Temp 98.4 °F (36.9 °C)   Resp 16   Ht 5' 9\" (1.753 m)   Wt 209 lb (94.8 kg)   SpO2 95%   BMI 30.86 kg/m²     General appearance:  No apparent distress, appears stated age and cooperative. HEENT:  Normal cephalic, atraumatic without obvious deformity. Pupils equal, round, and reactive to light. Extra ocular muscles intact. Conjunctivae/corneas clear. Neck: Supple, with full range of motion. No jugular venous distention. Trachea midline. Respiratory:  Normal respiratory effort. Clear to auscultation, bilaterally without Rales/Wheezes/Rhonchi. Cardiovascular:  Regular rate and rhythm with normal S1/S2 without murmurs, rubs or gallops. Abdomen: Soft, non-tender, non-distended with normal bowel sounds. Musculoskeletal:  No clubbing, cyanosis or edema bilaterally. Full range of motion without deformity. Skin: Skin color, texture, turgor normal.  No rashes or lesions. Neurologic:  Neurovascularly intact without any focal sensory/motor deficits.  Cranial nerves: II-XII intact, grossly non-focal.  Psychiatric:  Alert and oriented, thought content appropriate, good insight  Capillary Refill: Brisk,3 seconds, normal  Peripheral Pulses: +2 palpable, equal bilaterally       Labs:     Recent Labs     12/02/22  0032   WBC 6.4   HGB 14.4 HCT 42.3   *     Recent Labs     12/02/22 0032      K 3.9      CO2 26   BUN 21*   CREATININE 1.1   CALCIUM 8.6     Recent Labs     12/02/22 0032   AST 20   ALT 20   BILITOT 0.8   ALKPHOS 123     No results for input(s): INR in the last 72 hours. Recent Labs     12/02/22  0032 12/02/22  0632   TROPONINI 0.04* 0.03*       Urinalysis:      Lab Results   Component Value Date/Time    NITRU Negative 12/02/2022 02:12 AM    WBCUA 0-2 12/02/2022 02:12 AM    BACTERIA Rare 12/19/2018 02:10 PM    RBCUA 0-2 12/02/2022 02:12 AM    BLOODU Negative 12/02/2022 02:12 AM    SPECGRAV 1.020 12/02/2022 02:12 AM    GLUCOSEU >=1000 12/02/2022 02:12 AM    GLUCOSEU >=1000 02/02/2012 04:58 AM       Radiology:     CXR: I have reviewed the CXR with the following interpretation: bibasilar pna  EKG:  I have reviewed the EKG with the following interpretation: keyon    CT HEAD WO CONTRAST   Final Result   No acute intracranial abnormality. MRI may be obtained if clinically   indicated. CTA HEAD NECK W CONTRAST   Final Result   1. No acute arterial abnormality or hemodynamically significant arterial   stenosis in the head or neck. 2. Incidental 1.6 cm right thyroid nodule. Nonemergent follow-up outpatient   thyroid ultrasound is recommended for guidelines below. RECOMMENDATIONS:   1.6 cm incidental right thyroid nodule. Recommend thyroid US. Reference: J Am Chris Radiol. 2015 Feb;12(2): 143-50         XR CHEST (2 VW)   Final Result   Borderline cardiomegaly with mild central pulmonary congestion      Mild bibasilar atelectasis or early infiltrates which is prominent         MRI BRAIN WO CONTRAST    (Results Pending)       Consults:    IP CONSULT TO CARDIOLOGY    ASSESSMENT:    Active Hospital Problems    Diagnosis Date Noted    Stroke-like symptom [R29.90] 12/02/2022     Priority: Medium         PLAN:      Influenza. Oseltamivir. Procalcitonin and COVID negative. Muscular deconditioning.   Due to above.  PT/OT. 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 This seems asymptomatic, though he does have significant peripheral pitting edema (elevate, compress). TTE reassuring. EP consulted. DM2. Adjusted insulin regimen. Fu A1c. He follows with endocrinology. Incidental R thyroid nodule. PCP to consider thyroid US. DVT Prophylaxis: enoxaparin  Diet: ADULT DIET; Regular; 4 carb choices (60 gm/meal)  Code Status: Full Code    PT/OT Eval Status: eval ordered    Dispo - perhaps 12/3 if he is doing well overall. Christo Mcclure MD    Thank you Kellie Montague MD for the opportunity to be involved in this patient's care. If you have any questions or concerns please feel free to contact me at [phone].

## 2022-12-02 NOTE — ED PROVIDER NOTES
[address] [facility]  ED PROVIDER NOTE    Patient Identification  Pt Name: Nel Padilla  MRN: [id number]  Fernando 1940  Date of evaluation: 12/1/2022  Provider: Sheree Fritz MD  PCP: Asha Crespo MD    Chief Complaint  weakness    HPI  History provided by patient   This is a 78 y.o. male who presents to the ED for weakness. He is concerned that he had a mini stroke. Was in bilateral arms and legs. Woke up with it at 3 AM.  He has history of peripheral neuropathy and at baseline has some numbness in his hands and feet. This numbness bilaterally may have been slightly worse. He also felt that his vision was more dim in bilateral eyes. now that he had been in the emergency room for 30 minutes, his symptoms have greatly improved. No dizziness or lightheadedness. No chest pain or shortness of breath. He has been having some cough and nausea. No pain anywhere    ROS  12 systems reviewed, pertinent positives/negatives per HPI otherwise noted to be negative. I have reviewed the following nursing documentation:  Allergies: Patient has no known allergies. Past medical history:   Past Medical History:   Diagnosis Date    COVID-19 08/04/2021    Diabetes mellitus ([address]     type 2    Glaucoma     Hyperlipidemia     Hypertension     Kidney stone     Neuropathy     Osteoarthrosis, hip 03/21/2016    Prepatellar bursitis 03/21/2016     Past surgical history:   Past Surgical History:   Procedure Laterality Date    COLON SURGERY      COLONOSCOPY  2008    CYSTOSCOPY  02/02/2012    with stent placement    EYE SURGERY      left eyemed valve, bilateral retinal repair    FOOT SURGERY      right, tendon repair    GLAUCOMA SURGERY N/A     AHMED GLAUCOMA VALVE/Model S-2/SN [id number]/MHU Safe per [employer]    KIDNEY STONE SURGERY      OTHER SURGICAL HISTORY Left 07/17/2017    CYSTOSCOPY, LEFT URETEROSCOPY, STENT PLACEMENT, URETHERAL dilation, stone manipulation        Home medications:   Previous Medications ACETYLCYSTEINE ( PO)    Take by mouth    AMLODIPINE (NORVASC) 5 MG TABLET    Take 1 tablet by mouth daily    ASPIRIN 81 MG CHEWABLE TABLET    Take 1 tablet by mouth daily    ATORVASTATIN (LIPITOR) 80 MG TABLET    Take 1 tablet by mouth nightly    B COMPLEX VITAMINS CAPSULE    Take 1 capsule by mouth daily    BENZONATATE (TESSALON) 200 MG CAPSULE    Take 200 mg by mouth 3 times daily as needed    BRIMONIDINE (ALPHAGAN) 0.2 % OPHTHALMIC SOLUTION    Place 1 drop into both eyes 2 times daily     CHOLECALCIFEROL (VITAMIN D3) 5000 UNITS TABS    Take by mouth daily    CYANOCOBALAMIN ([employer] VITAMIN B-12 SL)    Place under the tongue daily    DOCUSATE SODIUM (COLACE) 100 MG CAPSULE    Take 100 mg by mouth 2 times daily as needed for Constipation    DORZOLAMIDE HCL (TRUSOPT OP)    Apply 1 drop to eye 2 times daily     INSULIN ASPART (NOVOLOG) 100 UNIT/ML INJECTION VIAL    Inject  into the skin 3 times daily (before meals). Sliding scale based on meal calculation     INSULIN GLARGINE (LANTUS) 100 UNIT/ML INJECTION    Inject 40 Units into the skin 2 times daily. LANSOPRAZOLE (PREVACID) 30 MG DELAYED RELEASE CAPSULE    Take 30 mg by mouth daily    LOSARTAN (COZAAR) 100 MG TABLET    Take 1 tablet by mouth daily    ONDANSETRON (ZOFRAN ODT) 4 MG DISINTEGRATING TABLET    Take 1-2 tablets by mouth every 12 hours as needed for Nausea May Sub regular tablet (non-ODT) if insurance does not cover ODT. PREGABALIN (LYRICA) 50 MG CAPSULE    Take 50 mg by mouth 2 times daily. Social history:  reports that he has never smoked. He has never used smokeless tobacco. He reports that he does not drink alcohol and does not use drugs. Family history:  History reviewed. No pertinent family history.       Exam  ED Triage Vitals   BP Temp Temp Source Heart Rate Resp SpO2 Height Weight   -- 12/01/22 2326 12/01/22 2326 12/01/22 2320 12/01/22 2320 12/01/22 2320 12/01/22 2320 12/01/22 2320    99 °F (37.2 °C) Oral 99 16 97 % 5' 9\" (1.753 m) 217 lb (98.4 kg)     Nursing note and vitals reviewed. Constitutional: In no acute distress  HENT:      Head: Normocephalic      Ears: External ears normal.      Nose: Nose normal.     Mouth: Membrane mucosa moist   Eyes: No discharge. Neck: Supple. Trachea midline. Cardiovascular: Regular rate. Warm extremities  Pulmonary/Chest: Effort normal. No respiratory distress. Abdominal: Soft. No distension. Nontender  : Deferred  Rectal: Deferred   Musculoskeletal: Moves all extremities. No gross deformity. Neurological: Alert and oriented. Face symmetric. Speech is clear. NIHSS 0  Skin: Warm and dry. Psychiatric: Normal mood and affect. Behavior is normal.    Procedures      EKG  The Ekg interpreted by me in the absence of a cardiologist shows. Type 1 heart block  New compared to feb 2022 study  HR 81  No ST changes      Radiology  CT HEAD WO CONTRAST   Final Result   No acute intracranial abnormality. MRI may be obtained if clinically   indicated. CTA HEAD NECK W CONTRAST   Final Result   1. No acute arterial abnormality or hemodynamically significant arterial   stenosis in the head or neck. 2. Incidental 1.6 cm right thyroid nodule. Nonemergent follow-up outpatient   thyroid ultrasound is recommended for guidelines below. RECOMMENDATIONS:   1.6 cm incidental right thyroid nodule. Recommend thyroid US. Reference: J Am Chris Radiol.  2015 Feb;12(2): 143-50         XR CHEST (2 VW)   Final Result   Borderline cardiomegaly with mild central pulmonary congestion      Mild bibasilar atelectasis or early infiltrates which is prominent             Labs  Results for orders placed or performed during the hospital encounter of 12/01/22   CBC with Auto Differential   Result Value Ref Range    WBC 6.4 4.0 - 11.0 K/uL    RBC 4.67 4.20 - 5.90 M/uL    Hemoglobin 14.4 13.5 - 17.5 g/dL    Hematocrit 42.3 40.5 - 52.5 %    MCV 90.6 80.0 - 100.0 fL    MCH 30.9 26.0 - 34.0 pg    MCHC 34.2 31.0 - 36.0 g/dL    RDW 14.1 12.4 - 15.4 %    Platelets 563 (L) 577 - 450 K/uL    MPV 9.1 5.0 - 10.5 fL    Neutrophils % 78.8 %    Lymphocytes % 10.4 %    Monocytes % 9.4 %    Eosinophils % 0.6 %    Basophils % 0.8 %    Neutrophils Absolute 5.1 1.7 - 7.7 K/uL    Lymphocytes Absolute 0.7 (L) 1.0 - 5.1 K/uL    Monocytes Absolute 0.6 0.0 - 1.3 K/uL    Eosinophils Absolute 0.0 0.0 - 0.6 K/uL    Basophils Absolute 0.1 0.0 - 0.2 K/uL   CMP w/ Reflex to MG   Result Value Ref Range    Sodium 139 136 - 145 mmol/L    Potassium reflex Magnesium 3.9 3.5 - 5.1 mmol/L    Chloride 104 99 - 110 mmol/L    CO2 26 21 - 32 mmol/L    Anion Gap 9 3 - 16    Glucose 318 (H) 70 - 99 mg/dL    BUN 21 (H) 7 - 20 mg/dL    Creatinine 1.1 0.8 - 1.3 mg/dL    Est, Glom Filt Rate >60 >60    Calcium 8.6 8.3 - 10.6 mg/dL    Total Protein 6.6 6.4 - 8.2 g/dL    Albumin 4.0 3.4 - 5.0 g/dL    Albumin/Globulin Ratio 1.5 1.1 - 2.2    Total Bilirubin 0.8 0.0 - 1.0 mg/dL    Alkaline Phosphatase 123 40 - 129 U/L    ALT 20 10 - 40 U/L    AST 20 15 - 37 U/L   Troponin   Result Value Ref Range    Troponin 0.04 (H) <0.01 ng/mL   Urinalysis with Reflex to Culture    Specimen: Urine   Result Value Ref Range    Color, UA Yellow Straw/Yellow    Clarity, UA Clear Clear    Glucose, Ur >=1000 (A) Negative mg/dL    Bilirubin Urine Negative Negative    Ketones, Urine TRACE (A) Negative mg/dL    Specific Gravity, UA 1.020 1.005 - 1.030    Blood, Urine Negative Negative    pH, UA 5.0 5.0 - 8.0    Protein, UA TRACE (A) Negative mg/dL    Urobilinogen, Urine 0.2 <2.0 E.U./dL    Nitrite, Urine Negative Negative    Leukocyte Esterase, Urine Negative Negative    Microscopic Examination YES     Urine Type NotGiven     Urine Reflex to Culture Not Indicated    Microscopic Urinalysis   Result Value Ref Range    WBC, UA 0-2 0 - 5 /HPF    RBC, UA 0-2 0 - 4 /HPF   EKG 12 Lead   Result Value Ref Range    Ventricular Rate 81 BPM    Atrial Rate 98 BPM    QRS Duration 72 ms    Q-T Interval 382 ms    QTc Calculation (Bazett) 443 ms    P Axis 23 degrees    R Axis 3 degrees    T Axis 17 degrees    Diagnosis       Sinus rhythm with 2nd degree A-V block (Mobitz I)Possible Inferior infarct , age undeterminedAbnormal ECGWhen compared with ECG of 07-FEB-2022 20:48,Sinus rhythm is now with 2nd degree A-V block (Mobitz I)Borderline criteria for Inferior infarct are now Present         ScreeningsNIH Stroke Scale  Interval: Baseline  Level of Consciousness (1a): Alert  LOC Questions (1b): Answers both correctly  LOC Commands (1c): Performs both tasks correctly  Best Gaze (2): Normal  Visual (3): No visual loss  Facial Palsy (4): Normal symmetrical movement  Motor Arm, Left (5a): No drift  Motor Arm, Right (5b): No drift  Motor Leg, Left (6a): No drift  Motor Leg, Right (6b): No drift  Limb Ataxia (7): Absent  Sensory (8): Normal  Best Language (9): No aphasia  Dysarthria (10): Normal  Extinction and Inattention (11): No abnormality  Total: 0Glasgow Coma Scale  Eye Opening: Spontaneous  Best Verbal Response: Oriented  Best Motor Response: Obeys commands  [place] Coma Scale Score: 15       MDM and ED Course  This is a 78 y.o. male who presents to the ED for Nausea, cough, weakness in bilateral arms and legs, and blurred vision which has now improved. NIH stroke scale 0. Has no lateralizing deficits on exam.  Has 5 out of 5 strength bilateral upper and lower extremities and is ambulating at his baseline. I am not sure of the etiology of his symptoms. No lateralizing defects to indicate TIA. Given his concern for stroke, I will start off with CT head and neck to look for intracranial bleed. Do not believe that this was transverse myelitis/GBS, since symptoms seem to have resolved.  ------------    EKG shows new type I heart block. He is normotensive right now. No chest pain or shortness of breath. This may explain his weakness throughout his entire body and dimmed vision earlier.   Will admit to hospitalist service. Paced on pacer pads    Trop elevated but near his baseline. The total critical care time spent while evaluating and treating this patient was at least 32 minutes. This excludes time spent doing separately billable procedures. This includes time at the bedside, data interpretation, medication management, obtaining critical history from collateral sources if the patient is unable to provide it directly, and physician consultation. Specifics of interventions taken and potentially life-threatening diagnostic considerations are listed above in the medical decision making. [unfilled]    Is this patient to be included in the SEP-1 Core Measure due to severe sepsis or septic shock? No   Exclusion criteria - the patient is NOT to be included for SEP-1 Core Measure due to:  2+ SIRS criteria are not met        Final Impression  1. General weakness    2. Heart block        Blood pressure 136/66, pulse 90, temperature 99 °F (37.2 °C), temperature source Oral, resp. rate 16, height 5' 9\" (1.753 m), weight 217 lb (98.4 kg), SpO2 97 %. Disposition:  DISPOSITION Decision To Admit 12/02/2022 03:26:59 AM      Patient Referrals:  No follow-up provider specified. Discharge Medications:  New Prescriptions    No medications on file       Discontinued Medications:  Discontinued Medications    No medications on file       This chart was generated using the [employer] dictation system. I created this record but it may contain dictation errors given the limitations of this technology.         Cesar Houser MD  12/02/22 6229

## 2022-12-02 NOTE — PROGRESS NOTES
Occupational Therapy  [facility]  Occupational Therapy Initial Assessment/Treatment/Discharge    Name: Priyanka Goncalves  : 1940  MRN: [id number]  Date of Service: 2022    Discharge Recommendations:  24 hour supervision or assist  OT Equipment Recommendations  Equipment Needed: No  Other: pt declining HHOT     Patient Diagnosis(es): The primary encounter diagnosis was General weakness. Diagnoses of Heart block and Second degree Mobitz I AV block were also pertinent to this visit. Past Medical History:  has a past medical history of COVID-19, Diabetes mellitus ([facility] [address] Glaucoma, Hyperlipidemia, Hypertension, Kidney stone, Neuropathy, Osteoarthrosis, hip, and Prepatellar bursitis. Past Surgical History:  has a past surgical history that includes eye surgery; Kidney stone surgery; Foot surgery; Colonoscopy (); Cystoscopy (2012); other surgical history (Left, 2017); Colon surgery; and Glaucoma surgery (N/A). Assessment  Pt is 77 yo M presenting with diagnosis of stroke like symptoms. Pt resides with wife and 2 adult sons in one story home 3 [address] [facility] handrails. PTA pt was ind for ADLs/transfers/ambulation w/ use of WC/RW PRN and assist from wife for LB dressing PRN - pts adult sons take care of all household responsibilities. At this time pt is SBA for bed mobility, transfers/ambulation w/ RW, toileting and stance sink side ADLs. Pt min A LB dressing (baseline). Pt does not present with further skilled OT needs within the acute setting and home with 24 hr care is recommended at discharge to increase pt safety and ind with ADLs/transfers/ambulation.   Prognosis: Good  Decision Making: Medium Complexity  REQUIRES OT FOLLOW-UP: No  Activity Tolerance: Patient Tolerated treatment well     Plan  Occupational Therapy Plan  Times Per Week: one visit     Restrictions  Restrictions/Precautions: Isolation, Fall Risk, Up as Tolerated  Required Braces or Orthoses?: No  Other position/activity restrictions: tele, IV    Subjective  Chart Reviewed: Yes, Orders, Progress Notes, History and Physical, Labs  Patient assessed for rehabilitation services?: Yes  Family / Caregiver Present: No  Referring Practitioner: Hannah Welch MD  Diagnosis: Stroke-like symptom  Subjective: Pt side lying in bed agreeable to OT services upon arrival. RN approved therapy. Pain:Pt denies pain at this time.     Social/Functional History  Lives With: Spouse, Family (wife (79-Celia) 2 adult sons (62 -Yaneli Real and 55-Zabrina yrs old)  Type of Home: House  Home Layout:  (storage in basement - son zabrina lives in basement - pt report chair lift if needed)  Home Access: Stairs to enter with rails  Entrance Stairs - Number of Steps: 3  Entrance Stairs - Rails: Both  Bathroom Shower/Tub: Tub/Shower unit, Curtain, Shower chair without back  Bathroom Toilet: Handicap height  Bathroom Equipment: Grab bars in shower, Toilet raiser  Bathroom Accessibility: Wheelchair accessible  Home Equipment: Hospital bed, [place], [employer], Walker, rolling, Rollator  Receives Help From: Family  ADL Assistance: Needs assistance (wife assist with LB dressing PRN)  Toileting: Independent  Homemaking Assistance: Needs assistance (sons take care of)  Homemaking Responsibilities: No (sons take care of)  Ambulation Assistance: Independent (w/ WC and ambulate household distances no AD)  Transfer Assistance: Independent  Active : No  Leisure & Hobbies: play with dog, bible study  Additional Comments: pt report between wife and 2 kids they are able to provide 24/7 support, grandson and grand daughter live across the street and are able to provide support PRN    Objective  Vitals  Heart Rate: 73  Heart Rate Source: Monitor  BP: (!) 144/69  BP Location: Left upper arm  BP Method: Automatic  Patient Position: Sitting  MAP (Calculated): 94  Resp: 16  SpO2: 95 %  O2 Device: None (Room air)    Observation/Palpation  Posture: Fair (pt demo kyphotic posture - pt states \"i have spinal stenosis\")    Safety Devices  Type of Devices: All fall risk precautions in place;Call light within reach;Gait belt;Patient at risk for falls;Nurse notified; Bed alarm in place; Left in bed (RN present)  Restraints Initially in Place: No    Bed Mobility Training  Overall Level of Assistance: Stand-by assistance  Interventions: Verbal cues; Safety awareness training  Supine to Sit: Stand-by assistance; Adaptive equipment; Additional time (HOB raised, bed rails, to R)  Sit to Supine: Stand-by assistance; Adaptive equipment; Additional time    Balance  Sitting: Intact  Standing: With support (RW)    Transfer Training  Overall Level of Assistance: Stand-by assistance; Adaptive equipment; Additional time (RW)  Interventions: Tactile cues; Verbal cues; Safety awareness training;Weight shifting training/pressure relief  Sit to Stand: Stand-by assistance; Additional time; Adaptive equipment (RW)  Stand to Sit: Stand-by assistance; Adaptive equipment; Additional time (RW)  Toilet Transfer: Stand-by assistance; Adaptive equipment; Additional time (RW, grab bars)    Gait  Overall Level of Assistance: Stand-by assistance; Adaptive equipment; Additional time (RW, ambulate to/from bathroom in room)  Interventions: Tactile cues; Verbal cues    Strength/ROM  AROM: Generally decreased, functional  PROM: Generally decreased, functional  Strength: Generally decreased, functional  Coordination: Generally decreased, functional  Tone: Normal  Sensation: Impaired (pt report kim lower leg numbness/tingling (not new this admission and states its been going on for a few years due to diabetic neuropathy)    ADL  Grooming: SBA - stance sink side hand wash  LE Dressing: Minimal assistance - don briefs seated EOB assist to thread feet, pt able to complete clothing management SBA in stance with RW - pt report wife assist LB dressing PRN  Toileting: Stand by assistance    Vision  Vision: Impaired (pt reports glaucoma in both eyes (began about 15 years ago), pt report double vision, pt report \"everything seems dimmer\", pt report everything in distance blurs together - pt report being ongoing pt at an outpatient vision clinic)  Tracking: Able to track stimulus in all quads w/o difficulty (once able to maintain focus on target point)    Hearing  Hearing: Exceptions to [facility]  Hearing Exceptions: Hard of hearing/hearing concerns;Bilateral hearing aid - pt did not have hearing aids at this time    Cognition  Overall Cognitive Status: WFL (may appear limited if pt does not have hearing aids in - req increased vollume when speaking to pt)    Orientation  Overall Orientation Status: Within Normal Limits  Orientation Level: Oriented X4    Perception  Overall Perceptual Status: WFL    Education:  Education Given To: Patient  Education Provided: Role of Therapy;Plan of Care;ADL Adaptive Strategies;Transfer Training;Energy Conservation; Fall Prevention Strategies  Education Method: Verbal;Demonstration  Barriers to Learning: None  Education Outcome: Verbalized understanding;Demonstrated understanding  Disease specific: Pt educated on benefits of OOB activity to decrease risks associated with prolonged bedrest while in hospital. Pt educated on benefits of using RW when ambulating household distances to increase pt safety and ind.     AM-PAC Score  AM-PAC Inpatient Daily Activity Raw Score: 23 (12/02/22 1526)  AM-PAC Inpatient ADL T-Scale Score : 51.12 (12/02/22 1526)  ADL Inpatient CMS 0-100% Score: 15.86 (12/02/22 1526)  ADL Inpatient CMS G-Code Modifier : CI (12/02/22 1526)    Goals  Short Term Goals  Time Frame for Short Term Goals: one visit  Short Term Goal 1: Pt will complete toielting SBA - goal met 12/02/2022  Short Term Goal 2: Pt will complete stance sink side ADLs SBA - goal met 12/02/2022  Patient Goals   Patient goals : \"to go home\"    Therapy Time   Individual Concurrent Group Co-treatment   Time In 1429         Time Out 1516 Minutes 41         Timed Code Treatment Minutes: 31 Minutes (10 min eval)    Liz Machuca, OTR/L  If pt is unable to be seen after this session, please let this note serve as discharge summary. Please see case management note for discharge disposition. Thank you.
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
## 2022-12-02 NOTE — TELEPHONE ENCOUNTER
Monitor placed by floor rn  Monitor company [employer]  Length of monitor 2 week  Monitor ordered by [facility]  Serial number [id number]  Kit ID [id number], [id number]  Dropped off at nurse's station for RN to apply at discharge

## 2022-12-02 NOTE — PROGRESS NOTES
Physical Therapy  Facility/Department: [facility] A2 CARD TELEMETRY  Physical Therapy Initial Assessment and Treatment    Name: Nle Padilla  : 1940  MRN: [id number]  Date of Service: 2022    Discharge Recommendations:  24 hour supervision or assist, Home with Home health PT   PT Equipment Recommendations  Equipment Needed: No  Other: patient said that he has a rolling walker at home. Patient Diagnosis(es): The primary encounter diagnosis was General weakness. Diagnoses of Heart block and Second degree Mobitz I AV block were also pertinent to this visit. Past Medical History:  has a past medical history of COVID-19, Diabetes mellitus ([facility] [address] Glaucoma, Hyperlipidemia, Hypertension, Kidney stone, Neuropathy, Osteoarthrosis, hip, and Prepatellar bursitis. Past Surgical History:  has a past surgical history that includes eye surgery; Kidney stone surgery; Foot surgery; Colonoscopy (); Cystoscopy (2012); other surgical history (Left, 2017); Colon surgery; and Glaucoma surgery (N/A). If pt is unable to be seen after this session, please let this note serve as discharge summary. Please see case management note for discharge disposition. Thank you. Assessment   Body Structures, Functions, Activity Limitations Requiring Skilled Therapeutic Intervention: Decreased functional mobility ; Decreased strength;Decreased endurance;Decreased balance;Decreased high-level IADLs;Decreased posture  Assessment: Patient is an 78year old male who presented to [facility] on 22 with stroke like symptoms. Patient tested positive for influenza on 22. Patient said that he is normally able to ambulate household distances without an assistive device with modified independence. Today he ambulated 50 feet with no device and CGA. He then ambulated 50 feet with a rolling walker and SBA. Patient's balance significantly improved with use of rolling walker.  Patient completed his seated and supine exercises with supervision. Patient is below his prior level of function and would benefit from skilled PT plan of care. Recommend home PT to address his therapy deficits. Patient is safe for home, when medically stable, with 24/7 supervision/assistance, home PT and continued use of a rolling walker. PT to continue to follow. Treatment Diagnosis: decreased independence with functional mobility  Specific Instructions for Next Treatment: progress mobility as tolerated  Therapy Prognosis: Good  Decision Making: Low Complexity  Barriers to Learning: hard of hearing  Requires PT Follow-Up: Yes  Activity Tolerance  Activity Tolerance: Patient tolerated treatment well;Patient limited by endurance  Activity Tolerance Comments: post activity: 154/77 97% on room air. 78 BPM     Plan   Physcial Therapy Plan  General Plan: 2-3 times per week  Therapy Duration: 1 Week (12/9/22)  Specific Instructions for Next Treatment: progress mobility as tolerated  Current Treatment Recommendations: Strengthening, Functional mobility training, Balance training, Transfer training, Endurance training, Gait training, Equipment evaluation, education, & procurement, Patient/Caregiver education & training, Safety education & training, Therapeutic activities  Additional Comments: 2-3/week because patient is currently in isolation for Influenza. Safety Devices  Type of Devices: All fall risk precautions in place, Call light within reach, Gait belt, Patient at risk for falls, Nurse notified, Bed alarm in place, Left in bed (patient's bilateral legs elevated at end of session to reduce edema)  Restraints  Restraints Initially in Place: No     Restrictions  Restrictions/Precautions  Restrictions/Precautions: Isolation, Fall Risk, Up as Tolerated, Contact Precautions  Required Braces or Orthoses?: No  Position Activity Restriction  Other position/activity restrictions: tele, IV. +Influenza. Droplet isolation.      Subjective   Pain: No complaints of pain.  General  Chart Reviewed: Yes  Patient assessed for rehabilitation services?: Yes  Additional Pertinent Hx: HPI per chart, \"The patient is a pleasant 78 Y M with a h/o HTN, HLD, DM2, and CVA. He woke up at 3 AM yesterday with weakness in all four extremities and dim vision. Apparently he waited until 11 PM to present to the ED, at which point his symptoms pretty much resolved within the next half hour. In the ED they saw that he had Mobitz type I, second degree AVB, so hospital medicine was called for admission. The patient denied any chest pain, palpitations, or lightheadedness. The following morning he admitted to a few days of a new cough, chills, and mild dyspnea, and tested positive for influenza. CT Head:   No acute intracranial abnormality. MRI may be obtained if clinically  indicated. CTA Head and Neck:   1. No acute arterial abnormality or hemodynamically significant arterial  stenosis in the head or neck. \"  Response To Previous Treatment: Not applicable  Family / Caregiver Present: No  Referring Practitioner: Karlie Palacios MD  Referral Date : 12/02/22  Follows Commands: Within Functional Limits  General Comment  Comments: Supine in bed upon entry of therapy staff. Cleared for therapy by RN. Subjective  Subjective: Patient agreed to participate.          Social/Functional History  Social/Functional History  Lives With: Spouse, Family (wife (79-Celia) 2 adult sons (59 -Freeman Macadam and 55-Donal yrs old))  Type of Home: [facility] [employer],[address]:  (storage in basement - lucia Stockton lives in basement - pt report chair lift if needed)  Home Access: Stairs to enter with rails  Entrance Stairs - Number of Steps: 3  Entrance Stairs - Rails: Both  Bathroom Shower/Tub: Tub/Shower unit, Curtain, Shower chair without back  Bathroom Toilet: Handicap height  Bathroom Equipment: Grab bars in shower, Toilet raiser  Bathroom Accessibility: Wheelchair accessible  Home Equipment: Hospital bed, Roberto Mota Sandra Class, rolling, Rollator  Has the patient had two or more falls in the past year or any fall with injury in the past year?: No  Receives Help From: Family  ADL Assistance: Needs assistance (wife assist with LB dressing PRN)  Toileting: Independent  Homemaking Assistance: Needs assistance (sons take care of)  Homemaking Responsibilities: No (sons take care of)  Ambulation Assistance: Independent (patient uses WC in the community (doctor's office) and ambulate household distances no AD)  Transfer Assistance: Independent  Active : No  Patient's  Info: reports has not driven in a couple of years due to decreased vision  Leisure & Hobbies: play with dog, bible study  Additional Comments: pt report between wife and 2 kids they are able to provide 24/7 support, grandson and grand daughter live across the street and are able to provide support PRN  Vision/Hearing  Vision  Vision: Impaired (pt reports glaucoma in both eyes (began about 15 years ago), pt report double vision, pt report \"everything seems dimmer\", pt report everything in distance blurs together. \"But now it is starting to clear up and get closer to normal.)  Hearing  Hearing: Exceptions to [facility]  Hearing Exceptions: Hard of hearing/hearing concerns;Bilateral hearing aid    Cognition   Orientation  Overall Orientation Status: Within Normal Limits  Orientation Level: Oriented X4  Cognition  Overall Cognitive Status: WFL     Objective   Heart Rate: 80  Heart Rate Source: Monitor  BP: (!) 145/79  BP Location: Right upper arm  BP Method: Automatic  Patient Position: Semi fowlers  MAP (Calculated): 101  Resp: 16  SpO2: 95 %  O2 Device: None (Room air)  Comment: post activity: 154/77 97% on room air. 79 BPM     Observation/Palpation  Edema: severe edema in bilateral feet. Patient said \"This has been going on for months. \" Patient's RN has already applied compression stockings on patient's legs.  Patient's RN [facility] also said that the patient's physician wants to have his legs elevated at all times. Gross Assessment  AROM: Within functional limits  PROM: Within functional limits  Strength: Generally decreased, functional (grossly 4/5 bilateral)  Coordination: Within functional limits  Sensation: Intact                 Bed Mobility Training  Bed Mobility Training: Yes  Overall Level of Assistance: Stand-by assistance  Interventions: Verbal cues; Safety awareness training  Rolling: Stand-by assistance  Supine to Sit: Stand-by assistance; Adaptive equipment; Additional time (head of bed elevated)  Sit to Supine: Stand-by assistance; Adaptive equipment; Additional time  Scooting: Stand-by assistance (to edge of bed)  Balance  Sitting: Intact  Standing: Impaired  Standing - Static: Good  Standing - Dynamic: Fair  Transfer Training  Transfer Training: Yes  Overall Level of Assistance: Adaptive equipment; Additional time;Contact-guard assistance;Stand-by assistance  Interventions: Verbal cues; Safety awareness training  Sit to Stand: Additional time; Adaptive equipment;Contact-guard assistance;Stand-by assistance  Stand to Sit: Stand-by assistance; Adaptive equipment; Additional time;Contact-guard assistance (poor eccentric control of hip extensors)  Toilet Transfer: Stand-by assistance; Adaptive equipment; Additional time (RW)  Gait Training  Gait Training: Yes  Gait  Overall Level of Assistance: Stand-by assistance; Adaptive equipment; Additional time;Contact-guard assistance (CGA without assistive device. SBA with rolling walker. patient's balance significantly improved with use of rolling walker)  Interventions: Tactile cues; Verbal cues  Base of Support: Center of gravity altered;Narrowed  Speed/Kaila: Pace decreased (< 100 feet/min); Shuffled; Slow  Step Length: Right shortened;Left shortened  Gait Abnormalities: Decreased step clearance (significantly increased bilateral foot drag. cueing to correct with little improvement.  \"I think I have to keep my legs on the ground so I can maintain my balance better. \")  Distance (ft): 50 Feet (50 feet with no assistive device and CGA. 1 loss of balance but patient was able to regain his balance on his own by holding onto the bed. 50 feet with RW and SBA. patient's balance greatly improved with RW. no complaints of SOB, chest pain or dizziness.)  Number of Stairs Trained:  (unable to complete because patient is in droplet isolation and cannot leave his room.)              Balance  Posture: Fair  Sitting - Static: Good  Sitting - Dynamic: Good  Standing - Static: Fair  Standing - Dynamic: Fair;-  Comments: fair- without assistive device. fair+ with rolling walker  Exercise Treatment: 1 x 10 AROM bilateral: ankle pumps, long arc quads, hip flexion. 1 x 10 glut sets. 1 x 5 straight leg raises bilateral. 1 x 5 external rotation/internal rotation of bilateral lower extremities. AM-PAC Score     AM-PAC Inpatient Mobility without Stair Climbing Raw Score : 15 (12/02/22 1730)  AM-PAC Inpatient without Stair Climbing T-Scale Score : 43.03 (12/02/22 1730)  Mobility Inpatient CMS 0-100% Score: 47.43 (12/02/22 1730)  Mobility Inpatient without Stair CMS G-Code Modifier : CK (12/02/22 1730)       Goals  Short Term Goals  Time Frame for Short Term Goals: 1 week 12/9/22  Short Term Goal 1: Supine <> sit with modified independence. Short Term Goal 2: Sit <> stand with least restricted assistived device and mod I  Short Term Goal 3: Bed <> chair with LRAD and mod I  Short Term Goal 4: Ambulate 100 feet with LRAD and mod I  Short Term Goal 5: By 12/5/22 patient will tolerate 12-15 reps of his exercises to maximize his strength and endurance. Patient Goals   Patient Goals : To go home. Education  Patient Education  Education Given To: Patient  Education Provided: Role of Therapy;Plan of Care;Home Exercise Program;Equipment; Fall Prevention Strategies;Transfer Training  Education Provided Comments: patient educated on the benefits of increased mobility, safety with mobility, safety with use of rolling walker, use of call bell, how to appropriately wear a mask to decrease spread of influenza.   Education Method: Demonstration;Verbal  Barriers to Learning: None  Education Outcome: Verbalized understanding;Demonstrated understanding      Therapy Time   Individual Concurrent Group Co-treatment   Time In 8128         Time Out 1610         Minutes 38         Timed Code Treatment Minutes: 28 Minutes (10 minute evaluation)       Cas Villa, PT

## 2022-12-02 NOTE — PROGRESS NOTES
Patient admitted to room 210 from ER. Patient oriented to room, call light, bed rails, phone, lights and bathroom. Patient instructed about the schedule of the day including: vital sign frequency, lab draws, possible tests, frequency of MD and staff rounds, including RN/MD rounding together at bedside, daily weights, and I &O's.  bed alarm in place, patient aware of placement and reason. Telemetry box  in place, patient aware of placement and reason. Bed locked, in lowest position, side rails up 2/4, call light within reach. Will continue to monitor.

## 2022-12-02 NOTE — CONSULTS
[address] [facility]  [phone]      Attending Physician: Chuckie Velez MD  Reason for Consultation/Chief Complaint: Elevated troponin, second degree Mobitz I AV block    Subjective   History of Present Illness:  Ajay Calzada is a 78 y.o. male with a history of essential hypertension, hyperlipidemia, and type II DM who presented with weakness and chills. The patient reports that yesterday morning, he awoke feeling poorly with chills, cough, generalized weakness, and lightheadedness. He denies any associated chest pain or shortness of breath. He does note some chronic bilateral lower extremity edema. He was diagnosed with COVID-19 in August and says that he was on home oxygen for awhile, but was eventually weaned off. He did recently receive the influenza vaccine. On arrival to the ED, the patient was afebrile with heart rate 99 bpm, /76, and oxygen saturation 97% on room air. Initial EKG showed sinus rhythm with Mobitz type I AV block and possible old inferior infarct pattern. Troponin T trended 0.04-->0. 03. Chest x-ray showed mild central pulmonary congestion and mild bibasilar atelectasis versus early infiltrates. Non-contrast head CT was negative for any acute intracranial abnormality. CTA showed no hemodynamically significant stenoses in the head or neck. There was incidentally noted to be 1.6 cm right thyroid nodule. He ultimately tested positive for influenza A.  TTE obtained earlier today showed an LVEF of 55-60% with normal wall motion, grade 1 diastolic dysfunction, normal RV size and systolic function, mild mitral stenosis, mild mitral regurgitation, mild aortic regurgitation, and mild pulmonic regurgitation. The patient currently remains hemodynamically stable and thus far no higher degrees of AV block have been observed on telemetry.       Past Medical History:   has a past medical history of COVID-19, Diabetes mellitus ([address] Glaucoma, Hyperlipidemia, Hypertension, Kidney stone, Neuropathy, Osteoarthrosis, hip, and Prepatellar bursitis. Surgical History:   has a past surgical history that includes eye surgery; Kidney stone surgery; Foot surgery; Colonoscopy (2008); Cystoscopy (02/02/2012); other surgical history (Left, 07/17/2017); Colon surgery; and Glaucoma surgery (N/A). Social History:   reports that he has never smoked. He has never used smokeless tobacco. He reports that he does not drink alcohol and does not use drugs. Family History:  No family history of early onset CAD or sudden cardiac death. Home Medications:  Were reviewed and are listed in nursing record and/or below  Prior to Admission medications    Medication Sig Start Date End Date Taking? Authorizing Provider   aspirin 81 MG chewable tablet Take 1 tablet by mouth daily 2/9/22   Sheridan Ortiz MD   losartan (COZAAR) 100 MG tablet Take 1 tablet by mouth daily 2/9/22   Sheridan Ortiz MD   amLODIPine (NORVASC) 5 MG tablet Take 1 tablet by mouth daily 2/9/22   Sheridan Ortiz MD   atorvastatin (LIPITOR) 80 MG tablet Take 1 tablet by mouth nightly 2/8/22   Sheridan Ortiz MD   Acetylcysteine ( PO) Take by mouth    Historical Provider, MD   benzonatate (TESSALON) 200 MG capsule Take 200 mg by mouth 3 times daily as needed  Patient not taking: No sig reported 8/12/21   Historical Provider, MD   lansoprazole (PREVACID) 30 MG delayed release capsule Take 30 mg by mouth daily  Patient not taking: Reported on 12/2/2022    Historical Provider, MD   pregabalin (LYRICA) 50 MG capsule Take 50 mg by mouth 2 times daily. Historical Provider, MD   ondansetron (ZOFRAN ODT) 4 MG disintegrating tablet Take 1-2 tablets by mouth every 12 hours as needed for Nausea May Sub regular tablet (non-ODT) if insurance does not cover ODT.   Patient not taking: No sig reported 12/16/18   Quoc Rodriguez PA-C   Cyanocobalamin ([employer] VITAMIN B-12 SL) Place under the tongue daily  Patient not taking: No sig reported    Historical Provider, MD   docusate sodium (COLACE) 100 MG capsule Take 100 mg by mouth 2 times daily as needed for Constipation    Historical Provider, MD   Cholecalciferol (VITAMIN D3) 5000 units TABS Take by mouth daily    Historical Provider, MD   brimonidine (ALPHAGAN) 0.2 % ophthalmic solution Place 1 drop into both eyes 2 times daily  2/18/16   Historical Provider, MD   b complex vitamins capsule Take 1 capsule by mouth daily    Historical Provider, MD   Dorzolamide HCl (TRUSOPT OP) Apply 1 drop to eye 2 times daily     Historical Provider, MD   insulin aspart (NOVOLOG) 100 UNIT/ML injection vial Inject  into the skin 3 times daily (before meals). Sliding scale based on meal calculation     Historical Provider, MD   insulin glargine (LANTUS) 100 UNIT/ML injection Inject 40 Units into the skin 2 times daily.       Historical Provider, MD        CURRENT Medications:  amLODIPine (NORVASC) tablet 5 mg, Daily  aspirin chewable tablet 81 mg, Daily  atorvastatin (LIPITOR) tablet 80 mg, Nightly  docusate sodium (COLACE) capsule 100 mg, BID PRN  insulin lispro (HUMALOG) injection vial 0-8 Units, TID WC  insulin lispro (HUMALOG) injection vial 0-4 Units, Nightly  glucose chewable tablet 16 g, PRN  dextrose bolus 10% 125 mL, PRN   Or  dextrose bolus 10% 250 mL, PRN  glucagon (rDNA) injection 1 mg, PRN  dextrose 10 % infusion, Continuous PRN  pantoprazole (PROTONIX) tablet 40 mg, QAM AC  losartan (COZAAR) tablet 100 mg, Daily  pregabalin (LYRICA) capsule 50 mg, BID  sodium chloride flush 0.9 % injection 5-40 mL, 2 times per day  sodium chloride flush 0.9 % injection 5-40 mL, PRN  0.9 % sodium chloride infusion, PRN  enoxaparin (LOVENOX) injection 40 mg, Daily  ondansetron (ZOFRAN-ODT) disintegrating tablet 4 mg, Q8H PRN   Or  ondansetron (ZOFRAN) injection 4 mg, Q6H PRN  polyethylene glycol (GLYCOLAX) packet 17 g, Daily PRN  acetaminophen (TYLENOL) tablet 650 mg, Q6H PRN   Or  acetaminophen (TYLENOL) suppository 650 mg, Q6H PRN  magnesium sulfate 1000 mg in dextrose 5% 100 mL IVPB, PRN  potassium chloride (KLOR-CON M) extended release tablet 40 mEq, PRN   Or  potassium bicarb-citric acid (EFFER-K) effervescent tablet 40 mEq, PRN   Or  potassium chloride 10 mEq/100 mL IVPB (Peripheral Line), PRN  perflutren lipid microspheres (DEFINITY) injection 1.5 mL, ONCE PRN  oseltamivir (TAMIFLU) capsule 30 mg, BID  insulin glargine (LANTUS) injection vial 40 Units, BID        Allergies:  Patient has no known allergies. Review of Systems:   A 14 point review of symptoms was completed. Pertinent positives were identified in the HPI. All other review of symptoms negative unless otherwise noted below. Objective   PHYSICAL EXAM:    Vitals:    12/02/22 1239   BP: (!) 144/69   Pulse: 73   Resp: 16   Temp: 98.4 °F (36.9 °C)   SpO2: 95%    Weight: 209 lb (94.8 kg)       General: Elderly male in no acute distress. Pleasant and interactive on exam.  HEENT: Normocephalic, atraumatic, non-icteric, hearing intact, nares normal, mucous membranes moist.  Neck: Supple, trachea midline. No adenopathy. No carotid bruits. No JVD. Heart: Regular rate and rhythm. Normal S1 and S2. Grade I/IV diastolic murmur. No rubs or gallops. Lungs: Normal respiratory effort. Clear to auscultation bilaterally. No wheezes, rales, or rhonchi. Abdomen: Soft, non-tender. Normoactive bowel sounds. No masses or organomegaly. Skin:  Multiple nevi noted on back. There is one, small bluish colored skin lesion. No rashes. Pulses: 2+ and symmetric. Extremities: 1+ bilateral LE edema. No clubbing or cyanosis. Musculoskeletal: Spontaneously moves all four extremities. Psych: Normal mood and affect. Neuro: Alert and oriented to person, place, and time. No focal deficits noted.       Labs   CBC:   Lab Results   Component Value Date/Time    WBC 6.4 12/02/2022 12:32 AM    RBC 4.67 12/02/2022 12:32 AM    HGB 14.4 12/02/2022 12:32 AM    HCT 42.3 12/02/2022 12:32 AM    MCV 90.6 12/02/2022 12:32 AM    RDW 14.1 12/02/2022 12:32 AM     12/02/2022 12:32 AM     CMP:  Lab Results   Component Value Date/Time     12/02/2022 12:32 AM    K 3.9 12/02/2022 12:32 AM     12/02/2022 12:32 AM    CO2 26 12/02/2022 12:32 AM    BUN 21 12/02/2022 12:32 AM    CREATININE 1.1 12/02/2022 12:32 AM    GFRAA >60 02/09/2022 05:17 PM    GFRAA >60 10/14/2012 11:55 PM    AGRATIO 1.5 12/02/2022 12:32 AM    LABGLOM >60 12/02/2022 12:32 AM    GLUCOSE 318 12/02/2022 12:32 AM    PROT 6.6 12/02/2022 12:32 AM    PROT 6.2 10/14/2012 11:55 PM    CALCIUM 8.6 12/02/2022 12:32 AM    BILITOT 0.8 12/02/2022 12:32 AM    ALKPHOS 123 12/02/2022 12:32 AM    AST 20 12/02/2022 12:32 AM    ALT 20 12/02/2022 12:32 AM     PT/INR:  No results found for: PTINR  HgBA1c:  Lab Results   Component Value Date    LABA1C 9.9 02/08/2022     Lab Results   Component Value Date    TROPONINI 0.03 (H) 12/02/2022         Cardiac Data     Last EKG: Sinus rhythm with Mobitz type I AV block, possible old inferior infarct pattern    Echo:  TTE 12/2/22:  Conclusions   Summary   Normal left ventricular size with mild concentric left ventricular   hypertrophy. Normal left ventricular systolic function with ejection fraction of 55-60%. No regional wall motion abnormalites are seen. Grade I diastolic dysfunction with normal filling pressure. The right ventricle is normal in size and function. Mild mitral stenosis secondary to calcific mitral valve disease. Mild mitral regurgitation. Mild aortic regurgitation. Mild pulmonic regurgitation. Systolic pulmonic artery pressure (SPAP) is normal estimated at 26 mmHg   (Right atrial pressure of 3 mmHg). Stress Test:  Treadmill GXT 4/26/01:  1. GXT is negative for ischemia     2. No arrhythmia was identified     3. Normal blood pressure response to exercise     4.   Exercise capacity is poor     Cath: N/A    Other Studies:   Chest X-ray 12/2/22:  Borderline cardiomegaly with mild central pulmonary congestion       Mild bibasilar atelectasis or early infiltrates which is prominent     Non-contrast head CT 12/2/22:  No acute intracranial abnormality. MRI may be obtained if clinically   indicated. CTA head/neck 12/2/22:  1. No acute arterial abnormality or hemodynamically significant arterial   stenosis in the head or neck. 2. Incidental 1.6 cm right thyroid nodule. Nonemergent follow-up outpatient   thyroid ultrasound is recommended for guidelines below. Assessment and Plan      1. Mild troponin T elevation - Significance of patient's very mild troponin T elevation is somewhat difficult to determine. Possible that mild elevation in cardiac enzymes may be secondary to demand ischemia in the setting of patient's influenza A infection. He is not having any angina, TTE shows preserved biventricular systolic function, and troponin trend has thus far been flat ,and based on overall presentation, unlikely to be representative of an acute coronary syndrome.   -Recommend repeating EKG and checking one more troponin T level and if troponin T is stable and/or downtrending, can discontinue draws  -Can plan to obtain outpatient pharmacologic nuclear SPECT stress test for further cardiac restratification once patient has had additional time to recover from his acute influenza A infection (if there is evidence of higher degree AV block on evaluation below, can consider other modality)  -Continue aspirin 81 mg daily and atorvastatin 80 mg qHS    2. Mobitz type 1 AV block   -Thus far, no higher degree AV block has been observed on telemetry and there is no indication for pacemaker placement at this time  -Can continue to monitor on telemetry while inpatient and will arrange for 2-week Vital Connect monitor at discharge to further monitor for bradyarrhythmias and evidence of higher degree AV block  -Continue to avoid AV-rissa blocking agents  -Maintain K>4 and Mg>2    3. Influenza infection  -On oseltamivir  -Encourage rest and hydration  -Further supportive care per primary team    4. Bilateral lower extremity  -Patient reports this has been chronic issue and has no other signs or symptoms of acute heart failure  -Recommend knee-high compression stockings and patient should be encouraged to keep his legs elevated when able to help promote venous return    5. Skin lesion on upper back  -Patient has multiple nevi noted on back  -There is one, small bluish colored skin lesion for which further dermatologic evaluation is recommended    6. Thyroid nodule  -Needs non-emergent thyroid ultrasound for further evaluation    7. Essential hypertension  -Continue amlodipine 5 mg daily and losartan 100 mg daily    8. Hyperlipidemia  -Continue atorvastatin 40 mg qHS    9. Type II DM  -Management per primary team      Thank you for allowing us to participate in the care of Martir Seymour. Cardiology will sign off at this time. Will arrange for follow-up with [facility] in 2 weeks. Please call me with any questions [phone]. Pierre Trevizo,   [facility]  [phone] [facility]  [phone] [address] [place] [address]  12/2/2022 1:40 PM      I will address the patient's cardiac risk factors and adjusted pharmacologic treatment as needed. In addition, I have reinforced the need for patient directed risk factor modification. All questions and concerns were addressed to the patient/family. Alternatives to my treatment were discussed. The note was completed using EMR. Every effort was made to ensure accuracy; however, inadvertent computerized transcription errors may be present.

## 2022-12-03 VITALS
DIASTOLIC BLOOD PRESSURE: 69 MMHG | OXYGEN SATURATION: 94 % | TEMPERATURE: 98 F | SYSTOLIC BLOOD PRESSURE: 151 MMHG | HEART RATE: 63 BPM | WEIGHT: 209 LBS | BODY MASS INDEX: 30.96 KG/M2 | HEIGHT: 69 IN | RESPIRATION RATE: 18 BRPM

## 2022-12-03 LAB
ANION GAP SERPL CALCULATED.3IONS-SCNC: 7 MMOL/L (ref 3–16)
BASOPHILS ABSOLUTE: 0 K/UL (ref 0–0.2)
BASOPHILS RELATIVE PERCENT: 0.5 %
BUN BLDV-MCNC: 16 MG/DL (ref 7–20)
CALCIUM SERPL-MCNC: 8.3 MG/DL (ref 8.3–10.6)
CHLORIDE BLD-SCNC: 107 MMOL/L (ref 99–110)
CO2: 27 MMOL/L (ref 21–32)
CREAT SERPL-MCNC: 1 MG/DL (ref 0.8–1.3)
EOSINOPHILS ABSOLUTE: 0.2 K/UL (ref 0–0.6)
EOSINOPHILS RELATIVE PERCENT: 2.9 %
ESTIMATED AVERAGE GLUCOSE: 194.4 MG/DL
GFR SERPL CREATININE-BSD FRML MDRD: >60 ML/MIN/{1.73_M2}
GLUCOSE BLD-MCNC: 133 MG/DL (ref 70–99)
GLUCOSE BLD-MCNC: 82 MG/DL (ref 70–99)
GLUCOSE BLD-MCNC: 82 MG/DL (ref 70–99)
HBA1C MFR BLD: 8.4 %
HCT VFR BLD CALC: 38.6 % (ref 40.5–52.5)
HEMOGLOBIN: 13.1 G/DL (ref 13.5–17.5)
LACTIC ACID: 0.8 MMOL/L (ref 0.4–2)
LYMPHOCYTES ABSOLUTE: 1.2 K/UL (ref 1–5.1)
LYMPHOCYTES RELATIVE PERCENT: 21.6 %
MAGNESIUM: 2 MG/DL (ref 1.8–2.4)
MCH RBC QN AUTO: 30.7 PG (ref 26–34)
MCHC RBC AUTO-ENTMCNC: 34 G/DL (ref 31–36)
MCV RBC AUTO: 90.2 FL (ref 80–100)
MONOCYTES ABSOLUTE: 0.8 K/UL (ref 0–1.3)
MONOCYTES RELATIVE PERCENT: 15.5 %
NEUTROPHILS ABSOLUTE: 3.2 K/UL (ref 1.7–7.7)
NEUTROPHILS RELATIVE PERCENT: 59.5 %
PDW BLD-RTO: 14 % (ref 12.4–15.4)
PERFORMED ON: ABNORMAL
PERFORMED ON: NORMAL
PLATELET # BLD: 120 K/UL (ref 135–450)
PMV BLD AUTO: 9.3 FL (ref 5–10.5)
POTASSIUM REFLEX MAGNESIUM: 3.5 MMOL/L (ref 3.5–5.1)
PROCALCITONIN: 0.1 NG/ML (ref 0–0.15)
RBC # BLD: 4.28 M/UL (ref 4.2–5.9)
SODIUM BLD-SCNC: 141 MMOL/L (ref 136–145)
WBC # BLD: 5.4 K/UL (ref 4–11)

## 2022-12-03 PROCEDURE — 80048 BASIC METABOLIC PNL TOTAL CA: CPT

## 2022-12-03 PROCEDURE — 84145 PROCALCITONIN (PCT): CPT

## 2022-12-03 PROCEDURE — 6360000002 HC RX W HCPCS: Performed by: INTERNAL MEDICINE

## 2022-12-03 PROCEDURE — 83735 ASSAY OF MAGNESIUM: CPT

## 2022-12-03 PROCEDURE — 85025 COMPLETE CBC W/AUTO DIFF WBC: CPT

## 2022-12-03 PROCEDURE — 83605 ASSAY OF LACTIC ACID: CPT

## 2022-12-03 PROCEDURE — G0378 HOSPITAL OBSERVATION PER HR: HCPCS

## 2022-12-03 PROCEDURE — 2580000003 HC RX 258: Performed by: INTERNAL MEDICINE

## 2022-12-03 PROCEDURE — 36415 COLL VENOUS BLD VENIPUNCTURE: CPT

## 2022-12-03 PROCEDURE — 6370000000 HC RX 637 (ALT 250 FOR IP): Performed by: INTERNAL MEDICINE

## 2022-12-03 RX ORDER — INSULIN GLARGINE 100 [IU]/ML
30 INJECTION, SOLUTION SUBCUTANEOUS 2 TIMES DAILY
Status: DISCONTINUED | OUTPATIENT
Start: 2022-12-03 | End: 2022-12-03 | Stop reason: HOSPADM

## 2022-12-03 RX ORDER — BRIMONIDINE TARTRATE 2 MG/ML
1 SOLUTION/ DROPS OPHTHALMIC 2 TIMES DAILY
Status: DISCONTINUED | OUTPATIENT
Start: 2022-12-03 | End: 2022-12-03 | Stop reason: HOSPADM

## 2022-12-03 RX ORDER — DORZOLAMIDE HCL 20 MG/ML
1 SOLUTION/ DROPS OPHTHALMIC 2 TIMES DAILY
Status: DISCONTINUED | OUTPATIENT
Start: 2022-12-03 | End: 2022-12-03 | Stop reason: HOSPADM

## 2022-12-03 RX ORDER — OSELTAMIVIR PHOSPHATE 30 MG/1
30 CAPSULE ORAL 2 TIMES DAILY
Qty: 5 CAPSULE | Refills: 0 | Status: SHIPPED | OUTPATIENT
Start: 2022-12-03 | End: 2022-12-06

## 2022-12-03 RX ADMIN — ASPIRIN 81 MG: 81 TABLET, CHEWABLE ORAL at 09:13

## 2022-12-03 RX ADMIN — PREGABALIN 50 MG: 25 CAPSULE ORAL at 09:12

## 2022-12-03 RX ADMIN — OSELTAMIVIR PHOSPHATE 30 MG: 30 CAPSULE ORAL at 09:13

## 2022-12-03 RX ADMIN — LOSARTAN POTASSIUM 100 MG: 100 TABLET, FILM COATED ORAL at 09:13

## 2022-12-03 RX ADMIN — PANTOPRAZOLE SODIUM 40 MG: 40 TABLET, DELAYED RELEASE ORAL at 09:21

## 2022-12-03 RX ADMIN — BRIMONIDINE TARTRATE 1 DROP: 2 SOLUTION OPHTHALMIC at 09:14

## 2022-12-03 RX ADMIN — INSULIN GLARGINE 30 UNITS: 100 INJECTION, SOLUTION SUBCUTANEOUS at 09:21

## 2022-12-03 RX ADMIN — AMLODIPINE BESYLATE 5 MG: 5 TABLET ORAL at 09:13

## 2022-12-03 RX ADMIN — Medication 10 ML: at 09:13

## 2022-12-03 ASSESSMENT — PAIN SCALES - GENERAL: PAINLEVEL_OUTOF10: 4

## 2022-12-03 ASSESSMENT — PAIN DESCRIPTION - LOCATION: LOCATION: BACK

## 2022-12-03 ASSESSMENT — PAIN DESCRIPTION - ORIENTATION: ORIENTATION: MID

## 2022-12-03 ASSESSMENT — PAIN DESCRIPTION - PAIN TYPE: TYPE: CHRONIC PAIN

## 2022-12-03 NOTE — PLAN OF CARE
Problem: Discharge Planning  Goal: Discharge to home or other facility with appropriate resources  12/3/2022 0017 by Jesús Blackwood RN  Outcome: Progressing  12/2/2022 1040 by Cesar Mitchell RN  Outcome: Progressing     Problem: Safety - Adult  Goal: Free from fall injury  12/3/2022 0017 by Jesús Blackwood RN  Outcome: Progressing  12/2/2022 1040 by Cesar Mitchell RN  Outcome: Progressing     Problem: Skin/Tissue Integrity  Goal: Absence of new skin breakdown  Description: 1. Monitor for areas of redness and/or skin breakdown  2. Assess vascular access sites hourly  3. Every 4-6 hours minimum:  Change oxygen saturation probe site  4. Every 4-6 hours:  If on nasal continuous positive airway pressure, respiratory therapy assess nares and determine need for appliance change or resting period.   12/3/2022 0017 by Jesús Blackwood RN  Outcome: Progressing  12/2/2022 1041 by Cesar Mitchell RN  Outcome: Progressing

## 2022-12-03 NOTE — PROGRESS NOTES
Patient ready for discharge. Reviewed AVS with patient, answered all questions. Tele box removed, [facility] notified. Peripheral IV removed and gauze applied. Cardiac event monitor placed on patient and reviewed instructions with patient and patients son Oscar Gironr. Patient aware that patch is good for 7 days and to replace patch the next Saturday, patient and patient son also aware of how to return cardiac monitor and that monitor is to be worn for 2 weeks. Patient taken to car via wheelchair with all belongings including cell phone and event monitor supplies.

## 2022-12-03 NOTE — DISCHARGE INSTR - COC
Continuity of Care Form    Patient Name: Junnie Mcardle   :  1940  MRN:  [id number]    Admit date:  2022  Discharge date:  ***    Code Status Order: Full Code   Advance Directives:     Admitting Physician:  Heber Ricardo MD  PCP: Radha Bowen MD    Discharging Nurse: [facility] Unit/Room#: 0210/0210-02  Discharging Unit Phone Number: ***    Emergency Contact:   Extended Emergency Contact Information  Primary Emergency Contact: Genevieve Shirley  Address: [facility] [id number] [place] [address],[place] [address], [address] Phone: [phone]  Work Phone: [phone]  Relation: Spouse  Secondary Emergency Contact: Hattie Wilkes  Address: [facility] [address], [facility]  [place] Phone: [phone]  Relation: Child    Past Surgical History:  Past Surgical History:   Procedure Laterality Date    COLON SURGERY      COLONOSCOPY      CYSTOSCOPY  2012    with stent placement    EYE SURGERY      left eyemed valve, bilateral retinal repair    FOOT SURGERY      right, tendon repair    GLAUCOMA SURGERY N/A     AHMED GLAUCOMA VALVE/Model S-2/SN [id number] Safe per [employer]    KIDNEY STONE SURGERY      OTHER SURGICAL HISTORY Left 2017    CYSTOSCOPY, LEFT URETEROSCOPY, STENT PLACEMENT, URETHERAL dilation, stone manipulation        Immunization History:   Immunization History   Administered Date(s) Administered    Influenza Virus Vaccine 2001    Influenza, FLUZONE (age 72 y+), High Dose, 0.7mL 10/22/2020    Pneumococcal Conjugate 13-valent (Vscdfeh39) 2019    Pneumococcal Polysaccharide (Bstquboop89) 2022    Td vaccine (adult) 2013    Tdap (Boostrix, Adacel) 2013       Active Problems:  Patient Active Problem List   Diagnosis Code    Renal colic on left side P26    Prepatellar bursitis M70.40    Osteoarthrosis, hip M16.9    Obesity E66.9    HTN (hypertension), benign I10    DM (diabetes mellitus), type 2, uncontrolled with complications [id number]    Chest pain R07.9    TIA (transient ischemic attack) G45.9    Arterial ischemic stroke, ICA, left, acute (HCC) F10.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    Dyslipidemia E78.5    Acute cerebrovascular accident (CVA) ([facility] [address] I63.9    COVID-19 virus infection U07.1    Acute respiratory failure with hypoxia ([facility] [address] J96.01    Type 2 diabetes mellitus with hyperglycemia, with long-term current use of insulin ([facility]) E11.65, Z79.4    Glaucoma H40.9    Stroke-like symptom R29.90       Isolation/Infection:   Isolation            Droplet          Patient Infection Status       Infection Onset Added Last Indicated Last Indicated By Review Planned Expiration Resolved Resolved By    Influenza 22 COVID-19 & Influenza Combo 22      Resolved    COVID-19 (Rule Out) 22 COVID-19 & Influenza Combo (Ordered)   22 Rule-Out Test Resulted    COVID-19 (Rule Out) 22 COVID-19 & Influenza Combo (Ordered)   22 Rule-Out Test Resulted    COVID-19 21 COVID-19 & Influenza Combo   21     COVID-19 (Rule Out) 21 COVID-19 & Influenza Combo (Ordered)   21 Rule-Out Test Resulted    COVID-19 (Rule Out) 21 COVID-19 & Influenza Combo (Ordered)   21 Rule-Out Test Resulted            Nurse Assessment:  Last Vital Signs: BP (!) 151/69   Pulse 63   Temp 98 °F (36.7 °C) (Oral)   Resp 18   Ht 5' 9\" (1.753 m)   Wt 209 lb (94.8 kg)   SpO2 94%   BMI 30.86 kg/m²     Last documented pain score (0-10 scale): Pain Level: 4  Last Weight:   Wt Readings from Last 1 Encounters:   22 209 lb (94.8 kg)     Mental Status:  {IP PT MENTAL STATUS:}    IV Access:  {[facility] ACCESS:153405330}    Nursing Mobility/ADLs:  Walking   {CHP DME ROMZ:578056009}  Transfer  {P DME CUGD:932399089}  Bathing  {CHP DME VBPA:969037084}  Dressing  {P DME KBGF:212100326}  Toileting  {P DME XLAU:092493112}  Feeding  {P DME FBCR:273062404}  Med Admin  {P DME PCBR:894736068}  Med Delivery   { BEA MED Delivery:510477649}    Wound Care Documentation and Therapy:        Elimination:  Continence: Bowel: {YES / GN:35790}  Bladder: {YES / JE:87633}  Urinary Catheter: {Urinary Catheter:000382130}   Colostomy/Ileostomy/Ileal Conduit: {YES / ZZ:39871}       Date of Last BM: ***    Intake/Output Summary (Last 24 hours) at 12/3/2022 1216  Last data filed at 12/3/2022 0912  Gross per 24 hour   Intake 540 ml   Output 0 ml   Net 540 ml     I/O last 3 completed shifts:   In: 5 [P.O.:720]  Out: -     Safety Concerns:     [address] Concerns:828530200}    Impairments/Disabilities:      [address] [place] Impairments/Disabilities:984965960}    Nutrition Therapy:  Current Nutrition Therapy:   [address] Diet List:784811830}    Routes of Feeding: {[facility] DME Other Feedings:712069225}  Liquids: {Slp liquid thickness:86673}  Daily Fluid Restriction: {[facility] DME Yes amt example:308395380}  Last Modified Barium Swallow with Video (Video Swallowing Test): {Done Not Done QKSD:102142235}    Treatments at the Time of Hospital Discharge:   Respiratory Treatments: ***  Oxygen Therapy:  {Therapy; copd oxygen:55228}  Ventilator:    { CC Vent YNUN:060821049}    Rehab Therapies: {THERAPEUTIC INTERVENTION:9546226496}  Weight Bearing Status/Restrictions: [address] [employer]in}  Other Medical Equipment (for information only, NOT a DME order):  {EQUIPMENT:336434520}  Other Treatments: ***    Patient's personal belongings (please select all that are sent with patient):  {[facility] DME Belongings:677561236}    RN SIGNATURE:  {Esignature:066929529}    CASE MANAGEMENT/SOCIAL WORK SECTION    Inpatient Status Date: ***    Readmission Risk Assessment Score:  Readmission Risk              Risk of Unplanned Readmission:  0           Discharging to Facility/ Agency   Name:   Address:  Phone:  Fax:    Dialysis Facility (if applicable)   Name:  Address:  Dialysis Schedule:  Phone:  Fax:    / signature: {Esignature:120994440}    PHYSICIAN SECTION    Prognosis: Fair    Condition at Discharge: Stable    Rehab Potential (if transferring to Rehab): Good    Recommended Labs or Other Treatments After Discharge: follow up with cardiology after you finish wearing your heart monitor. Follow up with your endocrinologist when possible, ideally in a month or so. Follow up with your primary doctor and consider getting an ultrasound of your thyroid. Follow up with dermatology when possible so they can take a look at the moles on your back. Physician Certification: I certify the above information and transfer of Annie Abdi  is necessary for the continuing treatment of the diagnosis listed and that he requires Home Care for less 30 days.      Update Admission H&P: No change in H&P    PHYSICIAN SIGNATURE:  Electronically signed by Cindy Plata MD on 12/3/22 at 12:17 PM EST

## 2022-12-03 NOTE — DISCHARGE SUMMARY
Hospital Medicine Discharge Summary    Patient ID: Toby Costa      Patient's PCP: Stan Muhammad MD    Admit Date: 12/1/2022     Discharge Date:   12/03/22     Admitting Provider: Katlyn Vuong MD     Discharge Provider: Sarah Murdock MD     Discharge Diagnoses: Active Hospital Problems    Diagnosis     Stroke-like symptom [R29.90]      Priority: Medium       The patient was seen and examined on day of discharge and this discharge summary is in conjunction with any daily progress note from day of discharge. Hospital Course: The patient is a pleasant 78 Y M with a h/o HTN, HLD, DM2, and CVA. He woke up at 3 AM yesterday with weakness in all four extremities and dim vision. Apparently he waited until 11 PM to present to the ED, at which point his symptoms pretty much resolved within the next half hour. In the ED they saw that he had Mobitz type I, second degree AVB, so hospital medicine was called for admission. The patient denied any chest pain, palpitations, or lightheadedness. The following morning he admitted to a few days of a new cough, chills, and mild dyspnea, and tested positive for influenza. Influenza. Oseltamivir. Procalcitonin and COVID negative. Muscular deconditioning. Due to above. Patient declined home PT. 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 This seems asymptomatic, and I think his significant peripheral pitting edema is due to venous insufficiency rather than CHF (plan will be to elevate and compress legs). TTE reassuring. appreciate cardiology input, they will provide a 2 week monitor upon discharge. DM2. A1c 8.4. He follows with endocrinology and is prescribed glargine 54 qAM and 38 qPM, plus lispro TIDAC. We started with significantly less insulin here and his sugars quickly came under tight control, even though he seemed to be eating well.   I suspect that he doesn't take his insulin as prescribed at home. When I asked him about this, he would not give a straight answer. He made comments like \"I feel like if I take that much it will flatline me! \" and \"I'm not a very good patient,\" but then other times he was adamant that he always took the insulin as prescribed. \"  Overall I am leaving his regimen the same and he can sort this out with his endocrinologist.     Demand ischemia. Cardiology will consider an outpatient nuclear stress test.      Incidental R thyroid nodule. PCP to consider thyroid US. Physical Exam Performed:     BP (!) 151/69   Pulse 63   Temp 98 °F (36.7 °C) (Oral)   Resp 18   Ht 5' 9\" (1.753 m)   Wt 209 lb (94.8 kg)   SpO2 94%   BMI 30.86 kg/m²       General appearance:  No apparent distress, appears stated age and cooperative. HEENT:  Normal cephalic, atraumatic without obvious deformity. Pupils equal, round, and reactive to light. Extra ocular muscles intact. Conjunctivae/corneas clear. Neck: Supple, with full range of motion. No jugular venous distention. Trachea midline. Respiratory:  Normal respiratory effort. Clear to auscultation, bilaterally without Rales/Wheezes/Rhonchi. Cardiovascular:  Regular rate and rhythm with normal S1/S2 without murmurs, rubs or gallops. Abdomen: Soft, non-tender, non-distended with normal bowel sounds. Musculoskeletal:  No clubbing, cyanosis or edema bilaterally. Full range of motion without deformity. Skin: Skin color, texture, turgor normal.  No rashes or lesions. Neurologic:  Neurovascularly intact without any focal sensory/motor deficits. Cranial nerves: II-XII intact, grossly non-focal.  Psychiatric:  Alert and oriented, thought content appropriate, good insight  Capillary Refill: Brisk,3 seconds, normal  Peripheral Pulses: +2 palpable, equal bilaterally       Labs:  For convenience and continuity at follow-up the following most recent labs are provided:      CBC:    Lab Results   Component Value Date/Time    WBC 5.4 12/03/2022 06:06 AM    HGB 13.1 12/03/2022 06:06 AM    HCT 38.6 12/03/2022 06:06 AM     12/03/2022 06:06 AM       Renal:    Lab Results   Component Value Date/Time     12/03/2022 06:06 AM    K 3.5 12/03/2022 06:06 AM     12/03/2022 06:06 AM    CO2 27 12/03/2022 06:06 AM    BUN 16 12/03/2022 06:06 AM    CREATININE 1.0 12/03/2022 06:06 AM    CALCIUM 8.3 12/03/2022 06:06 AM         Significant Diagnostic Studies    Radiology:   CT HEAD WO CONTRAST   Final Result   No acute intracranial abnormality. MRI may be obtained if clinically   indicated. CTA HEAD NECK W CONTRAST   Final Result   1. No acute arterial abnormality or hemodynamically significant arterial   stenosis in the head or neck. 2. Incidental 1.6 cm right thyroid nodule. Nonemergent follow-up outpatient   thyroid ultrasound is recommended for guidelines below. RECOMMENDATIONS:   1.6 cm incidental right thyroid nodule. Recommend thyroid US. Reference: J Am Chris Radiol. 2015 Feb;12(2): 143-50         XR CHEST (2 VW)   Final Result   Borderline cardiomegaly with mild central pulmonary congestion      Mild bibasilar atelectasis or early infiltrates which is prominent                Consults:     IP CONSULT TO CARDIOLOGY  IP CONSULT TO HOME CARE NEEDS    Disposition:  follow up with cardiology after you finish wearing your heart monitor. Follow up with your endocrinologist when possible, ideally in a month or so. Follow up with your primary doctor and consider getting an ultrasound of your thyroid. Condition at Discharge:  stable    Discharge Instructions/Follow-up:  follow up with cardiology after you finish wearing your heart monitor. Follow up with your endocrinologist when possible, ideally in a month or so. Follow up with your primary doctor and consider getting an ultrasound of your thyroid. Follow up with dermatology when possible so they can take a look at the moles on your back.      Code Status:  Full Code Activity: activity as tolerated    Diet: diabetic diet      Discharge Medications:     Current Discharge Medication List             Details   oseltamivir (TAMIFLU) 30 MG capsule Take 1 capsule by mouth 2 times daily for 5 doses  Qty: 5 capsule, Refills: 0                Details   aspirin 81 MG chewable tablet Take 1 tablet by mouth daily  Qty: 30 tablet, Refills: 3      losartan (COZAAR) 100 MG tablet Take 1 tablet by mouth daily  Qty: 30 tablet, Refills: 3      amLODIPine (NORVASC) 5 MG tablet Take 1 tablet by mouth daily  Qty: 30 tablet, Refills: 0      atorvastatin (LIPITOR) 80 MG tablet Take 1 tablet by mouth nightly  Qty: 30 tablet, Refills: 3      Acetylcysteine ( PO) Take by mouth      pregabalin (LYRICA) 50 MG capsule Take 50 mg by mouth 2 times daily. docusate sodium (COLACE) 100 MG capsule Take 100 mg by mouth 2 times daily as needed for Constipation      Cholecalciferol (VITAMIN D3) 5000 units TABS Take by mouth daily      brimonidine (ALPHAGAN) 0.2 % ophthalmic solution Place 1 drop into both eyes 2 times daily       b complex vitamins capsule Take 1 capsule by mouth daily      Dorzolamide HCl (TRUSOPT OP) Apply 1 drop to eye 2 times daily       insulin aspart (NOVOLOG) 100 UNIT/ML injection vial Inject  into the skin 3 times daily (before meals). Sliding scale based on meal calculation       insulin glargine (LANTUS) 100 UNIT/ML injection Inject 40 Units into the skin 2 times daily. Time Spent on discharge: 33 mins in the examination, evaluation, counseling and review of medications and discharge plan. Signed:    Татьяна Austin MD   12/3/2022      Thank you Amina Case MD for the opportunity to be involved in this patient's care. If you have any questions or concerns, please feel free to contact me at [phone].

## 2022-12-21 NOTE — ED NOTES
Patient medicated per [facility] for pain. Pt tolerated well. Will continue to monitor.       Stormy Oliveira RN  07/27/18 [id number] no...

## 2022-12-27 NOTE — PROGRESS NOTES
[address] [facility]  [phone]      Attending Physician: No att. providers found  Reason for Consultation/Chief Complaint:  Follow-up for mild troponin T elevation, Mobitz I AV block    Subjective     Priyanka Goncalves is a 78 y. o. with a history of Mobitz type I AV block, essential hypertension, hyperlipidemia, and chronic venous insufficiency who presents for follow-up. Patient was admitted to [facility] earlier this month after presenting with generalized weakness, chills, cough, and lightheadedness. On arrival to the ED, the patient was afebrile with heart rate 99 bpm, /76, and oxygen saturation 97% on room air. Initial EKG showed sinus rhythm with Mobitz type I AV block and possible old inferior infarct pattern. Troponin T trended 0.04-->0. 03. Chest x-ray showed mild central pulmonary congestion and mild bibasilar atelectasis versus early infiltrates. Non-contrast head CT was negative for any acute intracranial abnormality. CTA showed no hemodynamically significant stenoses in the head or neck. There was incidentally noted to be 1.6 cm right thyroid nodule. He ultimately tested positive for influenza A. A TTE was obtained and showed an LVEF of 55-60% with normal wall motion, grade 1 diastolic dysfunction, normal RV size and systolic function, mild mitral stenosis, mild mitral regurgitation, mild aortic regurgitation, and mild pulmonic regurgitation. He remained hemodynamically stable throughout and no higher degrees of AV block were observed on telemetry. He was ultimately discharged with a cardiac event monitor which he wore from 12/3-12/5/22 and showed predominant sinus rhythm with an average heart rate of 64 bpm (range 55-88 bpm), rare PVCs, and intermittent Mobitz 1 AV block (1.78% burden).   An outpatient nuclear SPECT stress test was planned for further cardiac risk stratification once he had additional time to recover from his influenza A infection. Since his discharge, the patient reports that he has been feeling better. He still has some weakness and says that he has to sit and rest after walking relatively short distances due to fatigue. He denies any chest pain or shortness of breath, but states that he still has residual non-productive cough. He has some mild lower extremity edema. He is not wearing his compression stockings today. He recently got an exercise bike and says that he has been doing 70 paddles twice per day. He has also been working to decrease his sodium intake. Past Medical History:   has a past medical history of COVID-19, Diabetes mellitus ([facility] [address] Glaucoma, Hyperlipidemia, Hypertension, Kidney stone, Neuropathy, Osteoarthrosis, hip, and Prepatellar bursitis. Surgical History:   has a past surgical history that includes eye surgery; Kidney stone surgery; Foot surgery; Colonoscopy (2008); Cystoscopy (02/02/2012); other surgical history (Left, 07/17/2017); Colon surgery; and Glaucoma surgery (N/A). Social History:   reports that he has never smoked. He has never used smokeless tobacco. He reports that he does not drink alcohol and does not use drugs. Family History:  No family history of early onset CAD or sudden cardiac death. Home Medications:  Were reviewed and are listed in nursing record and/or below  Prior to Admission medications    Medication Sig Start Date End Date Taking?  Authorizing Provider   aspirin 81 MG chewable tablet Take 1 tablet by mouth daily 2/9/22  Yes Elisa Golden MD   losartan (COZAAR) 100 MG tablet Take 1 tablet by mouth daily 2/9/22  Yes Elisa Golden MD   amLODIPine (NORVASC) 5 MG tablet Take 1 tablet by mouth daily 2/9/22  Yes Elisa Golden MD   atorvastatin (LIPITOR) 80 MG tablet Take 1 tablet by mouth nightly 2/8/22  Yes Elisa Golden MD   Acetylcysteine ( PO) Take by mouth   Yes Historical Provider, MD   pregabalin (LYRICA) 50 MG capsule Take 50 mg by mouth 2 times daily. Yes Historical Provider, MD   docusate sodium (COLACE) 100 MG capsule Take 100 mg by mouth 2 times daily as needed for Constipation   Yes Historical Provider, MD   Cholecalciferol (VITAMIN D3) 5000 units TABS Take by mouth daily   Yes Historical Provider, MD   brimonidine (ALPHAGAN) 0.2 % ophthalmic solution Place 1 drop into both eyes 2 times daily  2/18/16  Yes Historical Provider, MD   b complex vitamins capsule Take 1 capsule by mouth daily   Yes Historical Provider, MD   Dorzolamide HCl (TRUSOPT OP) Apply 1 drop to eye 2 times daily    Yes Historical Provider, MD   insulin aspart (NOVOLOG) 100 UNIT/ML injection vial Inject  into the skin 3 times daily (before meals). Sliding scale based on meal calculation    Yes Historical Provider, MD   insulin glargine (LANTUS) 100 UNIT/ML injection Inject 40 Units into the skin 2 times daily. Yes Historical Provider, MD        CURRENT Medications:  No current facility-administered medications for this visit. Allergies:  Patient has no known allergies. Review of Systems:   A 14 point review of symptoms completed. Pertinent positives identified in the HPI, all other review of symptoms negative as below. Review of Systems   Constitutional:  Negative for chills and fever. HENT:  Negative for congestion, rhinorrhea and sore throat. Eyes:  Negative for photophobia, pain and visual disturbance. Respiratory:  Positive for cough. Negative for shortness of breath. Cardiovascular:  Positive for leg swelling. Negative for chest pain and palpitations. Gastrointestinal:  Negative for abdominal pain, blood in stool, constipation, diarrhea, nausea and vomiting. Endocrine: Negative for cold intolerance and heat intolerance. Genitourinary:  Negative for difficulty urinating, dysuria and hematuria. Musculoskeletal:  Negative for arthralgias, joint swelling and myalgias. Skin:  Negative for rash and wound.    Allergic/Immunologic: Negative for environmental allergies and food allergies. Neurological:  Positive for weakness. Negative for dizziness, syncope and light-headedness. Psychiatric/Behavioral:  Negative for dysphoric mood. The patient is not nervous/anxious. Objective   PHYSICAL EXAM:    Vitals:    12/28/22 0921   BP: 124/78   Pulse: 73   SpO2: 97%    Weight: 214 lb 8 oz (97.3 kg)      General: Elderly male in wheelchair in no acute distress. Pleasant and interactive on exam.  HEENT: Normocephalic, atraumatic, non-icteric, hearing intact, nares normal, mucous membranes moist.  Neck: No JVD. Heart: Regular rate and rhythm. Normal S1 and S2. Grade I/IV diastolic murmur. No rubs or gallops. Lungs: Normal respiratory effort. Clear to auscultation bilaterally. No wheezes, rales, or rhonchi. Abdomen: Soft, non-tender. Normoactive bowel sounds. Hernia present. Skin:  Multiple nevi noted on back. There is one, small bluish colored skin lesion. No rashes. Pulses: Radial pulses 2+ bilaterally. Extremities: 1+ bilateral LE edema. No clubbing or cyanosis. Musculoskeletal: Spontaneously moves all four extremities. Psych: Normal mood and affect. Neuro: Alert and oriented to person, place, and time.       Labs   CBC:   Lab Results   Component Value Date/Time    WBC 5.4 12/03/2022 06:06 AM    RBC 4.28 12/03/2022 06:06 AM    HGB 13.1 12/03/2022 06:06 AM    HCT 38.6 12/03/2022 06:06 AM    MCV 90.2 12/03/2022 06:06 AM    RDW 14.0 12/03/2022 06:06 AM     12/03/2022 06:06 AM     CMP:  Lab Results   Component Value Date/Time     12/03/2022 06:06 AM    K 3.5 12/03/2022 06:06 AM     12/03/2022 06:06 AM    CO2 27 12/03/2022 06:06 AM    BUN 16 12/03/2022 06:06 AM    CREATININE 1.0 12/03/2022 06:06 AM    GFRAA >60 02/09/2022 05:17 PM    GFRAA >60 10/14/2012 11:55 PM    AGRATIO 1.5 12/02/2022 12:32 AM    LABGLOM >60 12/03/2022 06:06 AM    GLUCOSE 82 12/03/2022 06:06 AM    PROT 6.6 12/02/2022 12:32 AM    PROT 6.2 10/14/2012 11:55 PM CALCIUM 8.3 12/03/2022 06:06 AM    BILITOT 0.8 12/02/2022 12:32 AM    ALKPHOS 123 12/02/2022 12:32 AM    AST 20 12/02/2022 12:32 AM    ALT 20 12/02/2022 12:32 AM     PT/INR:  No results found for: PTINR  HgBA1c:  Lab Results   Component Value Date    LABA1C 8.4 12/02/2022     Lab Results   Component Value Date    TROPONINI 0.03 (H) 12/02/2022         Cardiac Data     Echo:  TTE 12/2/22:  Conclusions   Summary   Normal left ventricular size with mild concentric left ventricular   hypertrophy. Normal left ventricular systolic function with ejection fraction of 55-60%. No regional wall motion abnormalites are seen. Grade I diastolic dysfunction with normal filling pressure. The right ventricle is normal in size and function. Mild mitral stenosis secondary to calcific mitral valve disease. Mild mitral regurgitation. Mild aortic regurgitation. Mild pulmonic regurgitation. Systolic pulmonic artery pressure (SPAP) is normal estimated at 26 mmHg   (Right atrial pressure of 3 mmHg). Stress Test:  Treadmill GXT 4/26/01:  1. GXT is negative for ischemia   2. No arrhythmia was identified   3. Normal blood pressure response to exercise   4. Exercise capacity is poor      Cath: N/A     Other Studies:   Chest X-ray 12/2/22:  Borderline cardiomegaly with mild central pulmonary congestion       Mild bibasilar atelectasis or early infiltrates which is prominent      Non-contrast head CT 12/2/22:  No acute intracranial abnormality. MRI may be obtained if clinically   indicated. CTA head/neck 12/2/22:  1. No acute arterial abnormality or hemodynamically significant arterial   stenosis in the head or neck. 2. Incidental 1.6 cm right thyroid nodule. Nonemergent follow-up outpatient   thyroid ultrasound is recommended for guidelines below.          Cardiac event monitor 12/3-/12/15/22:   Average heart rate 64 (55-88), overall PVC burden- 0.09%, no atrial fib/flutter, 2nd degree Mobitz 1 AV block - 1.78%. Assessment and Plan      1. Mild troponin T elevation - Possible that recent mild elevation in cardiac enzymes was secondary to demand ischemia in the setting of patient's influenza A infection. He is not having any angina, TTE showed preserved biventricular systolic function, and overall troponin trend was flat and not consistent with ACS. -Now that he has had additional time to recover from his influenza A infection, we discussed proceeding with a pharmacologic nuclear SPECT stress test for further cardiac risk stratification. However, the patient states that since he has generally been feeling well and is not having angina, he would prefer to defer additional ischemic evaluation at this time.  -Will continue aspirin 81 mg daily and atorvastatin 80 mg qHS     2. Intermittent Mobitz type 1 AV block - Recent cardiac event monitor demonstrated intermittent Mobitz 1 AV block. Does not appear to be symptomatic from this and no other evidence of higher-degree AV block as thus far been observed. -No indication for pacemaker placement at this time  -Continue to avoid AV-rissa blocking agents     3. Influenza infection  -Seems to be gradually recovering  -Received oseltamivir     4. Chronic venous insufficiency  -Recommended use of knee-high compression stockings and encourage patient to keep legs elevated when able to help promote venous return     5. Skin lesion on upper back  -Patient has multiple nevi noted on back  -There is one, small bluish colored skin lesion for which further dermatologic evaluation is recommended - Patient states that he is already established with a dermatologist and will call to schedule follow-up     6. Thyroid nodule  -Seen incidentally on head/neck CTA  -Will obtain thyroid ultrasound for further evaluation     7. Essential hypertension  -BP currently controlled  -Continue amlodipine 5 mg daily and losartan 100 mg daily     8.  Hyperlipidemia  -Continue atorvastatin 40 mg qHS     9. Type II DM  -Most recent HbA1c 8.4%  -Will need follow-up with PCP for monitoring and titration of insulin regimen as needed      Follow-up with me in 6 months. Scribe's attestation: This note was scribed in the presence of Dr. Pierre Trevizo DO, By Carol Kamara RN. It is a pleasure to assist in the care of Martir Seymour. Please call with any questions. The scribes documentation has been prepared under my direction and personally reviewed by me in its entirety. I confirm that the note above accurately reflects all work, treatment, procedures, and medical decision making performed by me. I, Dr. Pierre Trevizo, personally performed the services described in this documentation as scribed by Carol Kamara RN in my presence, and it is both accurate and complete to the best of our ability. Thank you for allowing us to participate in the care of Martir Seymour. Please call me with any questions [phone]. Pierre Trevizo DO   [facility]  [phone] [facility]  [phone] [address]    I will address the patient's cardiac risk factors and adjusted pharmacologic treatment as needed. In addition, I have reinforced the need for patient directed risk factor modification. All questions and concerns were addressed to the patient/family. Alternatives to my treatment were discussed. The note was completed using EMR. Every effort was made to ensure accuracy; however, inadvertent computerized transcription errors may be present.

## 2022-12-28 ENCOUNTER — OFFICE VISIT (OUTPATIENT)
Dept: CARDIOLOGY CLINIC | Age: 82
End: 2022-12-28
Payer: MEDICARE

## 2022-12-28 VITALS
OXYGEN SATURATION: 97 % | HEART RATE: 73 BPM | SYSTOLIC BLOOD PRESSURE: 124 MMHG | BODY MASS INDEX: 31.77 KG/M2 | HEIGHT: 69 IN | DIASTOLIC BLOOD PRESSURE: 78 MMHG | WEIGHT: 214.5 LBS

## 2022-12-28 DIAGNOSIS — R77.8 ELEVATED TROPONIN: ICD-10-CM

## 2022-12-28 DIAGNOSIS — I87.2 CHRONIC VENOUS INSUFFICIENCY: ICD-10-CM

## 2022-12-28 DIAGNOSIS — I10 ESSENTIAL HYPERTENSION: Primary | ICD-10-CM

## 2022-12-28 DIAGNOSIS — L98.9 SKIN LESION: ICD-10-CM

## 2022-12-28 DIAGNOSIS — E78.5 DYSLIPIDEMIA: ICD-10-CM

## 2022-12-28 DIAGNOSIS — Z09 HOSPITAL DISCHARGE FOLLOW-UP: ICD-10-CM

## 2022-12-28 DIAGNOSIS — I44.1 AV BLOCK, MOBITZ 1: ICD-10-CM

## 2022-12-28 DIAGNOSIS — Z79.4 TYPE 2 DIABETES MELLITUS WITHOUT COMPLICATION, WITH LONG-TERM CURRENT USE OF INSULIN (HCC): ICD-10-CM

## 2022-12-28 DIAGNOSIS — E11.9 TYPE 2 DIABETES MELLITUS WITHOUT COMPLICATION, WITH LONG-TERM CURRENT USE OF INSULIN (HCC): ICD-10-CM

## 2022-12-28 DIAGNOSIS — E04.1 THYROID NODULE: ICD-10-CM

## 2022-12-28 PROBLEM — I44.30 AV BLOCK: Status: ACTIVE | Noted: 2022-12-28

## 2022-12-28 PROCEDURE — G8417 CALC BMI ABV UP PARAM F/U: HCPCS | Performed by: INTERNAL MEDICINE

## 2022-12-28 PROCEDURE — 3052F HG A1C>EQUAL 8.0%<EQUAL 9.0%: CPT | Performed by: INTERNAL MEDICINE

## 2022-12-28 PROCEDURE — 3074F SYST BP LT 130 MM HG: CPT | Performed by: INTERNAL MEDICINE

## 2022-12-28 PROCEDURE — G8484 FLU IMMUNIZE NO ADMIN: HCPCS | Performed by: INTERNAL MEDICINE

## 2022-12-28 PROCEDURE — 1111F DSCHRG MED/CURRENT MED MERGE: CPT | Performed by: INTERNAL MEDICINE

## 2022-12-28 PROCEDURE — 99214 OFFICE O/P EST MOD 30 MIN: CPT | Performed by: INTERNAL MEDICINE

## 2022-12-28 PROCEDURE — G8427 DOCREV CUR MEDS BY ELIG CLIN: HCPCS | Performed by: INTERNAL MEDICINE

## 2022-12-28 PROCEDURE — 1123F ACP DISCUSS/DSCN MKR DOCD: CPT | Performed by: INTERNAL MEDICINE

## 2022-12-28 PROCEDURE — 3078F DIAST BP <80 MM HG: CPT | Performed by: INTERNAL MEDICINE

## 2022-12-28 PROCEDURE — 1036F TOBACCO NON-USER: CPT | Performed by: INTERNAL MEDICINE

## 2022-12-28 ASSESSMENT — ENCOUNTER SYMPTOMS
BLOOD IN STOOL: 0
SHORTNESS OF BREATH: 0
SORE THROAT: 0
COUGH: 1
CONSTIPATION: 0
VOMITING: 0
EYE PAIN: 0
RHINORRHEA: 0
DIARRHEA: 0
ABDOMINAL PAIN: 0
PHOTOPHOBIA: 0
NAUSEA: 0

## 2022-12-28 NOTE — PATIENT INSTRUCTIONS
Plan:   Recommend obtaining knee high compression stockings from a medical supply store. You can get the lowest compression since these can be hard to apply if they are too tight. Wear these during the day and take off at night. Would also recommend elevating feet when sitting to help mobilize fluid. Would recommend seeing general surgery for umbilical hernia if this becomes larger or causes pain   Continue current medication recommend- Aspirin and atorvastatin   Recommend a thyroid US to evaluate nodule   5. Discussed a stress test- Lexiscan Myoview. Patient would like to hold off for now   6. Recommend seeing dermatology sooner for multiple nevi noted on back and also issues with your nose. Call today to schedule with dermatologist you are following with   7. Follow up with me in 6 months         Your provider has ordered testing for further evaluation. An order/prescription has been included in your paper work. To schedule outpatient testing, contact Central Scheduling by calling [address] [place] [address] ([phone]).

## 2023-01-20 ENCOUNTER — HOSPITAL ENCOUNTER (OUTPATIENT)
Dept: ULTRASOUND IMAGING | Age: 83
Discharge: HOME | End: 2023-01-20
Payer: MEDICARE

## 2023-01-20 DIAGNOSIS — E04.1 THYROID NODULE: ICD-10-CM

## 2023-01-20 PROCEDURE — 76536 US EXAM OF HEAD AND NECK: CPT

## 2023-01-24 ENCOUNTER — TELEPHONE (OUTPATIENT)
Dept: CARDIOLOGY CLINIC | Age: 83
End: 2023-01-24

## 2023-01-24 NOTE — TELEPHONE ENCOUNTER
Created telephone encounter. Spoke with Vee Marx relayed message per [facility] regarding US of thyroid. Pt verbalized understanding, he has an endocrinologist Dr. Janel Smith and he will call that office to have him look over [address]. Please let patient know that his thyroid ultrasound shows scattered small nodules throughout his thyroid that appear to be benign. There was a more prominent cystic solid nodule within the right thyroid lobe for which is recommended that he be referred to endocrinology for further evaluation and management.

## 2023-03-20 ENCOUNTER — CLINICAL DOCUMENTATION (OUTPATIENT)
Dept: SPIRITUAL SERVICES | Age: 83
End: 2023-03-20

## 2023-03-20 NOTE — ACP (ADVANCE CARE PLANNING)
Advance Care Planning   Ambulatory ACP Specialist Patient Outreach    Date:  3/20/2023  ACP Specialist:  Eduardo Truong    Outreach call to patient in follow-up to ACP Specialist referral from: Tay Marvin MD    [x] PCP  [] Provider   [] Ambulatory Care Management [] Other for Reason:    [x] Advance Directive Assistance  [] Code Status Discussion  [] Complete Portable DNR Order  [] Discuss Goals of Care  [] Complete POST/MOST  [] Early ACP Decision-Making  [] Other    Date Referral Received:2/27/23    Today's Outreach:  [x] First   [] Second  [] Third                               First outreach made by [x]  phone  [] email []   [employer]     Intervention:  [x] Spoke with Patient  [] Left VM requesting return call      Outcome: Scheduled ACP Conversation Call for Friday March 24th at 3pm.        Next Step:   [x] ACP scheduled conversation  [] Outreach again in one week               [] Email / Mail [address] [place]  [] Email / Mail Advance Directive            [] Close Referral. Routing closure to referring provider/staff and to ACP Specialist . [] Closure Letter mailed to Patient with Invitation to Contact ACP Specialist if/when ready.     Thank you for this referral.

## 2023-03-24 ENCOUNTER — CLINICAL DOCUMENTATION (OUTPATIENT)
Dept: SPIRITUAL SERVICES | Age: 83
End: 2023-03-24

## 2023-03-24 NOTE — ACP (ADVANCE CARE PLANNING)
the event your heart stopped as a result of an underlying serious health condition, would you want attempts to be made to restart your heart (answer \"yes\" for attempt to resuscitate) or would you prefer a natural death (answer \"no\" for do not attempt to resuscitate)? \"  Reviewed; pt does not want life-sustaining treatment to continue if in a terminal, irreversible condition. [x] Yes   [] No   Educated Patient / Shelba Specter regarding differences between Advance Directives and portable DNR orders. Length of ACP Conversation in minutes:  45    Conversation Outcomes:  ACP discussion completed; will plan to call pt on Monday March 27 to gather addresses for [facility] so that ACP packet can be mailed out to pt. Follow-up plan:    [x] Schedule follow-up conversation to continue planning  [] Referred individual to Provider for additional questions/concerns   [] Advised patient/agent/surrogate to review completed ACP document and update if needed with changes in condition, patient preferences or care setting    [] This note routed to one or more involved healthcare providers    Summary:  Met with pt and offered ACP support. Pt shared his understanding and we discussed naming HCPOA. Pt would like his wife and children named in his document. He plans to get the addresses, phone numbers so that this SW can type this into ACP document for pt. Reviewed care preferences and pt was educated on living will. We will discuss this again once pt has the document in front of him (plan to meet with pt and wife at that time--to be determined after SW speaks with pt on Mon Mar 27). Thanked pt for his time. Will continue to follow. Next step: call on Monday to finalize HCPOA and mail to pt for review. Follow up accordingly.

## 2024-05-19 ENCOUNTER — APPOINTMENT (OUTPATIENT)
Dept: CT IMAGING | Age: 84
End: 2024-05-19
Payer: OTHER GOVERNMENT

## 2024-05-19 ENCOUNTER — HOSPITAL ENCOUNTER (EMERGENCY)
Age: 84
Discharge: HOME | End: 2024-05-19
Payer: OTHER GOVERNMENT

## 2024-05-19 VITALS
DIASTOLIC BLOOD PRESSURE: 59 MMHG | TEMPERATURE: 98.3 F | HEART RATE: 70 BPM | SYSTOLIC BLOOD PRESSURE: 130 MMHG | OXYGEN SATURATION: 99 % | RESPIRATION RATE: 18 BRPM

## 2024-05-19 DIAGNOSIS — S01.01XA LACERATION OF SCALP, INITIAL ENCOUNTER: ICD-10-CM

## 2024-05-19 DIAGNOSIS — S09.90XA INJURY OF HEAD, INITIAL ENCOUNTER: Primary | ICD-10-CM

## 2024-05-19 LAB
ALBUMIN SERPL-MCNC: 4 G/DL (ref 3.4–5)
ALBUMIN/GLOB SERPL: 1.3 {RATIO} (ref 1.1–2.2)
ALP SERPL-CCNC: 137 U/L (ref 40–129)
ALT SERPL-CCNC: 22 U/L (ref 10–40)
ANION GAP SERPL CALCULATED.3IONS-SCNC: 8 MMOL/L (ref 3–16)
AST SERPL-CCNC: 24 U/L (ref 15–37)
BASOPHILS # BLD: 0.1 K/UL (ref 0–0.2)
BASOPHILS NFR BLD: 0.6 %
BILIRUB SERPL-MCNC: 0.5 MG/DL (ref 0–1)
BUN SERPL-MCNC: 26 MG/DL (ref 7–20)
CALCIUM SERPL-MCNC: 9.3 MG/DL (ref 8.3–10.6)
CHLORIDE SERPL-SCNC: 105 MMOL/L (ref 99–110)
CO2 SERPL-SCNC: 30 MMOL/L (ref 21–32)
CREAT SERPL-MCNC: 1.1 MG/DL (ref 0.8–1.3)
DEPRECATED RDW RBC AUTO: 14 % (ref 12.4–15.4)
EOSINOPHIL # BLD: 0.3 K/UL (ref 0–0.6)
EOSINOPHIL NFR BLD: 3.1 %
GFR SERPLBLD CREATININE-BSD FMLA CKD-EPI: 65 ML/MIN/{1.73_M2}
GLUCOSE SERPL-MCNC: 111 MG/DL (ref 70–99)
HCT VFR BLD AUTO: 47.3 % (ref 40.5–52.5)
HGB BLD-MCNC: 16 G/DL (ref 13.5–17.5)
LYMPHOCYTES # BLD: 1.2 K/UL (ref 1–5.1)
LYMPHOCYTES NFR BLD: 12.6 %
MCH RBC QN AUTO: 30.5 PG (ref 26–34)
MCHC RBC AUTO-ENTMCNC: 33.7 G/DL (ref 31–36)
MCV RBC AUTO: 90.4 FL (ref 80–100)
MONOCYTES # BLD: 0.6 K/UL (ref 0–1.3)
MONOCYTES NFR BLD: 6.5 %
NEUTROPHILS # BLD: 7.5 K/UL (ref 1.7–7.7)
NEUTROPHILS NFR BLD: 77.2 %
PLATELET # BLD AUTO: 125 K/UL (ref 135–450)
PMV BLD AUTO: 9.8 FL (ref 5–10.5)
POTASSIUM SERPL-SCNC: 4.3 MMOL/L (ref 3.5–5.1)
PROT SERPL-MCNC: 7.2 G/DL (ref 6.4–8.2)
RBC # BLD AUTO: 5.23 M/UL (ref 4.2–5.9)
SODIUM SERPL-SCNC: 143 MMOL/L (ref 136–145)
WBC # BLD AUTO: 9.7 K/UL (ref 4–11)

## 2024-05-19 PROCEDURE — 12004 RPR S/N/AX/GEN/TRK7.6-12.5CM: CPT

## 2024-05-19 PROCEDURE — 36415 COLL VENOUS BLD VENIPUNCTURE: CPT

## 2024-05-19 PROCEDURE — 80053 COMPREHEN METABOLIC PANEL: CPT

## 2024-05-19 PROCEDURE — 99284 EMERGENCY DEPT VISIT MOD MDM: CPT

## 2024-05-19 PROCEDURE — 85025 COMPLETE CBC W/AUTO DIFF WBC: CPT

## 2024-05-19 PROCEDURE — 70450 CT HEAD/BRAIN W/O DYE: CPT

## 2024-05-19 PROCEDURE — 72125 CT NECK SPINE W/O DYE: CPT

## 2024-05-19 ASSESSMENT — PAIN SCALES - GENERAL: PAINLEVEL_OUTOF10: 4

## 2024-05-19 ASSESSMENT — PAIN DESCRIPTION - DESCRIPTORS: DESCRIPTORS: OTHER (COMMENT)

## 2024-05-19 ASSESSMENT — PAIN - FUNCTIONAL ASSESSMENT: PAIN_FUNCTIONAL_ASSESSMENT: 0-10

## 2024-05-19 ASSESSMENT — PAIN DESCRIPTION - LOCATION: LOCATION: BACK

## 2024-05-20 NOTE — ED NOTES
Discharge instructions reviewed with patient and family member.  Patient and family verbalized understanding.  All home medications have been reviewed, questions answered and patient voiced understanding. Given prescriptions, discharge instructions, and appointment times.

## 2024-05-21 NOTE — ED PROVIDER NOTES
reasonable to check this at this time    Patient's head and neck CT are unremarkable for acute injury.  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  C-spine does show some lucent foci in the spine for example in situ differential including osteopenia versus lytic lesion.  We did discuss this at bedside and that he will need a MRI outpatient to further characterize these findings with his PCP.  I did repair his laceration and we discussed staple removal and follow-up with his PCP and he is comfortable with discharge at this time.    His CBC and CMP are unremarkable without significant anemia leukocytosis hypokalemia acute kidney injury or electrolyte imbalance.  Thrombocytopenia similar to baseline    The patient tolerated their visit well.  The patient and / or the family were informed of the results of any tests, a time was given to answer questions, a plan was proposed and they agreed with plan.    I am the Primary Clinician of Record.  FINAL IMPRESSION      1. Injury of head, initial encounter    2. Laceration of scalp, initial encounter          DISPOSITION/PLAN     DISPOSITION Decision To Discharge 05/19/2024 10:06:03 PM      PATIENT REFERRED TO:  Debbie Lizama, TRESSA - CNP  [address] [place] [id number]  [phone]    In 1 week  For suture removal, for a re-check of todays visit, For wound re-check    [place] [address]  [phone]  Go to   If symptoms worsen, recurrent falls, worsening headache or vomiting      DISCHARGE MEDICATIONS:  Discharge Medication List as of 5/19/2024 10:09 PM          DISCONTINUED MEDICATIONS:  Discharge Medication List as of 5/19/2024 10:09 PM                 (Please note that portions of this note were completed with a voice recognition program.  Efforts were made to edit the dictations but occasionally words are mis-transcribed.)    Jerica Cramer PA-C (electronically signed)       Jerica Cramer,

## 2024-09-26 ENCOUNTER — HOSPITAL ENCOUNTER (EMERGENCY)
Age: 84
Discharge: HOME | End: 2024-09-26
Attending: STUDENT IN AN ORGANIZED HEALTH CARE EDUCATION/TRAINING PROGRAM
Payer: OTHER GOVERNMENT

## 2024-09-26 ENCOUNTER — APPOINTMENT (OUTPATIENT)
Dept: GENERAL RADIOLOGY | Age: 84
End: 2024-09-26
Payer: OTHER GOVERNMENT

## 2024-09-26 VITALS
DIASTOLIC BLOOD PRESSURE: 60 MMHG | WEIGHT: 202 LBS | HEIGHT: 69 IN | RESPIRATION RATE: 16 BRPM | HEART RATE: 74 BPM | TEMPERATURE: 98.6 F | BODY MASS INDEX: 29.92 KG/M2 | SYSTOLIC BLOOD PRESSURE: 127 MMHG | OXYGEN SATURATION: 98 %

## 2024-09-26 DIAGNOSIS — R60.0 BILATERAL LEG EDEMA: Primary | ICD-10-CM

## 2024-09-26 LAB
ALBUMIN SERPL-MCNC: 3.6 G/DL (ref 3.4–5)
ALBUMIN/GLOB SERPL: 1.2 {RATIO} (ref 1.1–2.2)
ALP SERPL-CCNC: 171 U/L (ref 40–129)
ALT SERPL-CCNC: 18 U/L (ref 10–40)
ANION GAP SERPL CALCULATED.3IONS-SCNC: 9 MMOL/L (ref 3–16)
AST SERPL-CCNC: 17 U/L (ref 15–37)
BASOPHILS # BLD: 0.1 K/UL (ref 0–0.2)
BASOPHILS NFR BLD: 0.8 %
BILIRUB SERPL-MCNC: 0.3 MG/DL (ref 0–1)
BUN SERPL-MCNC: 18 MG/DL (ref 7–20)
CALCIUM SERPL-MCNC: 9.1 MG/DL (ref 8.3–10.6)
CHLORIDE SERPL-SCNC: 104 MMOL/L (ref 99–110)
CO2 SERPL-SCNC: 27 MMOL/L (ref 21–32)
CREAT SERPL-MCNC: 0.8 MG/DL (ref 0.8–1.3)
CRP SERPL-MCNC: 6.4 MG/L (ref 0–5.1)
DEPRECATED RDW RBC AUTO: 14.4 % (ref 12.4–15.4)
EOSINOPHIL # BLD: 0.4 K/UL (ref 0–0.6)
EOSINOPHIL NFR BLD: 5.1 %
GFR SERPLBLD CREATININE-BSD FMLA CKD-EPI: 86 ML/MIN/{1.73_M2}
GLUCOSE SERPL-MCNC: 306 MG/DL (ref 70–99)
HCT VFR BLD AUTO: 42.7 % (ref 40.5–52.5)
HGB BLD-MCNC: 14.7 G/DL (ref 13.5–17.5)
LYMPHOCYTES # BLD: 1.5 K/UL (ref 1–5.1)
LYMPHOCYTES NFR BLD: 18.8 %
MCH RBC QN AUTO: 30.5 PG (ref 26–34)
MCHC RBC AUTO-ENTMCNC: 34.4 G/DL (ref 31–36)
MCV RBC AUTO: 88.7 FL (ref 80–100)
MONOCYTES # BLD: 0.7 K/UL (ref 0–1.3)
MONOCYTES NFR BLD: 9.5 %
NEUTROPHILS # BLD: 5.1 K/UL (ref 1.7–7.7)
NEUTROPHILS NFR BLD: 65.8 %
NT-PROBNP SERPL-MCNC: 414 PG/ML (ref 0–449)
PLATELET # BLD AUTO: 131 K/UL (ref 135–450)
PMV BLD AUTO: 9.4 FL (ref 5–10.5)
POTASSIUM SERPL-SCNC: 4.3 MMOL/L (ref 3.5–5.1)
PROT SERPL-MCNC: 6.7 G/DL (ref 6.4–8.2)
RBC # BLD AUTO: 4.82 M/UL (ref 4.2–5.9)
SODIUM SERPL-SCNC: 140 MMOL/L (ref 136–145)
WBC # BLD AUTO: 7.7 K/UL (ref 4–11)

## 2024-09-26 PROCEDURE — 73630 X-RAY EXAM OF FOOT: CPT

## 2024-09-26 PROCEDURE — 83880 ASSAY OF NATRIURETIC PEPTIDE: CPT

## 2024-09-26 PROCEDURE — 6370000000 HC RX 637 (ALT 250 FOR IP): Performed by: STUDENT IN AN ORGANIZED HEALTH CARE EDUCATION/TRAINING PROGRAM

## 2024-09-26 PROCEDURE — 85025 COMPLETE CBC W/AUTO DIFF WBC: CPT

## 2024-09-26 PROCEDURE — 99284 EMERGENCY DEPT VISIT MOD MDM: CPT

## 2024-09-26 PROCEDURE — 86140 C-REACTIVE PROTEIN: CPT

## 2024-09-26 PROCEDURE — 80053 COMPREHEN METABOLIC PANEL: CPT

## 2024-09-26 RX ORDER — BACITRACIN ZINC AND POLYMYXIN B SULFATE 500; 1000 [USP'U]/G; [USP'U]/G
OINTMENT TOPICAL ONCE
Status: COMPLETED | OUTPATIENT
Start: 2024-09-26 | End: 2024-09-26

## 2024-09-26 RX ADMIN — BACITRACIN ZINC AND POLYMYXIN B SULFATE: at 02:00

## 2024-09-26 ASSESSMENT — PAIN - FUNCTIONAL ASSESSMENT: PAIN_FUNCTIONAL_ASSESSMENT: NONE - DENIES PAIN

## 2024-10-18 ENCOUNTER — APPOINTMENT (OUTPATIENT)
Dept: GENERAL RADIOLOGY | Age: 84
DRG: 193 | End: 2024-10-18
Payer: OTHER GOVERNMENT

## 2024-10-18 ENCOUNTER — HOSPITAL ENCOUNTER (INPATIENT)
Age: 84
LOS: 4 days | Discharge: NURSING, REHAB OR LONG-TERM CARE | DRG: 193 | End: 2024-10-22
Attending: EMERGENCY MEDICINE | Admitting: INTERNAL MEDICINE
Payer: OTHER GOVERNMENT

## 2024-10-18 ENCOUNTER — APPOINTMENT (OUTPATIENT)
Age: 84
DRG: 193 | End: 2024-10-18
Attending: INTERNAL MEDICINE
Payer: OTHER GOVERNMENT

## 2024-10-18 DIAGNOSIS — J18.9 PNEUMONIA OF BOTH LUNGS DUE TO INFECTIOUS ORGANISM, UNSPECIFIED PART OF LUNG: Primary | ICD-10-CM

## 2024-10-18 DIAGNOSIS — R79.89 ELEVATED TROPONIN: ICD-10-CM

## 2024-10-18 DIAGNOSIS — N17.9 AKI (ACUTE KIDNEY INJURY) (HCC): ICD-10-CM

## 2024-10-18 DIAGNOSIS — R06.02 SHORTNESS OF BREATH: ICD-10-CM

## 2024-10-18 PROBLEM — J15.9 BACTERIAL PNEUMONIA: Status: ACTIVE | Noted: 2024-10-18

## 2024-10-18 LAB
ANION GAP SERPL CALCULATED.3IONS-SCNC: 10 MMOL/L (ref 3–16)
BASOPHILS # BLD: 0 K/UL (ref 0–0.2)
BASOPHILS NFR BLD: 0.2 %
BUN SERPL-MCNC: 26 MG/DL (ref 7–20)
CALCIUM SERPL-MCNC: 8.4 MG/DL (ref 8.3–10.6)
CHLORIDE SERPL-SCNC: 108 MMOL/L (ref 99–110)
CO2 SERPL-SCNC: 23 MMOL/L (ref 21–32)
CREAT SERPL-MCNC: 1.1 MG/DL (ref 0.8–1.3)
DEPRECATED RDW RBC AUTO: 14.3 % (ref 12.4–15.4)
ECHO AO ROOT DIAM: 3.6 CM
ECHO AO ROOT INDEX: 1.7 CM/M2
ECHO AV AREA PEAK VELOCITY: 2.2 CM2
ECHO AV AREA VTI: 2.3 CM2
ECHO AV AREA/BSA PEAK VELOCITY: 1 CM2/M2
ECHO AV AREA/BSA VTI: 1.1 CM2/M2
ECHO AV MEAN GRADIENT: 6 MMHG
ECHO AV MEAN VELOCITY: 1.1 M/S
ECHO AV PEAK GRADIENT: 9 MMHG
ECHO AV PEAK VELOCITY: 1.5 M/S
ECHO AV VELOCITY RATIO: 0.73
ECHO AV VTI: 32.7 CM
ECHO BSA: 2.16 M2
ECHO EST RA PRESSURE: 8 MMHG
ECHO IVC EXP: 1.9 CM
ECHO IVC INSP: 1.1 CM
ECHO LA AREA 2C: 29.7 CM2
ECHO LA AREA 4C: 25.9 CM2
ECHO LA MAJOR AXIS: 7.7 CM
ECHO LA MINOR AXIS: 7.8 CM
ECHO LA VOL BP: 83 ML (ref 18–58)
ECHO LA VOL MOD A2C: 94 ML (ref 18–58)
ECHO LA VOL MOD A4C: 72 ML (ref 18–58)
ECHO LA VOL/BSA BIPLANE: 39 ML/M2 (ref 16–34)
ECHO LA VOLUME INDEX MOD A2C: 44 ML/M2 (ref 16–34)
ECHO LA VOLUME INDEX MOD A4C: 34 ML/M2 (ref 16–34)
ECHO LV E' LATERAL VELOCITY: 8.1 CM/S
ECHO LV E' SEPTAL VELOCITY: 6.6 CM/S
ECHO LV EF PHYSICIAN: 65 %
ECHO LV FRACTIONAL SHORTENING: 41 % (ref 28–44)
ECHO LV INTERNAL DIMENSION DIASTOLE INDEX: 1.84 CM/M2
ECHO LV INTERNAL DIMENSION DIASTOLIC: 3.9 CM (ref 4.2–5.9)
ECHO LV INTERNAL DIMENSION SYSTOLIC INDEX: 1.08 CM/M2
ECHO LV INTERNAL DIMENSION SYSTOLIC: 2.3 CM
ECHO LV IVSD: 1.3 CM (ref 0.6–1)
ECHO LV MASS 2D: 179.7 G (ref 88–224)
ECHO LV MASS INDEX 2D: 84.8 G/M2 (ref 49–115)
ECHO LV POSTERIOR WALL DIASTOLIC: 1.3 CM (ref 0.6–1)
ECHO LV RELATIVE WALL THICKNESS RATIO: 0.67
ECHO LVOT AREA: 3.1 CM2
ECHO LVOT AV VTI INDEX: 0.73
ECHO LVOT DIAM: 2 CM
ECHO LVOT MEAN GRADIENT: 3 MMHG
ECHO LVOT PEAK GRADIENT: 5 MMHG
ECHO LVOT PEAK VELOCITY: 1.1 M/S
ECHO LVOT STROKE VOLUME INDEX: 35.5 ML/M2
ECHO LVOT SV: 75.4 ML
ECHO LVOT VTI: 24 CM
ECHO MV A VELOCITY: 2.29 M/S
ECHO MV AREA INDEX PLAN: 0.8 CM2/M2
ECHO MV AREA PHT: 1.8 CM2
ECHO MV AREA PLAN: 1.7 CM2
ECHO MV AREA VTI: 1.6 CM2
ECHO MV E DECELERATION TIME (DT): 187 MS
ECHO MV E VELOCITY: 2.1 M/S
ECHO MV E/A RATIO: 0.92
ECHO MV E/E' LATERAL: 25.93
ECHO MV E/E' RATIO (AVERAGED): 28.87
ECHO MV E/E' SEPTAL: 31.82
ECHO MV LVOT VTI INDEX: 1.98
ECHO MV MAX VELOCITY: 2.4 M/S
ECHO MV MEAN GRADIENT: 12 MMHG
ECHO MV MEAN VELOCITY: 1.7 M/S
ECHO MV PEAK GRADIENT: 22 MMHG
ECHO MV PRESSURE HALF TIME (PHT): 123 MS
ECHO MV VTI: 47.5 CM
ECHO PV MAX VELOCITY: 1.3 M/S
ECHO PV MEAN GRADIENT: 4 MMHG
ECHO PV MEAN VELOCITY: 0.9 M/S
ECHO PV PEAK GRADIENT: 6 MMHG
ECHO PV VTI: 27.2 CM
ECHO RA AREA 4C: 20.5 CM2
ECHO RA END SYSTOLIC VOLUME APICAL 4 CHAMBER INDEX BSA: 27 ML/M2
ECHO RA VOLUME: 57 ML
ECHO RIGHT VENTRICULAR SYSTOLIC PRESSURE (RVSP): 50 MMHG
ECHO RV BASAL DIMENSION: 4.2 CM
ECHO RV FREE WALL PEAK S': 14.3 CM/S
ECHO RV MID DIMENSION: 3 CM
ECHO RV TAPSE: 2.7 CM (ref 1.7–?)
ECHO TV REGURGITANT MAX VELOCITY: 3.24 M/S
ECHO TV REGURGITANT PEAK GRADIENT: 42 MMHG
EKG ATRIAL RATE: 64 BPM
EKG ATRIAL RATE: 80 BPM
EKG DIAGNOSIS: NORMAL
EKG DIAGNOSIS: NORMAL
EKG P AXIS: 35 DEGREES
EKG P AXIS: 50 DEGREES
EKG P-R INTERVAL: 224 MS
EKG P-R INTERVAL: 236 MS
EKG Q-T INTERVAL: 380 MS
EKG Q-T INTERVAL: 428 MS
EKG QRS DURATION: 70 MS
EKG QRS DURATION: 80 MS
EKG QTC CALCULATION (BAZETT): 438 MS
EKG QTC CALCULATION (BAZETT): 441 MS
EKG R AXIS: 18 DEGREES
EKG R AXIS: 8 DEGREES
EKG T AXIS: -2 DEGREES
EKG T AXIS: 36 DEGREES
EKG VENTRICULAR RATE: 64 BPM
EKG VENTRICULAR RATE: 80 BPM
EOSINOPHIL # BLD: 0.1 K/UL (ref 0–0.6)
EOSINOPHIL NFR BLD: 0.9 %
FLUAV RNA RESP QL NAA+PROBE: NOT DETECTED
FLUBV RNA RESP QL NAA+PROBE: NOT DETECTED
GFR SERPLBLD CREATININE-BSD FMLA CKD-EPI: 65 ML/MIN/{1.73_M2}
GLUCOSE BLD-MCNC: 169 MG/DL (ref 70–99)
GLUCOSE BLD-MCNC: 249 MG/DL (ref 70–99)
GLUCOSE BLD-MCNC: 281 MG/DL (ref 70–99)
GLUCOSE BLD-MCNC: 301 MG/DL (ref 70–99)
GLUCOSE SERPL-MCNC: 235 MG/DL (ref 70–99)
HCT VFR BLD AUTO: 35 % (ref 40.5–52.5)
HGB BLD-MCNC: 12 G/DL (ref 13.5–17.5)
LYMPHOCYTES # BLD: 0.9 K/UL (ref 1–5.1)
LYMPHOCYTES NFR BLD: 6.7 %
MCH RBC QN AUTO: 30.3 PG (ref 26–34)
MCHC RBC AUTO-ENTMCNC: 34.3 G/DL (ref 31–36)
MCV RBC AUTO: 88.5 FL (ref 80–100)
MONOCYTES # BLD: 1 K/UL (ref 0–1.3)
MONOCYTES NFR BLD: 7.7 %
NEUTROPHILS # BLD: 10.9 K/UL (ref 1.7–7.7)
NEUTROPHILS NFR BLD: 84.5 %
NT-PROBNP SERPL-MCNC: 1302 PG/ML (ref 0–449)
PERFORMED ON: ABNORMAL
PLATELET # BLD AUTO: 145 K/UL (ref 135–450)
PMV BLD AUTO: 9.8 FL (ref 5–10.5)
POTASSIUM SERPL-SCNC: 3.7 MMOL/L (ref 3.5–5.1)
RBC # BLD AUTO: 3.95 M/UL (ref 4.2–5.9)
SARS-COV-2 RNA RESP QL NAA+PROBE: NOT DETECTED
SODIUM SERPL-SCNC: 141 MMOL/L (ref 136–145)
TROPONIN, HIGH SENSITIVITY: 90 NG/L (ref 0–22)
TROPONIN, HIGH SENSITIVITY: 99 NG/L (ref 0–22)
TSH SERPL DL<=0.005 MIU/L-ACNC: 5.45 UIU/ML (ref 0.27–4.2)
WBC # BLD AUTO: 12.9 K/UL (ref 4–11)

## 2024-10-18 PROCEDURE — 93010 ELECTROCARDIOGRAM REPORT: CPT | Performed by: INTERNAL MEDICINE

## 2024-10-18 PROCEDURE — 93306 TTE W/DOPPLER COMPLETE: CPT | Performed by: STUDENT IN AN ORGANIZED HEALTH CARE EDUCATION/TRAINING PROGRAM

## 2024-10-18 PROCEDURE — 99223 1ST HOSP IP/OBS HIGH 75: CPT | Performed by: INTERNAL MEDICINE

## 2024-10-18 PROCEDURE — 1200000000 HC SEMI PRIVATE

## 2024-10-18 PROCEDURE — 80048 BASIC METABOLIC PNL TOTAL CA: CPT

## 2024-10-18 PROCEDURE — 83880 ASSAY OF NATRIURETIC PEPTIDE: CPT

## 2024-10-18 PROCEDURE — 6370000000 HC RX 637 (ALT 250 FOR IP): Performed by: FAMILY MEDICINE

## 2024-10-18 PROCEDURE — 6360000002 HC RX W HCPCS: Performed by: EMERGENCY MEDICINE

## 2024-10-18 PROCEDURE — 87040 BLOOD CULTURE FOR BACTERIA: CPT

## 2024-10-18 PROCEDURE — 87636 SARSCOV2 & INF A&B AMP PRB: CPT

## 2024-10-18 PROCEDURE — 2580000003 HC RX 258: Performed by: FAMILY MEDICINE

## 2024-10-18 PROCEDURE — 83036 HEMOGLOBIN GLYCOSYLATED A1C: CPT

## 2024-10-18 PROCEDURE — 93306 TTE W/DOPPLER COMPLETE: CPT

## 2024-10-18 PROCEDURE — 93005 ELECTROCARDIOGRAM TRACING: CPT | Performed by: INTERNAL MEDICINE

## 2024-10-18 PROCEDURE — 84439 ASSAY OF FREE THYROXINE: CPT

## 2024-10-18 PROCEDURE — 2580000003 HC RX 258: Performed by: EMERGENCY MEDICINE

## 2024-10-18 PROCEDURE — 84484 ASSAY OF TROPONIN QUANT: CPT

## 2024-10-18 PROCEDURE — 84443 ASSAY THYROID STIM HORMONE: CPT

## 2024-10-18 PROCEDURE — 71045 X-RAY EXAM CHEST 1 VIEW: CPT

## 2024-10-18 PROCEDURE — 36415 COLL VENOUS BLD VENIPUNCTURE: CPT

## 2024-10-18 PROCEDURE — 6370000000 HC RX 637 (ALT 250 FOR IP): Performed by: INTERNAL MEDICINE

## 2024-10-18 PROCEDURE — 99285 EMERGENCY DEPT VISIT HI MDM: CPT

## 2024-10-18 PROCEDURE — 6360000002 HC RX W HCPCS: Performed by: FAMILY MEDICINE

## 2024-10-18 PROCEDURE — 93005 ELECTROCARDIOGRAM TRACING: CPT | Performed by: EMERGENCY MEDICINE

## 2024-10-18 PROCEDURE — 85025 COMPLETE CBC W/AUTO DIFF WBC: CPT

## 2024-10-18 PROCEDURE — 6370000000 HC RX 637 (ALT 250 FOR IP): Performed by: EMERGENCY MEDICINE

## 2024-10-18 RX ORDER — FUROSEMIDE 40 MG/1
40 TABLET ORAL DAILY
Status: DISCONTINUED | OUTPATIENT
Start: 2024-10-18 | End: 2024-10-22 | Stop reason: HOSPADM

## 2024-10-18 RX ORDER — ASPIRIN 325 MG
325 TABLET ORAL ONCE
Status: COMPLETED | OUTPATIENT
Start: 2024-10-18 | End: 2024-10-18

## 2024-10-18 RX ORDER — ACETAMINOPHEN 650 MG/1
650 SUPPOSITORY RECTAL EVERY 6 HOURS PRN
Status: DISCONTINUED | OUTPATIENT
Start: 2024-10-18 | End: 2024-10-22 | Stop reason: HOSPADM

## 2024-10-18 RX ORDER — ONDANSETRON 2 MG/ML
4 INJECTION INTRAMUSCULAR; INTRAVENOUS EVERY 6 HOURS PRN
Status: DISCONTINUED | OUTPATIENT
Start: 2024-10-18 | End: 2024-10-22 | Stop reason: HOSPADM

## 2024-10-18 RX ORDER — ASPIRIN 81 MG/1
81 TABLET, CHEWABLE ORAL DAILY
Status: DISCONTINUED | OUTPATIENT
Start: 2024-10-18 | End: 2024-10-22 | Stop reason: HOSPADM

## 2024-10-18 RX ORDER — PREGABALIN 25 MG/1
50 CAPSULE ORAL 2 TIMES DAILY
Status: DISCONTINUED | OUTPATIENT
Start: 2024-10-18 | End: 2024-10-22 | Stop reason: HOSPADM

## 2024-10-18 RX ORDER — GLUCAGON 1 MG/ML
1 KIT INJECTION PRN
Status: DISCONTINUED | OUTPATIENT
Start: 2024-10-18 | End: 2024-10-20 | Stop reason: SDUPTHER

## 2024-10-18 RX ORDER — FUROSEMIDE 10 MG/ML
40 INJECTION INTRAMUSCULAR; INTRAVENOUS ONCE
Status: DISCONTINUED | OUTPATIENT
Start: 2024-10-18 | End: 2024-10-18

## 2024-10-18 RX ORDER — BRIMONIDINE TARTRATE 2 MG/ML
1 SOLUTION/ DROPS OPHTHALMIC 2 TIMES DAILY
Status: DISCONTINUED | OUTPATIENT
Start: 2024-10-18 | End: 2024-10-22 | Stop reason: HOSPADM

## 2024-10-18 RX ORDER — AMLODIPINE BESYLATE 5 MG/1
5 TABLET ORAL DAILY
Status: DISCONTINUED | OUTPATIENT
Start: 2024-10-18 | End: 2024-10-18

## 2024-10-18 RX ORDER — ENOXAPARIN SODIUM 100 MG/ML
40 INJECTION SUBCUTANEOUS DAILY
Status: DISCONTINUED | OUTPATIENT
Start: 2024-10-18 | End: 2024-10-22 | Stop reason: HOSPADM

## 2024-10-18 RX ORDER — AZITHROMYCIN 250 MG/1
500 TABLET, FILM COATED ORAL EVERY 24 HOURS
Status: COMPLETED | OUTPATIENT
Start: 2024-10-19 | End: 2024-10-21

## 2024-10-18 RX ORDER — SODIUM CHLORIDE 9 MG/ML
INJECTION, SOLUTION INTRAVENOUS PRN
Status: DISCONTINUED | OUTPATIENT
Start: 2024-10-18 | End: 2024-10-22 | Stop reason: HOSPADM

## 2024-10-18 RX ORDER — ATORVASTATIN CALCIUM 40 MG/1
80 TABLET, FILM COATED ORAL NIGHTLY
Status: DISCONTINUED | OUTPATIENT
Start: 2024-10-18 | End: 2024-10-22 | Stop reason: HOSPADM

## 2024-10-18 RX ORDER — INSULIN GLARGINE 100 [IU]/ML
40 INJECTION, SOLUTION SUBCUTANEOUS 2 TIMES DAILY
Status: DISCONTINUED | OUTPATIENT
Start: 2024-10-18 | End: 2024-10-22 | Stop reason: HOSPADM

## 2024-10-18 RX ORDER — ACETAMINOPHEN 325 MG/1
650 TABLET ORAL EVERY 6 HOURS PRN
Status: DISCONTINUED | OUTPATIENT
Start: 2024-10-18 | End: 2024-10-22 | Stop reason: HOSPADM

## 2024-10-18 RX ORDER — ONDANSETRON 4 MG/1
4 TABLET, ORALLY DISINTEGRATING ORAL EVERY 8 HOURS PRN
Status: DISCONTINUED | OUTPATIENT
Start: 2024-10-18 | End: 2024-10-22 | Stop reason: HOSPADM

## 2024-10-18 RX ORDER — SODIUM CHLORIDE 0.9 % (FLUSH) 0.9 %
5-40 SYRINGE (ML) INJECTION EVERY 12 HOURS SCHEDULED
Status: DISCONTINUED | OUTPATIENT
Start: 2024-10-18 | End: 2024-10-22 | Stop reason: HOSPADM

## 2024-10-18 RX ORDER — SODIUM CHLORIDE 0.9 % (FLUSH) 0.9 %
5-40 SYRINGE (ML) INJECTION PRN
Status: DISCONTINUED | OUTPATIENT
Start: 2024-10-18 | End: 2024-10-22 | Stop reason: HOSPADM

## 2024-10-18 RX ORDER — POLYETHYLENE GLYCOL 3350 17 G/17G
17 POWDER, FOR SOLUTION ORAL DAILY PRN
Status: DISCONTINUED | OUTPATIENT
Start: 2024-10-18 | End: 2024-10-22 | Stop reason: HOSPADM

## 2024-10-18 RX ORDER — INSULIN LISPRO 100 [IU]/ML
0-8 INJECTION, SOLUTION INTRAVENOUS; SUBCUTANEOUS
Status: DISCONTINUED | OUTPATIENT
Start: 2024-10-18 | End: 2024-10-20

## 2024-10-18 RX ORDER — DEXTROSE MONOHYDRATE 100 MG/ML
INJECTION, SOLUTION INTRAVENOUS CONTINUOUS PRN
Status: DISCONTINUED | OUTPATIENT
Start: 2024-10-18 | End: 2024-10-20 | Stop reason: SDUPTHER

## 2024-10-18 RX ORDER — SODIUM CHLORIDE 9 MG/ML
INJECTION, SOLUTION INTRAVENOUS CONTINUOUS
Status: DISCONTINUED | OUTPATIENT
Start: 2024-10-18 | End: 2024-10-18

## 2024-10-18 RX ORDER — NEPHROCAP 1 MG
1 CAP ORAL DAILY
Status: DISCONTINUED | OUTPATIENT
Start: 2024-10-18 | End: 2024-10-22 | Stop reason: HOSPADM

## 2024-10-18 RX ADMIN — PREGABALIN 50 MG: 25 CAPSULE ORAL at 08:43

## 2024-10-18 RX ADMIN — ASCORBIC ACID, THIAMINE MONONITRATE,RIBOFLAVIN, NIACINAMIDE, PYRIDOXINE HYDROCHLORIDE, FOLIC ACID, CYANOCOBALAMIN, BIOTIN, CALCIUM PANTOTHENATE, 1 MG: 100; 1.5; 1.7; 20; 10; 1; 6000; 150000; 5 CAPSULE, LIQUID FILLED ORAL at 08:43

## 2024-10-18 RX ADMIN — SODIUM CHLORIDE, PRESERVATIVE FREE 10 ML: 5 INJECTION INTRAVENOUS at 20:20

## 2024-10-18 RX ADMIN — ATORVASTATIN CALCIUM 80 MG: 40 TABLET, FILM COATED ORAL at 20:28

## 2024-10-18 RX ADMIN — INSULIN GLARGINE 40 UNITS: 100 INJECTION, SOLUTION SUBCUTANEOUS at 20:30

## 2024-10-18 RX ADMIN — BRIMONIDINE TARTRATE 1 DROP: 2 SOLUTION/ DROPS OPHTHALMIC at 08:43

## 2024-10-18 RX ADMIN — AZITHROMYCIN MONOHYDRATE 500 MG: 500 INJECTION, POWDER, LYOPHILIZED, FOR SOLUTION INTRAVENOUS at 05:09

## 2024-10-18 RX ADMIN — ASPIRIN 325 MG: 325 TABLET ORAL at 04:22

## 2024-10-18 RX ADMIN — INSULIN LISPRO 2 UNITS: 100 INJECTION, SOLUTION INTRAVENOUS; SUBCUTANEOUS at 12:12

## 2024-10-18 RX ADMIN — ENOXAPARIN SODIUM 40 MG: 100 INJECTION SUBCUTANEOUS at 08:43

## 2024-10-18 RX ADMIN — PREGABALIN 50 MG: 25 CAPSULE ORAL at 20:28

## 2024-10-18 RX ADMIN — SODIUM CHLORIDE 1000 MG: 900 INJECTION INTRAVENOUS at 04:28

## 2024-10-18 RX ADMIN — INSULIN GLARGINE 40 UNITS: 100 INJECTION, SOLUTION SUBCUTANEOUS at 08:43

## 2024-10-18 RX ADMIN — FUROSEMIDE 40 MG: 40 TABLET ORAL at 16:58

## 2024-10-18 RX ADMIN — AMLODIPINE BESYLATE 5 MG: 5 TABLET ORAL at 08:43

## 2024-10-18 RX ADMIN — ASPIRIN 81 MG: 81 TABLET, CHEWABLE ORAL at 08:43

## 2024-10-18 RX ADMIN — INSULIN LISPRO 4 UNITS: 100 INJECTION, SOLUTION INTRAVENOUS; SUBCUTANEOUS at 16:58

## 2024-10-18 RX ADMIN — INSULIN LISPRO 6 UNITS: 100 INJECTION, SOLUTION INTRAVENOUS; SUBCUTANEOUS at 20:29

## 2024-10-18 RX ADMIN — SODIUM CHLORIDE: 9 INJECTION, SOLUTION INTRAVENOUS at 06:51

## 2024-10-18 RX ADMIN — BRIMONIDINE TARTRATE 1 DROP: 2 SOLUTION/ DROPS OPHTHALMIC at 20:20

## 2024-10-18 NOTE — CONSULTS
Wound Referral Progress Note       NAME:  Fernando Trejo  MEDICAL RECORD NUMBER:  [id number]  AGE: 84 y.o.   GENDER: male  : 1940  TODAY'S DATE:  10/18/2024    Subjective Pt legally blind, alert and oriented   Reason for WOCN Evaluation and Assessment: BLE      Fernando Trejo is a 84 y.o. male referred by:   Provider and Nursing    Wound Identification:  Wound Type: venous and diabetic  Contributing Factors: edema, venous stasis, lymphedema, and diabetes    Pt seen for wound care.  Pt admitted from SNF for weakness and SOB.  He denies pain in his BLE.  States he uses lymphedema pumps an hour a day at the SNF.  He states he had wraps on his legs and the nurse removed them here.  He states improvement in his BLE recently    Patient Care Goal:  Wound Healing        PAST MEDICAL HISTORY        Diagnosis Date    COVID-19 2021    Diabetes mellitus (HCC)     type 2    Glaucoma     Hyperlipidemia     Hypertension     Kidney stone     Neuropathy     Osteoarthrosis, hip 2016    Prepatellar bursitis 2016       PAST SURGICAL HISTORY    Past Surgical History:   Procedure Laterality Date    COLON SURGERY      COLONOSCOPY      CYSTOSCOPY  2012    with stent placement    EYE SURGERY      left eyemed valve, bilateral retinal repair    FOOT SURGERY      right, tendon repair    GLAUCOMA SURGERY N/A     AHMED GLAUCOMA VALVE/Model S-2/SN [id number]/MRI Safe per Tutor Assignment    KIDNEY STONE SURGERY      OTHER SURGICAL HISTORY Left 2017    CYSTOSCOPY, LEFT URETEROSCOPY, STENT PLACEMENT, URETHERAL dilation, stone manipulation        FAMILY HISTORY    No family history on file.    SOCIAL HISTORY    Social History     Tobacco Use    Smoking status: Never    Smokeless tobacco: Never   Vaping Use    Vaping status: Never Used   Substance Use Topics    Alcohol use: No    Drug use: No       ALLERGIES    No Known Allergies    MEDICATIONS    No current facility-administered medications on file prior to  12:32 AM       Assessment   Marcelo Risk Score: Marcelo Scale Score: 15    Patient Active Problem List   Diagnosis Code    Renal colic on left side N23    Prepatellar bursitis M70.40    Osteoarthrosis, hip M16.9    Obesity E66.9    HTN (hypertension), benign I10    DM (diabetes mellitus), type 2, uncontrolled with complications [id number]    Chest pain R07.9    TIA (transient ischemic attack) G45.9    Arterial ischemic stroke, ICA, left, acute ([facility]) I63.232    Dyslipidemia E78.5    Acute cerebrovascular accident (CVA) ([facility]) I63.9    COVID-19 virus infection U07.1    Acute respiratory failure with hypoxia J96.01    Type 2 diabetes mellitus with hyperglycemia, with long-term current use of insulin ([facility]) E11.65, Z79.4    Glaucoma H40.9    Stroke-like symptom R29.90    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  Scattered partial and full thickness skin loss, worse on posterior RLE, no weeping          Response to treatment:  Well tolerated by patient.     Pain Assessment:  Severity:  0 / 10  Quality of pain: N/A  Wound Pain Timing/Severity: none  Premedicated: N/A    Plan  BLE:  cleanse open area with NS and gauze, pat dry.  Apply Triad paste to any yellow tissue, then apply Xeroform gauze.   Then wrap BLE with kerlix roll gauze and two 4\" ace wraps from toes to knee gatch, including heels and ankles.  Change daily.           Specialty Bed Required :  N/A    Current Diet: ADULT DIET; Regular; 5 carb choices (75 gm/meal)  Dietician consult:  Yes    Discharge Plan:  Placement for patient upon discharge: Skilled Nursing Facility SNF   Patient appropriate for Outpatient Wound Care Center: Yes    Referrals:      Patient/Caregiver Teaching:  Level of patient/caregiver understanding able to:  Verbal understanding      Electronically signed by Ana Schmidt RN, CWOCN on 10/18/2024 at 11:36 AM

## 2024-10-18 NOTE — PROGRESS NOTES
Pt sitting in bed this AM during shift assessment. He is alert and oriented and accepting of care. Pt has blindness in both eyes, pt states \"I can only see a little.\" Pt on room air with no SOB or dyspnea noted. Vitals stable. Wound care consult completed for LE open areas, fresh dressings applied. Pt to work with PT/OT.     Notified by monitor room this AM pt showing signs of possible [place] heart block. MD Ayala notified. Pt noted with this on a past EKG as well. Repeat EKG ordered, echo ordered, and cardiology consult ordered to consider heart issues as contributing to increasing weakness. BNP remains elevated.      Pt tolerating food and fluids well. Update provided to Donal, son. IV intact. Call light within reach. No new concerns at this time.     Bedside Mobility Assessment Tool (BMAT):     Assessment Level 1- Sit and Shake    1. From a semi-reclined position, ask patient to sit up and rotate to a seated position at the side of the bed. Can use the bedrail.    2. Ask patient to reach out and grab your hand and shake making sure patient reaches across his/her midline.   Pass- Patient is able to come to a seated position, maintain core strength. Maintains seated balance while reaching across midline. Move on to Assessment Level 2.     Assessment Level 2- Stretch and Point   1. With patient in seated position at the side of the bed, have patient place both feet on the floor (or stool) with knees no higher than hips.    2. Ask patient to stretch one leg and straighten the knee, then bend the ankle/flex and point the toes. If appropriate, repeat with the other leg.   Pass- Patient is able to demonstrate appropriate quad strength on intended weight bearing limb(s). Move onto Assessment Level 3.     Assessment Level 3- Stand   1. Ask patient to elevate off the bed or chair (seated to standing) using an assistive device (cane, bedrail).    2. Patient should be able to raise buttocks off be and hold for a count of

## 2024-10-18 NOTE — PROGRESS NOTES
Patient admitted to room 219 from ED. Side rails up x2. Patient has an order for telemetry. Bed is locked and in lowest position. Call light placed in patient reach. Patient explained the routine of the hospital, including but not limited to lab work, vital signs, hourly rounding, etc. Care plans and education updated, CHG wipes used at time of admission.     BP (!) 107/59   Pulse 77   Temp 98.4 °F (36.9 °C) (Oral)   Resp 16   Ht 1.753 m (5' 9\")   Wt 96.2 kg (212 lb)   SpO2 95%   BMI 31.31 kg/m²     Most recent set of vitals as shown.       4 Eyes Skin Assessment     The patient is being assess for   Admission    I agree that 2 RN's have performed a thorough Head to Toe Skin Assessment on the patient. ALL assessment sites listed below have been assessed.      Areas assessed for pressure by both nurses:   [x]   Head, Face, and Ears   [x]   Shoulders, Back, and Chest, Abdomen  [x]   Arms, Elbows, and Hands   [x]   Coccyx, Sacrum, and Ischium  [x]   Legs, Feet, and Heels                  Skin Assessed Under all Medical Devices by both nurses:  O2 device tubing, SCD's, and compression stockings              Please list where Mepile[employer] Borders are located:  coccyx             **SHARE this note so that the co-signing nurse is able to place an eSignature**    Co-signer eSignature: Electronically signed by Adrian Dhillon RN on 10/18/24 at 7:43 AM EDT             Marcelo Prevention initiated:  Yes   Wound Care Orders initiated:  Yes      [facility] nurse consulted for Pressure Injury (Stage 3,4, Unstageable, DTI, NWPT, Complex wounds)and New or Established Ostomies:  No      Primary Nurse eSignature: Electronically signed by OWEN NORIEGA RN on 10/18/24 at 7:42 AM EDT      Bedside Mobility Assessment Tool (BMAT):     Assessment Level 1- Sit and Shake    1. From a semi-reclined position, ask patient to sit up and rotate to a seated position at the side of the bed. Can use the bedrail.    2. Ask patient to reach out and grab your

## 2024-10-18 NOTE — PROGRESS NOTES
[facility]  [phone]  2/16/2024     Rhett Lima  2008  [id number]     Rhett is here today with his mother.  He comes in for evaluation of the following concerns: elevated blood pressure and started on medication by PCP. Noted to have normal blood work.    The patient location is: in [place]  The chief complaint leading to consultation is: f/u hypertension    Visit type: audiovisual    Face to Face time with patient: 25 minutes  35 minutes of total time spent on the encounter, which includes face to face time and non-face to face time preparing to see the patient (eg, review of tests), Obtaining and/or reviewing separately obtained history, Documenting clinical information in the electronic or other health record, Independently interpreting results (not separately reported) and communicating results to the patient/family/caregiver, or Care coordination (not separately reported).     Each patient to whom he or she provides medical services by telemedicine is:  (1) informed of the relationship between the physician and patient and the respective role of any other health care provider with respect to management of the patient; and (2) notified that he or she may decline to receive medical services by telemedicine and may withdraw from such care at any time.    Notes:     Presents today with Mom.   Patient presents today for follow up visit for high blood pressure.   More consistent in taking medication and taking BP.     12/14 127/84  12/28 124/73  01/09 111/62  01/10 124/75  02/01 126/69  02/03 113/73    Patient was started on HCTZ. Patient still taking HCTZ in the morning. Missed maybe 3 days since seeing me last.   Denies ER visit/hospitalization since last visit.   Patient experiences dizziness when at practice.   Denies chest pain, shortness of breath, headaches, syncope, exercise intolerance.   No further symptoms with exercise. Haven't been doing a lot of exercise  Consult has been called to Dr. swann on 10/18/24. Spoke with dada. 10:33 AM    Brenna Hernandez  10/18/2024   recently.   Patient wears glasses.   Reports good appetite and hydration.   Patient states that he has been increasing his hydration, states his school water bottle broke, drinks a lot at practice and 2 bottles of water at home.   UTD on immunizations.   Patient states not having to wake up at night to urinate anymore.   There are no reports of chest pain, chest pain with exertion, cyanosis, exercise intolerance, dyspnea, fatigue, palpitations, syncope, and tachypnea.     Review of Systems:   Neuro:   Normal development. No seizures. No chronic headaches.  Psych: No known ADD or ADHD.  No known learning disabilities.  RESP:  No recurrent pneumonias or asthma.  GI:  No history of reflux. No change in bowel habits.  :  No history of urinary tract infection or renal structural abnormalities.  MS:  No muscle or joint swelling or apparent tenderness.  SKIN:  No history of rashes.  Heme/lymphatic: No history of anemia, excessive bruising or bleeding.  Allergic/Immunologic: No history of environmental allergies or immune compromise.  ENT: No hearing loss, no recurring ear infections.  Eyes:No visual disturbance or need for glasses.     Past Medical History:   Diagnosis Date    Hypertension     Migraines      History reviewed. No pertinent surgical history.    FAMILY HISTORY:   Family History   Problem Relation Age of Onset    Hypertension Mother     Hypertension Father     No Known Problems Brother     Hypertension Paternal Uncle     Hypertension Paternal Uncle     Hypertension Paternal Uncle     Hypertension Paternal Uncle     Hypertension Paternal Uncle     Hypertension Paternal Uncle     Hypertension Paternal Uncle     Hypertension Paternal Uncle     Heart attack Paternal Uncle     Hypertension Maternal Grandmother     Heart disease Maternal Grandmother         S/P stent placement    No Known Problems Maternal Grandfather     Heart disease Paternal Grandmother     Hypertension Paternal Grandmother      Pacemaker/defibrilator Paternal Grandfather        Social History     Socioeconomic History    Marital status: Single   Social History Narrative    Lives with Mom, Dad and brother. No pets or smokers in home.     Currently in 11th grade. Plays baseball.         MEDICATIONS:   Current Outpatient Medications on File Prior to Visit   Medication Sig Dispense Refill    clindamycin (CLEOCIN T) 1 % lotion Apply topically.      tretinoin (RETIN-A) 0.05 % cream Apply 1 application  topically every evening.      [DISCONTINUED] hydroCHLOROthiazide (HYDRODIURIL) 25 MG tablet Take 1 tablet (25 mg total) by mouth once daily. 30 tablet 2     No current facility-administered medications on file prior to visit.       Review of patient's allergies indicates:  No Known Allergies    Immunization status: up to date and documented.      PHYSICAL EXAM:  There were no vitals taken for this visit.  No blood pressure reading on file for this encounter.  There is no height or weight on file to calculate BMI.    General appearance: The patient appears well-developed, well-nourished, in no distress. Overweight.  HEET: Normocephalic. No dysmorphic features. Pink mucous membranes.   Neck: No jugular venous distention while sitting.   Chest: The chest is symmetrically developed.   Lungs: deferred  Cardiac: deferred  Abdomen: deferred  Extremities: Well perfused. No obvious clubbing, cyanosis.   Pulses: deferred  Neuro: normal for age  Skin: deferred      TESTS:  I personally evaluated the following studies previously:    EKG:  Low right atrial rhythm  Early repolarization pattern    ECHOCARDIOGRAM:   Normal intracardiac connections.   No obvious shunting.   Normal cardiac chamber sizes.   Normal biventricular systolic function.   No pericardial effusion.   (Full report is in electronic medical record)      ASSESSMENT and PLAN:  Rhett is a 15 y.o. male with longstanding Hypertension that has improved after starting on HCTZ, with fairly good  control. While he does have some measurements that are in the elevated range, he also has some near 110 SBP and I do not want to risk him getting dizzy secondary to hypotension with an increase in dose. His cardiac evaluation is reassuring without evidence of a primary cardiac cause for his elevated blood pressure. His labs are reportedly normal suggesting that he does not have a renal etiology either. Therefore, it is most likely that this is Essential Hypertension.     Additionally, he is overweight with a BMI of 37, which is not helping is overall cardiac health and BP.     Continue with WCC, including immunizations.   Cleared for anesthesia if needed from a cardiac standpoint.   Continue HCTZ 25 mg po daily.   Continue to take BP daily and keep a log.   Watch how much salt he is eating, and try to decrease as much as possible, eating healthy choices.     Activity:No activity restrictions are indicated at this time. Activities may include endurance training, interscholastic athletic, competition and contact sports.    Endocarditis prophylaxis is not recommended in this circumstance.     FOLLOW UP:  Follow-Up clinic visit in 6 months with the following tests: none planned.    35 minutes were spent in this encounter, at least 50% of which was face to face consultation with Rhett and his family about the following: see above.        Sarah Dias MD  Pediatric Cardiologist

## 2024-10-18 NOTE — ED PROVIDER NOTES
[facility] ED  EMERGENCY DEPARTMENT ENCOUNTER        Pt Name: Fernando Trejo  MRN: [id number]  Birthdate 1940  Date of evaluation: 10/18/2024  Provider: Logan Coello MD  PCP: Debbie Lizama APRN - CNP  Note Started: 2:59 AM EDT 10/18/24    CHIEF COMPLAINT       Chief Complaint   Patient presents with    Shortness of Breath    Fatigue     Pt state that he has had some SOB and fatigue.        HISTORY OF PRESENT ILLNESS: 1 or more Elements   History From: Patient        Fernando Trejo is a 84 y.o. male who presents for evaluation of shortness of breath.  Patient reports that he woke up and was feeling short of breath.  Said he coughed and was able to produce thick sputum and symptoms resolved.  EMS brought the patient to the ED for evaluation.  He states that symptoms seem to have resolved.  Currently denies cough chest pains shortness of breath or fevers.  Is not take medications for symptoms.  He denies close sick contacts.     Nursing Notes were all reviewed and agreed with or any disagreements were addressed in the HPI.    REVIEW OF SYSTEMS :      Review of Systems    Positives and Pertinent negatives as per HPI.     SURGICAL HISTORY     Past Surgical History:   Procedure Laterality Date    COLON SURGERY      COLONOSCOPY  2008    CYSTOSCOPY  02/02/2012    with stent placement    EYE SURGERY      left eyemed valve, bilateral retinal repair    FOOT SURGERY      right, tendon repair    GLAUCOMA SURGERY N/A     AHMED GLAUCOMA VALVE/Model S-2/SN [id number]/MRI Safe per [employer]    KIDNEY STONE SURGERY      OTHER SURGICAL HISTORY Left 07/17/2017    CYSTOSCOPY, LEFT URETEROSCOPY, STENT PLACEMENT, URETHERAL dilation, stone manipulation        CURRENTMEDICATIONS       Previous Medications    ACETYLCYSTEINE ( PO)    Take by mouth    AMLODIPINE (NORVASC) 5 MG TABLET    Take 1 tablet by mouth daily    ASPIRIN 81 MG CHEWABLE TABLET    Take 1 tablet by mouth daily    ATORVASTATIN  in stable condition with started antibiotics for coverage of CAP.     CONSULTS: (Who and What was discussed)  None          Chronic Conditions:   Past Medical History:   Diagnosis Date    COVID-19 08/04/2021    Diabetes mellitus (HCC)     type 2    Glaucoma     Hyperlipidemia     Hypertension     Kidney stone     Neuropathy     Osteoarthrosis, hip 03/21/2016    Prepatellar bursitis 03/21/2016         Records Reviewed (External and source): Reviewed most recent ED visit from 9/26/2024.     Disposition Considerations (include 1 Tests not done, Admit vs D/C, Shared Decision Making, Pt Expectation of Test or Tx.): Patient is a medical edition required mission the hospital for further medical management evaluation.       I am the Primary Clinician of Record.    FINAL IMPRESSION      1. Pneumonia of both lungs due to infectious organism, unspecified part of lung    2. MUNDO (acute kidney injury) ([facility])    3. Elevated troponin          DISPOSITION/PLAN     DISPOSITION    Condition at Disposition: Data Unavailable      PATIENT REFERRED TO:  No follow-up provider specified.    DISCHARGE MEDICATIONS:  New Prescriptions    No medications on file       DISCONTINUED MEDICATIONS:  Discontinued Medications    No medications on file              (Please note that portions of this note were completed with a voice recognition program.  Efforts were made to edit the dictations but occasionally words are mis-transcribed.)    Logan Coello MD (electronically signed)            Logan Coello MD  10/18/24 [id number]

## 2024-10-18 NOTE — CONSULTS
[facility]   CONSULTATION  [phone]        Reason for Consultation/Chief Complaint: \"I have been asked to see him for elevated troponin elevated BNP  Wenckebach and weakness\"    History of Present Illness:  Fernando Trejo is a 84 y.o. patient who presented to the hospital with complaints of cough shortness of breath and weakness. Symptoms started about three days ago and progressive.  He has no chest pain. Occasionally brings up scanty sputum which is thick and hard to expectorate. No hemoptysis.No  fever or chills.  Denies history of heart disease. He has HBP and diabetes both have been treated and under control per patient.  He is on statin for high cholesterol.  He does not smoke or drink alcohol. I have been asked to provide consultation regarding further management and testing.      Past Medical History:   has a past medical history of COVID-19, Diabetes mellitus (HCC), Glaucoma, Hyperlipidemia, Hypertension, Kidney stone, Neuropathy, Osteoarthrosis, hip, and Prepatellar bursitis.    Surgical History:   has a past surgical history that includes eye surgery; Kidney stone surgery; Foot surgery; Colonoscopy (2008); Cystoscopy (02/02/2012); other surgical history (Left, 07/17/2017); Colon surgery; and Glaucoma surgery (N/A).     Social History:   reports that he has never smoked. He has never used smokeless tobacco. He reports that he does not drink alcohol and does not use drugs.     Family History:  family history is not on file.     Home Medications:  Were reviewed and are listed in nursing record. and/or listed below  Prior to Admission medications    Medication Sig Start Date End Date Taking? Authorizing Provider   aspirin 81 MG chewable tablet Take 1 tablet by mouth daily 2/9/22  Yes Efrain Reyez MD   amLODIPine (NORVASC) 5 MG tablet Take 1 tablet by mouth daily 2/9/22  Yes Efrain Reyez MD   atorvastatin (LIPITOR) 80 MG tablet Take 1 tablet by mouth nightly 2/8/22  Yes Aissatou  mellitus with hyperglycemia, with long-term current use of insulin (HCC)    Glaucoma    Stroke-like symptom    AV block    Bacterial pneumonia     I had the opportunity to review the clinical symptoms and presentation of Fernando Trejo.   I will address the patient's cardiac risk factors and adjusted pharmacologic treatment as needed. In addition, I have reinforced the need for patient directed risk factor modification.  Thank you for allowing to us to participate in the care or Fernando Trejo. Further evaluation will be based upon the patient's clinical course and testing results.    All questions and concerns were addressed to the patient/family. Alternatives to my treatment were discussed. The note was completed using EMR. Every effort was made to ensure accuracy; however, inadvertent computerized transcription errors may be present.

## 2024-10-18 NOTE — ACP (ADVANCE CARE PLANNING)
Advance Care Planning     General Advance Care Planning (ACP) Conversation    Date of Conversation: 10/18/2024  Conducted with: Patient with Decision Making Capacity  Other persons present: None    Healthcare Decision Maker:   Primary Decision Maker: Celia Trejo K - Spouse - [phone]    Secondary Decision Maker: Donal Trejo W - Child - [phone]    Secondary Decision Maker: Fernando Trejo Jasbir - Child - [phone]    Supplemental (Other) Decision Maker: Lula Whitaker L - Child - [phone]       Content/Action Overview:  DECLINED ACP Conversation - will revisit periodically  Reviewed DNR/DNI and patient elects Full Code (Attempt Resuscitation)        Length of Voluntary ACP Conversation in minutes:  <16 minutes (Non-Billable)    Clari Walker RN

## 2024-10-18 NOTE — CARE COORDINATION
Case Management Assessment  Initial Evaluation    Date/Time of Evaluation: 10/18/2024 8:34 AM  Assessment Completed by: Clari Walker RN    If patient is discharged prior to next notation, then this note serves as note for discharge by case management.    Patient Name: Fernando Trejo                   YOB: 1940  Diagnosis: Bacterial pneumonia [J15.9]  Elevated troponin [R79.89]  MUNDO (acute kidney injury) (HCC) [N17.9]  Pneumonia of both lungs due to infectious organism, unspecified part of lung [J18.9]                   Date / Time: 10/18/2024  2:27 AM    Patient Admission Status: Inpatient   Readmission Risk (Low < 19, Mod (19-27), High > 27): Readmission Risk Score: 15.5    Current PCP: Debbie Lizama APRN - CNP  PCP verified by CM? Yes    Chart Reviewed: Yes      History Provided by: Patient  Patient Orientation: Alert and Oriented    Patient Cognition: Alert    Hospitalization in the last 30 days (Readmission):  No    If yes, Readmission Assessment in  Navigator will be completed.    Advance Directives:      Code Status: Full Code   Patient's Primary Decision Maker is: Legal Next of Kin    Primary Decision Maker: Celia Trejo - Spouse - [phone]    Secondary Decision Maker: Donal Trejo - Child - [phone]    Secondary Decision Maker: MayaFernando Jasbir  Child - [phone]    Supplemental (Other) Decision Maker: Lula Whitaker - Child - [phone]    Discharge Planning:    Patient lives with: Spouse/Significant Other Type of Home: House  Primary Care Giver: Self  Patient Support Systems include: Spouse/Significant Other   [place] resources[place]  Current community resources: ECF/Home Care ([facility])  Current services prior to admission: Durable Medical Equipment, Home Care, [facility])            Current DME: Walker, Cane, Wheelchair, Shower Chair, Bedside Commode            Type of Home Care services:  None    ADLS  Prior functional level:  [J15.9]  Elevated troponin [R79.89]  MUNDO (acute kidney injury) (HCC) [N17.9]  Pneumonia of both lungs due to infectious organism, unspecified part of lung [J18.9]    IF APPLICABLE: The Patient and/or patient representative Fernando and his family were provided with a choice of provider and agrees with the discharge plan. Freedom of choice list with basic dialogue that supports the patient's individualized plan of care/goals and shares the quality data associated with the providers was provided to:     Patient Representative Name:       The Patient and/or Patient Representative Agree with the Discharge Plan?      Clari Walker RN  Case Management Department  Ph: [phone] Fax: [phone]

## 2024-10-18 NOTE — H&P
Hospital Medicine History & Physical      PCP: Debbie Lizama APRN - CNP    Date of Admission: 10/18/2024    Date of Service: Pt seen/examined on 10/18/2024     Chief Complaint:    Chief Complaint   Patient presents with    Shortness of Breath    Fatigue     Pt state that he has had some SOB and fatigue.        History Of Present Illness:      The patient is a 84 y.o. male with osteoarthritis, neuropathy, hypertension, hyperlipidemia, DM type 2, Glaucoma who presents to [facility] with c/o fatigue and shortness of breath.  He is legally blind  Patient states that he just got very weak and could not even get up; family had difficulty getting  him up to a wheelchair-eventually brought into the emergency department  Workup in the ER as below  Vitals stable.  On RA.  Labs with BNP 1,302, troponin 90, WBC 12.9.  imaging with pneumonia.  Admitted for further management/care.  Patient got empiric antibiotics Rocephin and Zithromax.  Patient seen today, looks extremely weak and ill.  But in no distress.  Awake alert and well-oriented.  No fevers, no shortness of breath, sats are stable on room air  He does have chronic bilateral lower extremity edema with erythema-he states that this is actually better.  Uses leg pumps at home.  BNP and troponin were elevated, troponin pending.  Patient denies any chest pain denies any history of heart disease.  EKG shows sinus rhythm with first-degree AV block however telemetry right now shows sinus rhythm with possibly second-degree AV block repeat EKG ordered.     Past Medical History:        Diagnosis Date    COVID-19 08/04/2021    Diabetes mellitus (HCC)     type 2    Glaucoma     Hyperlipidemia     Hypertension     Kidney stone     Neuropathy     Osteoarthrosis, hip 03/21/2016    Prepatellar bursitis 03/21/2016       Past Surgical History:        Procedure Laterality Date    COLON SURGERY      COLONOSCOPY  2008    CYSTOSCOPY  02/02/2012    with stent placement    EYE SURGERY   Labs     10/18/24  0302   WBC 12.9*   HGB 12.0*   HCT 35.0*   MCV 88.5        BMP:   Recent Labs     10/18/24  0302      K 3.7      CO2 23   BUN 26*   CREATININE 1.1       U/A:    Lab Results   Component Value Date/Time    NITRITE neg 02/02/2012 05:01 AM    COLORU Yellow 12/02/2022 02:12 AM    WBCUA 0-2 12/02/2022 02:12 AM    RBCUA 0-2 12/02/2022 02:12 AM    MUCUS 1+ 12/19/2018 02:10 PM    BACTERIA Rare 12/19/2018 02:10 PM    CLARITYU Clear 12/02/2022 02:12 AM    SPECGRAV 1.020 02/02/2012 05:01 AM    LEUKOCYTESUR Negative 12/02/2022 02:12 AM    BLOODU Negative 12/02/2022 02:12 AM    GLUCOSEU >=1000 12/02/2022 02:12 AM    GLUCOSEU >=1000 02/02/2012 04:58 AM    AMORPHOUS 1+ 12/19/2018 02:10 PM         CULTURES  Results       Procedure Component Value Units Date/Time    Culture, Blood 1 [[id number]]     Order Status: Sent Specimen: Blood     Culture, Blood 2 [[id number]]     Order Status: Sent Specimen: Blood     COVID-19 & Influenza Combo [[id number]] Collected: 10/18/24 0302    Order Status: Completed Specimen: Nasopharyngeal Swab Updated: 10/18/24 0328     SARS-CoV-2 RNA, RT PCR NOT DETECTED     Comment: Not Detected results do not preclude SARS-CoV-2 infection and  should not be used as the sole basis for patient management  decisions.  Results must be combined with clinical observations,  patient history, and epidemiological information.  Testing was performed using MONET KVNG SARS-CoV-2 and Influenza A/B  nucleic acid assay. This test is a multiplex Real-Time Reverse  Transcriptase Polymerase Chain Reaction (RT-PCR)-based in vitro  diagnostic test intended for the qualitative detection of nucleic  acids from SARS-CoV-2, influenza A, and influenza B in nasopharyngeal  and nasal swab specimens for use under the FDA’s Emergency Use  Authorization (EUA) only.    Patient Fact Sheet:  https://www.fda.gov/media/577166/download  Provider Fact Sheet: https://www.fda.gov/media/592962/download  EUA:

## 2024-10-19 ENCOUNTER — APPOINTMENT (OUTPATIENT)
Dept: CT IMAGING | Age: 84
DRG: 193 | End: 2024-10-19
Payer: OTHER GOVERNMENT

## 2024-10-19 LAB
ALBUMIN SERPL-MCNC: 2.7 G/DL (ref 3.4–5)
ALBUMIN/GLOB SERPL: 0.8 {RATIO} (ref 1.1–2.2)
ALP SERPL-CCNC: 101 U/L (ref 40–129)
ALT SERPL-CCNC: 11 U/L (ref 10–40)
ANION GAP SERPL CALCULATED.3IONS-SCNC: 10 MMOL/L (ref 3–16)
AST SERPL-CCNC: 17 U/L (ref 15–37)
BASOPHILS # BLD: 0 K/UL (ref 0–0.2)
BASOPHILS NFR BLD: 0.3 %
BILIRUB SERPL-MCNC: 0.4 MG/DL (ref 0–1)
BUN SERPL-MCNC: 21 MG/DL (ref 7–20)
CALCIUM SERPL-MCNC: 8.2 MG/DL (ref 8.3–10.6)
CHLORIDE SERPL-SCNC: 112 MMOL/L (ref 99–110)
CO2 SERPL-SCNC: 24 MMOL/L (ref 21–32)
CREAT SERPL-MCNC: 0.9 MG/DL (ref 0.8–1.3)
DEPRECATED RDW RBC AUTO: 14 % (ref 12.4–15.4)
EKG ATRIAL RATE: 63 BPM
EKG DIAGNOSIS: NORMAL
EKG P AXIS: 30 DEGREES
EKG P-R INTERVAL: 206 MS
EKG Q-T INTERVAL: 426 MS
EKG QRS DURATION: 82 MS
EKG QTC CALCULATION (BAZETT): 435 MS
EKG R AXIS: 18 DEGREES
EKG T AXIS: 29 DEGREES
EKG VENTRICULAR RATE: 63 BPM
EOSINOPHIL # BLD: 0.2 K/UL (ref 0–0.6)
EOSINOPHIL NFR BLD: 1.8 %
EST. AVERAGE GLUCOSE BLD GHB EST-MCNC: 208.7 MG/DL
GFR SERPLBLD CREATININE-BSD FMLA CKD-EPI: 83 ML/MIN/{1.73_M2}
GLUCOSE BLD-MCNC: 140 MG/DL (ref 70–99)
GLUCOSE BLD-MCNC: 167 MG/DL (ref 70–99)
GLUCOSE BLD-MCNC: 198 MG/DL (ref 70–99)
GLUCOSE BLD-MCNC: 211 MG/DL (ref 70–99)
GLUCOSE BLD-MCNC: 213 MG/DL (ref 70–99)
GLUCOSE BLD-MCNC: 231 MG/DL (ref 70–99)
GLUCOSE BLD-MCNC: 67 MG/DL (ref 70–99)
GLUCOSE BLD-MCNC: 82 MG/DL (ref 70–99)
GLUCOSE SERPL-MCNC: 80 MG/DL (ref 70–99)
HBA1C MFR BLD: 8.9 %
HCT VFR BLD AUTO: 36.2 % (ref 40.5–52.5)
HGB BLD-MCNC: 12.5 G/DL (ref 13.5–17.5)
LYMPHOCYTES # BLD: 1.1 K/UL (ref 1–5.1)
LYMPHOCYTES NFR BLD: 10.2 %
MCH RBC QN AUTO: 30.7 PG (ref 26–34)
MCHC RBC AUTO-ENTMCNC: 34.4 G/DL (ref 31–36)
MCV RBC AUTO: 89.1 FL (ref 80–100)
MONOCYTES # BLD: 0.9 K/UL (ref 0–1.3)
MONOCYTES NFR BLD: 8.8 %
NEUTROPHILS # BLD: 8.4 K/UL (ref 1.7–7.7)
NEUTROPHILS NFR BLD: 78.9 %
NT-PROBNP SERPL-MCNC: 2656 PG/ML (ref 0–449)
PERFORMED ON: ABNORMAL
PERFORMED ON: NORMAL
PLATELET # BLD AUTO: 147 K/UL (ref 135–450)
PMV BLD AUTO: 9.8 FL (ref 5–10.5)
POTASSIUM SERPL-SCNC: 3.6 MMOL/L (ref 3.5–5.1)
PROT SERPL-MCNC: 6 G/DL (ref 6.4–8.2)
RBC # BLD AUTO: 4.06 M/UL (ref 4.2–5.9)
SODIUM SERPL-SCNC: 146 MMOL/L (ref 136–145)
WBC # BLD AUTO: 10.7 K/UL (ref 4–11)

## 2024-10-19 PROCEDURE — 97166 OT EVAL MOD COMPLEX 45 MIN: CPT

## 2024-10-19 PROCEDURE — 97162 PT EVAL MOD COMPLEX 30 MIN: CPT

## 2024-10-19 PROCEDURE — 85025 COMPLETE CBC W/AUTO DIFF WBC: CPT

## 2024-10-19 PROCEDURE — 1200000000 HC SEMI PRIVATE

## 2024-10-19 PROCEDURE — 97110 THERAPEUTIC EXERCISES: CPT

## 2024-10-19 PROCEDURE — 36415 COLL VENOUS BLD VENIPUNCTURE: CPT

## 2024-10-19 PROCEDURE — 93010 ELECTROCARDIOGRAM REPORT: CPT | Performed by: INTERNAL MEDICINE

## 2024-10-19 PROCEDURE — 70450 CT HEAD/BRAIN W/O DYE: CPT

## 2024-10-19 PROCEDURE — 83880 ASSAY OF NATRIURETIC PEPTIDE: CPT

## 2024-10-19 PROCEDURE — 6370000000 HC RX 637 (ALT 250 FOR IP): Performed by: INTERNAL MEDICINE

## 2024-10-19 PROCEDURE — 80053 COMPREHEN METABOLIC PANEL: CPT

## 2024-10-19 PROCEDURE — 70498 CT ANGIOGRAPHY NECK: CPT

## 2024-10-19 PROCEDURE — 97530 THERAPEUTIC ACTIVITIES: CPT

## 2024-10-19 PROCEDURE — 2580000003 HC RX 258: Performed by: FAMILY MEDICINE

## 2024-10-19 PROCEDURE — 93005 ELECTROCARDIOGRAM TRACING: CPT | Performed by: INTERNAL MEDICINE

## 2024-10-19 PROCEDURE — 6360000002 HC RX W HCPCS: Performed by: FAMILY MEDICINE

## 2024-10-19 PROCEDURE — 99232 SBSQ HOSP IP/OBS MODERATE 35: CPT | Performed by: INTERNAL MEDICINE

## 2024-10-19 PROCEDURE — 6360000004 HC RX CONTRAST MEDICATION: Performed by: NURSE PRACTITIONER

## 2024-10-19 PROCEDURE — 6370000000 HC RX 637 (ALT 250 FOR IP): Performed by: FAMILY MEDICINE

## 2024-10-19 RX ORDER — IOPAMIDOL 755 MG/ML
75 INJECTION, SOLUTION INTRAVASCULAR
Status: COMPLETED | OUTPATIENT
Start: 2024-10-19 | End: 2024-10-19

## 2024-10-19 RX ORDER — LOSARTAN POTASSIUM 50 MG/1
50 TABLET ORAL 2 TIMES DAILY
Status: DISCONTINUED | OUTPATIENT
Start: 2024-10-19 | End: 2024-10-22 | Stop reason: HOSPADM

## 2024-10-19 RX ORDER — METOPROLOL SUCCINATE 25 MG/1
25 TABLET, EXTENDED RELEASE ORAL 2 TIMES DAILY
Status: DISCONTINUED | OUTPATIENT
Start: 2024-10-19 | End: 2024-10-22 | Stop reason: HOSPADM

## 2024-10-19 RX ADMIN — SODIUM CHLORIDE, PRESERVATIVE FREE 10 ML: 5 INJECTION INTRAVENOUS at 11:55

## 2024-10-19 RX ADMIN — INSULIN GLARGINE 40 UNITS: 100 INJECTION, SOLUTION SUBCUTANEOUS at 21:18

## 2024-10-19 RX ADMIN — BRIMONIDINE TARTRATE 1 DROP: 2 SOLUTION/ DROPS OPHTHALMIC at 21:13

## 2024-10-19 RX ADMIN — INSULIN LISPRO 2 UNITS: 100 INJECTION, SOLUTION INTRAVENOUS; SUBCUTANEOUS at 21:18

## 2024-10-19 RX ADMIN — ASCORBIC ACID, THIAMINE MONONITRATE,RIBOFLAVIN, NIACINAMIDE, PYRIDOXINE HYDROCHLORIDE, FOLIC ACID, CYANOCOBALAMIN, BIOTIN, CALCIUM PANTOTHENATE, 1 MG: 100; 1.5; 1.7; 20; 10; 1; 6000; 150000; 5 CAPSULE, LIQUID FILLED ORAL at 11:36

## 2024-10-19 RX ADMIN — ENOXAPARIN SODIUM 40 MG: 100 INJECTION SUBCUTANEOUS at 11:35

## 2024-10-19 RX ADMIN — METOPROLOL SUCCINATE 25 MG: 25 TABLET, FILM COATED, EXTENDED RELEASE ORAL at 11:36

## 2024-10-19 RX ADMIN — LOSARTAN POTASSIUM 50 MG: 50 TABLET, FILM COATED ORAL at 11:35

## 2024-10-19 RX ADMIN — PREGABALIN 50 MG: 25 CAPSULE ORAL at 11:36

## 2024-10-19 RX ADMIN — SODIUM CHLORIDE 1000 MG: 900 INJECTION INTRAVENOUS at 06:12

## 2024-10-19 RX ADMIN — LOSARTAN POTASSIUM 50 MG: 50 TABLET, FILM COATED ORAL at 21:15

## 2024-10-19 RX ADMIN — METOPROLOL SUCCINATE 25 MG: 25 TABLET, FILM COATED, EXTENDED RELEASE ORAL at 21:15

## 2024-10-19 RX ADMIN — BRIMONIDINE TARTRATE 1 DROP: 2 SOLUTION/ DROPS OPHTHALMIC at 11:35

## 2024-10-19 RX ADMIN — SODIUM CHLORIDE, PRESERVATIVE FREE 10 ML: 5 INJECTION INTRAVENOUS at 21:21

## 2024-10-19 RX ADMIN — PREGABALIN 50 MG: 25 CAPSULE ORAL at 21:16

## 2024-10-19 RX ADMIN — IOPAMIDOL 75 ML: 755 INJECTION, SOLUTION INTRAVENOUS at 14:24

## 2024-10-19 RX ADMIN — AZITHROMYCIN 500 MG: 250 TABLET, FILM COATED ORAL at 06:12

## 2024-10-19 RX ADMIN — ASPIRIN 81 MG: 81 TABLET, CHEWABLE ORAL at 11:35

## 2024-10-19 RX ADMIN — INSULIN LISPRO 2 UNITS: 100 INJECTION, SOLUTION INTRAVENOUS; SUBCUTANEOUS at 17:11

## 2024-10-19 RX ADMIN — INSULIN GLARGINE 40 UNITS: 100 INJECTION, SOLUTION SUBCUTANEOUS at 11:35

## 2024-10-19 RX ADMIN — FUROSEMIDE 40 MG: 40 TABLET ORAL at 11:35

## 2024-10-19 RX ADMIN — ATORVASTATIN CALCIUM 80 MG: 40 TABLET, FILM COATED ORAL at 21:15

## 2024-10-19 NOTE — PROGRESS NOTES
Inpatient Occupational Therapy Evaluation and Treatment    Unit: 2 [place]  Date:  10/19/2024  Patient Name:    Fernando Trejo  Admitting diagnosis:  Bacterial pneumonia [J15.9]  Elevated troponin [R79.89]  MUNDO (acute kidney injury) (HCC) [N17.9]  Pneumonia of both lungs due to infectious organism, unspecified part of lung [J18.9]  Admit Date:  10/18/2024  Precautions/Restrictions/WB Status/ Lines/ Wounds/ Oxygen: Fall risk, Bed/chair alarm, Lines (IV, Supplemental O2 (3L), and external catheter), Telemetry, and Continuous pulse oximetry, Impaired Vision - Legally Blind    Pt seen for cotreatment this date due to patient safety, patient endurance, complexity of condition, and acute illness/injury    Treatment Time:  16:25-17:28  Treatment Number:  1  Timed Code Treatment Minutes: 53 minutes  Total Treatment Minutes: 63 minutes    Patient Goals for Therapy: \"to get out of this bed\"          Discharge Recommendations: SNF  DME needs for discharge: Defer to facility       Therapy recommendations for staff:   Assist of 1 for transfers with use of No AD and gait belt to/from BSC  to/from chair    History of Present Illness: Per admission H&P from Jose Enrique Ayala MD on 10/18  \"The patient is a 84 y.o. male with osteoarthritis, neuropathy, hypertension, hyperlipidemia, DM type 2, Glaucoma who presents to [facility] with c/o fatigue and shortness of breath.  He is legally blind  Patient states that he just got very weak and could not even get up; family had difficulty getting  him up to a wheelchair-eventually brought into the emergency department  Workup in the ER as below  Vitals stable.  On RA.  Labs with BNP 1,302, troponin 90, WBC 12.9.  imaging with pneumonia.  Admitted for further management/care.  Patient got empiric antibiotics Rocephin and Zithromax.  Patient seen today, looks extremely weak and ill.  But in no distress.  Awake alert and well-oriented.  No fevers, no shortness of breath, sats are stable on room  for achieving goals include: Pt motivated, PLOF, and Pt cooperative   Barriers to achieving goals include:  Complexity of condition    Plan:  To be seen 3-5 x/wk while in acute care setting for therapeutic exercises, bed mobility, transfers, family/patient education, ADL/IADL retraining, and energy conservation training.    Electronically signed by Jasbir Das OT      If patient discharges from this facility prior to next visit, this note will serve as the Discharge Summary

## 2024-10-19 NOTE — PROGRESS NOTES
On evening shift MT called several times for sat 86% on RA.  TCDB would only keep sat up for a few minutes at 91%. O2 applied at 2LNC for sat 91%. Then increased to 3 LNC & sat is 96%.  No calls for decreased sat.

## 2024-10-19 NOTE — PROGRESS NOTES
Inpatient Physical Therapy Evaluation & Treatment    Unit: 2 [place]  Date:  10/19/2024  Patient Name:    Fernando Trejo  Admitting diagnosis:  Bacterial pneumonia [J15.9]  Elevated troponin [R79.89]  MUNDO (acute kidney injury) (HCC) [N17.9]  Pneumonia of both lungs due to infectious organism, unspecified part of lung [J18.9]  Admit Date:  10/18/2024  Precautions/Restrictions/WB Status/ Lines/ Wounds/ Oxygen: Fall risk, Bed/chair alarm, Lines (IV, Supplemental O2 (3L), and external catheter), Telemetry, and Continuous pulse oximetry      Pt seen for cotreatment this date due to patient safety, patient endurance, complexity of condition, and acute illness/injury    Treatment Time:  3467 - 2670  Treatment Number:  1   Timed Code Treatment Minutes: 53 minutes  Total Treatment Minutes:  63  minutes    Patient Stated Goals for Therapy: \" to get out of the bed \"         Discharge Recommendations: SNF  DME needs for discharge: Defer to facility       Therapy recommendation for EMS Transport: can transport by wheelchair    Therapy recommendations for staff:   Assist of 1 for transfers with use of gait belt to/from BSC  to/from chair    History of Present Illness:   Per admission H&P by Dr. Ayala on 10/18/24:  \"The patient is a 84 y.o. male with osteoarthritis, neuropathy, hypertension, hyperlipidemia, DM type 2, Glaucoma who presents to [facility] with c/o fatigue and shortness of breath.  He is legally blind  Patient states that he just got very weak and could not even get up; family had difficulty getting  him up to a wheelchair-eventually brought into the emergency department  Workup in the ER as below  Vitals stable.  On RA.  Labs with BNP 1,302, troponin 90, WBC 12.9.  imaging with pneumonia.  Admitted for further management/care.  Patient got empiric antibiotics Rocephin and Zithromax.  Patient seen today, looks extremely weak and ill.  But in no distress.  Awake alert and well-oriented.  No fevers, no shortness of  family/patient education.    Signature: Jorge Pepe PT     If patient discharges from this facility prior to next visit, this note will serve as the Discharge Summary.    CHF Education  N/A

## 2024-10-19 NOTE — PROGRESS NOTES
the skin 3 times daily (before meals). Sliding scale based on meal calculation    Yes Provider, MD Pao   insulin glargine (LANTUS) 100 UNIT/ML injection Inject 40 Units into the skin 2 times daily   Yes ProviderPao MD   losartan (COZAAR) 100 MG tablet Take 1 tablet by mouth daily  Patient not taking: Reported on 9/26/2024 2/9/22   Efrain Reyez MD   docusate sodium (COLACE) 100 MG capsule Take 1 capsule by mouth 2 times daily as needed for Constipation  Patient not taking: Reported on 10/18/2024    Provider, MD Pao        CURRENT Medications:  pregabalin (LYRICA) capsule 50 mg, BID  insulin glargine (LANTUS) injection vial 40 Units, BID  brimonidine (ALPHAGAN) 0.2 % ophthalmic solution 1 drop, BID  Virt-Caps 1 mg, Daily  atorvastatin (LIPITOR) tablet 80 mg, Nightly  aspirin chewable tablet 81 mg, Daily  sodium chloride flush 0.9 % injection 5-40 mL, 2 times per day  sodium chloride flush 0.9 % injection 5-40 mL, PRN  0.9 % sodium chloride infusion, PRN  enoxaparin (LOVENOX) injection 40 mg, Daily  ondansetron (ZOFRAN-ODT) disintegrating tablet 4 mg, Q8H PRN   Or  ondansetron (ZOFRAN) injection 4 mg, Q6H PRN  polyethylene glycol (GLYCOLAX) packet 17 g, Daily PRN  acetaminophen (TYLENOL) tablet 650 mg, Q6H PRN   Or  acetaminophen (TYLENOL) suppository 650 mg, Q6H PRN  cefTRIAXone (ROCEPHIN) 1,000 mg in sodium chloride 0.9 % 50 mL IVPB (mini-bag), Q24H   And  azithromycin (ZITHROMAX) tablet 500 mg, Q24H  glucose chewable tablet 16 g, PRN  dextrose bolus 10% 125 mL, PRN   Or  dextrose bolus 10% 250 mL, PRN  glucagon injection 1 mg, PRN  dextrose 10 % infusion, Continuous PRN  insulin lispro (HUMALOG,ADMELOG) injection vial 0-8 Units, 4x Daily AC & HS  furosemide (LASIX) tablet 40 mg, Daily      Allergies:  Patient has no known allergies.     Review of Systems:   A 14 point review of symptoms completed. Pertinent positives identified in the HPI, all other review of symptoms negative.  CREATININE 1.1 10/18/2024 03:02 AM    GFRAA >60 02/09/2022 05:17 PM    GFRAA >60 10/14/2012 11:55 PM    AGRATIO 1.2 09/26/2024 12:37 AM    LABGLOM 65 10/18/2024 03:02 AM    LABGLOM >60 12/03/2022 06:06 AM    GLUCOSE 235 10/18/2024 03:02 AM    CALCIUM 8.4 10/18/2024 03:02 AM    BILITOT 0.3 09/26/2024 12:37 AM    ALKPHOS 171 09/26/2024 12:37 AM    AST 17 09/26/2024 12:37 AM    ALT 18 09/26/2024 12:37 AM     PT/INR:  No results found for: \"PTINR\"  HgBA1c:  Lab Results   Component Value Date    LABA1C 8.9 10/18/2024     Lab Results   Component Value Date    TROPONINI 0.03 (H) 12/02/2022         Interval Testing/Data:     Telemetry personally reviewed: sinus rhythm in 80s    NT proBNP 1302  Troponin 90, 99  HBA1c 8.9  TSH 5.45  Hgb 12.0    EKG 10/18/2024: NSR @ 64 bpm with Mobitz Type I    Echo 10/18/2024: Summary    Left Ventricle: Hyperdynamic left ventricular systolic function with a visually estimated EF of >65%. Left ventricle is smaller than normal. Mildly increased wall thickness. Findings consistent with mild concentric hypertrophy. Normal wall motion. Indeterminate diastolic function due to mitral valve disease.    Aortic Valve: Trileaflet valve. Mildly thickened cusps. No regurgitation. No stenosis.    Mitral Valve: Severe annular calcification at the posterior leaflet. Moderately calcified leaflets. The posterior leaflet of the mitral valve has restricted mobility. Mild regurgitation. Mild-moderate mitral stenosis noted by doppler evaluation. MV mean gradient is 12 mmHg. MV area by continuity equation is 1.6 cm2. MV area by planimetry is 1.7 cm2.  Suspect elevated mitral valve mean gradient secondary to increased left atrial pressure.  Further evaluation with KAM could be considered.    Tricuspid Valve: Valve structure is normal. Mild to moderate regurgitation. No stenosis noted. Moderately elevated RVSP, consistent with moderate pulmonary hypertension. Est RA pressure is 8 mmHg. The estimated RVSP is 50

## 2024-10-19 NOTE — PROGRESS NOTES
Progress Note    Admit Date:  10/18/2024    Patient presented with weakness and fatigue.   Workup in the ER revealed pneumonia-patient admitted started on antibiotics  Hypoxia overnight and currently requiring 2 to 3 L of oxygen  Telemetry showed transient Wenckebach phenomenon-seen by cardiology.  Troponin elevated BNP elevated    Subjective:  Mr. Trejo seen.  Still looks ill.  On supplemental oxygen 3 L.  However getting frustrated of being here in the hospital.     Plan of care discussed in detail with patient and with patient's son and grandson at bedside    Objective:   /68   Pulse 66   Temp 99 °F (37.2 °C) (Axillary)   Resp 16   Ht 1.753 m (5' 9\")   Wt 96.2 kg (212 lb)   SpO2 94%   BMI 31.31 kg/m²     Intake/Output Summary (Last 24 hours) at 10/19/2024 1244  Last data filed at 10/19/2024 0507  Gross per 24 hour   Intake 390 ml   Output 1600 ml   Net -1210 ml         Physical Exam:  Gen: No distress. Alert.  Awake and well-oriented.  Patient looks ill and fatigued.  He is legally blind, keeps his eyes closed  Eyes: PERRL. No sclera icterus. No conjunctival injection.   ENT: No discharge. Pharynx clear.   Neck: No JVD.  No Carotid Bruit. Trachea midline.  Resp: No accessory muscle use.  Diminished breath sounds with mild rhonchi.  CV: Regular rate. Regular rhythm. No murmur.  No rub.   GI: Non-tender. Non-distended. No masses. No organomegaly. Normal bowel sounds. No hernia.   Skin: Warm and dry. No nodule on exposed extremities. No rash on exposed extremities.   M/S: No cyanosis. No joint deformity. No clubbing.  Bilateral lower extremity 1+ edema with chronic bilateral leg erythema-bilateral legs and Ace wrap now  Neuro: Awake. Grossly nonfocal    Psych: Oriented x 3. No anxiety or agitation        Medications:   Scheduled Meds:   losartan  50 mg Oral BID    metoprolol succinate  25 mg Oral BID    pregabalin  50 mg Oral BID    insulin glargine  40 Units SubCUTAneous BID    brimonidine  1 drop  bilateral lower extremities wrapped in Ace wrap  -Started on Lasix daily as above     #DM type 2  -monitor glucose  -continue Lantus 40 units BID, SSI coverage  -Hemoglobin A1c elevated at 8.9%     #Hypertension  -Hold amlodipine due to leg edema  -held Losartan due to renal insufficiency; monitor creatinine-losartan resumed now.  Monitor blood pressure and BMP     #Hyperlipidemia  -on Atorvastatin      #Neuropathy  -continue Lyrica     #Glaucoma   -continue home eye drop regimen     #H/o CVA  -on aspirin, Statin     DVT Prophylaxis: Lovenox  Diet: ADULT DIET; Regular; 5 carb choices (75 gm/meal)  Code Status: Full Code     Plan of care discussed in detail with patient, his family son and grandson and with patient's nurse    -Continue current management with IV antibiotics and oxygen; wean oxygen as tolerated.  -Continue PT OT  -Continue cardiac monitoring.    Likely DC home once medically stable with continued outpatient cardiac follow-up    Total time today 38 minutes. > 50 % time dominated by coordination of care and D/W family          Jose Enrique Ayala MD   10/19/2024 12:44 PM

## 2024-10-19 NOTE — PROGRESS NOTES
Per son at bedside, patient having slurred speech.      Upon my assessment, speech clear. Strength equal all extremities.  No pronator drift.     Ordered CT head, CTA head/neck.    Consider MRI.     Yaneth Farris FNP-C  10/19/2024

## 2024-10-20 LAB
ALBUMIN SERPL-MCNC: 2.6 G/DL (ref 3.4–5)
ALBUMIN/GLOB SERPL: 0.8 {RATIO} (ref 1.1–2.2)
ALP SERPL-CCNC: 92 U/L (ref 40–129)
ALT SERPL-CCNC: 11 U/L (ref 10–40)
ANION GAP SERPL CALCULATED.3IONS-SCNC: 9 MMOL/L (ref 3–16)
AST SERPL-CCNC: 18 U/L (ref 15–37)
BASOPHILS # BLD: 0 K/UL (ref 0–0.2)
BASOPHILS NFR BLD: 0.2 %
BILIRUB SERPL-MCNC: 0.4 MG/DL (ref 0–1)
BUN SERPL-MCNC: 18 MG/DL (ref 7–20)
CALCIUM SERPL-MCNC: 8 MG/DL (ref 8.3–10.6)
CHLORIDE SERPL-SCNC: 108 MMOL/L (ref 99–110)
CO2 SERPL-SCNC: 27 MMOL/L (ref 21–32)
CREAT SERPL-MCNC: 0.9 MG/DL (ref 0.8–1.3)
DEPRECATED RDW RBC AUTO: 13.9 % (ref 12.4–15.4)
EOSINOPHIL # BLD: 0.3 K/UL (ref 0–0.6)
EOSINOPHIL NFR BLD: 3.3 %
GFR SERPLBLD CREATININE-BSD FMLA CKD-EPI: 83 ML/MIN/{1.73_M2}
GLUCOSE BLD-MCNC: 115 MG/DL (ref 70–99)
GLUCOSE BLD-MCNC: 130 MG/DL (ref 70–99)
GLUCOSE BLD-MCNC: 211 MG/DL (ref 70–99)
GLUCOSE BLD-MCNC: 222 MG/DL (ref 70–99)
GLUCOSE BLD-MCNC: 281 MG/DL (ref 70–99)
GLUCOSE BLD-MCNC: 70 MG/DL (ref 70–99)
GLUCOSE BLD-MCNC: 71 MG/DL (ref 70–99)
GLUCOSE BLD-MCNC: 98 MG/DL (ref 70–99)
GLUCOSE SERPL-MCNC: 57 MG/DL (ref 70–99)
HCT VFR BLD AUTO: 32.5 % (ref 40.5–52.5)
HGB BLD-MCNC: 11.4 G/DL (ref 13.5–17.5)
LYMPHOCYTES # BLD: 1.2 K/UL (ref 1–5.1)
LYMPHOCYTES NFR BLD: 12.3 %
MAGNESIUM SERPL-MCNC: 2.1 MG/DL (ref 1.8–2.4)
MCH RBC QN AUTO: 30.9 PG (ref 26–34)
MCHC RBC AUTO-ENTMCNC: 35 G/DL (ref 31–36)
MCV RBC AUTO: 88.2 FL (ref 80–100)
MONOCYTES # BLD: 0.8 K/UL (ref 0–1.3)
MONOCYTES NFR BLD: 8.4 %
NEUTROPHILS # BLD: 7.6 K/UL (ref 1.7–7.7)
NEUTROPHILS NFR BLD: 75.8 %
PERFORMED ON: ABNORMAL
PERFORMED ON: NORMAL
PLATELET # BLD AUTO: 151 K/UL (ref 135–450)
PMV BLD AUTO: 9.6 FL (ref 5–10.5)
POTASSIUM SERPL-SCNC: 3.4 MMOL/L (ref 3.5–5.1)
PROT SERPL-MCNC: 5.7 G/DL (ref 6.4–8.2)
RBC # BLD AUTO: 3.68 M/UL (ref 4.2–5.9)
SODIUM SERPL-SCNC: 144 MMOL/L (ref 136–145)
WBC # BLD AUTO: 10 K/UL (ref 4–11)

## 2024-10-20 PROCEDURE — 36415 COLL VENOUS BLD VENIPUNCTURE: CPT

## 2024-10-20 PROCEDURE — 6360000002 HC RX W HCPCS: Performed by: FAMILY MEDICINE

## 2024-10-20 PROCEDURE — 6370000000 HC RX 637 (ALT 250 FOR IP): Performed by: INTERNAL MEDICINE

## 2024-10-20 PROCEDURE — 6370000000 HC RX 637 (ALT 250 FOR IP): Performed by: FAMILY MEDICINE

## 2024-10-20 PROCEDURE — 83735 ASSAY OF MAGNESIUM: CPT

## 2024-10-20 PROCEDURE — 85025 COMPLETE CBC W/AUTO DIFF WBC: CPT

## 2024-10-20 PROCEDURE — 1200000000 HC SEMI PRIVATE

## 2024-10-20 PROCEDURE — 80053 COMPREHEN METABOLIC PANEL: CPT

## 2024-10-20 PROCEDURE — 2580000003 HC RX 258: Performed by: FAMILY MEDICINE

## 2024-10-20 RX ORDER — POTASSIUM CHLORIDE 750 MG/1
10 TABLET, EXTENDED RELEASE ORAL DAILY
Status: DISCONTINUED | OUTPATIENT
Start: 2024-10-20 | End: 2024-10-22 | Stop reason: HOSPADM

## 2024-10-20 RX ORDER — INSULIN LISPRO 100 [IU]/ML
0-4 INJECTION, SOLUTION INTRAVENOUS; SUBCUTANEOUS
Status: DISCONTINUED | OUTPATIENT
Start: 2024-10-20 | End: 2024-10-22 | Stop reason: HOSPADM

## 2024-10-20 RX ORDER — DEXTROSE MONOHYDRATE 100 MG/ML
INJECTION, SOLUTION INTRAVENOUS CONTINUOUS PRN
Status: DISCONTINUED | OUTPATIENT
Start: 2024-10-20 | End: 2024-10-22 | Stop reason: HOSPADM

## 2024-10-20 RX ORDER — NYSTATIN 100000 U/G
OINTMENT TOPICAL 3 TIMES DAILY
Status: DISCONTINUED | OUTPATIENT
Start: 2024-10-20 | End: 2024-10-22 | Stop reason: HOSPADM

## 2024-10-20 RX ORDER — GLUCAGON 1 MG/ML
1 KIT INJECTION PRN
Status: DISCONTINUED | OUTPATIENT
Start: 2024-10-20 | End: 2024-10-22 | Stop reason: HOSPADM

## 2024-10-20 RX ADMIN — LOSARTAN POTASSIUM 50 MG: 50 TABLET, FILM COATED ORAL at 22:18

## 2024-10-20 RX ADMIN — INSULIN GLARGINE 40 UNITS: 100 INJECTION, SOLUTION SUBCUTANEOUS at 11:18

## 2024-10-20 RX ADMIN — SODIUM CHLORIDE 1000 MG: 900 INJECTION INTRAVENOUS at 06:46

## 2024-10-20 RX ADMIN — ACETAMINOPHEN 650 MG: 325 TABLET ORAL at 22:20

## 2024-10-20 RX ADMIN — INSULIN GLARGINE 40 UNITS: 100 INJECTION, SOLUTION SUBCUTANEOUS at 22:17

## 2024-10-20 RX ADMIN — ENOXAPARIN SODIUM 40 MG: 100 INJECTION SUBCUTANEOUS at 11:22

## 2024-10-20 RX ADMIN — LOSARTAN POTASSIUM 50 MG: 50 TABLET, FILM COATED ORAL at 11:21

## 2024-10-20 RX ADMIN — BRIMONIDINE TARTRATE 1 DROP: 2 SOLUTION/ DROPS OPHTHALMIC at 22:18

## 2024-10-20 RX ADMIN — FUROSEMIDE 40 MG: 40 TABLET ORAL at 11:27

## 2024-10-20 RX ADMIN — METOPROLOL SUCCINATE 25 MG: 25 TABLET, FILM COATED, EXTENDED RELEASE ORAL at 22:18

## 2024-10-20 RX ADMIN — BRIMONIDINE TARTRATE 1 DROP: 2 SOLUTION/ DROPS OPHTHALMIC at 11:18

## 2024-10-20 RX ADMIN — ACETAMINOPHEN 650 MG: 325 TABLET ORAL at 16:47

## 2024-10-20 RX ADMIN — ATORVASTATIN CALCIUM 80 MG: 40 TABLET, FILM COATED ORAL at 22:18

## 2024-10-20 RX ADMIN — METOPROLOL SUCCINATE 25 MG: 25 TABLET, FILM COATED, EXTENDED RELEASE ORAL at 11:23

## 2024-10-20 RX ADMIN — POTASSIUM CHLORIDE 10 MEQ: 10 TABLET, EXTENDED RELEASE ORAL at 11:21

## 2024-10-20 RX ADMIN — AZITHROMYCIN 500 MG: 250 TABLET, FILM COATED ORAL at 06:30

## 2024-10-20 RX ADMIN — INSULIN LISPRO 1 UNITS: 100 INJECTION, SOLUTION INTRAVENOUS; SUBCUTANEOUS at 12:13

## 2024-10-20 RX ADMIN — PREGABALIN 50 MG: 25 CAPSULE ORAL at 22:18

## 2024-10-20 RX ADMIN — ASPIRIN 81 MG: 81 TABLET, CHEWABLE ORAL at 11:23

## 2024-10-20 RX ADMIN — ASCORBIC ACID, THIAMINE MONONITRATE,RIBOFLAVIN, NIACINAMIDE, PYRIDOXINE HYDROCHLORIDE, FOLIC ACID, CYANOCOBALAMIN, BIOTIN, CALCIUM PANTOTHENATE, 1 MG: 100; 1.5; 1.7; 20; 10; 1; 6000; 150000; 5 CAPSULE, LIQUID FILLED ORAL at 11:21

## 2024-10-20 RX ADMIN — NYSTATIN OINTMENT: 100000 OINTMENT TOPICAL at 22:17

## 2024-10-20 RX ADMIN — SODIUM CHLORIDE, PRESERVATIVE FREE 5 ML: 5 INJECTION INTRAVENOUS at 11:23

## 2024-10-20 RX ADMIN — PREGABALIN 50 MG: 25 CAPSULE ORAL at 11:22

## 2024-10-20 RX ADMIN — SODIUM CHLORIDE, PRESERVATIVE FREE 10 ML: 5 INJECTION INTRAVENOUS at 22:52

## 2024-10-20 RX ADMIN — INSULIN LISPRO 1 UNITS: 100 INJECTION, SOLUTION INTRAVENOUS; SUBCUTANEOUS at 17:46

## 2024-10-20 ASSESSMENT — PAIN - FUNCTIONAL ASSESSMENT
PAIN_FUNCTIONAL_ASSESSMENT: ACTIVITIES ARE NOT PREVENTED
PAIN_FUNCTIONAL_ASSESSMENT: PREVENTS OR INTERFERES SOME ACTIVE ACTIVITIES AND ADLS

## 2024-10-20 ASSESSMENT — PAIN SCALES - GENERAL
PAINLEVEL_OUTOF10: 3
PAINLEVEL_OUTOF10: 3
PAINLEVEL_OUTOF10: 1
PAINLEVEL_OUTOF10: 4

## 2024-10-20 ASSESSMENT — PAIN DESCRIPTION - LOCATION
LOCATION: FOOT;LEG
LOCATION: HEAD;NECK

## 2024-10-20 ASSESSMENT — PAIN DESCRIPTION - PAIN TYPE
TYPE: NEUROPATHIC PAIN
TYPE: ACUTE PAIN

## 2024-10-20 ASSESSMENT — PAIN DESCRIPTION - DESCRIPTORS
DESCRIPTORS: NUMBNESS
DESCRIPTORS: DISCOMFORT

## 2024-10-20 ASSESSMENT — PAIN DESCRIPTION - ORIENTATION
ORIENTATION: POSTERIOR
ORIENTATION: LEFT;RIGHT

## 2024-10-20 ASSESSMENT — PAIN DESCRIPTION - FREQUENCY: FREQUENCY: INTERMITTENT

## 2024-10-20 ASSESSMENT — PAIN DESCRIPTION - ONSET: ONSET: GRADUAL

## 2024-10-20 NOTE — PROGRESS NOTES
Pt's glucose dropped several times over night. Pt was given juice, sandwich, & cereal. Pt's glucose would then be WNL

## 2024-10-20 NOTE — PROGRESS NOTES
0733: Blood glucose reported at 67. Instructed RN Aurora to give apple juice.    0750: Blood glucose re-checked 82.

## 2024-10-20 NOTE — FLOWSHEET NOTE
Pt A/Ox4. VSS. Denies pain. Pt unlabored; respirations even, regular, effortless. O2 in place, 3 L/NC; no oxygen at baseline. No distress noted. Shift assessment complete. See flowsheet. AM med's given. See MAR. Rash noted to bilateral buttocks, pt reported itching. Denies needs at this time. Telemetry and continuous pulse ox remains in place. Bed alarm on. Side rails 2/4. Bed in low position. Call light within reach.     Bedside Mobility Assessment Tool (BMAT):     Assessment Level 1- Sit and Shake    1. From a semi-reclined position, ask patient to sit up and rotate to a seated position at the side of the bed. Can use the bedrail.    2. Ask patient to reach out and grab your hand and shake making sure patient reaches across his/her midline.   Pass- Patient is able to come to a seated position, maintain core strength. Maintains seated balance while reaching across midline. Move on to Assessment Level 2.     Assessment Level 2- Stretch and Point   1. With patient in seated position at the side of the bed, have patient place both feet on the floor (or stool) with knees no higher than hips.    2. Ask patient to stretch one leg and straighten the knee, then bend the ankle/flex and point the toes. If appropriate, repeat with the other leg.   Pass- Patient is able to demonstrate appropriate quad strength on intended weight bearing limb(s). Move onto Assessment Level 3.     Assessment Level 3- Stand   1. Ask patient to elevate off the bed or chair (seated to standing) using an assistive device (cane, bedrail).    2. Patient should be able to raise buttocks off be and hold for a count of five. May repeat once.   Pass- Patient maintains standing stability for at least 5 seconds, proceed to assessment level 4.    Assessment Level 4- Walk   1. Ask patient to march in place at bedside.    2. Then ask patient to advance step and return each foot. Some medical conditions may render a patient from stepping backwards, use your

## 2024-10-20 NOTE — FLOWSHEET NOTE
10/20/24 1515   Oxygen Therapy   SpO2 98 %   Pulse Oximetry Type Continuous   O2 Device Nasal cannula   O2 Flow Rate (L/min) (S)  2 L/min     Decreased oxygen saturation to 2 L/NC. No distress. O2 saturation maintaining >95%. Continuous pulse ox remains in place.

## 2024-10-20 NOTE — PROGRESS NOTES
Progress Note    Admit Date:  10/18/2024    Patient presented with weakness and fatigue.   Workup in the ER revealed pneumonia-patient admitted started on antibiotics  Hypoxia overnight and currently requiring 2 to 3 L of oxygen  Telemetry showed transient Wenckebach phenomenon-seen by cardiology.  Troponin elevated BNP elevated    Subjective:   10/19  Mr. Trejo seen.  Still looks ill.  On supplemental oxygen 3 L.  However getting frustrated of being here in the hospital.     Plan of care discussed in detail with patient and with patient's son and grandson at bedside     10/20  Patient was seen by therapy SNF recommended will need to discuss this further with patient and family tomorrow.  Shortness of breath seems to be better O2 sats better currently stable on 2 L  Buttock area examined patient has a fungal rash will order topical Mycostatin cream    Lungs still has some crackles on exam without a follow-up chest x-ray        Objective:   BP (!) 147/66   Pulse 73   Temp 98.4 °F (36.9 °C) (Oral)   Resp 18   Ht 1.753 m (5' 9\")   Wt 96.2 kg (212 lb)   SpO2 98%   BMI 31.31 kg/m²     Intake/Output Summary (Last 24 hours) at 10/20/2024 1822  Last data filed at 10/20/2024 1748  Gross per 24 hour   Intake 1778.18 ml   Output 1700 ml   Net 78.18 ml         Physical Exam:  Gen: No distress. Alert.  Awake and well-oriented.  Patient looks ill and fatigued.  He is legally blind, keeps his eyes closed  Eyes: PERRL. No sclera icterus. No conjunctival injection.   ENT: No discharge. Pharynx clear.   Neck: No JVD.  No Carotid Bruit. Trachea midline.  Resp: No accessory muscle use.  Diminished breath sounds with mild rhonchi, bibasilar crackles +.  CV: Regular rate. Regular rhythm. No murmur.  No rub.   GI: Non-tender. Non-distended. No masses. No organomegaly. Normal bowel sounds. No hernia.   Skin: Warm and dry. No nodule on exposed extremities. No rash on exposed extremities.   Buttock region- examined with RN- fungal  only.    Patient Fact Sheet:  https://www.fda.gov/media/728694/download  Provider Fact Sheet: https://www.fda.gov/media/056141/download  EUA: https://www.fda.gov/media/506273/download  IFU: https://www.fda.gov/media/761393/download    Methodology:  RT-PCR          Influenza A NOT DETECTED     Influenza B NOT DETECTED              Radiology  CTA HEAD NECK W CONTRAST   Preliminary Result   No large vessel occlusion in the head or neck.      Partially visualized pleural effusions.      Upper lung ground-glass opacities could represent edema or pneumonia and are   partially imaged.      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  Correlate for any chronic history of pulsatile   tinnitus or symptoms of CSF leak.         CT HEAD WO CONTRAST   Preliminary Result   No acute intracranial abnormality.         XR CHEST PORTABLE   Final Result   Patchy ground-glass opacities in the right upper lobe and left lower lobe   suggesting bilateral pneumonia.             ECHO    Left Ventricle: Hyperdynamic left ventricular systolic function with a visually estimated EF of >65%. Left ventricle is smaller than normal. Mildly increased wall thickness. Findings consistent with mild concentric hypertrophy. Normal wall motion. Indeterminate diastolic function due to mitral valve disease.    Aortic Valve: Trileaflet valve. Mildly thickened cusps. No regurgitation. No stenosis.    Mitral Valve: Severe annular calcification at the posterior leaflet. Moderately calcified leaflets. The posterior leaflet of the mitral valve has restricted mobility. Mild regurgitation. Mild-moderate mitral stenosis noted by doppler evaluation. MV mean gradient is 12 mmHg. MV area by continuity equation is 1.6 cm2. MV area by planimetry is 1.7 cm2.  Suspect elevated mitral valve mean gradient secondary to increased left atrial pressure.  Further evaluation with KAM could be considered.    Tricuspid Valve: Valve

## 2024-10-21 ENCOUNTER — APPOINTMENT (OUTPATIENT)
Dept: GENERAL RADIOLOGY | Age: 84
DRG: 193 | End: 2024-10-21
Payer: OTHER GOVERNMENT

## 2024-10-21 PROBLEM — R53.1 WEAKNESS: Status: ACTIVE | Noted: 2024-10-21

## 2024-10-21 PROBLEM — I10 PRIMARY HYPERTENSION: Status: ACTIVE | Noted: 2024-10-21

## 2024-10-21 PROBLEM — J18.9 PNEUMONIA OF BOTH LUNGS DUE TO INFECTIOUS ORGANISM: Status: ACTIVE | Noted: 2024-10-21

## 2024-10-21 PROBLEM — R09.02 HYPOXIA: Status: ACTIVE | Noted: 2024-10-21

## 2024-10-21 LAB
ALBUMIN SERPL-MCNC: 2.5 G/DL (ref 3.4–5)
ALBUMIN/GLOB SERPL: 0.8 {RATIO} (ref 1.1–2.2)
ALP SERPL-CCNC: 93 U/L (ref 40–129)
ALT SERPL-CCNC: 11 U/L (ref 10–40)
ANION GAP SERPL CALCULATED.3IONS-SCNC: 7 MMOL/L (ref 3–16)
AST SERPL-CCNC: 17 U/L (ref 15–37)
BASOPHILS # BLD: 0 K/UL (ref 0–0.2)
BASOPHILS NFR BLD: 0.4 %
BILIRUB SERPL-MCNC: 0.4 MG/DL (ref 0–1)
BUN SERPL-MCNC: 20 MG/DL (ref 7–20)
CALCIUM SERPL-MCNC: 7.8 MG/DL (ref 8.3–10.6)
CHLORIDE SERPL-SCNC: 103 MMOL/L (ref 99–110)
CO2 SERPL-SCNC: 28 MMOL/L (ref 21–32)
CREAT SERPL-MCNC: 0.8 MG/DL (ref 0.8–1.3)
DEPRECATED RDW RBC AUTO: 14 % (ref 12.4–15.4)
EOSINOPHIL # BLD: 0.5 K/UL (ref 0–0.6)
EOSINOPHIL NFR BLD: 5.9 %
GFR SERPLBLD CREATININE-BSD FMLA CKD-EPI: 86 ML/MIN/{1.73_M2}
GLUCOSE BLD-MCNC: 176 MG/DL (ref 70–99)
GLUCOSE BLD-MCNC: 309 MG/DL (ref 70–99)
GLUCOSE BLD-MCNC: 310 MG/DL (ref 70–99)
GLUCOSE BLD-MCNC: 81 MG/DL (ref 70–99)
GLUCOSE BLD-MCNC: 88 MG/DL (ref 70–99)
GLUCOSE SERPL-MCNC: 99 MG/DL (ref 70–99)
HCT VFR BLD AUTO: 33.6 % (ref 40.5–52.5)
HGB BLD-MCNC: 11.7 G/DL (ref 13.5–17.5)
LYMPHOCYTES # BLD: 1.2 K/UL (ref 1–5.1)
LYMPHOCYTES NFR BLD: 14.3 %
MAGNESIUM SERPL-MCNC: 2.1 MG/DL (ref 1.8–2.4)
MCH RBC QN AUTO: 30.5 PG (ref 26–34)
MCHC RBC AUTO-ENTMCNC: 34.8 G/DL (ref 31–36)
MCV RBC AUTO: 87.6 FL (ref 80–100)
MONOCYTES # BLD: 0.8 K/UL (ref 0–1.3)
MONOCYTES NFR BLD: 9.8 %
NEUTROPHILS # BLD: 6 K/UL (ref 1.7–7.7)
NEUTROPHILS NFR BLD: 69.6 %
PERFORMED ON: ABNORMAL
PERFORMED ON: NORMAL
PERFORMED ON: NORMAL
PLATELET # BLD AUTO: 153 K/UL (ref 135–450)
PMV BLD AUTO: 9.3 FL (ref 5–10.5)
POTASSIUM SERPL-SCNC: 3.3 MMOL/L (ref 3.5–5.1)
PROT SERPL-MCNC: 5.6 G/DL (ref 6.4–8.2)
RBC # BLD AUTO: 3.83 M/UL (ref 4.2–5.9)
SODIUM SERPL-SCNC: 138 MMOL/L (ref 136–145)
T4 FREE SERPL-MCNC: 0.8 NG/DL (ref 0.9–1.8)
WBC # BLD AUTO: 8.6 K/UL (ref 4–11)

## 2024-10-21 PROCEDURE — 97535 SELF CARE MNGMENT TRAINING: CPT

## 2024-10-21 PROCEDURE — 6360000002 HC RX W HCPCS: Performed by: FAMILY MEDICINE

## 2024-10-21 PROCEDURE — 36415 COLL VENOUS BLD VENIPUNCTURE: CPT

## 2024-10-21 PROCEDURE — 6370000000 HC RX 637 (ALT 250 FOR IP): Performed by: INTERNAL MEDICINE

## 2024-10-21 PROCEDURE — 97530 THERAPEUTIC ACTIVITIES: CPT

## 2024-10-21 PROCEDURE — 83036 HEMOGLOBIN GLYCOSYLATED A1C: CPT

## 2024-10-21 PROCEDURE — 94761 N-INVAS EAR/PLS OXIMETRY MLT: CPT

## 2024-10-21 PROCEDURE — 2580000003 HC RX 258: Performed by: FAMILY MEDICINE

## 2024-10-21 PROCEDURE — 71046 X-RAY EXAM CHEST 2 VIEWS: CPT

## 2024-10-21 PROCEDURE — 83735 ASSAY OF MAGNESIUM: CPT

## 2024-10-21 PROCEDURE — 6370000000 HC RX 637 (ALT 250 FOR IP): Performed by: FAMILY MEDICINE

## 2024-10-21 PROCEDURE — 85025 COMPLETE CBC W/AUTO DIFF WBC: CPT

## 2024-10-21 PROCEDURE — 1200000000 HC SEMI PRIVATE

## 2024-10-21 PROCEDURE — 2700000000 HC OXYGEN THERAPY PER DAY

## 2024-10-21 PROCEDURE — 99232 SBSQ HOSP IP/OBS MODERATE 35: CPT | Performed by: INTERNAL MEDICINE

## 2024-10-21 PROCEDURE — 80053 COMPREHEN METABOLIC PANEL: CPT

## 2024-10-21 RX ORDER — IBUPROFEN 600 MG/1
600 TABLET, FILM COATED ORAL ONCE
Status: COMPLETED | OUTPATIENT
Start: 2024-10-21 | End: 2024-10-21

## 2024-10-21 RX ADMIN — AZITHROMYCIN 500 MG: 250 TABLET, FILM COATED ORAL at 05:18

## 2024-10-21 RX ADMIN — METOPROLOL SUCCINATE 25 MG: 25 TABLET, FILM COATED, EXTENDED RELEASE ORAL at 09:45

## 2024-10-21 RX ADMIN — NYSTATIN OINTMENT: 100000 OINTMENT TOPICAL at 12:51

## 2024-10-21 RX ADMIN — FUROSEMIDE 40 MG: 40 TABLET ORAL at 09:45

## 2024-10-21 RX ADMIN — ENOXAPARIN SODIUM 40 MG: 100 INJECTION SUBCUTANEOUS at 09:45

## 2024-10-21 RX ADMIN — INSULIN LISPRO 3 UNITS: 100 INJECTION, SOLUTION INTRAVENOUS; SUBCUTANEOUS at 19:41

## 2024-10-21 RX ADMIN — INSULIN GLARGINE 40 UNITS: 100 INJECTION, SOLUTION SUBCUTANEOUS at 19:41

## 2024-10-21 RX ADMIN — INSULIN LISPRO 3 UNITS: 100 INJECTION, SOLUTION INTRAVENOUS; SUBCUTANEOUS at 16:57

## 2024-10-21 RX ADMIN — ASCORBIC ACID, THIAMINE MONONITRATE,RIBOFLAVIN, NIACINAMIDE, PYRIDOXINE HYDROCHLORIDE, FOLIC ACID, CYANOCOBALAMIN, BIOTIN, CALCIUM PANTOTHENATE, 1 MG: 100; 1.5; 1.7; 20; 10; 1; 6000; 150000; 5 CAPSULE, LIQUID FILLED ORAL at 09:45

## 2024-10-21 RX ADMIN — IBUPROFEN 600 MG: 600 TABLET ORAL at 05:18

## 2024-10-21 RX ADMIN — SODIUM CHLORIDE, PRESERVATIVE FREE 10 ML: 5 INJECTION INTRAVENOUS at 09:46

## 2024-10-21 RX ADMIN — BRIMONIDINE TARTRATE 1 DROP: 2 SOLUTION/ DROPS OPHTHALMIC at 19:50

## 2024-10-21 RX ADMIN — SODIUM CHLORIDE, PRESERVATIVE FREE 10 ML: 5 INJECTION INTRAVENOUS at 19:43

## 2024-10-21 RX ADMIN — NYSTATIN OINTMENT: 100000 OINTMENT TOPICAL at 09:46

## 2024-10-21 RX ADMIN — ATORVASTATIN CALCIUM 80 MG: 40 TABLET, FILM COATED ORAL at 19:41

## 2024-10-21 RX ADMIN — PREGABALIN 50 MG: 25 CAPSULE ORAL at 09:45

## 2024-10-21 RX ADMIN — NYSTATIN OINTMENT: 100000 OINTMENT TOPICAL at 19:50

## 2024-10-21 RX ADMIN — ACETAMINOPHEN 650 MG: 325 TABLET ORAL at 19:48

## 2024-10-21 RX ADMIN — LOSARTAN POTASSIUM 50 MG: 50 TABLET, FILM COATED ORAL at 09:45

## 2024-10-21 RX ADMIN — SODIUM CHLORIDE 1000 MG: 900 INJECTION INTRAVENOUS at 05:17

## 2024-10-21 RX ADMIN — PREGABALIN 50 MG: 25 CAPSULE ORAL at 19:41

## 2024-10-21 RX ADMIN — LOSARTAN POTASSIUM 50 MG: 50 TABLET, FILM COATED ORAL at 19:41

## 2024-10-21 RX ADMIN — METOPROLOL SUCCINATE 25 MG: 25 TABLET, FILM COATED, EXTENDED RELEASE ORAL at 19:41

## 2024-10-21 RX ADMIN — BRIMONIDINE TARTRATE 1 DROP: 2 SOLUTION/ DROPS OPHTHALMIC at 09:46

## 2024-10-21 RX ADMIN — POTASSIUM CHLORIDE 10 MEQ: 10 TABLET, EXTENDED RELEASE ORAL at 09:45

## 2024-10-21 RX ADMIN — ASPIRIN 81 MG: 81 TABLET, CHEWABLE ORAL at 09:45

## 2024-10-21 ASSESSMENT — PAIN DESCRIPTION - ORIENTATION: ORIENTATION: MID

## 2024-10-21 ASSESSMENT — PAIN SCALES - GENERAL
PAINLEVEL_OUTOF10: 3
PAINLEVEL_OUTOF10: 2

## 2024-10-21 ASSESSMENT — PAIN DESCRIPTION - LOCATION: LOCATION: GENERALIZED

## 2024-10-21 ASSESSMENT — PAIN DESCRIPTION - DESCRIPTORS: DESCRIPTORS: ACHING

## 2024-10-21 NOTE — FLOWSHEET NOTE
10/20/24 2200   Vital Signs   Temp 97.7 °F (36.5 °C)   Temp Source Oral   Pulse 74   Heart Rate Source Monitor   Respirations 18   BP (!) 158/79   MAP (Calculated) 105   BP Location Left upper arm   BP Method Automatic   Patient Position Semi fowlers   Pain Assessment   Pain Assessment 0-10   Pain Level 4   Patient's Stated Pain Goal 0 - No pain   Pain Location Foot;Leg   Pain Orientation Left;Right   Pain Descriptors Numbness  (neuropathy)   Functional Pain Assessment Prevents or interferes some active activities and ADLs   Pain Type Neuropathic pain   Non-Pharmaceutical Pain Intervention(s) Declines   Opioid-Induced Sedation   POSS Score 1   RASS   Alvarado Agitation Sedation Scale (RASS) 0   Oxygen Therapy   SpO2 93 %   O2 Device Nasal cannula   O2 Flow Rate (L/min) 2 L/min     Shift assessment completed. Pt is alert and oriented. Vital signs are WNL. Respirations are even & easy. No complaints voiced. Pt denies needs at this time. SR up x 2 and bed in low position. Call light is within reach.

## 2024-10-21 NOTE — PROGRESS NOTES
Shift report given to Brenna at bedside. Patient is stable. All end of shift needs have been met. No further assistance needed at this time.

## 2024-10-21 NOTE — PROGRESS NOTES
Inpatient Physical Therapy Treatment    Unit: 2 [place]  Date:  10/21/2024  Patient Name:    Fernando Trejo  Admitting diagnosis:  Bacterial pneumonia [J15.9]  Elevated troponin [R79.89]  MUNDO (acute kidney injury) ([facility]) [N17.9]  Pneumonia of both lungs due to infectious organism, unspecified part of lung [J18.9]  Admit Date:  10/18/2024  Precautions/Restrictions/WB Status/ Lines/ Wounds/ Oxygen: Fall risk, Bed/chair alarm, Lines (IV, Supplemental O2 (2L), and external catheter), Impaired vision, Telemetry, and Continuous pulse oximetry      Pt seen for cotreatment this date due to patient safety and patient endurance    Treatment Time:  1344 - 1410  Treatment Number:  2  Timed Code Treatment Minutes: 26 minutes  Total Treatment Minutes:  26  minutes    Patient Stated Goals for Therapy: none stated          Discharge Recommendations: SNF  DME needs for discharge: Defer to facility       Therapy recommendation for EMS Transport: can transport by wheelchair    Therapy recommendations for staff:   Assist of 1 for transfers with use of No AD and gait belt to/from BSC  to/from chair    History of Present Illness:   Per admission H&P by Dr. Ayala on 10/18/24:  \"The patient is a 84 y.o. male with osteoarthritis, neuropathy, hypertension, hyperlipidemia, DM type 2, Glaucoma who presents to [facility] with c/o fatigue and shortness of breath.  He is legally blind  Patient states that he just got very weak and could not even get up; family had difficulty getting  him up to a wheelchair-eventually brought into the emergency department  Workup in the ER as below  Vitals stable.  On RA.  Labs with BNP 1,302, troponin 90, WBC 12.9.  imaging with pneumonia.  Admitted for further management/care.  Patient got empiric antibiotics Rocephin and Zithromax.  Patient seen today, looks extremely weak and ill.  But in no distress.  Awake alert and well-oriented.  No fevers, no shortness of breath, sats are stable on room air  He does have

## 2024-10-21 NOTE — CARE COORDINATION
Met with pt and son at bedside. Both agreeable to SNF. Requested [facility]. Referral called to Malissa. M. Awaiting return call.     1349- call back from Malissa at [facility] stating they will not have a bed available until atleast Wednesday this week. Plan to keep her updated with DC plans.

## 2024-10-21 NOTE — PROGRESS NOTES
Progress Note    Admit Date:  10/18/2024    Patient presented with weakness and fatigue.   Workup in the ER revealed pneumonia-patient admitted started on antibiotics  Hypoxia  requiring 2 to 3 L of oxygen  Telemetry showed transient Wenckebach phenomenon-seen by cardiology.  Troponin elevated BNP elevated    Subjective:  Mr. Trejo seen.  Still looks ill.  On supplemental oxygen 3 L.   Weak- Needs SNF         Objective:   /66   Pulse 64   Temp 98 °F (36.7 °C) (Oral)   Resp 16   Ht 1.753 m (5' 9\")   Wt 96.2 kg (212 lb)   SpO2 95%   BMI 31.31 kg/m²     Intake/Output Summary (Last 24 hours) at 10/21/2024 1612  Last data filed at 10/21/2024 0346  Gross per 24 hour   Intake 240 ml   Output 1300 ml   Net -1060 ml         Physical Exam:  Gen: No distress. Alert.  Awake and well-oriented.  Patient looks ill and fatigued.  He is legally blind, keeps his eyes closed  ENT: No discharge. Pharynx clear.   Neck: No JVD.  No Carotid Bruit. Trachea midline.  Resp: No accessory muscle use.  Diminished breath sounds , + rhonchi  CV: Regular rate. Regular rhythm. No murmur.  No rub.   GI: Non-tender. Non-distended. No masses. No organomegaly. Normal bowel sounds. No hernia.   Skin: Warm and dry. No nodule on exposed extremities. No rash on exposed extremities.   Buttock region- examined with RN- fungal rash +  M/S: No cyanosis. No joint deformity. No clubbing.  Bilateral lower extremity 1+ edema with chronic bilateral leg erythema-bilateral legs and Ace wrap now  Neuro: Awake. Grossly nonfocal    Psych: Oriented x 3. No anxiety or agitation        Medications:   Scheduled Meds:   potassium chloride  10 mEq Oral Daily    insulin lispro  0-4 Units SubCUTAneous 4x Daily AC & HS    nystatin   Topical TID    losartan  50 mg Oral BID    metoprolol succinate  25 mg Oral BID    pregabalin  50 mg Oral BID    insulin glargine  40 Units SubCUTAneous BID    brimonidine  1 drop Both Eyes BID    Virt-Caps  1 capsule Oral Daily     pneumonia-likely gram-positive infection.  # History of ILD-noted in records from PCP office  # Hypoxia ; not present on admission.  -Patient was not hypoxic on admission -currently requiring 2 -> 3L of oxygen, continue supplemental oxygen, wean as tolerated  -Follow-up on cultures -negative so far  -Continue IV antibiotics Rocephin, Zithromax D#4  - F/U CXR shows persistent ASD     #Leukocytosis   -due to pneumonia.  -continue antibiotics as above.  -monitor for improvement.  White count is better today     # Generalized weakness  -Likely due to pneumonia  -Cannot rule out cardiac source  -Consult PT OT  -Check TSH.  Slightly elevated at 5.45     #Elevated BNP  #Elevated troponin- type 2 NSTEMI  # Second-degree type I heart block  # Mitral stenosis and moderate pulmonary hypertension  -Seen in consultation by cardiology  -Echocardiogram completed and shows normal EF, mitral stenosis and moderate pulmonary hypertension  -trend troponin - 90, 99   -old records reviewed patient has had an EKG in 2022 which also showed second-degree type I heart block.  -Patient started on Lasix daily per cardiology  Continue to monitor cardiology recommends outpatient follow-up for further evaluation of mitral valve disease      # Chronic leg edema  # Chronic stasis dermatitis  -Will discontinue Norvasc  -Patient does have home care visits, has been getting his legs wrapped.  Wound care consulted ; currently bilateral lower extremities wrapped in Ace wrap  -Started on Lasix daily as above     #DM type 2  -monitor glucose  -continue Lantus 40 units BID, SSI coverage  -Hemoglobin A1c elevated at 8.9%     #Hypertension  -Hold amlodipine due to leg edema  -held Losartan due to renal insufficiency; monitor creatinine-losartan resumed now.  Monitor blood pressure and BMP     #Hyperlipidemia  -on Atorvastatin      #Neuropathy  -continue Lyrica     #Glaucoma   -continue home eye drop regimen     #H/o CVA  -on aspirin, Statin    # fungal rash,

## 2024-10-21 NOTE — FLOWSHEET NOTE
10/21/24 0915   Vital Signs   Temp 97.7 °F (36.5 °C)   Temp Source Oral   Pulse 66   Heart Rate Source Monitor   Respirations 16   /66   MAP (Calculated) 90   BP Location Left upper arm   BP Method Automatic   Patient Position High fowlers   Pain Assessment   Pain Assessment 0-10   Pain Level 2   Patient's Stated Pain Goal 2   Non-Pharmaceutical Pain Intervention(s) Declines   Response to Pain Intervention Patient satisfied   Opioid-Induced Sedation   POSS Score 1   RASS   Alvarado Agitation Sedation Scale (RASS) 0   Oxygen Therapy   SpO2 96 %   O2 Device Nasal cannula   O2 Flow Rate (L/min) 2 L/min     Alert and oriented. Skin w/d. Resp ee unlabored. Assisted patient on bed pan.  Large BM/soft.  Meds given. Nrsg asmt completed. Patient to go down for chest xray.  Call light in easy reach. Bed alarm on.  See flow sheet and MAR.

## 2024-10-21 NOTE — PROGRESS NOTES
Inpatient Occupational Therapy Treatment    Unit: [facility]  Date:  10/21/2024  Patient Name:    Fernando Trejo  Admitting diagnosis:  Bacterial pneumonia [J15.9]  Elevated troponin [R79.89]  MUNDO (acute kidney injury) ([facility]) [N17.9]  Pneumonia of both lungs due to infectious organism, unspecified part of lung [J18.9]  Admit Date:  10/18/2024  Precautions/Restrictions/WB Status/ Lines/ Wounds/ Oxygen: Fall risk, Bed/chair alarm, Lines (IV, Supplemental O2 (2L), and external catheter), Impaired vision, Telemetry, and Continuous pulse oximetry    Pt seen for cotreatment this date due to patient safety, patient endurance, complexity of condition, acute illness/injury, and need for the assistance of 2 skilled therapists    Treatment Time:  6355-7941  Treatment Number:  2  Timed Code Treatment Minutes: 26 minutes  Total Treatment Minutes:  26  minutes    Patient Goals for Therapy: \"be able to go home and get stronger \"          Discharge Recommendations: SNF  DME needs for discharge: Defer to facility       Therapy recommendations for staff:   Assist of 1 for transfers with use of N/A and gait belt to/from BSC  to/from chair    History of Present Illness: Per admission H&P from Jose Enrique Ayala MD on 10/18  \"The patient is a 84 y.o. male with osteoarthritis, neuropathy, hypertension, hyperlipidemia, DM type 2, Glaucoma who presents to [facility] with c/o fatigue and shortness of breath.  He is legally blind  Patient states that he just got very weak and could not even get up; family had difficulty getting  him up to a wheelchair-eventually brought into the emergency department  Workup in the ER as below  Vitals stable.  On RA.  Labs with BNP 1,302, troponin 90, WBC 12.9.  imaging with pneumonia.  Admitted for further management/care.  Patient got empiric antibiotics Rocephin and Zithromax.  Patient seen today, looks extremely weak and ill.  But in no distress.  Awake alert and well-oriented.  No fevers, no shortness of  need     BP (mmHg) HR (bpm) SpO2 (%) on RA Comments   Supine at rest       Seated at EOB       Standing       End of session         Objective:  Does this pt have an acute or acute on chronic diagnosis of CHF? No    Balance:  Sitting:    SBA  Standing:    CGA with gait belt    Bed Mobility:   Supine to Sit:    Min A , HOB elevated to full-range, With cues for hand placement, and With use of hand rail  Sit to Supine:   SBA, HOB flat, and With use of hand rail  Rolling:   Not Tested  Scooting in sitting: Min A   Scooting in supine:  Not Tested    Transfers:    Sit to stand:    CGA with gait belt and HHA  Stand to sit:    CGA and With cues for hand placement and gait belt  Bed to chair:     Not Tested  Bed/ chair to standard toilet:  Not Tested  Bed/chair to BSC:   CGA, Hand Held Assist and gait belt, and With cues for hand placement  Functional Mobility:   CGA, Hand Held Assist and gait belt, Stand pivot, and With cues for hand placement; several steps to/from BSC    ADLs:  Dressing:      UE:   Not Tested  LE:    Not Tested    Bathing:    UE:  Not Tested  LE:  Not Tested    Eating:   Not Tested    Toileting:  Max A to mange clothing and bowel hygiene    Grooming/hygiene: Not Tested    Ther Ex / Activities Initiated:   N/A    Activity Tolerance:   Pt completed therapy session with no adverse symptoms    Positioning Needs:   Pt in bed, alarm set, call light provided and all needs within reach .     Patient/Family Education:   Pt educated on role of inpatient OT, plan of care, importance of continued activity, DC recommendations, Functional transfer/mobility safety, Transfer techniques, and Calling for assist with mobility.    CHF Education  N/A    Assessment:  Pt seen for Occupational therapy followup session in acute care setting.  Pt remains limited by generalized weakness, impaired functional mobility, and low vision. Pt, however, was able to complete bed mobility with decreased levels of assistance today and was

## 2024-10-22 ENCOUNTER — TELEPHONE (OUTPATIENT)
Dept: CARDIOLOGY CLINIC | Age: 84
End: 2024-10-22

## 2024-10-22 VITALS
OXYGEN SATURATION: 94 % | BODY MASS INDEX: 31.4 KG/M2 | RESPIRATION RATE: 14 BRPM | SYSTOLIC BLOOD PRESSURE: 147 MMHG | WEIGHT: 212 LBS | DIASTOLIC BLOOD PRESSURE: 69 MMHG | HEIGHT: 69 IN | TEMPERATURE: 97.9 F | HEART RATE: 71 BPM

## 2024-10-22 LAB
BACTERIA BLD CULT ORG #2: NORMAL
BACTERIA BLD CULT: NORMAL
EST. AVERAGE GLUCOSE BLD GHB EST-MCNC: 205.9 MG/DL
GLUCOSE BLD-MCNC: 164 MG/DL (ref 70–99)
GLUCOSE BLD-MCNC: 174 MG/DL (ref 70–99)
GLUCOSE BLD-MCNC: 76 MG/DL (ref 70–99)
GLUCOSE BLD-MCNC: 82 MG/DL (ref 70–99)
HBA1C MFR BLD: 8.8 %
PERFORMED ON: ABNORMAL
PERFORMED ON: ABNORMAL
PERFORMED ON: NORMAL
PERFORMED ON: NORMAL

## 2024-10-22 PROCEDURE — 94761 N-INVAS EAR/PLS OXIMETRY MLT: CPT

## 2024-10-22 PROCEDURE — 6370000000 HC RX 637 (ALT 250 FOR IP): Performed by: FAMILY MEDICINE

## 2024-10-22 PROCEDURE — 6370000000 HC RX 637 (ALT 250 FOR IP): Performed by: INTERNAL MEDICINE

## 2024-10-22 PROCEDURE — 2580000003 HC RX 258: Performed by: FAMILY MEDICINE

## 2024-10-22 PROCEDURE — 99239 HOSP IP/OBS DSCHRG MGMT >30: CPT | Performed by: INTERNAL MEDICINE

## 2024-10-22 PROCEDURE — 6360000002 HC RX W HCPCS: Performed by: FAMILY MEDICINE

## 2024-10-22 PROCEDURE — 2700000000 HC OXYGEN THERAPY PER DAY

## 2024-10-22 RX ORDER — FUROSEMIDE 40 MG/1
40 TABLET ORAL DAILY
DISCHARGE
Start: 2024-10-23

## 2024-10-22 RX ORDER — POTASSIUM CHLORIDE 750 MG/1
10 TABLET, EXTENDED RELEASE ORAL DAILY
DISCHARGE
Start: 2024-10-23

## 2024-10-22 RX ORDER — POLYETHYLENE GLYCOL 3350 17 G/17G
17 POWDER, FOR SOLUTION ORAL DAILY PRN
DISCHARGE
Start: 2024-10-22

## 2024-10-22 RX ORDER — LOSARTAN POTASSIUM 50 MG/1
50 TABLET ORAL 2 TIMES DAILY
DISCHARGE
Start: 2024-10-22

## 2024-10-22 RX ORDER — ENOXAPARIN SODIUM 100 MG/ML
40 INJECTION SUBCUTANEOUS DAILY
DISCHARGE
Start: 2024-10-23 | End: 2024-10-30

## 2024-10-22 RX ORDER — ALBUTEROL SULFATE 90 UG/1
2 INHALANT RESPIRATORY (INHALATION) 4 TIMES DAILY PRN
DISCHARGE
Start: 2024-10-22

## 2024-10-22 RX ORDER — NYSTATIN 100000 U/G
OINTMENT TOPICAL
DISCHARGE
Start: 2024-10-22

## 2024-10-22 RX ORDER — AZITHROMYCIN 250 MG/1
250 TABLET, FILM COATED ORAL DAILY
DISCHARGE
Start: 2024-10-22 | End: 2024-10-23

## 2024-10-22 RX ORDER — METOPROLOL SUCCINATE 25 MG/1
25 TABLET, EXTENDED RELEASE ORAL 2 TIMES DAILY
DISCHARGE
Start: 2024-10-22

## 2024-10-22 RX ORDER — CEFUROXIME AXETIL 500 MG/1
500 TABLET ORAL 2 TIMES DAILY
DISCHARGE
Start: 2024-10-22 | End: 2024-10-27

## 2024-10-22 RX ADMIN — BRIMONIDINE TARTRATE 1 DROP: 2 SOLUTION/ DROPS OPHTHALMIC at 08:36

## 2024-10-22 RX ADMIN — NYSTATIN OINTMENT: 100000 OINTMENT TOPICAL at 12:47

## 2024-10-22 RX ADMIN — INSULIN GLARGINE 40 UNITS: 100 INJECTION, SOLUTION SUBCUTANEOUS at 08:35

## 2024-10-22 RX ADMIN — SODIUM CHLORIDE, PRESERVATIVE FREE 10 ML: 5 INJECTION INTRAVENOUS at 08:35

## 2024-10-22 RX ADMIN — ASCORBIC ACID, THIAMINE MONONITRATE,RIBOFLAVIN, NIACINAMIDE, PYRIDOXINE HYDROCHLORIDE, FOLIC ACID, CYANOCOBALAMIN, BIOTIN, CALCIUM PANTOTHENATE, 1 MG: 100; 1.5; 1.7; 20; 10; 1; 6000; 150000; 5 CAPSULE, LIQUID FILLED ORAL at 08:35

## 2024-10-22 RX ADMIN — ASPIRIN 81 MG: 81 TABLET, CHEWABLE ORAL at 08:35

## 2024-10-22 RX ADMIN — SODIUM CHLORIDE 1000 MG: 900 INJECTION INTRAVENOUS at 06:06

## 2024-10-22 RX ADMIN — NYSTATIN OINTMENT: 100000 OINTMENT TOPICAL at 08:36

## 2024-10-22 RX ADMIN — POTASSIUM CHLORIDE 10 MEQ: 10 TABLET, EXTENDED RELEASE ORAL at 08:37

## 2024-10-22 RX ADMIN — PREGABALIN 50 MG: 25 CAPSULE ORAL at 08:35

## 2024-10-22 RX ADMIN — ENOXAPARIN SODIUM 40 MG: 100 INJECTION SUBCUTANEOUS at 08:34

## 2024-10-22 RX ADMIN — METOPROLOL SUCCINATE 25 MG: 25 TABLET, FILM COATED, EXTENDED RELEASE ORAL at 08:35

## 2024-10-22 RX ADMIN — LOSARTAN POTASSIUM 50 MG: 50 TABLET, FILM COATED ORAL at 08:35

## 2024-10-22 RX ADMIN — FUROSEMIDE 40 MG: 40 TABLET ORAL at 08:35

## 2024-10-22 ASSESSMENT — PAIN SCALES - GENERAL
PAINLEVEL_OUTOF10: 0
PAINLEVEL_OUTOF10: 0

## 2024-10-22 NOTE — PROGRESS NOTES
Progress Note    Admit Date:  10/18/2024    Patient presented with weakness and fatigue.   Workup in the ER revealed pneumonia-patient admitted started on antibiotics  Hypoxia  requiring 2 to 3 L of oxygen  Telemetry showed transient Wenckebach phenomenon-seen by cardiology.  Troponin elevated BNP elevated    Subjective:  Mr. Trejo seen.  Still looks ill.  On supplemental oxygen 3 L.   Weak- Needs SNF     Objective:   BP (!) 150/74   Pulse 63   Temp 98.8 °F (37.1 °C) (Oral)   Resp 14   Ht 1.753 m (5' 9\")   Wt 96.2 kg (212 lb)   SpO2 98%   BMI 31.31 kg/m²     Intake/Output Summary (Last 24 hours) at 10/22/2024 1239  Last data filed at 10/22/2024 0837  Gross per 24 hour   Intake 1440 ml   Output 700 ml   Net 740 ml         Physical Exam:  Gen: No distress. Alert.  Awake and well-oriented.  Patient looks much better today.  Less fatigued more alert.  No distress  He is legally blind, keeps his eyes closed  ENT: No discharge. Pharynx clear.   Neck: No JVD.  No Carotid Bruit. Trachea midline.  Resp: No accessory muscle use.  Diminished breath sounds -air entry is improved today no wheezes rales or rhonchi today   CV: Regular rate. Regular rhythm. No murmur.  No rub.   GI: Non-tender. Non-distended. No masses. No organomegaly. Normal bowel sounds. No hernia.   Skin: Warm and dry. No nodule on exposed extremities. No rash on exposed extremities.   Buttock region- examined with RN- fungal rash +  M/S: No cyanosis. No joint deformity. No clubbing.  Bilateral lower extremity 1+ edema with chronic bilateral leg erythema-bilateral legs and Ace wrap now  Lower extremity edema has improved  Neuro: Awake. Grossly nonfocal    Psych: Oriented x 3. No anxiety or agitation        Medications:   Scheduled Meds:   potassium chloride  10 mEq Oral Daily    insulin lispro  0-4 Units SubCUTAneous 4x Daily AC & HS    nystatin   Topical TID    losartan  50 mg Oral BID    metoprolol succinate  25 mg Oral BID    pregabalin  50 mg

## 2024-10-22 NOTE — TELEPHONE ENCOUNTER
----- Message from Dr. Kali Cuevas MD sent at 10/19/2024  9:24 AM EDT -----  Regarding: clinic follow-up  Correction, clinic follow-up with Dr Davis in 2-4 weeks

## 2024-10-22 NOTE — CARE COORDINATION
DISCHARGE ORDER  Date/Time 10/22/2024 1:24 PM  Completed by: Clari Walker RN, Case Management    Patient Name: Fernando Trejo    : 1940      Admit order Date and Status:10/18/24 IP  Noted discharge order. (verify MD's last order for status of admission/Traditional Medicare 3 [place] Inpatient qualifying stay required for SNF)    Confirmed discharge plan with:              Patient:  Yes              When pt confirms DC plan does any support person need to be contacted by CM Yes if yes Donal-son                   Discharge to Facility: [facility]   Facility phone number for staff giving report: [phone]   Pre-certification completed: na. Able to DC to facility today per [facility] Exemption Notification (HENS) completed: yes   Discharge orders and Continuity of Care faxed to facility:  [facility]      Transportation:               Medical Transport explained with choice list offered to pt/family.                Choice:(no preference)  Agency used: [facility]   time:   1530      Pt/family/Nursing/Facility aware of  time: 1530  Yes Names: Fernando Montague David, Danielle, Yamel  Ambulance form completed:  yes:      Date Last IMM Given: 10/22    Comments:    Pt is being d/c'd to [facility] today. Pt's O2 sats are 98% on 2L.    Discharge timeout done with Pt, CM, RN. All discharge needs and concerns addressed.    Discharging nurse to complete BEA, reconcile AVS, and place final copy with patient's discharge packet. Discharging RN to ensure that written prescriptions for  Level II medications are sent with patient to the facility as per protocol.

## 2024-10-22 NOTE — PLAN OF CARE
Problem: Chronic Conditions and Co-morbidities  Goal: Patient's chronic conditions and co-morbidity symptoms are monitored and maintained or improved  10/20/2024 2303 by Tomas Storm RN  Outcome: Progressing  10/20/2024 1851 by Savannah Gonzales RN  Outcome: Progressing     Problem: Discharge Planning  Goal: Discharge to home or other facility with appropriate resources  10/20/2024 2303 by Tomas Storm RN  Outcome: Progressing  10/20/2024 1851 by Savannah Gonzales RN  Outcome: Progressing     Problem: Skin/Tissue Integrity  Goal: Absence of new skin breakdown  Description: 1.  Monitor for areas of redness and/or skin breakdown  2.  Assess vascular access sites hourly  3.  Every 4-6 hours minimum:  Change oxygen saturation probe site  4.  Every 4-6 hours:  If on nasal continuous positive airway pressure, respiratory therapy assess nares and determine need for appliance change or resting period.  10/20/2024 2303 by Tomas Storm RN  Outcome: Progressing  10/20/2024 1851 by Savannah Gonzales RN  Outcome: Progressing     Problem: Safety - Adult  Goal: Free from fall injury  10/20/2024 2303 by Tomas Storm RN  Outcome: Progressing  10/20/2024 1851 by Savannah Gonzales RN  Outcome: Progressing     Problem: ABCDS Injury Assessment  Goal: Absence of physical injury  10/20/2024 2303 by Tomas Storm RN  Outcome: Progressing  10/20/2024 1851 by Savannah Gonzales RN  Outcome: Progressing     Problem: Pain  Goal: Verbalizes/displays adequate comfort level or baseline comfort level  10/20/2024 2303 by Tomas Storm RN  Outcome: Progressing  10/20/2024 1851 by Savannah Gonzales RN  Outcome: Progressing  Flowsheets (Taken 10/20/2024 1117)  Verbalizes/displays adequate comfort level or baseline comfort level:   Encourage patient to monitor pain and request assistance   Assess pain using appropriate pain scale     Problem: Neurosensory - Adult  Goal: Achieves maximal functionality and self care  10/20/2024 2303 by 
  Problem: Chronic Conditions and Co-morbidities  Goal: Patient's chronic conditions and co-morbidity symptoms are monitored and maintained or improved  10/21/2024 2307 by Maria Elena Ramires RN  Outcome: Progressing  10/21/2024 1413 by Brenna Mehta RN  Outcome: Progressing     Problem: Discharge Planning  Goal: Discharge to home or other facility with appropriate resources  10/21/2024 2307 by Maria Elena Ramires RN  Outcome: Progressing  10/21/2024 1413 by Brenna Mehta RN  Outcome: Progressing     Problem: Skin/Tissue Integrity  Goal: Absence of new skin breakdown  Description: 1.  Monitor for areas of redness and/or skin breakdown  2.  Assess vascular access sites hourly  3.  Every 4-6 hours minimum:  Change oxygen saturation probe site  4.  Every 4-6 hours:  If on nasal continuous positive airway pressure, respiratory therapy assess nares and determine need for appliance change or resting period.  10/21/2024 2307 by Maria Elena Ramires RN  Outcome: Progressing  10/21/2024 1413 by Brenna Mehta RN  Outcome: Progressing     Problem: Safety - Adult  Goal: Free from fall injury  10/21/2024 2307 by Maria Elena Ramires RN  Outcome: Progressing  10/21/2024 1413 by Brenna Mehta RN  Outcome: Progressing     Problem: ABCDS Injury Assessment  Goal: Absence of physical injury  10/21/2024 2307 by Maria Elena Ramires RN  Outcome: Progressing  10/21/2024 1413 by Brenna Mehta RN  Outcome: Progressing     Problem: Pain  Goal: Verbalizes/displays adequate comfort level or baseline comfort level  10/21/2024 2307 by Maria Elena Ramires RN  Outcome: Progressing  10/21/2024 1413 by Brenna Mehta RN  Outcome: Progressing     Problem: Neurosensory - Adult  Goal: Achieves maximal functionality and self care  10/21/2024 2307 by Maria Elena Ramires RN  Outcome: Progressing  10/21/2024 1413 by Brenna Mehta RN  Outcome: Progressing     Problem: Respiratory - Adult  Goal: Achieves optimal ventilation and oxygenation  10/21/2024 2307 by Ike 
  Problem: Chronic Conditions and Co-morbidities  Goal: Patient's chronic conditions and co-morbidity symptoms are monitored and maintained or improved  Outcome: Progressing     Problem: Discharge Planning  Goal: Discharge to home or other facility with appropriate resources  Outcome: Progressing     Problem: Skin/Tissue Integrity  Goal: Absence of new skin breakdown  Description: 1.  Monitor for areas of redness and/or skin breakdown  2.  Assess vascular access sites hourly  3.  Every 4-6 hours minimum:  Change oxygen saturation probe site  4.  Every 4-6 hours:  If on nasal continuous positive airway pressure, respiratory therapy assess nares and determine need for appliance change or resting period.  Outcome: Progressing     Problem: Safety - Adult  Goal: Free from fall injury  Outcome: Progressing     Problem: ABCDS Injury Assessment  Goal: Absence of physical injury  Outcome: Progressing     
  Problem: Chronic Conditions and Co-morbidities  Goal: Patient's chronic conditions and co-morbidity symptoms are monitored and maintained or improved  Outcome: Progressing     Problem: Discharge Planning  Goal: Discharge to home or other facility with appropriate resources  Outcome: Progressing     Problem: Skin/Tissue Integrity  Goal: Absence of new skin breakdown  Description: 1.  Monitor for areas of redness and/or skin breakdown  2.  Assess vascular access sites hourly  3.  Every 4-6 hours minimum:  Change oxygen saturation probe site  4.  Every 4-6 hours:  If on nasal continuous positive airway pressure, respiratory therapy assess nares and determine need for appliance change or resting period.  Outcome: Progressing     Problem: Safety - Adult  Goal: Free from fall injury  Outcome: Progressing     Problem: ABCDS Injury Assessment  Goal: Absence of physical injury  Outcome: Progressing     Problem: Pain  Goal: Verbalizes/displays adequate comfort level or baseline comfort level  Outcome: Progressing     Problem: Neurosensory - Adult  Goal: Achieves maximal functionality and self care  Outcome: Progressing     Problem: Respiratory - Adult  Goal: Achieves optimal ventilation and oxygenation  Outcome: Progressing     Problem: Cardiovascular - Adult  Goal: Maintains optimal cardiac output and hemodynamic stability  Outcome: Progressing  Goal: Absence of cardiac dysrhythmias or at baseline  Outcome: Progressing     Problem: Skin/Tissue Integrity - Adult  Goal: Skin integrity remains intact  Outcome: Progressing     Problem: Musculoskeletal - Adult  Goal: Return mobility to safest level of function  Outcome: Progressing  Goal: Return ADL status to a safe level of function  Outcome: Progressing     Problem: Metabolic/Fluid and Electrolytes - Adult  Goal: Glucose maintained within prescribed range  Outcome: Progressing     Problem: Genitourinary - Adult  Goal: Absence of urinary retention  Outcome: Progressing     
  Problem: Chronic Conditions and Co-morbidities  Goal: Patient's chronic conditions and co-morbidity symptoms are monitored and maintained or improved  Outcome: Progressing     Problem: Discharge Planning  Goal: Discharge to home or other facility with appropriate resources  Outcome: Progressing     Problem: Skin/Tissue Integrity  Goal: Absence of new skin breakdown  Description: 1.  Monitor for areas of redness and/or skin breakdown  2.  Assess vascular access sites hourly  3.  Every 4-6 hours minimum:  Change oxygen saturation probe site  4.  Every 4-6 hours:  If on nasal continuous positive airway pressure, respiratory therapy assess nares and determine need for appliance change or resting period.  Outcome: Progressing     Problem: Safety - Adult  Goal: Free from fall injury  Outcome: Progressing     Problem: ABCDS Injury Assessment  Goal: Absence of physical injury  Outcome: Progressing     Problem: Pain  Goal: Verbalizes/displays adequate comfort level or baseline comfort level  Outcome: Progressing     Problem: Neurosensory - Adult  Goal: Achieves maximal functionality and self care  Outcome: Progressing     Problem: Respiratory - Adult  Goal: Achieves optimal ventilation and oxygenation  Outcome: Progressing     Problem: Cardiovascular - Adult  Goal: Maintains optimal cardiac output and hemodynamic stability  Outcome: Progressing  Goal: Absence of cardiac dysrhythmias or at baseline  Outcome: Progressing     Problem: Skin/Tissue Integrity - Adult  Goal: Skin integrity remains intact  Outcome: Progressing     Problem: Musculoskeletal - Adult  Goal: Return mobility to safest level of function  Outcome: Progressing  Goal: Return ADL status to a safe level of function  Outcome: Progressing     Problem: Metabolic/Fluid and Electrolytes - Adult  Goal: Glucose maintained within prescribed range  Outcome: Progressing     Problem: Genitourinary - Adult  Goal: Absence of urinary retention  Outcome: Progressing     
  Problem: Chronic Conditions and Co-morbidities  Goal: Patient's chronic conditions and co-morbidity symptoms are monitored and maintained or improved  Outcome: Progressing     Problem: Discharge Planning  Goal: Discharge to home or other facility with appropriate resources  Outcome: Progressing     Problem: Skin/Tissue Integrity  Goal: Absence of new skin breakdown  Description: 1.  Monitor for areas of redness and/or skin breakdown  2.  Assess vascular access sites hourly  3.  Every 4-6 hours minimum:  Change oxygen saturation probe site  4.  Every 4-6 hours:  If on nasal continuous positive airway pressure, respiratory therapy assess nares and determine need for appliance change or resting period.  Outcome: Progressing     Problem: Safety - Adult  Goal: Free from fall injury  Outcome: Progressing     Problem: ABCDS Injury Assessment  Goal: Absence of physical injury  Outcome: Progressing     Problem: Pain  Goal: Verbalizes/displays adequate comfort level or baseline comfort level  Outcome: Progressing  Flowsheets (Taken 10/20/2024 1117)  Verbalizes/displays adequate comfort level or baseline comfort level:   Encourage patient to monitor pain and request assistance   Assess pain using appropriate pain scale     Problem: Neurosensory - Adult  Goal: Achieves maximal functionality and self care  Outcome: Progressing     Problem: Respiratory - Adult  Goal: Achieves optimal ventilation and oxygenation  Outcome: Progressing     Problem: Cardiovascular - Adult  Goal: Maintains optimal cardiac output and hemodynamic stability  Outcome: Progressing  Goal: Absence of cardiac dysrhythmias or at baseline  Outcome: Progressing     Problem: Skin/Tissue Integrity - Adult  Goal: Skin integrity remains intact  Outcome: Progressing     Problem: Musculoskeletal - Adult  Goal: Return mobility to safest level of function  Outcome: Progressing  Goal: Return ADL status to a safe level of function  Outcome: Progressing     Problem: 
  Problem: Chronic Conditions and Co-morbidities  Goal: Patient's chronic conditions and co-morbidity symptoms are monitored and maintained or improved  Outcome: Progressing  Flowsheets (Taken 10/18/2024 1310)  Care Plan - Patient's Chronic Conditions and Co-Morbidity Symptoms are Monitored and Maintained or Improved:   Monitor and assess patient's chronic conditions and comorbid symptoms for stability, deterioration, or improvement   Collaborate with multidisciplinary team to address chronic and comorbid conditions and prevent exacerbation or deterioration  Note: Cardiology consulted     Problem: Skin/Tissue Integrity  Goal: Absence of new skin breakdown  Description: 1.  Monitor for areas of redness and/or skin breakdown  2.  Assess vascular access sites hourly  3.  Every 4-6 hours minimum:  Change oxygen saturation probe site  4.  Every 4-6 hours:  If on nasal continuous positive airway pressure, respiratory therapy assess nares and determine need for appliance change or resting period.  Outcome: Progressing     Problem: Safety - Adult  Goal: Free from fall injury  Outcome: Progressing  Flowsheets (Taken 10/18/2024 1310)  Free From Fall Injury: Instruct family/caregiver on patient safety  Note: PT/OT consulted     Problem: ABCDS Injury Assessment  Goal: Absence of physical injury  Outcome: Progressing  Flowsheets (Taken 10/18/2024 1310)  Absence of Physical Injury: Implement safety measures based on patient assessment     
  Problem: Pain  Goal: Verbalizes/displays adequate comfort level or baseline comfort level  Outcome: Progressing     
ventilation and oxygenation  10/20/2024 0209 by Alysa Huerta RN  Outcome: Progressing  10/19/2024 1850 by Savannah Gonzales RN  Outcome: Progressing     Problem: Cardiovascular - Adult  Goal: Maintains optimal cardiac output and hemodynamic stability  10/20/2024 0209 by Alysa Huerta RN  Outcome: Progressing  10/19/2024 1850 by Savannah Gonzales RN  Outcome: Progressing  Goal: Absence of cardiac dysrhythmias or at baseline  10/20/2024 0209 by Alysa Huerta RN  Outcome: Progressing  10/19/2024 1850 by Savannah Gonzales RN  Outcome: Progressing     Problem: Skin/Tissue Integrity - Adult  Goal: Skin integrity remains intact  10/20/2024 0209 by Alysa Huerta RN  Outcome: Progressing  10/19/2024 1850 by Savannah Gonzales RN  Outcome: Progressing     Problem: Musculoskeletal - Adult  Goal: Return mobility to safest level of function  10/20/2024 0209 by Alysa Huerta RN  Outcome: Progressing  10/19/2024 1850 by Savannah Gonzales RN  Outcome: Progressing  Goal: Return ADL status to a safe level of function  10/20/2024 0209 by Alysa Huerta RN  Outcome: Progressing  10/19/2024 1850 by Savannah Gonzales RN  Outcome: Progressing     Problem: Metabolic/Fluid and Electrolytes - Adult  Goal: Glucose maintained within prescribed range  10/20/2024 0209 by Alysa Huerta RN  Outcome: Progressing  10/19/2024 1850 by Savannah Gonzales RN  Outcome: Progressing     Problem: Genitourinary - Adult  Goal: Absence of urinary retention  10/20/2024 0209 by Alysa Huerta RN  Outcome: Progressing  10/19/2024 1850 by Savannah Gonzales RN  Outcome: Progressing

## 2024-10-22 NOTE — PROGRESS NOTES
Resting in bed awake. Lying on left side. No needs at present time. Call light in reach. Bed alarm in place and turned on.

## 2024-10-22 NOTE — DISCHARGE INSTRUCTIONS
Your information:  Name: Fernando Trejo  : 1940    Your instructions:    Follow instructions below.    What to do after you leave the hospital:    Recommended diet: diabetic diet    Recommended activity: activity as tolerated        The following personal items were collected during your admission and were returned to you:    Belongings  Dental Appliances: Uppers, Lowers  Vision - Corrective Lenses: None  Hearing Aid: None  Clothing: At bedside  Jewelry: None  Electronic Devices: None  Weapons (Notify Protective Services/Security): None  Other Valuables: At home  Home Medications: None  Valuables Given To: Other (Comment) (na)  Provide Name(s) of Who Valuable(s) Were Given To: na    Information obtained by:  By signing below, I understand that if any problems occur once I leave the hospital I am to contact MD.  I understand and acknowledge receipt of the instructions indicated above.

## 2024-10-22 NOTE — DISCHARGE INSTR - COC
Continuity of Care Form    Patient Name: Fernando Trejo   :  1940  MRN:  [id number]    Admit date:  10/18/2024  Discharge date:  10/22/2024    Code Status Order: Full Code   Advance Directives:   Advance Care Flowsheet Documentation             Admitting Physician:  Karlie Singh DO  PCP: Debbie Lizama, APRN - CNP    Discharging Nurse: Yamel CHIU  Discharging Hospital Unit/Room#: 0219/0219-01  Discharging Unit Phone Number: [phone]    Emergency Contact:   Extended Emergency Contact Information  Primary Emergency Contact: Celia Trejo  Address: [address]           [place] [address] [facility]  Home Phone: [phone]  Work Phone: [phone]  Relation: Spouse  Secondary Emergency Contact: Donal Trejo  Address: [address]           [place] [address]  Home Phone: [phone]  Relation: Child    Past Surgical History:  Past Surgical History:   Procedure Laterality Date    COLON SURGERY      COLONOSCOPY      CYSTOSCOPY  2012    with stent placement    EYE SURGERY      left eyemed valve, bilateral retinal repair    FOOT SURGERY      right, tendon repair    GLAUCOMA SURGERY N/A     AHMED GLAUCOMA VALVE/Model S-2/SN [id number]/MRI Safe per [employer]    KIDNEY STONE SURGERY      OTHER SURGICAL HISTORY Left 2017    CYSTOSCOPY, LEFT URETEROSCOPY, STENT PLACEMENT, URETHERAL dilation, stone manipulation        Immunization History:   Immunization History   Administered Date(s) Administered    Influenza Virus Vaccine 2001    Influenza, FLUZONE High Dose (age 65 y+), IM, Quadv, 0.7mL 10/22/2020    Pneumococcal, PCV-13, PREVNAR 13, (age 6w+), IM, 0.5mL 2019    Pneumococcal, PPSV23, PNEUMOVAX 23, (age 2y+), SC/IM, 0.5mL 2022    TDaP, ADACEL (age 10y-64y), BOOSTRIX (age 10y+), IM, 0.5mL 2013    Td vaccine (adult) 2013       Active Problems:  Patient Active Problem List   Diagnosis Code    Renal colic on left  (1800kcals/day)    Routes of Feeding: Oral  Liquids: No Restrictions  Daily Fluid Restriction: no  Last Modified Barium Swallow with Video (Video Swallowing Test): not done    Treatments at the Time of Hospital Discharge:   Respiratory Treatments:   Oxygen Therapy:  2 liters per nasal cannula  Ventilator:    - No ventilator support    Rehab Therapies: Physical Therapy and Occupational Therapy  Weight Bearing Status/Restrictions: No weight bearing restrictions  Other Medical Equipment (for information only, NOT a DME order):    Other Treatments:     Patient's personal belongings (please select all that are sent with patient):  None    RN SIGNATURE:  Electronically signed by Yamel Chiang RN on 10/22/24 at 12:45 PM EDT    CASE MANAGEMENT/SOCIAL WORK SECTION    Inpatient Status Date: 10/1/24 IP    Readmission Risk Assessment Score: 16%  Readmission Risk              Risk of Unplanned Readmission:  16           Discharging to Facility/ Agency   Name: [facility]  Address:  Phone:[phone]  Fax:        / signature: Electronically signed by Clari Walker RN on 10/22/24 at 1:11 PM EDT    PHYSICIAN SECTION    Prognosis: Good    Condition at Discharge: Stable    Rehab Potential (if transferring to Rehab): Good    Recommended Labs or Other Treatments After Discharge:   O2 2L. Wean as tolerated    Physician Certification: I certify the above information and transfer of Fernando Trejo  is necessary for the continuing treatment of the diagnosis listed and that he requires Skilled Nursing Facility for less 30 days.     Update Admission H&P: No change in H&P    PHYSICIAN SIGNATURE:  Electronically signed by Jose Enrique Ayala MD on 10/22/24 at 12:47 PM EDT

## 2024-10-22 NOTE — PROGRESS NOTES
Resting in chair awake. Alert and oriented. Am assessment complete. Chair alarm in place and turn on. Call light in reach.    Bedside Mobility Assessment Tool (BMAT):     Assessment Level 1- Sit and Shake    1. From a semi-reclined position, ask patient to sit up and rotate to a seated position at the side of the bed. Can use the bedrail.    2. Ask patient to reach out and grab your hand and shake making sure patient reaches across his/her midline.   Pass- Patient is able to come to a seated position, maintain core strength. Maintains seated balance while reaching across midline. Move on to Assessment Level 2.     Assessment Level 2- Stretch and Point   1. With patient in seated position at the side of the bed, have patient place both feet on the floor (or stool) with knees no higher than hips.    2. Ask patient to stretch one leg and straighten the knee, then bend the ankle/flex and point the toes. If appropriate, repeat with the other leg.   Pass- Patient is able to demonstrate appropriate quad strength on intended weight bearing limb(s). Move onto Assessment Level 3.     Assessment Level 3- Stand   1. Ask patient to elevate off the bed or chair (seated to standing) using an assistive device (cane, bedrail).    2. Patient should be able to raise buttocks off be and hold for a count of five. May repeat once.   Pass- Patient maintains standing stability for at least 5 seconds, proceed to assessment level 4.    Assessment Level 4- Walk   1. Ask patient to march in place at bedside.    2. Then ask patient to advance step and return each foot. Some medical conditions may render a patient from stepping backwards, use your best clinical judgement.   Pass- Patient demonstrates balance while shifting weight and ability to step, takes independent steps, does not use assistive device patient is MOBILITY LEVEL 4.      Mobility Level- 4

## 2024-10-22 NOTE — PROGRESS NOTES
4 Eyes Skin Assessment     NAME:  Fernando Trejo  YOB: 1940  MEDICAL RECORD NUMBER:  [id number]    The patient is being assessed for  Other discharge    I agree that at least one RN has performed a thorough Head to Toe Skin Assessment on the patient. ALL assessment sites listed below have been assessed.      Areas assessed by both nurses:    Head, Face, Ears, Shoulders, Back, Chest, Arms, Elbows, Hands, Sacrum. Buttock, Coccyx, Ischium, Legs. Feet and Heels, and Under Medical Devices                           Does the Patient have a Wound? Yes wound(s) were present on assessment. LDA wound assessment was Initiated and completed by RN       Marcelo Prevention initiated by RN: Yes  Wound Care Orders initiated by RN: Yes    Pressure Injury (Stage 3,4, Unstageable, DTI, NWPT, and Complex wounds) if present, place Wound referral order by RN under : No    New Ostomies, if present place, Ostomy referral order under : No     Nurse 1 eSignature: Electronically signed by Yamel Chiang RN on 10/22/24 at 1:02 PM EDT    **SHARE this note so that the co-signing nurse can place an eSignature**    Nurse 2 eSignature: Electronically signed by Julia Jean RN on 10/22/24 at 2:16 PM EDT

## 2024-10-22 NOTE — DISCHARGE SUMMARY
Name:  Fernando Trejo  Room:  0219/0219-01  MRN:    [id number]    Discharge Summary      This discharge summary is in conjunction with a complete physical exam done on the day of discharge.    Attending Physician: Dr. Singh  Discharging Physician: Dr. Ayala      Admit: 10/18/2024  Discharge:  10/22/2024    HPI:    The patient is a 84 y.o. male with osteoarthritis, neuropathy, hypertension, hyperlipidemia, DM type 2, Glaucoma who presents to [facility] with c/o fatigue and shortness of breath.  He is legally blind  Patient states that he just got very weak and could not even get up; family had difficulty getting  him up to a wheelchair-eventually brought into the emergency department  Workup in the ER as below  Vitals stable.  On RA.  Labs with BNP 1,302, troponin 90, WBC 12.9.  imaging with pneumonia.  Admitted for further management/care.  Patient got empiric antibiotics Rocephin and Zithromax.  Patient seen today, looks extremely weak and ill.  But in no distress.  Awake alert and well-oriented.  No fevers, no shortness of breath, sats are stable on room air  He does have chronic bilateral lower extremity edema with erythema-he states that this is actually better.  Uses leg pumps at home.  BNP and troponin were elevated, troponin pending.  Patient denies any chest pain denies any history of heart disease.  EKG shows sinus rhythm with first-degree AV block however telemetry right now shows sinus rhythm with possibly second-degree AV block repeat EKG ordered.      Diagnoses this Admission and Hospital Course     # Bilateral community-acquired pneumonia-likely gram-positive infection.  # History of ILD-noted in records from PCP office  # Hypoxia ; not present on admission.  -Patient was not hypoxic on admission -currently requiring 2 -> 3L of oxygen, continue supplemental oxygen, wean as tolerated-O2 currently on 2 L  -Follow-up on cultures -negative so far  -Continue IV antibiotics Rocephin, Zithromax D#5  - F/U

## 2025-01-27 ENCOUNTER — OFFICE VISIT (OUTPATIENT)
Dept: CARDIOLOGY CLINIC | Age: 85
End: 2025-01-27
Payer: MEDICARE

## 2025-01-27 VITALS
BODY MASS INDEX: 30.14 KG/M2 | SYSTOLIC BLOOD PRESSURE: 170 MMHG | WEIGHT: 203.5 LBS | HEIGHT: 69 IN | HEART RATE: 61 BPM | DIASTOLIC BLOOD PRESSURE: 74 MMHG | OXYGEN SATURATION: 98 %

## 2025-01-27 DIAGNOSIS — H40.9 GLAUCOMA OF BOTH EYES, UNSPECIFIED GLAUCOMA TYPE: ICD-10-CM

## 2025-01-27 DIAGNOSIS — I34.2 NONRHEUMATIC MITRAL VALVE STENOSIS: Primary | ICD-10-CM

## 2025-01-27 DIAGNOSIS — Z79.4 TYPE 2 DIABETES MELLITUS WITH HYPERGLYCEMIA, WITH LONG-TERM CURRENT USE OF INSULIN (HCC): ICD-10-CM

## 2025-01-27 DIAGNOSIS — E78.5 DYSLIPIDEMIA: ICD-10-CM

## 2025-01-27 DIAGNOSIS — I44.30 AV BLOCK: ICD-10-CM

## 2025-01-27 DIAGNOSIS — I10 PRIMARY HYPERTENSION: ICD-10-CM

## 2025-01-27 DIAGNOSIS — E11.65 TYPE 2 DIABETES MELLITUS WITH HYPERGLYCEMIA, WITH LONG-TERM CURRENT USE OF INSULIN (HCC): ICD-10-CM

## 2025-01-27 PROCEDURE — 1159F MED LIST DOCD IN RCRD: CPT | Performed by: NURSE PRACTITIONER

## 2025-01-27 PROCEDURE — 99214 OFFICE O/P EST MOD 30 MIN: CPT | Performed by: NURSE PRACTITIONER

## 2025-01-27 PROCEDURE — 1036F TOBACCO NON-USER: CPT | Performed by: NURSE PRACTITIONER

## 2025-01-27 PROCEDURE — G8427 DOCREV CUR MEDS BY ELIG CLIN: HCPCS | Performed by: NURSE PRACTITIONER

## 2025-01-27 PROCEDURE — G8417 CALC BMI ABV UP PARAM F/U: HCPCS | Performed by: NURSE PRACTITIONER

## 2025-01-27 PROCEDURE — 1160F RVW MEDS BY RX/DR IN RCRD: CPT | Performed by: NURSE PRACTITIONER

## 2025-01-27 PROCEDURE — 1123F ACP DISCUSS/DSCN MKR DOCD: CPT | Performed by: NURSE PRACTITIONER

## 2025-01-27 NOTE — PATIENT INSTRUCTIONS
Continue current doses of losartan and metoprolol  Consider taking the furosemide (Lasix) along with potassium at night while wearing a Pull UP - talk to your PCP  Follow up here only if needed for chest pain, shortness of breath or concern of worsening mitral valve disease

## 2025-01-27 NOTE — PROGRESS NOTES
[facility]   Cardiac Evaluation    Primary Care Doctor:  Debbie Lizama, APRN - CNP    Chief Complaint   Patient presents with    Follow-Up from Hospital    Hypertension        Assessment:    1. Nonrheumatic mitral valve stenosis    2. AV block    3. Type 2 diabetes mellitus with hyperglycemia, with long-term current use of insulin (HCC)    4. Glaucoma of both eyes, unspecified glaucoma type    5. Primary hypertension    6. Dyslipidemia      Patient asked multiple appropriate questions about evaluation and symptoms of mitral valve disease.  He also questions manner of repair or replacement.  At this time since he is not having any symptoms and does not want to pursue any surgical procedures, he declines further evaluation of mitral valve disease.    Plan:   Continue current doses of losartan and metoprolol  Consider taking the furosemide (Lasix) along with potassium at night while wearing a Pull UP - talk to your PCP  Follow up here only if needed for chest pain, shortness of breath or concern of worsening mitral valve disease    Vitals:    01/27/25 1534 01/27/25 1550   BP: (!) 190/86 (!) 170/74   Pulse: 61    SpO2: 98%    Weight: 92.3 kg (203 lb 8 oz)    Height: 1.753 m (5' 9.02\")        Primary Cardiologist: Dr. Surya Davis     History of Present Illness:   I had the pleasure of seeing Fernando Trejo (84 y.o.) in follow up for Mobitz 1 AV block, hypertension, hyperlipidemia, T2DM as well as glaucoma with blindness, ILD and chronic venous insufficiency.  He was hospitalized in October and treated for pneumonia.  Cardiology consulted for abnormal EKG (AV block), elevated BNP and mitral stenosis.  KAM was recommended as outpatient once he recovered from the pneumonia to further evaluate the mitral valve.    BP at home was 134/67 this am.   He checks it twice daily.   He denies any symptoms of chest pain, shortness of breath, lightheadedness or lethargy.  He has lower extremity edema with weeping and

## 2025-03-05 ENCOUNTER — HOSPITAL ENCOUNTER (EMERGENCY)
Age: 85
Discharge: HOME | End: 2025-03-05
Payer: OTHER GOVERNMENT

## 2025-03-05 ENCOUNTER — APPOINTMENT (OUTPATIENT)
Dept: GENERAL RADIOLOGY | Age: 85
End: 2025-03-05
Payer: OTHER GOVERNMENT

## 2025-03-05 VITALS
SYSTOLIC BLOOD PRESSURE: 136 MMHG | DIASTOLIC BLOOD PRESSURE: 65 MMHG | RESPIRATION RATE: 18 BRPM | TEMPERATURE: 98.8 F | OXYGEN SATURATION: 97 % | HEART RATE: 56 BPM

## 2025-03-05 DIAGNOSIS — K56.41 FECAL IMPACTION (HCC): Primary | ICD-10-CM

## 2025-03-05 PROCEDURE — 99283 EMERGENCY DEPT VISIT LOW MDM: CPT

## 2025-03-05 PROCEDURE — 74019 RADEX ABDOMEN 2 VIEWS: CPT

## 2025-03-05 RX ORDER — DOCUSATE SODIUM 100 MG/1
100 CAPSULE, LIQUID FILLED ORAL 2 TIMES DAILY
Qty: 60 CAPSULE | Refills: 0 | Status: SHIPPED | OUTPATIENT
Start: 2025-03-05 | End: 2025-04-04

## 2025-03-05 RX ORDER — SENNOSIDES A AND B 8.6 MG/1
1 TABLET, FILM COATED ORAL 2 TIMES DAILY
Qty: 60 TABLET | Refills: 11 | Status: SHIPPED | OUTPATIENT
Start: 2025-03-05 | End: 2026-03-05

## 2025-03-05 RX ORDER — POLYETHYLENE GLYCOL 3350 17 G/17G
17 POWDER, FOR SOLUTION ORAL DAILY PRN
Qty: 527 G | Refills: 0 | Status: SHIPPED | OUTPATIENT
Start: 2025-03-05

## 2025-03-05 ASSESSMENT — PAIN SCALES - GENERAL: PAINLEVEL_OUTOF10: 4

## 2025-03-05 ASSESSMENT — PAIN DESCRIPTION - LOCATION: LOCATION: RECTUM

## 2025-03-05 NOTE — ED PROVIDER NOTES
07:10:19 PM   DISPOSITION CONDITION Stable           PATIENT REFERRED TO:  Debbie Lizama APRN - CNP  [address] [id number]  [phone]    Schedule an appointment as soon as possible for a visit       [facility] Emergency Department  [address] [place] [id number] [phone]  Go to   If symptoms worsen      DISCHARGE MEDICATIONS:  Discharge Medication List as of 3/5/2025  7:51 PM        START taking these medications    Details   docusate sodium (COLACE) 100 MG capsule Take 1 capsule by mouth 2 times daily, Disp-60 capsule, R-0Normal      senna (SENOKOT) 8.6 MG tablet Take 1 tablet by mouth 2 times daily, Disp-60 tablet, R-11Normal             DISCONTINUED MEDICATIONS:  Discharge Medication List as of 3/5/2025  7:51 PM                 (Please note that portions of this note were completed with a voice recognition program.  Efforts were made to edit the dictations but occasionally words are mis-transcribed.)    TRESSA Mitchell CNP (electronically signed)      Lito Wilson APRN - CNP  03/06/25 2415

## 2025-05-22 ENCOUNTER — HOSPITAL ENCOUNTER (EMERGENCY)
Age: 85
Discharge: HOME | End: 2025-05-22
Attending: EMERGENCY MEDICINE
Payer: OTHER GOVERNMENT

## 2025-05-22 ENCOUNTER — APPOINTMENT (OUTPATIENT)
Dept: GENERAL RADIOLOGY | Age: 85
End: 2025-05-22
Payer: OTHER GOVERNMENT

## 2025-05-22 VITALS
DIASTOLIC BLOOD PRESSURE: 53 MMHG | SYSTOLIC BLOOD PRESSURE: 123 MMHG | RESPIRATION RATE: 20 BRPM | HEART RATE: 85 BPM | OXYGEN SATURATION: 96 % | WEIGHT: 208 LBS | BODY MASS INDEX: 30.81 KG/M2 | HEIGHT: 69 IN | TEMPERATURE: 98 F

## 2025-05-22 DIAGNOSIS — J69.0 ASPIRATION PNEUMONIA, UNSPECIFIED ASPIRATION PNEUMONIA TYPE, UNSPECIFIED LATERALITY, UNSPECIFIED PART OF LUNG (HCC): ICD-10-CM

## 2025-05-22 DIAGNOSIS — K59.00 CONSTIPATION, UNSPECIFIED CONSTIPATION TYPE: Primary | ICD-10-CM

## 2025-05-22 PROCEDURE — 6370000000 HC RX 637 (ALT 250 FOR IP)

## 2025-05-22 PROCEDURE — 6370000000 HC RX 637 (ALT 250 FOR IP): Performed by: EMERGENCY MEDICINE

## 2025-05-22 PROCEDURE — 74018 RADEX ABDOMEN 1 VIEW: CPT

## 2025-05-22 PROCEDURE — 71045 X-RAY EXAM CHEST 1 VIEW: CPT

## 2025-05-22 PROCEDURE — 99283 EMERGENCY DEPT VISIT LOW MDM: CPT

## 2025-05-22 RX ORDER — CEFDINIR 300 MG/1
CAPSULE ORAL
Status: COMPLETED
Start: 2025-05-22 | End: 2025-05-22

## 2025-05-22 RX ORDER — CEFDINIR 300 MG/1
300 CAPSULE ORAL 2 TIMES DAILY
Status: DISCONTINUED | OUTPATIENT
Start: 2025-05-22 | End: 2025-05-22 | Stop reason: HOSPADM

## 2025-05-22 RX ORDER — CEFDINIR 300 MG/1
300 CAPSULE ORAL 2 TIMES DAILY
Qty: 9 CAPSULE | Refills: 0 | Status: SHIPPED | OUTPATIENT
Start: 2025-05-22 | End: 2025-05-27

## 2025-05-22 RX ORDER — AZITHROMYCIN 250 MG/1
500 TABLET, FILM COATED ORAL ONCE
Status: COMPLETED | OUTPATIENT
Start: 2025-05-22 | End: 2025-05-22

## 2025-05-22 RX ORDER — AZITHROMYCIN 250 MG/1
250 TABLET, FILM COATED ORAL DAILY
Qty: 4 TABLET | Refills: 0 | Status: SHIPPED | OUTPATIENT
Start: 2025-05-22 | End: 2025-05-26

## 2025-05-22 RX ADMIN — AZITHROMYCIN DIHYDRATE 500 MG: 250 TABLET ORAL at 05:55

## 2025-05-22 RX ADMIN — LIDOCAINE HYDROCHLORIDE: 20 SOLUTION ORAL at 04:15

## 2025-05-22 RX ADMIN — CEFDINIR 300 MG: 300 CAPSULE ORAL at 05:54

## 2025-05-22 ASSESSMENT — PAIN - FUNCTIONAL ASSESSMENT: PAIN_FUNCTIONAL_ASSESSMENT: 0-10

## 2025-05-22 ASSESSMENT — PAIN DESCRIPTION - PAIN TYPE: TYPE: ACUTE PAIN

## 2025-05-22 NOTE — DISCHARGE INSTRUCTIONS
Try taking over-the-counter 250 mg magnesium oxide twice a day as needed for constipation in addition to your current regimen.    If you really want to have a bowel movement you can do the liquid magnesium citrate and drink half the bottle.    XR ABDOMEN (KUB) (SINGLE AP VIEW)   Final Result   Moderate constipation.         XR CHEST PORTABLE   Final Result   Basilar opacities, left greater than right. Findings are concerning for   aspiration or pneumonia.

## 2025-05-22 NOTE — ED PROVIDER NOTES
[facility] EMERGENCY DEPARTMENT  EMERGENCY DEPARTMENTENCOUNTER      Pt Name: Fernando Trejo  MRN: [id number]  Birthdate 1940  Date ofevaluation: 5/22/2025  Provider: Mellissa Casas MD    CHIEF COMPLAINT       Chief Complaint   Patient presents with    Aspiration    Constipation     Brought in by his son, Pt stated that he was eating asparagus and it got stucked in his throat and tried to cough it out and now he has sore throat. Pt stated that he is having constipation for 2 days         HISTORY OF PRESENT ILLNESS   (Location/Symptom, Timing/Onset,Context/Setting, Quality, Duration, Modifying Factors, Severity)  Note limiting factors.   Fernando Trejo is a 85 y.o. male  who  has a past medical history of COVID-19, Diabetes mellitus (HCC), Glaucoma, Hyperlipidemia, Hypertension, Kidney stone, Neuropathy, Osteoarthrosis, hip, and Prepatellar bursitis.  who presents to the emergency department with concerns of aspiration and constipation.  Patient states that tonight he was eating asparagus around 2000, when he felt as though it got stuck.  He states he tried to cough it out.  He states ever since then his throat has been irritated and he has had increase in phlegm.  He states he has been swallowing water with no issue.  He states he believes that he either injured his esophagus or that it might be stuck in his throat.  He does not feel as though it is in his lungs.  He states that he has been having constipation for 2 days.  He had a small bowel movement today.  He is concerned that he could have a fecal impaction again like he did the last time.  He states sometimes he needs to get disimpacted or have an enema.  He denies abdominal pain, nausea, vomiting, shortness of breath.  History obtained from the patient and his son.  He presents with acute illness and chronic illness with exacerbation.      NursingNotes were reviewed.    REVIEW OF SYSTEMS    (2-9 systems for level 4, 10 or more for level 5)     Review  as though there is something stuck.  Patient was given GI cocktail but still feels that sensation.  Patient was given azithromycin and Omnicef for pneumonia as we do not have Vantin on formulary.  He swallowed the pills without issue.  He states he still feels like there is something in there.  He drank half a bottle of water without issue.  I discussed if he still felt that way that we could bring him into the hospital and potentially have GI do EGD to look.  Patient states that he will see how he does at home and return if he has worsening symptoms.  He did not want magnesium citrate or lactulose for his constipation.  Suggested adding over-the-counter magnesium oxide to see if that helps as he is already on MiraLAX and Colace.  The patient is agreeable with plan of care and disposition.      REASSESSMENT      The same    CONSULTS:  None    PROCEDURES:  Unless otherwise noted below, none     Procedures    FINAL IMPRESSION      1. Constipation, unspecified constipation type    2. Aspiration pneumonia, unspecified aspiration pneumonia type, unspecified laterality, unspecified part of lung (HCC)          DISPOSITION/PLAN   DISPOSITION Decision To Discharge 05/22/2025 05:59:08 AM   DISPOSITION CONDITION Stable           PATIENT REFERREDTO:  Debbie Lizama, APRN - CNP  [facility] [address] [place] [id number]  [phone]    Schedule an appointment as soon as possible for a visit       [facility] Emergency Department  [address] [place] [address]  [phone]    If symptoms worsen      DISCHARGEMEDICATIONS:  Discharge Medication List as of 5/22/2025  5:52 AM        START taking these medications    Details   azithromycin (ZITHROMAX) 250 MG tablet Take 1 tablet by mouth daily for 4 days 500mg on day 1 followed by 250mg on days 2 - 5, Disp-4 tablet, R-0Normal      cefdinir (OMNICEF) 300 MG capsule Take 1 capsule by mouth 2 times daily for 5 days, Disp-9 capsule, R-0Normal                (Please note that

## 2025-06-10 ENCOUNTER — APPOINTMENT (OUTPATIENT)
Dept: GENERAL RADIOLOGY | Age: 85
End: 2025-06-10
Payer: OTHER GOVERNMENT

## 2025-06-10 ENCOUNTER — APPOINTMENT (OUTPATIENT)
Dept: CT IMAGING | Age: 85
End: 2025-06-10
Attending: EMERGENCY MEDICINE
Payer: OTHER GOVERNMENT

## 2025-06-10 ENCOUNTER — HOSPITAL ENCOUNTER (EMERGENCY)
Age: 85
Discharge: HOME | End: 2025-06-10
Attending: EMERGENCY MEDICINE
Payer: OTHER GOVERNMENT

## 2025-06-10 VITALS
OXYGEN SATURATION: 98 % | HEART RATE: 64 BPM | SYSTOLIC BLOOD PRESSURE: 163 MMHG | RESPIRATION RATE: 15 BRPM | DIASTOLIC BLOOD PRESSURE: 88 MMHG | TEMPERATURE: 98.1 F

## 2025-06-10 DIAGNOSIS — R53.1 GENERAL WEAKNESS: Primary | ICD-10-CM

## 2025-06-10 DIAGNOSIS — R01.1 MURMUR, CARDIAC: ICD-10-CM

## 2025-06-10 DIAGNOSIS — J18.9 ATYPICAL PNEUMONIA: ICD-10-CM

## 2025-06-10 LAB
ALBUMIN SERPL-MCNC: 3.7 G/DL (ref 3.4–5)
ALBUMIN/GLOB SERPL: 1.4 {RATIO} (ref 1.1–2.2)
ALP SERPL-CCNC: 111 U/L (ref 40–129)
ALT SERPL-CCNC: 27 U/L (ref 10–40)
ANION GAP SERPL CALCULATED.3IONS-SCNC: 10 MMOL/L (ref 3–16)
AST SERPL-CCNC: 23 U/L (ref 15–37)
BACTERIA URNS QL MICRO: ABNORMAL /HPF
BASOPHILS # BLD: 0.1 K/UL (ref 0–0.2)
BASOPHILS NFR BLD: 0.8 %
BILIRUB SERPL-MCNC: 0.4 MG/DL (ref 0–1)
BILIRUB UR QL STRIP.AUTO: NEGATIVE
BUN SERPL-MCNC: 17 MG/DL (ref 7–20)
CALCIUM SERPL-MCNC: 8.9 MG/DL (ref 8.3–10.6)
CHLORIDE SERPL-SCNC: 107 MMOL/L (ref 99–110)
CLARITY UR: CLEAR
CO2 SERPL-SCNC: 28 MMOL/L (ref 21–32)
COLOR UR: YELLOW
CREAT SERPL-MCNC: 0.9 MG/DL (ref 0.8–1.3)
DEPRECATED RDW RBC AUTO: 13.9 % (ref 12.4–15.4)
EKG ATRIAL RATE: 62 BPM
EKG DIAGNOSIS: NORMAL
EKG P AXIS: 28 DEGREES
EKG P-R INTERVAL: 230 MS
EKG Q-T INTERVAL: 416 MS
EKG QRS DURATION: 80 MS
EKG QTC CALCULATION (BAZETT): 422 MS
EKG R AXIS: -6 DEGREES
EKG T AXIS: 10 DEGREES
EKG VENTRICULAR RATE: 62 BPM
EOSINOPHIL # BLD: 0.3 K/UL (ref 0–0.6)
EOSINOPHIL NFR BLD: 3.7 %
GFR SERPLBLD CREATININE-BSD FMLA CKD-EPI: 83 ML/MIN/{1.73_M2}
GLUCOSE BLD-MCNC: 120 MG/DL (ref 70–99)
GLUCOSE SERPL-MCNC: 123 MG/DL (ref 70–99)
GLUCOSE UR STRIP.AUTO-MCNC: NEGATIVE MG/DL
HCT VFR BLD AUTO: 45.3 % (ref 40.5–52.5)
HGB BLD-MCNC: 15.6 G/DL (ref 13.5–17.5)
HGB UR QL STRIP.AUTO: NEGATIVE
KETONES UR STRIP.AUTO-MCNC: NEGATIVE MG/DL
LACTATE BLDV-SCNC: 1 MMOL/L (ref 0.4–1.9)
LEUKOCYTE ESTERASE UR QL STRIP.AUTO: NEGATIVE
LYMPHOCYTES # BLD: 1.5 K/UL (ref 1–5.1)
LYMPHOCYTES NFR BLD: 21.4 %
MCH RBC QN AUTO: 31.4 PG (ref 26–34)
MCHC RBC AUTO-ENTMCNC: 34.4 G/DL (ref 31–36)
MCV RBC AUTO: 91.2 FL (ref 80–100)
MONOCYTES # BLD: 0.5 K/UL (ref 0–1.3)
MONOCYTES NFR BLD: 7.5 %
NEUTROPHILS # BLD: 4.5 K/UL (ref 1.7–7.7)
NEUTROPHILS NFR BLD: 66.6 %
NITRITE UR QL STRIP.AUTO: NEGATIVE
NT-PROBNP SERPL-MCNC: 259 PG/ML (ref 0–449)
PERFORMED ON: ABNORMAL
PH UR STRIP.AUTO: 6 [PH] (ref 5–8)
PLATELET # BLD AUTO: 119 K/UL (ref 135–450)
PMV BLD AUTO: 9.7 FL (ref 5–10.5)
POTASSIUM SERPL-SCNC: 4.4 MMOL/L (ref 3.5–5.1)
PROCALCITONIN SERPL IA-MCNC: 0.04 NG/ML (ref 0–0.15)
PROT SERPL-MCNC: 6.4 G/DL (ref 6.4–8.2)
PROT UR STRIP.AUTO-MCNC: 30 MG/DL
RBC # BLD AUTO: 4.97 M/UL (ref 4.2–5.9)
RBC #/AREA URNS HPF: ABNORMAL /HPF (ref 0–4)
SODIUM SERPL-SCNC: 145 MMOL/L (ref 136–145)
SP GR UR STRIP.AUTO: 1.01 (ref 1–1.03)
TROPONIN, HIGH SENSITIVITY: 60 NG/L (ref 0–22)
TROPONIN, HIGH SENSITIVITY: 70 NG/L (ref 0–22)
UA COMPLETE W REFLEX CULTURE PNL UR: ABNORMAL
UA DIPSTICK W REFLEX MICRO PNL UR: YES
URN SPEC COLLECT METH UR: ABNORMAL
UROBILINOGEN UR STRIP-ACNC: 0.2 E.U./DL
WBC # BLD AUTO: 6.8 K/UL (ref 4–11)
WBC #/AREA URNS HPF: ABNORMAL /HPF (ref 0–5)

## 2025-06-10 PROCEDURE — 83605 ASSAY OF LACTIC ACID: CPT

## 2025-06-10 PROCEDURE — 81001 URINALYSIS AUTO W/SCOPE: CPT

## 2025-06-10 PROCEDURE — 85025 COMPLETE CBC W/AUTO DIFF WBC: CPT

## 2025-06-10 PROCEDURE — 93005 ELECTROCARDIOGRAM TRACING: CPT | Performed by: EMERGENCY MEDICINE

## 2025-06-10 PROCEDURE — 99285 EMERGENCY DEPT VISIT HI MDM: CPT

## 2025-06-10 PROCEDURE — 84484 ASSAY OF TROPONIN QUANT: CPT

## 2025-06-10 PROCEDURE — 71045 X-RAY EXAM CHEST 1 VIEW: CPT

## 2025-06-10 PROCEDURE — 6370000000 HC RX 637 (ALT 250 FOR IP): Performed by: EMERGENCY MEDICINE

## 2025-06-10 PROCEDURE — 6360000004 HC RX CONTRAST MEDICATION: Performed by: EMERGENCY MEDICINE

## 2025-06-10 PROCEDURE — 80053 COMPREHEN METABOLIC PANEL: CPT

## 2025-06-10 PROCEDURE — 93010 ELECTROCARDIOGRAM REPORT: CPT | Performed by: INTERNAL MEDICINE

## 2025-06-10 PROCEDURE — 83880 ASSAY OF NATRIURETIC PEPTIDE: CPT

## 2025-06-10 PROCEDURE — 71260 CT THORAX DX C+: CPT

## 2025-06-10 PROCEDURE — 84145 PROCALCITONIN (PCT): CPT

## 2025-06-10 RX ORDER — LEVOFLOXACIN 750 MG/1
750 TABLET, FILM COATED ORAL DAILY
Qty: 5 TABLET | Refills: 0 | Status: SHIPPED | OUTPATIENT
Start: 2025-06-10 | End: 2025-06-15

## 2025-06-10 RX ORDER — LEVOFLOXACIN 500 MG/1
750 TABLET, FILM COATED ORAL ONCE
Status: COMPLETED | OUTPATIENT
Start: 2025-06-10 | End: 2025-06-10

## 2025-06-10 RX ORDER — METOPROLOL SUCCINATE 50 MG/1
25 TABLET, EXTENDED RELEASE ORAL ONCE
Status: COMPLETED | OUTPATIENT
Start: 2025-06-10 | End: 2025-06-10

## 2025-06-10 RX ORDER — LOSARTAN POTASSIUM 25 MG/1
50 TABLET ORAL ONCE
Status: COMPLETED | OUTPATIENT
Start: 2025-06-10 | End: 2025-06-10

## 2025-06-10 RX ORDER — IOPAMIDOL 755 MG/ML
75 INJECTION, SOLUTION INTRAVASCULAR
Status: COMPLETED | OUTPATIENT
Start: 2025-06-10 | End: 2025-06-10

## 2025-06-10 RX ADMIN — IOPAMIDOL 75 ML: 755 INJECTION, SOLUTION INTRAVENOUS at 17:15

## 2025-06-10 RX ADMIN — LEVOFLOXACIN 750 MG: 500 TABLET, FILM COATED ORAL at 19:18

## 2025-06-10 RX ADMIN — LOSARTAN POTASSIUM 50 MG: 25 TABLET, FILM COATED ORAL at 19:18

## 2025-06-10 RX ADMIN — METOPROLOL SUCCINATE 25 MG: 50 TABLET, EXTENDED RELEASE ORAL at 19:17

## 2025-06-10 ASSESSMENT — PAIN SCALES - GENERAL: PAINLEVEL_OUTOF10: 0

## 2025-06-10 ASSESSMENT — PAIN - FUNCTIONAL ASSESSMENT: PAIN_FUNCTIONAL_ASSESSMENT: 0-10

## 2025-06-10 NOTE — ED NOTES
Patient's pants and briefs changed due to incontinence. Urine sample obtained using urinal and sent to lab.

## 2025-06-10 NOTE — ED PROVIDER NOTES
Emergency Department Provider Note  Location: [facility] EMERGENCY DEPARTMENT  6/10/2025     Patient Identification  Fernando Trejo is a 85 y.o. male    Chief Complaint  Fatigue (Increased over past 2 days, from home, home health nurse there today, )          HPI  (History provided by patient)  Patient is an 85-year-old male with complex medical history who presents with general fatigue over the past 2 days.  He is wheelchair-bound and has diabetic neuropathy of the lower extremities and gets home care daily and wound care for lower extremities.  Was noted to be borderline bradycardic by nurse.  Patient states for the past 2 days he is more fatigued with position changes and with trying to stand up from seated position.  He denies any unilateral weakness or change in sensation or vertiginous symptoms.  No chest pain no shortness of breath.      Nursing Notes were all reviewed and agreed with, or any disagreements were addressed in the HPI:  Allergies: No Known Allergies    Past medical history:  has a past medical history of COVID-19 (08/04/2021), Diabetes mellitus (HCC), Glaucoma, Hyperlipidemia, Hypertension, Kidney stone, Neuropathy, Osteoarthrosis, hip (03/21/2016), and Prepatellar bursitis (03/21/2016).    Past surgical history:  has a past surgical history that includes eye surgery; Kidney stone surgery; Foot surgery; Colonoscopy (2008); Cystoscopy (02/02/2012); other surgical history (Left, 07/17/2017); Colon surgery; and Glaucoma surgery (N/A).    Home medications:   Prior to Admission medications    Medication Sig Start Date End Date Taking? Authorizing Provider   levoFLOXacin (LEVAQUIN) 750 MG tablet Take 1 tablet by mouth daily for 5 days 6/10/25 6/15/25 Yes Lonnie Quigley MD   polyethylene glycol (GLYCOLAX) 17 g packet Take 1 packet by mouth daily as needed for Constipation 3/5/25   Lito Wilson APRN - CNP   senna (SENOKOT) 8.6 MG tablet Take 1 tablet by mouth 2 times daily 3/5/25 3/5/26

## 2025-06-11 NOTE — ED NOTES
Written and verbal discharge provided to patient and family member, patient verbalizes understanding. IV removed, no complications, dressing applied. Legs wrapped with ace wraps bilaterally. Patient assisted into wheelchair, GCS 15, no acute signs or symptoms of distress. Patient discharged at this time with his son.

## 2025-06-27 ENCOUNTER — TRANSCRIBE ORDERS (OUTPATIENT)
Dept: ADMINISTRATIVE | Age: 85
End: 2025-06-27

## 2025-06-27 DIAGNOSIS — R01.1 HEART MURMUR: Primary | ICD-10-CM

## 2025-07-11 ENCOUNTER — HOSPITAL ENCOUNTER (OUTPATIENT)
Age: 85
Discharge: HOME | End: 2025-07-13
Payer: MEDICARE

## 2025-07-11 VITALS
DIASTOLIC BLOOD PRESSURE: 88 MMHG | WEIGHT: 208 LBS | HEIGHT: 69 IN | BODY MASS INDEX: 30.81 KG/M2 | SYSTOLIC BLOOD PRESSURE: 163 MMHG

## 2025-07-11 DIAGNOSIS — R01.1 HEART MURMUR: ICD-10-CM

## 2025-07-11 LAB
ECHO AO ASC DIAM: 3.4 CM
ECHO AO ASCENDING AORTA INDEX: 1.62 CM/M2
ECHO AO ROOT DIAM: 3.4 CM
ECHO AO ROOT INDEX: 1.62 CM/M2
ECHO AR MAX VEL PISA: 4.1 M/S
ECHO AV AREA PEAK VELOCITY: 2.5 CM2
ECHO AV AREA VTI: 2.7 CM2
ECHO AV AREA/BSA PEAK VELOCITY: 1.2 CM2/M2
ECHO AV AREA/BSA VTI: 1.3 CM2/M2
ECHO AV MEAN GRADIENT: 3 MMHG
ECHO AV MEAN VELOCITY: 0.9 M/S
ECHO AV PEAK GRADIENT: 6 MMHG
ECHO AV PEAK VELOCITY: 1.2 M/S
ECHO AV VELOCITY RATIO: 0.67
ECHO AV VTI: 29.4 CM
ECHO BSA: 2.14 M2
ECHO EST RA PRESSURE: 8 MMHG
ECHO IVC EXP: 1.7 CM
ECHO IVC INSP: 1.4 CM
ECHO LA AREA 2C: 20.6 CM2
ECHO LA AREA 4C: 20.6 CM2
ECHO LA MAJOR AXIS: 6.5 CM
ECHO LA MINOR AXIS: 5.6 CM
ECHO LA VOL BP: 61 ML (ref 18–58)
ECHO LA VOL MOD A2C: 61 ML (ref 18–58)
ECHO LA VOL MOD A4C: 63 ML (ref 18–58)
ECHO LA VOL/BSA BIPLANE: 29 ML/M2 (ref 16–34)
ECHO LA VOLUME INDEX MOD A2C: 29 ML/M2 (ref 16–34)
ECHO LA VOLUME INDEX MOD A4C: 30 ML/M2 (ref 16–34)
ECHO LV E' LATERAL VELOCITY: 7.07 CM/S
ECHO LV E' SEPTAL VELOCITY: 5 CM/S
ECHO LV EDV A2C: 62 ML
ECHO LV EDV A4C: 61 ML
ECHO LV EDV INDEX A4C: 29 ML/M2
ECHO LV EDV NDEX A2C: 30 ML/M2
ECHO LV EF PHYSICIAN: 63 %
ECHO LV EJECTION FRACTION A2C: 60 %
ECHO LV EJECTION FRACTION A4C: 71 %
ECHO LV EJECTION FRACTION BIPLANE: 65 % (ref 55–100)
ECHO LV ESV A2C: 25 ML
ECHO LV ESV A4C: 18 ML
ECHO LV ESV INDEX A2C: 12 ML/M2
ECHO LV ESV INDEX A4C: 9 ML/M2
ECHO LV FRACTIONAL SHORTENING: 40 % (ref 28–44)
ECHO LV INTERNAL DIMENSION DIASTOLE INDEX: 1.9 CM/M2
ECHO LV INTERNAL DIMENSION DIASTOLIC: 4 CM (ref 4.2–5.9)
ECHO LV INTERNAL DIMENSION SYSTOLIC INDEX: 1.14 CM/M2
ECHO LV INTERNAL DIMENSION SYSTOLIC: 2.4 CM
ECHO LV ISOVOLUMETRIC RELAXATION TIME (IVRT): 82 MS
ECHO LV IVSD: 1.3 CM (ref 0.6–1)
ECHO LV MASS 2D: 186.5 G (ref 88–224)
ECHO LV MASS INDEX 2D: 88.8 G/M2 (ref 49–115)
ECHO LV POSTERIOR WALL DIASTOLIC: 1.3 CM (ref 0.6–1)
ECHO LV RELATIVE WALL THICKNESS RATIO: 0.65
ECHO LVOT AREA: 3.8 CM2
ECHO LVOT AV VTI INDEX: 0.73
ECHO LVOT DIAM: 2.2 CM
ECHO LVOT MEAN GRADIENT: 2 MMHG
ECHO LVOT PEAK GRADIENT: 3 MMHG
ECHO LVOT PEAK VELOCITY: 0.8 M/S
ECHO LVOT STROKE VOLUME INDEX: 38.9 ML/M2
ECHO LVOT SV: 81.7 ML
ECHO LVOT VTI: 21.5 CM
ECHO MV A VELOCITY: 1.8 M/S
ECHO MV AREA PHT: 1.7 CM2
ECHO MV AREA VTI: 1.6 CM2
ECHO MV E DECELERATION TIME (DT): 351 MS
ECHO MV E VELOCITY: 1.3 M/S
ECHO MV E/A RATIO: 0.72
ECHO MV E/E' LATERAL: 18.39
ECHO MV E/E' RATIO (AVERAGED): 22.19
ECHO MV E/E' SEPTAL: 26
ECHO MV LVOT VTI INDEX: 2.41
ECHO MV MAX VELOCITY: 1.8 M/S
ECHO MV MEAN GRADIENT: 5 MMHG
ECHO MV MEAN VELOCITY: 1.1 M/S
ECHO MV PEAK GRADIENT: 12 MMHG
ECHO MV PRESSURE HALF TIME (PHT): 126 MS
ECHO MV REGURGITANT PEAK GRADIENT: 13 MMHG
ECHO MV REGURGITANT PEAK VELOCITY: 1.8 M/S
ECHO MV REGURGITANT VTIA: 50.9 CM
ECHO MV VTI: 51.8 CM
ECHO PV MAX VELOCITY: 1 M/S
ECHO PV MEAN GRADIENT: 2 MMHG
ECHO PV MEAN VELOCITY: 0.7 M/S
ECHO PV PEAK GRADIENT: 4 MMHG
ECHO PV VTI: 25.2 CM
ECHO RA AREA 4C: 18 CM2
ECHO RA END SYSTOLIC VOLUME APICAL 4 CHAMBER INDEX BSA: 21 ML/M2
ECHO RA VOLUME: 45 ML
ECHO RIGHT VENTRICULAR SYSTOLIC PRESSURE (RVSP): 31 MMHG
ECHO RV BASAL DIMENSION: 4.5 CM
ECHO RV FREE WALL PEAK S': 9.7 CM/S
ECHO RV LONGITUDINAL DIMENSION: 7.3 CM
ECHO RV MID DIMENSION: 3.5 CM
ECHO RV TAPSE: 2.7 CM (ref 1.7–?)
ECHO TV REGURGITANT MAX VELOCITY: 2.4 M/S
ECHO TV REGURGITANT PEAK GRADIENT: 23 MMHG

## 2025-07-11 PROCEDURE — 93306 TTE W/DOPPLER COMPLETE: CPT

## 2025-07-11 PROCEDURE — 93306 TTE W/DOPPLER COMPLETE: CPT | Performed by: INTERNAL MEDICINE
